# Patient Record
Sex: FEMALE | Race: WHITE | NOT HISPANIC OR LATINO | Employment: OTHER | URBAN - METROPOLITAN AREA
[De-identification: names, ages, dates, MRNs, and addresses within clinical notes are randomized per-mention and may not be internally consistent; named-entity substitution may affect disease eponyms.]

---

## 2019-07-02 ENCOUNTER — OFFICE VISIT (OUTPATIENT)
Dept: CARDIOLOGY | Facility: CLINIC | Age: 64
End: 2019-07-02

## 2019-07-02 VITALS
BODY MASS INDEX: 24.86 KG/M2 | OXYGEN SATURATION: 96 % | HEART RATE: 75 BPM | WEIGHT: 164 LBS | HEIGHT: 68 IN | SYSTOLIC BLOOD PRESSURE: 143 MMHG | DIASTOLIC BLOOD PRESSURE: 84 MMHG

## 2019-07-02 DIAGNOSIS — E78.5 DYSLIPIDEMIA: ICD-10-CM

## 2019-07-02 DIAGNOSIS — R07.89 CHEST PAIN, ATYPICAL: Primary | ICD-10-CM

## 2019-07-02 DIAGNOSIS — I10 ESSENTIAL HYPERTENSION: ICD-10-CM

## 2019-07-02 DIAGNOSIS — I20.8 OTHER FORMS OF ANGINA PECTORIS (HCC): ICD-10-CM

## 2019-07-02 DIAGNOSIS — J41.0 SIMPLE CHRONIC BRONCHITIS (HCC): ICD-10-CM

## 2019-07-02 PROCEDURE — 93000 ELECTROCARDIOGRAM COMPLETE: CPT | Performed by: INTERNAL MEDICINE

## 2019-07-02 PROCEDURE — 99204 OFFICE O/P NEW MOD 45 MIN: CPT | Performed by: INTERNAL MEDICINE

## 2019-07-02 RX ORDER — CLONAZEPAM 1 MG/1
TABLET ORAL
Refills: 0 | Status: ON HOLD | COMMUNITY
Start: 2019-06-03 | End: 2020-10-30

## 2019-07-02 RX ORDER — ATENOLOL 25 MG/1
25 TABLET ORAL DAILY
Refills: 0 | COMMUNITY
Start: 2019-04-29 | End: 2022-10-18 | Stop reason: HOSPADM

## 2019-07-02 RX ORDER — MELOXICAM 15 MG/1
TABLET ORAL
Refills: 0 | COMMUNITY
Start: 2019-06-19 | End: 2019-07-02

## 2019-07-02 RX ORDER — SIMVASTATIN 80 MG
TABLET ORAL
Refills: 0 | Status: ON HOLD | COMMUNITY
Start: 2019-05-14 | End: 2020-10-30

## 2019-07-02 RX ORDER — LISINOPRIL 20 MG/1
20 TABLET ORAL DAILY
Refills: 0 | COMMUNITY
Start: 2019-04-29

## 2019-07-02 RX ORDER — LURASIDONE HYDROCHLORIDE 60 MG/1
TABLET, FILM COATED ORAL
Refills: 0 | Status: ON HOLD | COMMUNITY
Start: 2019-05-15 | End: 2020-10-30

## 2019-07-02 RX ORDER — DILTIAZEM HYDROCHLORIDE 60 MG/1
2 TABLET, FILM COATED ORAL
Refills: 0 | COMMUNITY
Start: 2019-06-26 | End: 2022-10-14

## 2019-07-02 RX ORDER — ZOLPIDEM TARTRATE 5 MG/1
TABLET ORAL
Refills: 0 | COMMUNITY
Start: 2019-05-17 | End: 2020-10-28 | Stop reason: ALTCHOICE

## 2019-07-02 RX ORDER — GABAPENTIN 300 MG/1
300 CAPSULE ORAL 2 TIMES DAILY
Refills: 0 | COMMUNITY
Start: 2019-06-19 | End: 2020-10-28 | Stop reason: SDUPTHER

## 2019-07-02 RX ORDER — SERTRALINE HYDROCHLORIDE 100 MG/1
100 TABLET, FILM COATED ORAL DAILY
Refills: 0 | COMMUNITY
Start: 2019-06-07

## 2019-07-02 RX ORDER — TIZANIDINE 4 MG/1
4 TABLET ORAL EVERY MORNING
COMMUNITY

## 2019-07-02 RX ORDER — AMITRIPTYLINE HYDROCHLORIDE 75 MG/1
75 TABLET, FILM COATED ORAL NIGHTLY
Refills: 0 | COMMUNITY
Start: 2019-06-24

## 2019-07-02 RX ORDER — BUTALBITAL/ACETAMINOPHEN 50MG-325MG
TABLET ORAL
Refills: 0 | COMMUNITY
Start: 2019-05-17 | End: 2019-07-02

## 2019-07-02 RX ORDER — GLIPIZIDE 10 MG/1
10 TABLET ORAL 2 TIMES DAILY
Refills: 0 | COMMUNITY
Start: 2019-04-15 | End: 2022-10-18 | Stop reason: HOSPADM

## 2019-07-02 NOTE — PROGRESS NOTES
Encounter Date:07/02/2019  New patient      Patient ID: Arianna Blanco is a 63 y.o. female.    Chief Complaint:  Chest pain  Shortness of breath  Hypertension  Diabetes  Dyslipidemia      History of Present Illness  The patient is a pleasant 63-year-old white female is here for evaluation of recent onset of substernal heaviness and tightness sensation with radiation to the left arm.  Occurs both at rest and with exertion.  Not associated with any sweating or nausea.  Sometimes she has right-sided numbness.  No other associated aggravating or elevating factors.  The symptoms are of new onset and increasing in frequency intermittent and significant.    Patient is not having any shortness of breath, palpitations, dizziness or syncope.  Denies having any headache ,abdominal pain ,nausea, vomiting , diarrhea constipation, loss of weight or loss of appetite.  Denies having any excessive bruising ,hematuria or blood in the stool.    Review of all systems negative except as indicated  Assessment and Plan       ]]]]]]]]]]]]]]]]]]]  Impression  =========  -Chest discomfort suggestive of angina pectoris.    - Diabetes hypertension COPD dyslipidemia    -Status post hysterectomy cholecystectomy and carpal tunnel surgery.    -Family history is positive for coronary artery disease.    -Smoker- abstinence from smoking was advised.    - No known allergies  ============  Plan  ========    EKG showed sinus rhythm without any ischemic changes  Patient has symptoms concerning for angina pectoris.  Patient has multiple risk factors for coronary artery disease.  Ischemic cardiac work-up.  Stress Cardiolite test  Echocardiogram.  Medications were reviewed and updated.  Abstinence from smoking.  Follow-up in the office on the same day.  Further plan will depend on patient's progress.  ]]]]]]]]]]]]]]         Diagnosis Plan   1. Chest pain, atypical  Adult Transthoracic Echo Complete W/ Cont if Necessary Per Protocol    Stress Test With  Myocardial Perfusion One Day    ECG 12 Lead   2. Other forms of angina pectoris (CMS/HCC)  ECG 12 Lead   3. Dyslipidemia  ECG 12 Lead   4. Essential hypertension  ECG 12 Lead   5. Simple chronic bronchitis (CMS/HCC)  ECG 12 Lead   LAB RESULTS (LAST 7 DAYS)    CBC        BMP        CMP         BNP        TROPONIN        CoAg        Creatinine Clearance  CrCl cannot be calculated (No order found.).    ABG        Radiology  No radiology results for the last day                The following portions of the patient's history were reviewed and updated as appropriate: allergies, current medications, past family history, past medical history, past social history, past surgical history and problem list.    Review of Systems   Constitution: Positive for weakness. Negative for weight gain and weight loss.   Cardiovascular: Positive for chest pain (sharp, tightness ). Negative for leg swelling, palpitations and syncope.   Respiratory: Negative for shortness of breath.    Skin: Negative for rash.   Gastrointestinal: Negative for nausea and vomiting.   Neurological: Positive for light-headedness and numbness (Rt arm ). Negative for dizziness.         Current Outpatient Medications:   •  tiZANidine (ZANAFLEX) 4 MG tablet, Take 4 mg by mouth At Night As Needed for Muscle Spasms., Disp: , Rfl:   •  amitriptyline (ELAVIL) 75 MG tablet, , Disp: , Rfl: 0  •  atenolol (TENORMIN) 25 MG tablet, Take 12.5 mg by mouth Daily., Disp: , Rfl: 0  •  clonazePAM (KlonoPIN) 1 MG tablet, , Disp: , Rfl: 0  •  gabapentin (NEURONTIN) 100 MG capsule, take 1 capsule by mouth every evening for 3 days then take 1 caps...  (REFER TO PRESCRIPTION NOTES)., Disp: , Rfl: 0  •  glipiZIDE (GLUCOTROL) 10 MG tablet, Take 10 mg by mouth 2 (Two) Times a Day., Disp: , Rfl: 0  •  LATUDA 60 MG tablet tablet, , Disp: , Rfl: 0  •  lisinopril (PRINIVIL,ZESTRIL) 20 MG tablet, , Disp: , Rfl: 0  •  metFORMIN (GLUCOPHAGE) 1000 MG tablet, , Disp: , Rfl: 0  •  nicotine  "polacrilex (NICORETTE) 4 MG gum, , Disp: , Rfl: 0  •  sertraline (ZOLOFT) 100 MG tablet, Take 150 mg by mouth Daily., Disp: , Rfl: 0  •  simvastatin (ZOCOR) 80 MG tablet, , Disp: , Rfl: 0  •  SYMBICORT 80-4.5 MCG/ACT inhaler, , Disp: , Rfl: 0  •  zolpidem (AMBIEN) 5 MG tablet, , Disp: , Rfl: 0    No Known Allergies    Family History   Problem Relation Age of Onset   • Heart disease Mother    • Hypertension Mother    • Diabetes Mother    • Stroke Mother        Past Surgical History:   Procedure Laterality Date   • CARPAL TUNNEL RELEASE Bilateral 2017   • CHOLECYSTECTOMY  1980   • HYSTERECTOMY  1980       Past Medical History:   Diagnosis Date   • COPD (chronic obstructive pulmonary disease) (CMS/HCC)    • Depression    • Diabetes mellitus (CMS/HCC)    • Hypertension        Family History   Problem Relation Age of Onset   • Heart disease Mother    • Hypertension Mother    • Diabetes Mother    • Stroke Mother        Social History     Socioeconomic History   • Marital status:      Spouse name: Not on file   • Number of children: Not on file   • Years of education: Not on file   • Highest education level: Not on file   Tobacco Use   • Smoking status: Current Every Day Smoker     Packs/day: 1.00     Years: 20.00     Pack years: 20.00     Types: Cigarettes   Substance and Sexual Activity   • Alcohol use: No     Frequency: Never           ECG 12 Lead  Date/Time: 7/2/2019 2:15 PM  Performed by: Donaldo Archer MD  Authorized by: Donaldo Archer MD   Previous ECG: no previous ECG available  Comments: Normal sinus rhythm nonspecific ST-T wave changes 80/min normal axis normal intervals no ectopy              Objective:       Physical Exam    /84 (BP Location: Right arm, Patient Position: Sitting, Cuff Size: Adult)   Pulse 75   Ht 172.7 cm (68\")   Wt 74.4 kg (164 lb)   SpO2 96%   BMI 24.94 kg/m²   The patient is alert, oriented and in no distress.    Vital signs as noted above.    Head and neck revealed " no carotid bruits or jugular venous distension.  No thyromegaly or lymphadenopathy is present.    Lungs clear.  No wheezing.  Breath sounds are normal bilaterally.    Heart normal first and second heart sounds.  No murmur..  No pericardial rub is present.  No gallop is present.    Abdomen soft and nontender.  No organomegaly is present.    Extremities revealed good peripheral pulses without any pedal edema.    Skin warm and dry.    Musculoskeletal system is grossly normal.    CNS grossly normal.

## 2019-07-10 DIAGNOSIS — R07.89 CHEST PAIN, ATYPICAL: Primary | ICD-10-CM

## 2019-07-16 ENCOUNTER — APPOINTMENT (OUTPATIENT)
Dept: CARDIOLOGY | Facility: HOSPITAL | Age: 64
End: 2019-07-16

## 2020-10-28 ENCOUNTER — OFFICE VISIT (OUTPATIENT)
Dept: CARDIOLOGY | Facility: CLINIC | Age: 65
End: 2020-10-28

## 2020-10-28 VITALS
SYSTOLIC BLOOD PRESSURE: 130 MMHG | HEIGHT: 68 IN | WEIGHT: 184 LBS | HEART RATE: 76 BPM | BODY MASS INDEX: 27.89 KG/M2 | DIASTOLIC BLOOD PRESSURE: 80 MMHG | OXYGEN SATURATION: 97 %

## 2020-10-28 DIAGNOSIS — I20.0 UNSTABLE ANGINA (HCC): Primary | ICD-10-CM

## 2020-10-28 DIAGNOSIS — R07.89 CHEST PAIN, ATYPICAL: ICD-10-CM

## 2020-10-28 DIAGNOSIS — J41.0 SIMPLE CHRONIC BRONCHITIS (HCC): ICD-10-CM

## 2020-10-28 DIAGNOSIS — I10 ESSENTIAL HYPERTENSION: ICD-10-CM

## 2020-10-28 DIAGNOSIS — E78.5 DYSLIPIDEMIA: ICD-10-CM

## 2020-10-28 PROCEDURE — 99215 OFFICE O/P EST HI 40 MIN: CPT | Performed by: INTERNAL MEDICINE

## 2020-10-28 PROCEDURE — 93000 ELECTROCARDIOGRAM COMPLETE: CPT | Performed by: INTERNAL MEDICINE

## 2020-10-28 RX ORDER — INSULIN LISPRO 100 U/ML
11 INJECTION, SOLUTION SUBCUTANEOUS
COMMUNITY
Start: 2020-10-03 | End: 2022-10-14

## 2020-10-28 RX ORDER — BREXPIPRAZOLE 2 MG/1
1 TABLET ORAL
COMMUNITY
Start: 2020-10-03 | End: 2020-10-28 | Stop reason: ALTCHOICE

## 2020-10-28 RX ORDER — SUVOREXANT 20 MG/1
1 TABLET, FILM COATED ORAL
COMMUNITY
Start: 2020-10-16 | End: 2020-10-28 | Stop reason: ALTCHOICE

## 2020-10-28 RX ORDER — TOPIRAMATE 25 MG/1
25 TABLET ORAL DAILY
COMMUNITY
Start: 2020-10-19 | End: 2022-10-14

## 2020-10-28 RX ORDER — IBUPROFEN 800 MG/1
800 TABLET ORAL 3 TIMES DAILY
COMMUNITY
Start: 2020-10-01 | End: 2022-10-18 | Stop reason: HOSPADM

## 2020-10-28 RX ORDER — BUTALBITAL/ACETAMINOPHEN 50MG-325MG
1 TABLET ORAL DAILY PRN
COMMUNITY
Start: 2020-09-10

## 2020-10-28 RX ORDER — LAMOTRIGINE 50 MG/1
100 TABLET, ORALLY DISINTEGRATING ORAL DAILY
COMMUNITY
Start: 2020-10-01 | End: 2022-10-14

## 2020-10-28 RX ORDER — INSULIN GLARGINE 100 [IU]/ML
50 INJECTION, SOLUTION SUBCUTANEOUS NIGHTLY
COMMUNITY
Start: 2020-09-16 | End: 2021-06-29

## 2020-10-28 NOTE — PROGRESS NOTES
Encounter Date:10/28/2020  Last seen 7/2/2019      Patient ID: Arianna Blanco is a 65 y.o. female.    Chief Complaint:  Chest pain  Shortness of breath  Hypertension  Diabetes  Dyslipidemia        History of Present Illness  Patient is having continued chest discomfort.  Patient was last seen in the office in July 2019.  Recently she was admitted to [facility] with increasing chest pain and apparently had negative cardiac work-up.  However patient continues to have chest discomfort substernal and left precordial heaviness and tightness in the chest with radiation to the left arm occurring at rest and with activity.  No other associated aggravating or alleviating factors.  Chest discomfort is getting to be more frequent and more persistent.  Moderate to severe in nature.  Patient is not having any shortness of breath, palpitations, dizziness or syncope.  Denies having any headache ,abdominal pain ,nausea, vomiting , diarrhea constipation, loss of weight or loss of appetite.  Denies having any excessive bruising ,hematuria or blood in the stool.    Review of all systems negative except as indicated.    Reviewed ROS.    Assessment and Plan         ]]]]]]]]]]]]]]]]]]]  Impression  =========  -Chest discomfort suggestive of unstable angina pectoris.  Patient apparently had work-up at [facility] 2 months back which was negative.     - Diabetes hypertension COPD dyslipidemia     -Status post hysterectomy cholecystectomy and carpal tunnel surgery.     -Family history is positive for coronary artery disease.     -Smoker- abstinence from smoking was advised.     - No known allergies  ============  Plan  ========  Patient has symptoms that are concerning for unstable angina pectoris.  Delete that  EKG showed sinus rhythm without any ischemic changes  Patient has multiple risk factors for coronary artery disease.  Medications were reviewed and updated.  Abstinence from smoking.  Patient was advised  cardiac catheterization and coronary artery graffiti for definite evaluation of coronary artery status since patient has continued symptoms and having multiple underlying risk factors.  Risks and benefits pros and cons of the procedure were discussed with patient.  Hold Metformin prior to cardiac catheterization  Further plan will depend on patient's progress.  ]]]]]]]]]]]]]]                      Diagnosis Plan   1. Unstable angina (CMS/HCC)  Case Request Cath Lab: Left Heart Cath with Coronary Angiography    CBC (No Diff)    Basic Metabolic Panel    Protime-INR    Lipid Panel    ECG 12 Lead    XR Chest 2 View    COVID PRE-OP / PRE-PROCEDURE SCREENING ORDER (NO ISOLATION) - Swab, Nasopharynx   2. Chest pain, atypical  COVID PRE-OP / PRE-PROCEDURE SCREENING ORDER (NO ISOLATION) - Swab, Nasopharynx   3. Essential hypertension  Case Request Cath Lab: Left Heart Cath with Coronary Angiography    CBC (No Diff)    Basic Metabolic Panel    Protime-INR    Lipid Panel    ECG 12 Lead    XR Chest 2 View    COVID PRE-OP / PRE-PROCEDURE SCREENING ORDER (NO ISOLATION) - Swab, Nasopharynx   4. Dyslipidemia  Case Request Cath Lab: Left Heart Cath with Coronary Angiography    CBC (No Diff)    Basic Metabolic Panel    Protime-INR    Lipid Panel    ECG 12 Lead    XR Chest 2 View    COVID PRE-OP / PRE-PROCEDURE SCREENING ORDER (NO ISOLATION) - Swab, Nasopharynx   5. Simple chronic bronchitis (CMS/HCC)  Case Request Cath Lab: Left Heart Cath with Coronary Angiography    CBC (No Diff)    Basic Metabolic Panel    Protime-INR    Lipid Panel    ECG 12 Lead    XR Chest 2 View    COVID PRE-OP / PRE-PROCEDURE SCREENING ORDER (NO ISOLATION) - Swab, Nasopharynx   LAB RESULTS (LAST 7 DAYS)    CBC        BMP        CMP         BNP        TROPONIN        CoAg        Creatinine Clearance  CrCl cannot be calculated (No successful lab value found.).    ABG        Radiology  No radiology results for the last day                The following portions of  the patient's history were reviewed and updated as appropriate: allergies, current medications, past family history, past medical history, past social history, past surgical history and problem list.    Review of Systems   Constitution: Positive for malaise/fatigue. Negative for fever.   HENT: Negative for congestion and hearing loss.    Eyes: Negative for double vision and visual disturbance.   Cardiovascular: Positive for chest pain and dyspnea on exertion. Negative for claudication, leg swelling and syncope.   Respiratory: Positive for shortness of breath. Negative for cough.    Endocrine: Negative for cold intolerance.   Skin: Negative for color change and rash.   Musculoskeletal: Negative for arthritis and joint pain.   Gastrointestinal: Negative for abdominal pain and heartburn.   Genitourinary: Negative for hematuria.   Neurological: Negative for excessive daytime sleepiness and dizziness.   Psychiatric/Behavioral: Negative for depression. The patient is not nervous/anxious.    All other systems reviewed and are negative.        Current Outpatient Medications:   •  Admelog SoloStar 100 UNIT/ML solution pen-injector, INJECT 10 UNITS SUBCUTANEOUSLY BEFORE EACH MEAL  STOP NOVOLOG, Disp: , Rfl:   •  albuterol sulfate  (90 Base) MCG/ACT inhaler, , Disp: , Rfl:   •  amitriptyline (ELAVIL) 75 MG tablet, , Disp: , Rfl: 0  •  atenolol (TENORMIN) 25 MG tablet, Take 12.5 mg by mouth Daily., Disp: , Rfl: 0  •  butalbital-acetaminophen  MG tablet tablet, Take 1 tablet by mouth Daily As Needed., Disp: , Rfl:   •  gabapentin (NEURONTIN) 300 MG capsule, Take 600 mg by mouth every night at bedtime., Disp: , Rfl:   •  glipiZIDE (GLUCOTROL) 10 MG tablet, Take 10 mg by mouth 2 (Two) Times a Day., Disp: , Rfl: 0  •  ibuprofen (ADVIL,MOTRIN) 800 MG tablet, Take 800 mg by mouth 3 (Three) Times a Day., Disp: , Rfl:   •  Insulin Glargine (BASAGLAR KWIKPEN) 100 UNIT/ML injection pen, INJECT 25 UNIT UNDER THE SKIN WITH  SUPPER, Disp: , Rfl:   •  Insulin Glargine (BASAGLAR KWIKPEN) 100 UNIT/ML injection pen, INJECT 50 UNITS SUBCUTANEOUSLY EVERY DAY WITH SUPPER, Disp: , Rfl:   •  lamoTRIgine (LaMICtal) 50 MG tablet dispersible disintegrating tablet, 100 mg Daily., Disp: , Rfl:   •  lisinopril (PRINIVIL,ZESTRIL) 20 MG tablet, , Disp: , Rfl: 0  •  metFORMIN (GLUCOPHAGE) 1000 MG tablet, , Disp: , Rfl: 0  •  sertraline (ZOLOFT) 100 MG tablet, Take 200 mg by mouth Daily., Disp: , Rfl: 0  •  simvastatin (ZOCOR) 80 MG tablet, , Disp: , Rfl: 0  •  SYMBICORT 80-4.5 MCG/ACT inhaler, , Disp: , Rfl: 0  •  tiZANidine (ZANAFLEX) 4 MG tablet, Take 4 mg by mouth At Night As Needed for Muscle Spasms., Disp: , Rfl:   •  topiramate (TOPAMAX) 25 MG tablet, Take 25 mg by mouth Daily., Disp: , Rfl:   •  clonazePAM (KlonoPIN) 1 MG tablet, , Disp: , Rfl: 0  •  LATUDA 60 MG tablet tablet, , Disp: , Rfl: 0    Allergies   Allergen Reactions   • Codeine GI Intolerance       Family History   Problem Relation Age of Onset   • Heart disease Mother    • Hypertension Mother    • Diabetes Mother    • Stroke Mother        Past Surgical History:   Procedure Laterality Date   • CARPAL TUNNEL RELEASE Bilateral 2017   • CHOLECYSTECTOMY  1980   • HYSTERECTOMY  1980       Past Medical History:   Diagnosis Date   • COPD (chronic obstructive pulmonary disease) (CMS/[facility])    • Depression    • Diabetes mellitus (CMS/[facility])    • Hyperlipidemia    • Hypertension        Family History   Problem Relation Age of Onset   • Heart disease Mother    • Hypertension Mother    • Diabetes Mother    • Stroke Mother        Social History     Socioeconomic History   • Marital status:      Spouse name: Not on file   • Number of children: Not on file   • Years of education: Not on file   • Highest education level: Not on file   Tobacco Use   • Smoking status: Current Every Day Smoker     Packs/day: 1.00     Years: 20.00     Pack years: 20.00     Types: Cigarettes   • Smokeless tobacco:  "Never Used   Substance and Sexual Activity   • Alcohol use: No     Frequency: Never   • Sexual activity: Defer           ECG 12 Lead    Date/Time: 10/28/2020 4:34 PM  Performed by: Donaldo Archer MD  Authorized by: Donaldo Archer MD   Comparison: compared with previous ECG   Similar to previous ECG  Comparison to previous ECG: Normal sinus rhythm nonspecific ST-T wave changes 73/min normal axis normal intervals no ectopy is from 7/2/2019                Objective:       Physical Exam    /80   Pulse 76   Ht 172.7 cm (68\")   Wt 83.5 kg (184 lb)   SpO2 97%   BMI 27.98 kg/m²   The patient is alert, oriented and in no distress.    Vital signs as noted above.    Head and neck revealed no carotid bruits or jugular venous distension.  No thyromegaly or lymphadenopathy is present.    Lungs clear.  No wheezing.  Breath sounds are normal bilaterally.    Heart normal first and second heart sounds.  No murmur..  No pericardial rub is present.  No gallop is present.    Abdomen soft and nontender.  No organomegaly is present.    Extremities revealed good peripheral pulses without any pedal edema.    Skin warm and dry.    Musculoskeletal system is grossly normal.    CNS grossly normal.    Reviewed and unchanged from last visit.        "

## 2020-10-30 ENCOUNTER — HOSPITAL ENCOUNTER (OUTPATIENT)
Dept: GENERAL RADIOLOGY | Facility: HOSPITAL | Age: 65
Setting detail: HOSPITAL OUTPATIENT SURGERY
Discharge: HOME | End: 2020-10-30

## 2020-10-30 ENCOUNTER — HOSPITAL ENCOUNTER (OUTPATIENT)
Facility: HOSPITAL | Age: 65
Setting detail: HOSPITAL OUTPATIENT SURGERY
Discharge: LEFT BEFORE DISCHARGE | End: 2020-10-30
Attending: INTERNAL MEDICINE | Admitting: INTERNAL MEDICINE

## 2020-10-30 VITALS
TEMPERATURE: 97.7 F | OXYGEN SATURATION: 95 % | SYSTOLIC BLOOD PRESSURE: 131 MMHG | WEIGHT: 181.88 LBS | HEART RATE: 92 BPM | RESPIRATION RATE: 17 BRPM | HEIGHT: 68 IN | DIASTOLIC BLOOD PRESSURE: 111 MMHG | BODY MASS INDEX: 27.57 KG/M2

## 2020-10-30 DIAGNOSIS — R07.89 CHEST PAIN, ATYPICAL: ICD-10-CM

## 2020-10-30 DIAGNOSIS — J41.0 SIMPLE CHRONIC BRONCHITIS (HCC): ICD-10-CM

## 2020-10-30 DIAGNOSIS — I20.0 UNSTABLE ANGINA (HCC): ICD-10-CM

## 2020-10-30 DIAGNOSIS — E78.5 DYSLIPIDEMIA: ICD-10-CM

## 2020-10-30 DIAGNOSIS — I10 ESSENTIAL HYPERTENSION: ICD-10-CM

## 2020-10-30 LAB
ANION GAP SERPL CALCULATED.3IONS-SCNC: 10 MMOL/L (ref 5–15)
BUN SERPL-MCNC: 16 MG/DL (ref 8–23)
BUN/CREAT SERPL: 24.2 (ref 7–25)
CALCIUM SPEC-SCNC: 9.4 MG/DL (ref 8.6–10.5)
CHLORIDE SERPL-SCNC: 101 MMOL/L (ref 98–107)
CHOLEST SERPL-MCNC: 295 MG/DL (ref 0–200)
CO2 SERPL-SCNC: 23 MMOL/L (ref 22–29)
CREAT SERPL-MCNC: 0.66 MG/DL (ref 0.57–1)
DEPRECATED RDW RBC AUTO: 40.3 FL (ref 37–54)
ERYTHROCYTE [DISTWIDTH] IN BLOOD BY AUTOMATED COUNT: 13.1 % (ref 12.3–15.4)
GFR SERPL CREATININE-BSD FRML MDRD: 90 ML/MIN/1.73
GLUCOSE BLDC GLUCOMTR-MCNC: 237 MG/DL (ref 70–105)
GLUCOSE BLDC GLUCOMTR-MCNC: 289 MG/DL (ref 70–105)
GLUCOSE SERPL-MCNC: 292 MG/DL (ref 65–99)
HCT VFR BLD AUTO: 39.7 % (ref 34–46.6)
HDLC SERPL-MCNC: 44 MG/DL (ref 40–60)
HGB BLD-MCNC: 13.9 G/DL (ref 12–15.9)
INR PPP: 0.94 (ref 0.93–1.1)
LDLC SERPL CALC-MCNC: 160 MG/DL (ref 0–100)
LDLC/HDLC SERPL: 3.57 {RATIO}
MCH RBC QN AUTO: 30.7 PG (ref 26.6–33)
MCHC RBC AUTO-ENTMCNC: 34.9 G/DL (ref 31.5–35.7)
MCV RBC AUTO: 87.8 FL (ref 79–97)
PLATELET # BLD AUTO: 151 10*3/MM3 (ref 140–450)
PMV BLD AUTO: 9.3 FL (ref 6–12)
POTASSIUM SERPL-SCNC: 4.2 MMOL/L (ref 3.5–5.2)
PROTHROMBIN TIME: 10.4 SECONDS (ref 9.6–11.7)
RBC # BLD AUTO: 4.52 10*6/MM3 (ref 3.77–5.28)
SARS-COV-2 RNA PNL SPEC NAA+PROBE: NOT DETECTED
SODIUM SERPL-SCNC: 134 MMOL/L (ref 136–145)
TRIGL SERPL-MCNC: 470 MG/DL (ref 0–150)
VLDLC SERPL-MCNC: 91 MG/DL (ref 5–40)
WBC # BLD AUTO: 6.3 10*3/MM3 (ref 3.4–10.8)

## 2020-10-30 PROCEDURE — 0 IOPAMIDOL PER 1 ML: Performed by: INTERNAL MEDICINE

## 2020-10-30 PROCEDURE — 93458 L HRT ARTERY/VENTRICLE ANGIO: CPT | Performed by: INTERNAL MEDICINE

## 2020-10-30 PROCEDURE — 85610 PROTHROMBIN TIME: CPT | Performed by: INTERNAL MEDICINE

## 2020-10-30 PROCEDURE — 25010000002 FENTANYL CITRATE (PF) 100 MCG/2ML SOLUTION: Performed by: INTERNAL MEDICINE

## 2020-10-30 PROCEDURE — C1894 INTRO/SHEATH, NON-LASER: HCPCS | Performed by: INTERNAL MEDICINE

## 2020-10-30 PROCEDURE — C9803 HOPD COVID-19 SPEC COLLECT: HCPCS

## 2020-10-30 PROCEDURE — 93005 ELECTROCARDIOGRAM TRACING: CPT | Performed by: INTERNAL MEDICINE

## 2020-10-30 PROCEDURE — 82962 GLUCOSE BLOOD TEST: CPT

## 2020-10-30 PROCEDURE — 99153 MOD SED SAME PHYS/QHP EA: CPT | Performed by: INTERNAL MEDICINE

## 2020-10-30 PROCEDURE — 99152 MOD SED SAME PHYS/QHP 5/>YRS: CPT | Performed by: INTERNAL MEDICINE

## 2020-10-30 PROCEDURE — 85027 COMPLETE CBC AUTOMATED: CPT | Performed by: INTERNAL MEDICINE

## 2020-10-30 PROCEDURE — 87635 SARS-COV-2 COVID-19 AMP PRB: CPT | Performed by: INTERNAL MEDICINE

## 2020-10-30 PROCEDURE — 80061 LIPID PANEL: CPT | Performed by: INTERNAL MEDICINE

## 2020-10-30 PROCEDURE — 71046 X-RAY EXAM CHEST 2 VIEWS: CPT

## 2020-10-30 PROCEDURE — C1769 GUIDE WIRE: HCPCS | Performed by: INTERNAL MEDICINE

## 2020-10-30 PROCEDURE — 25010000002 ONDANSETRON PER 1 MG: Performed by: INTERNAL MEDICINE

## 2020-10-30 PROCEDURE — 80048 BASIC METABOLIC PNL TOTAL CA: CPT | Performed by: INTERNAL MEDICINE

## 2020-10-30 PROCEDURE — 93010 ELECTROCARDIOGRAM REPORT: CPT | Performed by: INTERNAL MEDICINE

## 2020-10-30 PROCEDURE — 25010000002 MIDAZOLAM PER 1 MG: Performed by: INTERNAL MEDICINE

## 2020-10-30 RX ORDER — SODIUM CHLORIDE 9 MG/ML
250 INJECTION, SOLUTION INTRAVENOUS ONCE AS NEEDED
Status: DISCONTINUED | OUTPATIENT
Start: 2020-10-30 | End: 2020-10-31 | Stop reason: HOSPADM

## 2020-10-30 RX ORDER — SODIUM CHLORIDE 0.9 % (FLUSH) 0.9 %
10 SYRINGE (ML) INJECTION AS NEEDED
Status: DISCONTINUED | OUTPATIENT
Start: 2020-10-30 | End: 2020-10-31 | Stop reason: HOSPADM

## 2020-10-30 RX ORDER — ONDANSETRON 2 MG/ML
4 INJECTION INTRAMUSCULAR; INTRAVENOUS EVERY 6 HOURS PRN
Status: DISCONTINUED | OUTPATIENT
Start: 2020-10-30 | End: 2020-10-31 | Stop reason: HOSPADM

## 2020-10-30 RX ORDER — DIPHENHYDRAMINE HCL 25 MG
25 CAPSULE ORAL EVERY 6 HOURS PRN
Status: DISCONTINUED | OUTPATIENT
Start: 2020-10-30 | End: 2020-10-31 | Stop reason: HOSPADM

## 2020-10-30 RX ORDER — ONDANSETRON 2 MG/ML
4 INJECTION INTRAMUSCULAR; INTRAVENOUS EVERY 6 HOURS PRN
Status: CANCELLED | OUTPATIENT
Start: 2020-10-30

## 2020-10-30 RX ORDER — ONDANSETRON 4 MG/1
4 TABLET, FILM COATED ORAL EVERY 6 HOURS PRN
Status: DISCONTINUED | OUTPATIENT
Start: 2020-10-30 | End: 2020-10-31 | Stop reason: HOSPADM

## 2020-10-30 RX ORDER — MIDAZOLAM HYDROCHLORIDE 1 MG/ML
INJECTION INTRAMUSCULAR; INTRAVENOUS AS NEEDED
Status: DISCONTINUED | OUTPATIENT
Start: 2020-10-30 | End: 2020-10-30 | Stop reason: HOSPADM

## 2020-10-30 RX ORDER — ASPIRIN 81 MG/1
81 TABLET ORAL DAILY
Status: DISCONTINUED | OUTPATIENT
Start: 2020-10-30 | End: 2020-10-31 | Stop reason: HOSPADM

## 2020-10-30 RX ORDER — ISOSORBIDE MONONITRATE 60 MG/1
60 TABLET, EXTENDED RELEASE ORAL
Status: DISCONTINUED | OUTPATIENT
Start: 2020-10-30 | End: 2020-10-31 | Stop reason: HOSPADM

## 2020-10-30 RX ORDER — SODIUM CHLORIDE 9 MG/ML
30 INJECTION, SOLUTION INTRAVENOUS CONTINUOUS
Status: DISCONTINUED | OUTPATIENT
Start: 2020-10-30 | End: 2020-10-31 | Stop reason: HOSPADM

## 2020-10-30 RX ORDER — LIDOCAINE HYDROCHLORIDE 20 MG/ML
INJECTION, SOLUTION INFILTRATION; PERINEURAL AS NEEDED
Status: DISCONTINUED | OUTPATIENT
Start: 2020-10-30 | End: 2020-10-30 | Stop reason: HOSPADM

## 2020-10-30 RX ORDER — ACETAMINOPHEN 325 MG/1
650 TABLET ORAL EVERY 4 HOURS PRN
Status: DISCONTINUED | OUTPATIENT
Start: 2020-10-30 | End: 2020-10-31 | Stop reason: HOSPADM

## 2020-10-30 RX ORDER — FENTANYL CITRATE 50 UG/ML
INJECTION, SOLUTION INTRAMUSCULAR; INTRAVENOUS AS NEEDED
Status: DISCONTINUED | OUTPATIENT
Start: 2020-10-30 | End: 2020-10-30 | Stop reason: HOSPADM

## 2020-10-30 RX ORDER — SODIUM CHLORIDE 0.9 % (FLUSH) 0.9 %
10 SYRINGE (ML) INJECTION EVERY 12 HOURS SCHEDULED
Status: DISCONTINUED | OUTPATIENT
Start: 2020-10-30 | End: 2020-10-31 | Stop reason: HOSPADM

## 2020-10-30 RX ADMIN — SODIUM CHLORIDE 30 ML/HR: 9 INJECTION, SOLUTION INTRAVENOUS at 12:50

## 2020-10-30 RX ADMIN — ONDANSETRON 4 MG: 2 INJECTION INTRAMUSCULAR; INTRAVENOUS at 17:56

## 2020-11-14 LAB — QT INTERVAL: 361 MS

## 2021-04-14 ENCOUNTER — TELEPHONE (OUTPATIENT)
Dept: CARDIOLOGY | Facility: CLINIC | Age: 66
End: 2021-04-14

## 2021-04-14 NOTE — TELEPHONE ENCOUNTER
[facility] called to verify which MG of Imdur the patient is supposed to be on.   30 or 60.   When looking in chart, that medication is not on her list or mention in OV notes.   Patient has been taking Imdur 60mg QD since February.   Please advise.

## 2021-06-29 ENCOUNTER — APPOINTMENT (OUTPATIENT)
Dept: GENERAL RADIOLOGY | Facility: HOSPITAL | Age: 66
End: 2021-06-29

## 2021-06-29 ENCOUNTER — HOSPITAL ENCOUNTER (EMERGENCY)
Facility: HOSPITAL | Age: 66
Discharge: HOME | End: 2021-06-29
Attending: EMERGENCY MEDICINE | Admitting: EMERGENCY MEDICINE

## 2021-06-29 VITALS
OXYGEN SATURATION: 95 % | BODY MASS INDEX: 26.67 KG/M2 | DIASTOLIC BLOOD PRESSURE: 60 MMHG | WEIGHT: 176 LBS | TEMPERATURE: 97.7 F | SYSTOLIC BLOOD PRESSURE: 117 MMHG | HEART RATE: 77 BPM | HEIGHT: 68 IN | RESPIRATION RATE: 18 BRPM

## 2021-06-29 DIAGNOSIS — R07.9 CHEST PAIN, UNSPECIFIED TYPE: Primary | ICD-10-CM

## 2021-06-29 LAB
ANION GAP SERPL CALCULATED.3IONS-SCNC: 11 MMOL/L (ref 5–15)
BASOPHILS # BLD AUTO: 0 10*3/MM3 (ref 0–0.2)
BASOPHILS NFR BLD AUTO: 0.2 % (ref 0–1.5)
BUN SERPL-MCNC: 13 MG/DL (ref 8–23)
BUN/CREAT SERPL: 16 (ref 7–25)
CALCIUM SPEC-SCNC: 9.1 MG/DL (ref 8.6–10.5)
CHLORIDE SERPL-SCNC: 100 MMOL/L (ref 98–107)
CO2 SERPL-SCNC: 24 MMOL/L (ref 22–29)
CREAT SERPL-MCNC: 0.81 MG/DL (ref 0.57–1)
DEPRECATED RDW RBC AUTO: 41.1 FL (ref 37–54)
EOSINOPHIL # BLD AUTO: 0.2 10*3/MM3 (ref 0–0.4)
EOSINOPHIL NFR BLD AUTO: 2.5 % (ref 0.3–6.2)
ERYTHROCYTE [DISTWIDTH] IN BLOOD BY AUTOMATED COUNT: 13.3 % (ref 12.3–15.4)
GFR SERPL CREATININE-BSD FRML MDRD: 71 ML/MIN/1.73
GLUCOSE SERPL-MCNC: 223 MG/DL (ref 65–99)
HCT VFR BLD AUTO: 38.9 % (ref 34–46.6)
HGB BLD-MCNC: 13.6 G/DL (ref 12–15.9)
LYMPHOCYTES # BLD AUTO: 3.3 10*3/MM3 (ref 0.7–3.1)
LYMPHOCYTES NFR BLD AUTO: 38 % (ref 19.6–45.3)
MCH RBC QN AUTO: 31.2 PG (ref 26.6–33)
MCHC RBC AUTO-ENTMCNC: 34.9 G/DL (ref 31.5–35.7)
MCV RBC AUTO: 89.4 FL (ref 79–97)
MONOCYTES # BLD AUTO: 0.6 10*3/MM3 (ref 0.1–0.9)
MONOCYTES NFR BLD AUTO: 6.8 % (ref 5–12)
NEUTROPHILS NFR BLD AUTO: 4.6 10*3/MM3 (ref 1.7–7)
NEUTROPHILS NFR BLD AUTO: 52.5 % (ref 42.7–76)
NRBC BLD AUTO-RTO: 0.1 /100 WBC (ref 0–0.2)
PLATELET # BLD AUTO: 162 10*3/MM3 (ref 140–450)
PMV BLD AUTO: 9.8 FL (ref 6–12)
POTASSIUM SERPL-SCNC: 4.1 MMOL/L (ref 3.5–5.2)
RBC # BLD AUTO: 4.35 10*6/MM3 (ref 3.77–5.28)
SODIUM SERPL-SCNC: 135 MMOL/L (ref 136–145)
TROPONIN T SERPL-MCNC: <0.01 NG/ML (ref 0–0.03)
WBC # BLD AUTO: 8.8 10*3/MM3 (ref 3.4–10.8)

## 2021-06-29 PROCEDURE — 99284 EMERGENCY DEPT VISIT MOD MDM: CPT

## 2021-06-29 PROCEDURE — 85025 COMPLETE CBC W/AUTO DIFF WBC: CPT | Performed by: EMERGENCY MEDICINE

## 2021-06-29 PROCEDURE — 93005 ELECTROCARDIOGRAM TRACING: CPT | Performed by: EMERGENCY MEDICINE

## 2021-06-29 PROCEDURE — 84484 ASSAY OF TROPONIN QUANT: CPT | Performed by: EMERGENCY MEDICINE

## 2021-06-29 PROCEDURE — 80048 BASIC METABOLIC PNL TOTAL CA: CPT | Performed by: EMERGENCY MEDICINE

## 2021-06-29 PROCEDURE — 71045 X-RAY EXAM CHEST 1 VIEW: CPT

## 2021-06-29 RX ORDER — NAPROXEN 375 MG/1
375 TABLET ORAL 2 TIMES DAILY PRN
Qty: 14 TABLET | Refills: 0 | Status: SHIPPED | OUTPATIENT
Start: 2021-06-29 | End: 2022-10-14

## 2021-06-29 RX ORDER — SUVOREXANT 20 MG/1
20 TABLET, FILM COATED ORAL NIGHTLY
COMMUNITY
End: 2022-10-14

## 2021-06-29 RX ORDER — ISOSORBIDE MONONITRATE 60 MG/1
60 TABLET, EXTENDED RELEASE ORAL DAILY
COMMUNITY
End: 2021-10-28

## 2021-06-29 RX ORDER — SODIUM CHLORIDE 0.9 % (FLUSH) 0.9 %
10 SYRINGE (ML) INJECTION AS NEEDED
Status: DISCONTINUED | OUTPATIENT
Start: 2021-06-29 | End: 2021-06-29 | Stop reason: HOSPADM

## 2021-06-29 RX ORDER — BREXPIPRAZOLE 2 MG/1
2 TABLET ORAL NIGHTLY
COMMUNITY

## 2021-06-29 RX ORDER — TIZANIDINE 4 MG/1
8 TABLET ORAL EVERY EVENING
COMMUNITY

## 2021-07-01 LAB — QT INTERVAL: 372 MS

## 2021-10-28 RX ORDER — ISOSORBIDE MONONITRATE 60 MG/1
TABLET, EXTENDED RELEASE ORAL
Qty: 30 TABLET | Refills: 0 | Status: SHIPPED | OUTPATIENT
Start: 2021-10-28 | End: 2022-10-18 | Stop reason: HOSPADM

## 2021-10-28 NOTE — TELEPHONE ENCOUNTER
Rx Refill Note  Requested Prescriptions     Pending Prescriptions Disp Refills   • isosorbide mononitrate (IMDUR) 60 MG 24 hr tablet [Pharmacy Med Name: ISOSORBIDE MONONIT ER 60 MG TB] 30 tablet 32     Sig: TAKE 1 TABLET BY MOUTH EVERY DAY IN THE MORNING      Last office visit with prescribing clinician: 10/28/2020      Next office visit with prescribing clinician: Visit date not found            Holly Ortega MA  10/28/21, 08:16 EDT

## 2021-11-29 RX ORDER — ISOSORBIDE MONONITRATE 60 MG/1
TABLET, EXTENDED RELEASE ORAL
Qty: 30 TABLET | Refills: 0 | OUTPATIENT
Start: 2021-11-29

## 2021-11-29 NOTE — TELEPHONE ENCOUNTER
Rx Refill Note  Requested Prescriptions     Pending Prescriptions Disp Refills   • isosorbide mononitrate (IMDUR) 60 MG 24 hr tablet [Pharmacy Med Name: ISOSORBIDE MONONIT ER 60 MG TB] 30 tablet 0     Sig: TAKE 1 TABLET BY MOUTH EVERY DAY IN THE MORNING      Last office visit with prescribing clinician: 10/28/2020      Next office visit with prescribing clinician: Visit date not found            Holly Ortega MA  11/29/21, 08:50 EST

## 2022-10-14 ENCOUNTER — APPOINTMENT (OUTPATIENT)
Dept: GENERAL RADIOLOGY | Facility: HOSPITAL | Age: 67
End: 2022-10-14

## 2022-10-14 ENCOUNTER — HOSPITAL ENCOUNTER (INPATIENT)
Facility: HOSPITAL | Age: 67
LOS: 4 days | Discharge: HOME | End: 2022-10-18
Attending: EMERGENCY MEDICINE | Admitting: INTERNAL MEDICINE

## 2022-10-14 DIAGNOSIS — Z91.14 HX OF MEDICATION NONCOMPLIANCE: ICD-10-CM

## 2022-10-14 DIAGNOSIS — I48.92 ATRIAL FLUTTER, UNSPECIFIED TYPE: ICD-10-CM

## 2022-10-14 DIAGNOSIS — R06.00 DYSPNEA, UNSPECIFIED TYPE: ICD-10-CM

## 2022-10-14 DIAGNOSIS — R07.9 CHEST PAIN, UNSPECIFIED TYPE: Primary | ICD-10-CM

## 2022-10-14 DIAGNOSIS — R73.9 HYPERGLYCEMIA: ICD-10-CM

## 2022-10-14 PROBLEM — F41.8 ANXIETY ASSOCIATED WITH DEPRESSION: Status: ACTIVE | Noted: 2022-10-14

## 2022-10-14 PROBLEM — R07.89 OTHER CHEST PAIN: Status: ACTIVE | Noted: 2022-10-14

## 2022-10-14 PROBLEM — I48.91 ATRIAL FIBRILLATION (HCC): Status: ACTIVE | Noted: 2022-10-14

## 2022-10-14 PROBLEM — E11.65 TYPE 2 DIABETES MELLITUS WITH HYPERGLYCEMIA: Status: ACTIVE | Noted: 2022-10-14

## 2022-10-14 LAB
ACETONE BLD QL: NEGATIVE
ALBUMIN SERPL-MCNC: 4 G/DL (ref 3.5–5.2)
ALBUMIN/GLOB SERPL: 1.6 G/DL
ALP SERPL-CCNC: 116 U/L (ref 39–117)
ALT SERPL W P-5'-P-CCNC: 31 U/L (ref 1–33)
ANION GAP SERPL CALCULATED.3IONS-SCNC: 12 MMOL/L (ref 5–15)
APTT PPP: 24.8 SECONDS (ref 61–76.5)
ARTERIAL PATENCY WRIST A: POSITIVE
AST SERPL-CCNC: 21 U/L (ref 1–32)
ATMOSPHERIC PRESS: ABNORMAL MM[HG]
BASE EXCESS BLDA CALC-SCNC: -2.5 MMOL/L (ref 0–3)
BASOPHILS # BLD AUTO: 0.1 10*3/MM3 (ref 0–0.2)
BASOPHILS NFR BLD AUTO: 0.7 % (ref 0–1.5)
BDY SITE: ABNORMAL
BILIRUB SERPL-MCNC: 0.2 MG/DL (ref 0–1.2)
BILIRUB UR QL STRIP: NEGATIVE
BUN SERPL-MCNC: 20 MG/DL (ref 8–23)
BUN/CREAT SERPL: 31.3 (ref 7–25)
CALCIUM SPEC-SCNC: 9 MG/DL (ref 8.6–10.5)
CHLORIDE SERPL-SCNC: 97 MMOL/L (ref 98–107)
CK SERPL-CCNC: 107 U/L (ref 20–180)
CLARITY UR: CLEAR
CO2 BLDA-SCNC: 22.2 MMOL/L (ref 22–29)
CO2 SERPL-SCNC: 24 MMOL/L (ref 22–29)
COLOR UR: YELLOW
CREAT SERPL-MCNC: 0.64 MG/DL (ref 0.57–1)
D DIMER PPP FEU-MCNC: <0.19 MG/L (FEU) (ref 0–0.59)
D-LACTATE SERPL-SCNC: 1.6 MMOL/L (ref 0.5–2)
DEPRECATED RDW RBC AUTO: 41.1 FL (ref 37–54)
EGFRCR SERPLBLD CKD-EPI 2021: 97.6 ML/MIN/1.73
EOSINOPHIL # BLD AUTO: 0.2 10*3/MM3 (ref 0–0.4)
EOSINOPHIL NFR BLD AUTO: 2.7 % (ref 0.3–6.2)
ERYTHROCYTE [DISTWIDTH] IN BLOOD BY AUTOMATED COUNT: 13 % (ref 12.3–15.4)
GLOBULIN UR ELPH-MCNC: 2.5 GM/DL
GLUCOSE BLDC GLUCOMTR-MCNC: 376 MG/DL (ref 70–105)
GLUCOSE BLDC GLUCOMTR-MCNC: 389 MG/DL (ref 70–105)
GLUCOSE SERPL-MCNC: 400 MG/DL (ref 65–99)
GLUCOSE UR STRIP-MCNC: ABNORMAL MG/DL
HBA1C MFR BLD: 12.3 % (ref 3.5–5.6)
HCO3 BLDA-SCNC: 21.2 MMOL/L (ref 21–28)
HCT VFR BLD AUTO: 37.9 % (ref 34–46.6)
HEMODILUTION: NO
HGB BLD-MCNC: 12.7 G/DL (ref 12–15.9)
HGB UR QL STRIP.AUTO: NEGATIVE
HOLD SPECIMEN: NORMAL
HOLD SPECIMEN: NORMAL
INHALED O2 CONCENTRATION: 21 %
INR PPP: 1 (ref 0.93–1.1)
KETONES UR QL STRIP: NEGATIVE
LEUKOCYTE ESTERASE UR QL STRIP.AUTO: NEGATIVE
LIPASE SERPL-CCNC: 33 U/L (ref 13–60)
LYMPHOCYTES # BLD AUTO: 2.3 10*3/MM3 (ref 0.7–3.1)
LYMPHOCYTES NFR BLD AUTO: 29.9 % (ref 19.6–45.3)
MAGNESIUM SERPL-MCNC: 1.6 MG/DL (ref 1.6–2.4)
MCH RBC QN AUTO: 30.5 PG (ref 26.6–33)
MCHC RBC AUTO-ENTMCNC: 33.5 G/DL (ref 31.5–35.7)
MCV RBC AUTO: 91.2 FL (ref 79–97)
MODALITY: ABNORMAL
MONOCYTES # BLD AUTO: 0.5 10*3/MM3 (ref 0.1–0.9)
MONOCYTES NFR BLD AUTO: 6.9 % (ref 5–12)
NEUTROPHILS NFR BLD AUTO: 4.6 10*3/MM3 (ref 1.7–7)
NEUTROPHILS NFR BLD AUTO: 59.8 % (ref 42.7–76)
NITRITE UR QL STRIP: NEGATIVE
NRBC BLD AUTO-RTO: 0 /100 WBC (ref 0–0.2)
NT-PROBNP SERPL-MCNC: 391.9 PG/ML (ref 0–900)
PCO2 BLDA: 32.5 MM HG (ref 35–48)
PH BLDA: 7.42 PH UNITS (ref 7.35–7.45)
PH UR STRIP.AUTO: 6 [PH] (ref 5–8)
PLATELET # BLD AUTO: 167 10*3/MM3 (ref 140–450)
PMV BLD AUTO: 10.3 FL (ref 6–12)
PO2 BLDA: 62.6 MM HG (ref 83–108)
POTASSIUM SERPL-SCNC: 4 MMOL/L (ref 3.5–5.2)
PROT SERPL-MCNC: 6.5 G/DL (ref 6–8.5)
PROT UR QL STRIP: NEGATIVE
PROTHROMBIN TIME: 10.3 SECONDS (ref 9.6–11.7)
QT INTERVAL: 340 MS
RBC # BLD AUTO: 4.15 10*6/MM3 (ref 3.77–5.28)
SAO2 % BLDCOA: 92.4 % (ref 94–98)
SODIUM SERPL-SCNC: 133 MMOL/L (ref 136–145)
SP GR UR STRIP: 1.04 (ref 1–1.03)
TROPONIN T SERPL-MCNC: <0.01 NG/ML (ref 0–0.03)
TSH SERPL DL<=0.05 MIU/L-ACNC: 1.37 UIU/ML (ref 0.27–4.2)
UROBILINOGEN UR QL STRIP: ABNORMAL
WBC NRBC COR # BLD: 7.8 10*3/MM3 (ref 3.4–10.8)
WHOLE BLOOD HOLD SPECIMEN: NORMAL

## 2022-10-14 PROCEDURE — 83690 ASSAY OF LIPASE: CPT | Performed by: NURSE PRACTITIONER

## 2022-10-14 PROCEDURE — 85379 FIBRIN DEGRADATION QUANT: CPT | Performed by: NURSE PRACTITIONER

## 2022-10-14 PROCEDURE — 87040 BLOOD CULTURE FOR BACTERIA: CPT | Performed by: NURSE PRACTITIONER

## 2022-10-14 PROCEDURE — 99222 1ST HOSP IP/OBS MODERATE 55: CPT | Performed by: INTERNAL MEDICINE

## 2022-10-14 PROCEDURE — 93005 ELECTROCARDIOGRAM TRACING: CPT | Performed by: NURSE PRACTITIONER

## 2022-10-14 PROCEDURE — 83605 ASSAY OF LACTIC ACID: CPT

## 2022-10-14 PROCEDURE — 36600 WITHDRAWAL OF ARTERIAL BLOOD: CPT

## 2022-10-14 PROCEDURE — 93005 ELECTROCARDIOGRAM TRACING: CPT | Performed by: EMERGENCY MEDICINE

## 2022-10-14 PROCEDURE — 83036 HEMOGLOBIN GLYCOSYLATED A1C: CPT | Performed by: INTERNAL MEDICINE

## 2022-10-14 PROCEDURE — 85730 THROMBOPLASTIN TIME PARTIAL: CPT | Performed by: NURSE PRACTITIONER

## 2022-10-14 PROCEDURE — 71045 X-RAY EXAM CHEST 1 VIEW: CPT

## 2022-10-14 PROCEDURE — 25010000002 DIGOXIN PER 500 MCG: Performed by: INTERNAL MEDICINE

## 2022-10-14 PROCEDURE — 93005 ELECTROCARDIOGRAM TRACING: CPT

## 2022-10-14 PROCEDURE — 82550 ASSAY OF CK (CPK): CPT | Performed by: NURSE PRACTITIONER

## 2022-10-14 PROCEDURE — 83880 ASSAY OF NATRIURETIC PEPTIDE: CPT | Performed by: NURSE PRACTITIONER

## 2022-10-14 PROCEDURE — 84484 ASSAY OF TROPONIN QUANT: CPT | Performed by: NURSE PRACTITIONER

## 2022-10-14 PROCEDURE — 82009 KETONE BODYS QUAL: CPT | Performed by: NURSE PRACTITIONER

## 2022-10-14 PROCEDURE — 99285 EMERGENCY DEPT VISIT HI MDM: CPT

## 2022-10-14 PROCEDURE — 82962 GLUCOSE BLOOD TEST: CPT

## 2022-10-14 PROCEDURE — 80050 GENERAL HEALTH PANEL: CPT | Performed by: NURSE PRACTITIONER

## 2022-10-14 PROCEDURE — 63710000001 INSULIN LISPRO (HUMAN) PER 5 UNITS: Performed by: NURSE PRACTITIONER

## 2022-10-14 PROCEDURE — 82803 BLOOD GASES ANY COMBINATION: CPT

## 2022-10-14 PROCEDURE — 83735 ASSAY OF MAGNESIUM: CPT | Performed by: NURSE PRACTITIONER

## 2022-10-14 PROCEDURE — 25010000002 ENOXAPARIN PER 10 MG: Performed by: NURSE PRACTITIONER

## 2022-10-14 PROCEDURE — 85610 PROTHROMBIN TIME: CPT | Performed by: NURSE PRACTITIONER

## 2022-10-14 PROCEDURE — 81003 URINALYSIS AUTO W/O SCOPE: CPT | Performed by: NURSE PRACTITIONER

## 2022-10-14 PROCEDURE — 63710000001 INSULIN LISPRO (HUMAN) PER 5 UNITS: Performed by: INTERNAL MEDICINE

## 2022-10-14 PROCEDURE — 63710000001 INSULIN GLARGINE PER 5 UNITS: Performed by: INTERNAL MEDICINE

## 2022-10-14 RX ORDER — DILTIAZEM HCL/D5W 125 MG/125
5-15 PLASTIC BAG, INJECTION (ML) INTRAVENOUS CONTINUOUS
Status: DISCONTINUED | OUTPATIENT
Start: 2022-10-14 | End: 2022-10-17

## 2022-10-14 RX ORDER — LATANOPROST 50 UG/ML
1 SOLUTION/ DROPS OPHTHALMIC NIGHTLY
Status: DISCONTINUED | OUTPATIENT
Start: 2022-10-14 | End: 2022-10-18 | Stop reason: HOSPADM

## 2022-10-14 RX ORDER — ONDANSETRON 4 MG/1
4 TABLET, FILM COATED ORAL EVERY 6 HOURS PRN
Status: DISCONTINUED | OUTPATIENT
Start: 2022-10-14 | End: 2022-10-18 | Stop reason: HOSPADM

## 2022-10-14 RX ORDER — ENOXAPARIN SODIUM 100 MG/ML
1 INJECTION SUBCUTANEOUS ONCE
Status: COMPLETED | OUTPATIENT
Start: 2022-10-14 | End: 2022-10-14

## 2022-10-14 RX ORDER — ENOXAPARIN SODIUM 100 MG/ML
80 INJECTION SUBCUTANEOUS EVERY 12 HOURS
Status: DISCONTINUED | OUTPATIENT
Start: 2022-10-15 | End: 2022-10-18

## 2022-10-14 RX ORDER — INSULIN GLULISINE 100 [IU]/ML
10 INJECTION, SOLUTION SUBCUTANEOUS
Status: ON HOLD | COMMUNITY
End: 2022-10-18 | Stop reason: SDUPTHER

## 2022-10-14 RX ORDER — OLANZAPINE 10 MG/2ML
1 INJECTION, POWDER, LYOPHILIZED, FOR SOLUTION INTRAMUSCULAR
Status: DISCONTINUED | OUTPATIENT
Start: 2022-10-14 | End: 2022-10-18 | Stop reason: HOSPADM

## 2022-10-14 RX ORDER — OLANZAPINE 10 MG/2ML
1 INJECTION, POWDER, LYOPHILIZED, FOR SOLUTION INTRAMUSCULAR
Status: DISCONTINUED | OUTPATIENT
Start: 2022-10-14 | End: 2022-10-14

## 2022-10-14 RX ORDER — ASPIRIN 81 MG/1
324 TABLET, CHEWABLE ORAL ONCE
Status: COMPLETED | OUTPATIENT
Start: 2022-10-14 | End: 2022-10-14

## 2022-10-14 RX ORDER — INSULIN LISPRO 100 [IU]/ML
8 INJECTION, SOLUTION INTRAVENOUS; SUBCUTANEOUS ONCE
Status: COMPLETED | OUTPATIENT
Start: 2022-10-14 | End: 2022-10-14

## 2022-10-14 RX ORDER — ACETAMINOPHEN 650 MG/1
650 SUPPOSITORY RECTAL EVERY 4 HOURS PRN
Status: DISCONTINUED | OUTPATIENT
Start: 2022-10-14 | End: 2022-10-18 | Stop reason: HOSPADM

## 2022-10-14 RX ORDER — ONDANSETRON 2 MG/ML
4 INJECTION INTRAMUSCULAR; INTRAVENOUS EVERY 6 HOURS PRN
Status: DISCONTINUED | OUTPATIENT
Start: 2022-10-14 | End: 2022-10-18 | Stop reason: HOSPADM

## 2022-10-14 RX ORDER — DIGOXIN 0.25 MG/ML
0.5 INJECTION INTRAMUSCULAR; INTRAVENOUS ONCE
Status: COMPLETED | OUTPATIENT
Start: 2022-10-14 | End: 2022-10-14

## 2022-10-14 RX ORDER — INSULIN LISPRO 100 [IU]/ML
0-9 INJECTION, SOLUTION INTRAVENOUS; SUBCUTANEOUS
Status: DISCONTINUED | OUTPATIENT
Start: 2022-10-14 | End: 2022-10-14

## 2022-10-14 RX ORDER — MAGNESIUM SULFATE HEPTAHYDRATE 40 MG/ML
4 INJECTION, SOLUTION INTRAVENOUS AS NEEDED
Status: DISCONTINUED | OUTPATIENT
Start: 2022-10-14 | End: 2022-10-18 | Stop reason: HOSPADM

## 2022-10-14 RX ORDER — SERTRALINE HYDROCHLORIDE 100 MG/1
100 TABLET, FILM COATED ORAL DAILY
Status: DISCONTINUED | OUTPATIENT
Start: 2022-10-15 | End: 2022-10-18 | Stop reason: HOSPADM

## 2022-10-14 RX ORDER — CILOSTAZOL 100 MG/1
100 TABLET ORAL 2 TIMES DAILY
COMMUNITY

## 2022-10-14 RX ORDER — MAGNESIUM SULFATE HEPTAHYDRATE 40 MG/ML
2 INJECTION, SOLUTION INTRAVENOUS AS NEEDED
Status: DISCONTINUED | OUTPATIENT
Start: 2022-10-14 | End: 2022-10-18 | Stop reason: HOSPADM

## 2022-10-14 RX ORDER — CALCIUM GLUCONATE 20 MG/ML
2 INJECTION, SOLUTION INTRAVENOUS AS NEEDED
Status: DISCONTINUED | OUTPATIENT
Start: 2022-10-14 | End: 2022-10-18 | Stop reason: HOSPADM

## 2022-10-14 RX ORDER — DEXTROSE MONOHYDRATE 25 G/50ML
25 INJECTION, SOLUTION INTRAVENOUS
Status: DISCONTINUED | OUTPATIENT
Start: 2022-10-14 | End: 2022-10-14

## 2022-10-14 RX ORDER — SODIUM CHLORIDE 0.9 % (FLUSH) 0.9 %
10 SYRINGE (ML) INJECTION AS NEEDED
Status: DISCONTINUED | OUTPATIENT
Start: 2022-10-14 | End: 2022-10-18 | Stop reason: HOSPADM

## 2022-10-14 RX ORDER — CHOLECALCIFEROL (VITAMIN D3) 125 MCG
5 CAPSULE ORAL NIGHTLY PRN
Status: DISCONTINUED | OUTPATIENT
Start: 2022-10-14 | End: 2022-10-18 | Stop reason: HOSPADM

## 2022-10-14 RX ORDER — LATANOPROST 50 UG/ML
1 SOLUTION/ DROPS OPHTHALMIC NIGHTLY
COMMUNITY

## 2022-10-14 RX ORDER — DEXTROSE MONOHYDRATE 25 G/50ML
25 INJECTION, SOLUTION INTRAVENOUS
Status: DISCONTINUED | OUTPATIENT
Start: 2022-10-14 | End: 2022-10-18 | Stop reason: HOSPADM

## 2022-10-14 RX ORDER — DIGOXIN 0.25 MG/ML
0.25 INJECTION INTRAMUSCULAR; INTRAVENOUS ONCE AS NEEDED
Status: DISCONTINUED | OUTPATIENT
Start: 2022-10-14 | End: 2022-10-18 | Stop reason: HOSPADM

## 2022-10-14 RX ORDER — ARIPIPRAZOLE 2 MG/1
2 TABLET ORAL NIGHTLY
Status: DISCONTINUED | OUTPATIENT
Start: 2022-10-14 | End: 2022-10-18 | Stop reason: HOSPADM

## 2022-10-14 RX ORDER — POTASSIUM CHLORIDE 7.45 MG/ML
10 INJECTION INTRAVENOUS
Status: DISCONTINUED | OUTPATIENT
Start: 2022-10-14 | End: 2022-10-18 | Stop reason: HOSPADM

## 2022-10-14 RX ORDER — CALCIUM GLUCONATE 20 MG/ML
1 INJECTION, SOLUTION INTRAVENOUS AS NEEDED
Status: DISCONTINUED | OUTPATIENT
Start: 2022-10-14 | End: 2022-10-18 | Stop reason: HOSPADM

## 2022-10-14 RX ORDER — POLYETHYLENE GLYCOL 3350 17 G/17G
17 POWDER, FOR SOLUTION ORAL DAILY PRN
Status: DISCONTINUED | OUTPATIENT
Start: 2022-10-14 | End: 2022-10-18 | Stop reason: HOSPADM

## 2022-10-14 RX ORDER — ACETAMINOPHEN 325 MG/1
650 TABLET ORAL EVERY 4 HOURS PRN
Status: DISCONTINUED | OUTPATIENT
Start: 2022-10-14 | End: 2022-10-18 | Stop reason: HOSPADM

## 2022-10-14 RX ORDER — AMOXICILLIN 250 MG
2 CAPSULE ORAL 2 TIMES DAILY PRN
Status: DISCONTINUED | OUTPATIENT
Start: 2022-10-14 | End: 2022-10-18 | Stop reason: HOSPADM

## 2022-10-14 RX ORDER — GABAPENTIN 300 MG/1
300 CAPSULE ORAL 3 TIMES DAILY
Status: DISCONTINUED | OUTPATIENT
Start: 2022-10-14 | End: 2022-10-18 | Stop reason: HOSPADM

## 2022-10-14 RX ORDER — POTASSIUM CHLORIDE 20 MEQ/1
40 TABLET, EXTENDED RELEASE ORAL AS NEEDED
Status: DISCONTINUED | OUTPATIENT
Start: 2022-10-14 | End: 2022-10-18 | Stop reason: HOSPADM

## 2022-10-14 RX ORDER — NICOTINE POLACRILEX 4 MG
15 LOZENGE BUCCAL
Status: DISCONTINUED | OUTPATIENT
Start: 2022-10-14 | End: 2022-10-14

## 2022-10-14 RX ORDER — SODIUM CHLORIDE 0.9 % (FLUSH) 0.9 %
3-10 SYRINGE (ML) INJECTION AS NEEDED
Status: DISCONTINUED | OUTPATIENT
Start: 2022-10-14 | End: 2022-10-18 | Stop reason: HOSPADM

## 2022-10-14 RX ORDER — BISACODYL 10 MG
10 SUPPOSITORY, RECTAL RECTAL DAILY PRN
Status: DISCONTINUED | OUTPATIENT
Start: 2022-10-14 | End: 2022-10-18 | Stop reason: HOSPADM

## 2022-10-14 RX ORDER — ZOLPIDEM TARTRATE 5 MG/1
5 TABLET ORAL NIGHTLY PRN
COMMUNITY

## 2022-10-14 RX ORDER — NITROGLYCERIN 0.4 MG/1
0.4 TABLET SUBLINGUAL
Status: DISCONTINUED | OUTPATIENT
Start: 2022-10-14 | End: 2022-10-18 | Stop reason: HOSPADM

## 2022-10-14 RX ORDER — POTASSIUM CHLORIDE 1.5 G/1.77G
40 POWDER, FOR SOLUTION ORAL AS NEEDED
Status: DISCONTINUED | OUTPATIENT
Start: 2022-10-14 | End: 2022-10-18 | Stop reason: HOSPADM

## 2022-10-14 RX ORDER — BISACODYL 5 MG/1
5 TABLET, DELAYED RELEASE ORAL DAILY PRN
Status: DISCONTINUED | OUTPATIENT
Start: 2022-10-14 | End: 2022-10-18 | Stop reason: HOSPADM

## 2022-10-14 RX ORDER — ACETAMINOPHEN 160 MG/5ML
650 SOLUTION ORAL EVERY 4 HOURS PRN
Status: DISCONTINUED | OUTPATIENT
Start: 2022-10-14 | End: 2022-10-18 | Stop reason: HOSPADM

## 2022-10-14 RX ORDER — INSULIN LISPRO 100 [IU]/ML
0-14 INJECTION, SOLUTION INTRAVENOUS; SUBCUTANEOUS
Status: DISCONTINUED | OUTPATIENT
Start: 2022-10-15 | End: 2022-10-18 | Stop reason: HOSPADM

## 2022-10-14 RX ORDER — NICOTINE POLACRILEX 4 MG
15 LOZENGE BUCCAL
Status: DISCONTINUED | OUTPATIENT
Start: 2022-10-14 | End: 2022-10-18 | Stop reason: HOSPADM

## 2022-10-14 RX ORDER — SODIUM CHLORIDE 0.9 % (FLUSH) 0.9 %
3 SYRINGE (ML) INJECTION EVERY 12 HOURS SCHEDULED
Status: DISCONTINUED | OUTPATIENT
Start: 2022-10-14 | End: 2022-10-18 | Stop reason: HOSPADM

## 2022-10-14 RX ADMIN — ASPIRIN 81 MG CHEWABLE TABLET 324 MG: 81 TABLET CHEWABLE at 16:04

## 2022-10-14 RX ADMIN — SODIUM CHLORIDE 1000 ML: 9 INJECTION, SOLUTION INTRAVENOUS at 16:12

## 2022-10-14 RX ADMIN — INSULIN GLARGINE 30 UNITS: 100 INJECTION, SOLUTION SUBCUTANEOUS at 22:57

## 2022-10-14 RX ADMIN — Medication 5 MG/HR: at 16:17

## 2022-10-14 RX ADMIN — Medication 3 ML: at 22:57

## 2022-10-14 RX ADMIN — DIGOXIN 0.5 MG: 0.25 INJECTION INTRAMUSCULAR; INTRAVENOUS at 20:18

## 2022-10-14 RX ADMIN — INSULIN LISPRO 8 UNITS: 100 INJECTION, SOLUTION INTRAVENOUS; SUBCUTANEOUS at 21:01

## 2022-10-14 RX ADMIN — GABAPENTIN 300 MG: 300 CAPSULE ORAL at 22:57

## 2022-10-14 RX ADMIN — Medication 5 MG: at 22:57

## 2022-10-14 RX ADMIN — INSULIN LISPRO 8 UNITS: 100 INJECTION, SOLUTION INTRAVENOUS; SUBCUTANEOUS at 17:37

## 2022-10-14 RX ADMIN — ENOXAPARIN SODIUM 80 MG: 100 INJECTION SUBCUTANEOUS at 17:19

## 2022-10-14 NOTE — ED PROVIDER NOTES
"Subjective   History of Present Illness  Chief complaint: chest pain      Context: Patient is a 67-year-old female with a past medical history significant for hypertension dyslipidemia COPD obstructive sleep apnea CAD diabetes who comes in with complaints of chest pain that she describes as a heavy pressure and intermittently sharp that is not reproducible.  She denies any associated nausea or diaphoresis.  She denies any vomiting diarrhea or urinary complaints..  She denies any fever cough congestion or upper respiratory complaints.  She denies any unilateral leg swelling recent trauma surgery immobilization prior history of DVT PE or exogenous hormone use.  She saw her family doctor on October 6 for ankle pain and was sent to [facility] as they noted that she was tachycardic; she ended up leaving AGAINST MEDICAL ADVICE from the emergency department.  She has had some intermittent shortness of breath.  She reports her last cardiac evaluation was in October 2020 she had a heart cath.  She states she is not on any blood thinners and has not been taking her insulin in over 2 months because she \"did not want to.\"  She is unsure of what medications she currently takes.  She denies any known history of irregular heartbeat.  No reported unilateral focal deficits confusion ataxia slurred speech facial asymmetry or lethargy    Location: Chest without radiation  Duration: Over a week, worse today  Timing: Waxes and wanes  Quality/Severity: Heavy pressure sharp  Modifying factors: Worse with exertion  Associated symptoms: Shortness of breath        Additional hx provided by: self    PCP: Julian Archer                Review of Systems   Constitutional: Negative for fever.   HENT: Negative.    Eyes: Negative for visual disturbance.   Respiratory: Positive for shortness of breath. Negative for cough.    Cardiovascular: Positive for chest pain and palpitations. Negative for leg swelling.   Gastrointestinal: " Negative.    Endocrine: Negative for polydipsia and polyuria.   Genitourinary: Negative.    Musculoskeletal: Negative.    Skin: Negative for rash.   Allergic/Immunologic: Negative for immunocompromised state.   Neurological: Positive for weakness.   Hematological: Does not bruise/bleed easily.   Psychiatric/Behavioral: Negative for confusion.       Past Medical History:   Diagnosis Date   • COPD (chronic obstructive pulmonary disease) (CMS/[facility])    • Depression    • Diabetes mellitus (CMS/[facility])    • Hyperlipidemia    • Hypertension    Sleep apnea    Allergies   Allergen Reactions   • Codeine GI Intolerance       Past Surgical History:   Procedure Laterality Date   • CARDIAC CATHETERIZATION Left 10/30/2020    Procedure: Left Heart Cath with Coronary Angiography;  Surgeon: Donaldo Archer MD;  [facility]: [facility] CATH INVAS[facility];  Service: Cardiovascular;  Laterality: Left;   • CARPAL TUNNEL RELEASE Bilateral 2017   • CHOLECYSTECTOMY  1980   • HYSTERECTOMY  1980       Family History   Problem Relation Age of Onset   • Heart disease Mother    • Hypertension Mother    • Diabetes Mother    • Stroke Mother        Social History     Socioeconomic History   • Marital status:    Tobacco Use   • Smoking status: Every Day     Packs/day: 1.00     Years: 20.00     Pack years: 20.00     Types: Cigarettes   • Smokeless tobacco: Never   Substance and Sexual Activity   • Alcohol use: No   • Sexual activity: Defer           Objective   Physical Exam     Vital signs and triage nurse note reviewed.   Constitutional: Awake, alert; uncomfortable but nontoxic  HEENT: Normocephalic, atraumatic; pupils are PERRL with intact EOM; oropharynx is pink and moist without exudate or erythema.   Neck: Supple, full range of motion without pain; no JVD   Cardiovascular: Tachycardic rate   Pulmonary: Respiratory effort regular nonlabored, breath sounds clear to auscultation all fields.   Abdomen: Soft, nontender nondistended with  normoactive bowel sounds; no rebound or guarding.   Musculoskeletal: Independent range of motion of all extremities with no palpable tenderness or edema.  Negative Eyad   Neuro: Alert oriented x3, speech is clear and appropriate, GCS 15   Skin:  Fleshtone warm, dry, intact; no erythematous or petechial rash or lesion       ECG 12 Lead      Date/Time: 10/14/2022 4:20 PM  Performed by: Chanel Cordova APRN  Authorized by: Chanel Cordova APRN   Interpreted by physician  Comparison: compared with previous ECG from 10/6/2022  Comparison to previous ECG: From [facility]: ; after adenosine 2:1 flutter 155  BPM: 158  Conduction: right bundle branch block      ECG 12 Lead      Date/Time: 10/14/2022 5:01 PM  Performed by: Chanel Cordova APRN  Authorized by: Chanel Cordova APRN   Interpreted by physician ([place])  Rhythm: atrial flutter                                 ED Course  ED Course as of 10/14/22 1728   Fri Oct 14, 2022   1542 Seen after being placed in a room from triage [JW]   1701 Repeat EKG was obtained due to right improvement and noted to be in a flutter [JW]   1704 Spoke with dr meza []      ED Course User Index  [JW] Chanel Cordova APRN            Labs Reviewed   COMPREHENSIVE METABOLIC PANEL - Abnormal; Notable for the following components:       Result Value    Glucose 400 (*)     Sodium 133 (*)     Chloride 97 (*)     BUN/Creatinine Ratio 31.3 (*)     All other components within normal limits    Narrative:     GFR Normal >60  Chronic Kidney Disease <60  Kidney Failure <15     APTT - Abnormal; Notable for the following components:    PTT 24.8 (*)     All other components within normal limits   URINALYSIS W/ CULTURE IF INDICATED - Abnormal; Notable for the following components:    Specific Gravity, UA 1.039 (*)     Glucose, UA >=1000 mg/dL (3+) (*)     All other components within normal limits    Narrative:     In absence of clinical symptoms, the presence of pyuria, bacteria, and/or  nitrites on the urinalysis result does not correlate with infection.  Urine microscopic not indicated.   BLOOD GAS, ARTERIAL - Abnormal; Notable for the following components:    pCO2, Arterial 32.5 (*)     pO2, Arterial 62.6 (*)     Base Excess, Arterial -2.5 (*)     O2 Saturation, Arterial 92.4 (*)     All other components within normal limits   POCT GLUCOSE FINGERSTICK - Abnormal; Notable for the following components:    Glucose 376 (*)     All other components within normal limits   PROTIME-INR - Normal   LIPASE - Normal   TROPONIN (IN-HOUSE) - Normal    Narrative:     Troponin T Reference Range:  <= 0.03 ng/mL-   Negative for AMI  >0.03 ng/mL-     Abnormal for myocardial necrosis.  Clinicians would have to utilize clinical acumen, EKG, Troponin and serial changes to determine if it is an Acute Myocardial Infarction or myocardial injury due to an underlying chronic condition.       Results may be falsely decreased if patient taking Biotin.     BNP (IN-HOUSE) - Normal    Narrative:     Among patients with dyspnea, NT-proBNP is highly sensitive for the detection of acute congestive heart failure. In addition NT-proBNP of <300 pg/ml effectively rules out acute congestive heart failure with 99% negative predictive value.    Results may be falsely decreased if patient taking Biotin.     CK - Normal   MAGNESIUM - Normal   TSH - Normal   CBC WITH AUTO DIFFERENTIAL - Normal   ACETONE - Normal   D-DIMER, QUANTITATIVE - Normal    Narrative:     Reference Range  --------------------------------------------------------------------     < 0.50   Negative Predictive Value  0.50-0.59   Indeterminate    >= 0.60   Probable VTE             A very low percentage of patients with DVT may yield D-Dimer results   below the cut-off of 0.50 mg/L FEU.  This is known to be more   prevalent in patients with distal DVT.             Results of this test should always be interpreted in conjunction with   the patient's medical history, clinical  presentation and other   findings.  Clinical diagnosis should not be based on the result of   [place]OV[place] D-Dimer alone.   POC LACTATE - Normal   BLOOD CULTURE   BLOOD CULTURE   BLOOD GAS, ARTERIAL   RAINBOW DRAW    Narrative:     The following orders were created for panel order [place] Draw.  Procedure                               Abnormality         Status                     ---------                               -----------         ------                     Green Top (Gel)[[id number]]                                  Final result               Lavender Top[[id number]]                                     Final result               Gold Top - SST[[id number]]                                   Final result               Light Blue Top[[id number]]                                                                Please view results for these tests on the individual orders.   POCT GLUCOSE FINGERSTICK   POC LACTATE   CBC AND DIFFERENTIAL    Narrative:     The following orders were created for panel order CBC & Differential.  Procedure                               Abnormality         Status                     ---------                               -----------         ------                     CBC Auto Differential[[id number]]        Normal              Final result                 Please view results for these tests on the individual orders.   KETONE BODIES SERUM    Narrative:     The following orders were created for panel order Ketone Bodies, Serum (Not performed at [place]).  Procedure                               Abnormality         Status                     ---------                               -----------         ------                     Acetone[[id number]]                      Normal              Final result                 Please view results for these tests on the individual orders.   GREEN TOP   LAVENDER TOP   GOLD TOP - SST     Medications   sodium chloride 0.9 % flush 10 mL (has no administration in  time range)   sodium chloride 0.9 % flush 10 mL (has no administration in time range)   dilTIAZem (CARDIZEM) 125 mg in 125 mL D5W infusion (10 mg/hr Intravenous Rate/Dose Change 10/14/22 1705)   nitroglycerin (NITROSTAT) SL tablet 0.4 mg (has no administration in time range)   insulin lispro (ADMELOG) injection 8 Units (has no administration in time range)   sodium chloride 0.9 % bolus 1,000 mL (1,000 mL Intravenous New Bag 10/14/22 1612)   aspirin chewable tablet 324 mg (324 mg Oral Given 10/14/22 1604)   dilTIAZem (CARDIZEM) bolus from bag 1 mg/mL 10 mg (10 mg Intravenous Bolus from Bag 10/14/22 1617)   Enoxaparin Sodium (LOVENOX) syringe 80 mg (80 mg Subcutaneous Given 10/14/22 1719)     XR Chest 1 View    Result Date: 10/14/2022  IMPRESSION : Limited negative low lung volume portable chest[  Electronically Signed By-Mayur Allen On:10/14/2022 4:33 PM This report was finalized on [id number] by  Mayur Allen, .                               MDM  Number of Diagnoses or Management Options  Atrial flutter, unspecified type (HCC)  Chest pain, unspecified type  Dyspnea, unspecified type  Hx of medication noncompliance  Hyperglycemia  Diagnosis management comments: Chart review:  10/2020 heart cath: 1. HEMODYNAMICS:  Left ventricle end-diastolic pressure is normal.  No gradient was noted across aortic valve.  2. LEFT VENTRICULOGRAPHY:  Left ventricle size and contractility is normal with ejection fraction of 60%.  3. CORONARY ANGIOGRAPHY:  Left main coronary artery is normal.  Left anterior descending artery is normal.  Circumflex coronary artery is normal.  Right coronary artery has mid segment 50% disease     SUMMARY:  Left ventricle size and contractility is normal with ejection fraction of 60%.     Left main coronary artery is normal.  Left anterior descending artery is normal.  Circumflex coronary artery is normal.  Right coronary artery has mid segment 50% disease     RECOMMENDATIONS:  Medical  "therapy.  Patient was started on baby aspirin and Imdur 60 mg a day.  Follow-up in the office in 2 weeks.      Records were obtained from [facility] dated 10/6/2022.  Patient was noted to be in SVT with a heart rate of 160.  Patient was given 6 mg of adenosine and repeat EKG showed 2-1 a flutter with a right bundle branch block.  Troponin 0.14, , glucose 484.  Patient was given Cardizem and IV metoprolol but remained tachycardic and ended up leaving AGAINST MEDICAL ADVICE      /62   Pulse 107   Temp 98.3 °F (36.8 °C) (Oral)   Resp 16   Ht 172.7 cm (68\")   Wt 79.8 kg (176 lb)   SpO2 95%   BMI 26.76 kg/m²      Chart review:      Comorbidities:  has a past medical history of COPD (chronic obstructive pulmonary disease) (CMS/HCC), Depression, Diabetes mellitus (CMS/[facility]), Hyperlipidemia, and Hypertension.  Differentials:   not all inclusive of differentials considered  Discussion with provider:  Radiology interpretation:  X-rays reviewed by me and interpreted by radiologist,   XR Chest 1 View    Result Date: 10/14/2022  IMPRESSION : Limited negative low lung volume portable chest(  Electronically Signed By-Mayur Allen On:10/14/2022 4:33 PM This report was finalized on [id number] by  Mayur Allen, .    Lab interpretation:  Labs viewed by me significant for,    Appropriate PPE worn during exam.  Patient had an IV established labs obtained.  She was given aspirin and Cardizem bolus and drip, IV fluids.  Given Lovenox.  Refused sublingual nitro as her heart rate has improved her pain has subsided and she is resting comfortably.  I discussed with cardiology.  After her rate slowed it was noted to be in a flutter.    she was given Humalog as there are no other labs concerning for acidosis.   Pharmacy notified and working on current med list as she is a poor historian and does not know what current meds she is taking.    i discussed findings with patient who voices understanding of " admission    This document is intended for medical expert use only. Reading of this document by patients and/or patient's family without participating medical staff guidance may result in misinterpretation and unintended morbidity.  Any interpretation of such data is the responsibility of the patient and/or family member responsible for the patient in concert with their primary or specialist providers, not to be left for sources of online searches such as [employer], [employer] or similar queries. Relying on these approaches to knowledge may result in misinterpretation, misguided goals of care and even death should patients or family members try recommendations outside of the realm of professional medical care in a supervised inpatient environment.     Discussed with Dr. Wynn            Amount and/or Complexity of Data Reviewed  Independent visualization of images, tracings, or specimens: yes    Critical Care  Total time providing critical care: 30-74 minutes (35)      Final diagnoses:   Chest pain, unspecified type   Atrial flutter, unspecified type (HCC)   Dyspnea, unspecified type   Hyperglycemia   Hx of medication noncompliance       ED Disposition  ED Disposition     ED Disposition   Decision to Admit    Condition   --    Comment   --             No follow-up provider specified.       Medication List      No changes were made to your prescriptions during this visit.          Chanel Cordova, APRN  10/14/22 1727       Chanel Cordova, APRN  10/14/22 1724

## 2022-10-14 NOTE — CONSULTS
Referring Provider: Scott Wynn MD  Reason for Consultation:  Atrial flutter    Patient Care Team:  Christopher Jordan MD as PCP - General (Sports Medicine)  Donaldo Arcehr MD as Consulting Physician (Cardiology)    Chief complaint  Palpitations    Subjective .     History of present illness:  Arianna Blanco is a 67 y.o. female who presents with with history of multiple medical problems as outlined in the assessment was admitted to the hospital with history of chest pressure intermittently.  Denies having any nausea sweating shortness of breath.  Denies having any fever cough chills.  Patient recently was at [facility] and was noted to be tachycardic.  Patient was supposed to transfer to [facility] however patient signed out AMA since there were no beds available at [facility].  Having intermittent shortness of breath.  He had cardiac cath in October 2020 that revealed 50% RCA disease.  Patient is not on anticoagulants.  No other associated aggravating or elevating factors.  Patient is having palpitations.  Patient was found to be in atrial flutter.  Cardiology consultation was requested.  Patient was started on intravenous Cardizem..       ROS      Patient is not having any dizziness or syncope.  Denies having any headache ,abdominal pain ,nausea, vomiting , diarrhea constipation, loss of weight or loss of appetite.  Denies having any excessive bruising ,hematuria or blood in the stool.    Review of all systems negative except as indicated      History  Past Medical History:   Diagnosis Date   • COPD (chronic obstructive pulmonary disease) (CMS/HCC)    • Depression    • Diabetes mellitus (CMS/[facility])    • Hyperlipidemia    • Hypertension        Past Surgical History:   Procedure Laterality Date   • CARDIAC CATHETERIZATION Left 10/30/2020    Procedure: Left Heart Cath with Coronary Angiography;  Surgeon: Donaldo Archer MD;  Location: [facility] INVASIVE LOCATION;   "Service: Cardiovascular;  Laterality: Left;   • CARPAL TUNNEL RELEASE Bilateral 2017   • CHOLECYSTECTOMY  1980   • HYSTERECTOMY  1980       Family History   Problem Relation Age of Onset   • Heart disease Mother    • Hypertension Mother    • Diabetes Mother    • Stroke Mother        Social History     Tobacco Use   • Smoking status: Every Day     Packs/day: 1.00     Years: 20.00     Pack years: 20.00     Types: Cigarettes   • Smokeless tobacco: Never   Substance Use Topics   • Alcohol use: No        (Not in a hospital admission)        Codeine    Scheduled Meds:   Continuous Infusions:dilTIAZem, 5-15 mg/hr, Last Rate: 10 mg/hr (10/14/22 1705)      PRN Meds:.•  nitroglycerin  •  [COMPLETED] Insert peripheral IV **AND** sodium chloride  •  [COMPLETED] Insert peripheral IV **AND** sodium chloride    Objective     VITAL SIGNS  Vitals:    10/14/22 1654 10/14/22 1705 10/14/22 1715 10/14/22 1725   BP:   119/62    BP Location:       Patient Position:       Pulse: (!) 129 (!) 121 110 107   Resp:       Temp:       TempSrc:       SpO2: 94%  95% 95%   Weight:       Height:           Flowsheet Rows    Flowsheet Row First Filed Value   Admission Height 172.7 cm (68\") Documented at 10/14/2022 1522   Admission Weight 79.8 kg (176 lb) Documented at 10/14/2022 1522            Intake/Output Summary (Last 24 hours) at 10/14/2022 1752  Last data filed at 10/14/2022 1520  Gross per 24 hour   Intake 800 ml   Output --   Net 800 ml        TELEMETRY: Atrial flutter    Physical Exam:  The patient is alert, oriented and in no distress.  Vital signs as noted above.  Exogenous obesity.  Head and neck revealed no carotid bruits or jugular venous distention.  No thyromegaly or lymph adenopathy is present  Lungs clear.  No wheezing.  Breath sounds are normal bilaterally.  Heart normal first and second heart sounds.No murmur.  No precordial rub is present.  No gallop is present.  Abdomen soft and nontender.  No organomegaly is present.  Extremities " with good peripheral pulses without any pedal edema.  Skin warm and dry.  Musculoskeletal system is grossly normal  CNS grossly normal      Results Review:   I reviewed the patient's new clinical results.  Lab Results (last 24 hours)     Procedure Component Value Units Date/Time    Urinalysis With Culture If Indicated - Urine, Clean Catch [[id number]]  (Abnormal) Collected: 10/14/22 1715    Specimen: Urine, Clean Catch Updated: 10/14/22 1721     Color, UA Yellow     Appearance, UA Clear     pH, UA 6.0     Specific Gravity, UA 1.039     Glucose, UA >=1000 mg/dL (3+)     Ketones, UA Negative     Bilirubin, UA Negative     Blood, UA Negative     Protein, UA Negative     Leuk Esterase, UA Negative     Nitrite, UA Negative     Urobilinogen, UA 0.2 E.U./dL    Narrative:      In absence of clinical symptoms, the presence of pyuria, bacteria, and/or nitrites on the urinalysis result does not correlate with infection.  Urine microscopic not indicated.    Blood Gas, Arterial - [[id number]]  (Abnormal) Collected: 10/14/22 1618    Specimen: Arterial Blood Updated: 10/14/22 1721     Site Right Radial     Raphael's Test Positive     pH, Arterial 7.422 pH units      pCO2, Arterial 32.5 mm Hg      pO2, Arterial 62.6 mm Hg      HCO3, Arterial 21.2 mmol/L      Base Excess, Arterial -2.5 mmol/L      Comment: Serial Number: [id number]:  [id number]        O2 Saturation, Arterial 92.4 %      CO2 Content 22.2 mmol/L      Barometric Pressure for Blood Gas --     Comment: N/A        Modality Room Air     FIO2 21 %      Hemodilution No    [place] Draw [[id number]] Collected: 10/14/22 1558    Specimen: Blood Updated: 10/14/22 1707    Narrative:      The following orders were created for panel order [place] Draw.  Procedure                               Abnormality         Status                     ---------                               -----------         ------                     Green Top (Gel)[[id number]]                                  Final  result               Lavender Top[[id number]]                                     Final result               Gold Top - SST[[id number]]                                   Final result               Light Blue Top[[id number]]                                                                Please view results for these tests on the individual orders.    Green Top (Gel) [[id number]] Collected: 10/14/22 1558    Specimen: Blood Updated: 10/14/22 1707     Extra Tube HOLD    Ketone Bodies, Serum (Not performed at [place]) [[id number]]  (Normal) Collected: 10/14/22 1558    Specimen: Blood Updated: 10/14/22 1701    Narrative:      The following orders were created for panel order Ketone Bodies, Serum (Not performed at [place]).  Procedure                               Abnormality         Status                     ---------                               -----------         ------                     Acetone[[id number]]                      Normal              Final result                 Please view results for these tests on the individual orders.    Acetone [[id number]]  (Normal) Collected: 10/14/22 1558    Specimen: Blood Updated: 10/14/22 1701     Acetone Negative    Lavender Top [[id number]] Collected: 10/14/22 1558    Specimen: Blood Updated: 10/14/22 1700     Extra Tube hold for add-on     Comment: Auto resulted       Gold Top - SST [[id number]] Collected: 10/14/22 1558    Specimen: Blood Updated: 10/14/22 1700     Extra Tube Hold for add-ons.     Comment: Auto resulted.       Protime-INR [[id number]]  (Normal) Collected: 10/14/22 1626    Specimen: Blood Updated: 10/14/22 1643     Protime 10.3 Seconds      INR 1.00    aPTT [[id number]]  (Abnormal) Collected: 10/14/22 1626    Specimen: Blood Updated: 10/14/22 1643     PTT 24.8 seconds     D-dimer, Quantitative [[id number]]  (Normal) Collected: 10/14/22 1626    Specimen: Blood Updated: 10/14/22 1643     D-Dimer, Quantitative <0.19 mg/L (FEU)     Narrative:      Reference  Range  --------------------------------------------------------------------     < 0.50   Negative Predictive Value  0.50-0.59   Indeterminate    >= 0.60   Probable VTE             A very low percentage of patients with DVT may yield D-Dimer results   below the cut-off of 0.50 mg/L FEU.  This is known to be more   prevalent in patients with distal DVT.             Results of this test should always be interpreted in conjunction with   the patient's medical history, clinical presentation and other   findings.  Clinical diagnosis should not be based on the result of   INNOVANCE D-Dimer alone.    BNP [[id number]]  (Normal) Collected: 10/14/22 1558    Specimen: Blood Updated: 10/14/22 1629     proBNP 391.9 pg/mL     Narrative:      Among patients with dyspnea, NT-proBNP is highly sensitive for the detection of acute congestive heart failure. In addition NT-proBNP of <300 pg/ml effectively rules out acute congestive heart failure with 99% negative predictive value.    Results may be falsely decreased if patient taking Biotin.      TSH [[id number]]  (Normal) Collected: 10/14/22 1558    Specimen: Blood Updated: 10/14/22 1629     TSH 1.370 uIU/mL     Troponin [[id number]]  (Normal) Collected: 10/14/22 1558    Specimen: Blood Updated: 10/14/22 1629     Troponin T <0.010 ng/mL     Narrative:      Troponin T Reference Range:  <= 0.03 ng/mL-   Negative for AMI  >0.03 ng/mL-     Abnormal for myocardial necrosis.  Clinicians would have to utilize clinical acumen, EKG, Troponin and serial changes to determine if it is an Acute Myocardial Infarction or myocardial injury due to an underlying chronic condition.       Results may be falsely decreased if patient taking Biotin.      Comprehensive Metabolic Panel [[id number]]  (Abnormal) Collected: 10/14/22 1558    Specimen: Blood Updated: 10/14/22 1625     Glucose 400 mg/dL      BUN 20 mg/dL      Creatinine 0.64 mg/dL      Sodium 133 mmol/L      Potassium 4.0 mmol/L      Chloride 97 mmol/L       CO2 24.0 mmol/L      Calcium 9.0 mg/dL      Total Protein 6.5 g/dL      Albumin 4.00 g/dL      ALT (SGPT) 31 U/L      AST (SGOT) 21 U/L      Alkaline Phosphatase 116 U/L      Total Bilirubin 0.2 mg/dL      Globulin 2.5 gm/dL      A/G Ratio 1.6 g/dL      BUN/Creatinine Ratio 31.3     Anion Gap 12.0 mmol/L      eGFR 97.6 mL/min/1.73      Comment: National Kidney Foundation and American Society of Nephrology (ASN) Task Force recommended calculation based on the Chronic Kidney Disease Epidemiology Collaboration (CKD-EPI) equation refit without adjustment for race.       Narrative:      GFR Normal >60  Chronic Kidney Disease <60  Kidney Failure <15      Lipase [[id number]]  (Normal) Collected: 10/14/22 1558    Specimen: Blood Updated: 10/14/22 1625     Lipase 33 U/L     CK [[id number]]  (Normal) Collected: 10/14/22 1558    Specimen: Blood Updated: 10/14/22 1625     Creatine Kinase 107 U/L     Magnesium [[id number]]  (Normal) Collected: 10/14/22 1558    Specimen: Blood Updated: 10/14/22 1625     Magnesium 1.6 mg/dL     Blood Culture - Blood, Arm, Right [[id number]] Collected: 10/14/22 1612    Specimen: Blood from Arm, Right Updated: 10/14/22 1624    Blood Culture - Blood, Arm, Left [[id number]] Collected: 10/14/22 1612    Specimen: Blood from Arm, Left Updated: 10/14/22 1624    CBC & Differential [[id number]]  (Normal) Collected: 10/14/22 1558    Specimen: Blood Updated: 10/14/22 1604    Narrative:      The following orders were created for panel order CBC & Differential.  Procedure                               Abnormality         Status                     ---------                               -----------         ------                     CBC Auto Differential[[id number]]        Normal              Final result                 Please view results for these tests on the individual orders.    CBC Auto Differential [[id number]]  (Normal) Collected: 10/14/22 1558    Specimen: Blood Updated: 10/14/22 1604     WBC 7.80  10*3/mm3      RBC 4.15 10*6/mm3      Hemoglobin 12.7 g/dL      Hematocrit 37.9 %      MCV 91.2 fL      MCH 30.5 pg      MCHC 33.5 g/dL      RDW 13.0 %      RDW-SD 41.1 fl      MPV 10.3 fL      Platelets 167 10*3/mm3      Neutrophil % 59.8 %      Lymphocyte % 29.9 %      Monocyte % 6.9 %      Eosinophil % 2.7 %      Basophil % 0.7 %      Neutrophils, Absolute 4.60 10*3/mm3      Lymphocytes, Absolute 2.30 10*3/mm3      Monocytes, Absolute 0.50 10*3/mm3      Eosinophils, Absolute 0.20 10*3/mm3      Basophils, Absolute 0.10 10*3/mm3      nRBC 0.0 /100 WBC     POC Lactate [[id number]]  (Normal) Collected: 10/14/22 1556    Specimen: Blood Updated: 10/14/22 1557     Lactate 1.6 mmol/L      Comment: Serial Number: [id number]:  [id number]       POC Glucose Once [[id number]]  (Abnormal) Collected: 10/14/22 1556    Specimen: Blood Updated: 10/14/22 1557     Glucose 376 mg/dL      Comment: Serial Number: [id number]:  [id number]             Imaging Results (Last 24 Hours)     Procedure Component Value Units Date/Time    XR Chest 1 View [[id number]] Collected: 10/14/22 1632     Updated: 10/14/22 1635    Narrative:         DATE OF EXAM:   10/14/2022 4:29 PM     PROCEDURE:   XR CHEST 1 VW-     INDICATIONS:   chest pain, tachycardia     COMPARISON:  06/29/2021 and prior     TECHNIQUE:   Portable Chest     FINDINGS:      Study is limited by overlying support and monitoring apparatus. The  heart and mediastinal contours are within normal limits. Lung volumes  are low. Lungs are grossly clear. Osseous structures demonstrate no  acute abnormality        Impression:      IMPRESSION :   Limited negative low lung volume portable chest[     Electronically Signed By-Mayur Allen On:10/14/2022 4:33 PM  This report was finalized on [id number] by  Mayur Allen, .      LAB RESULTS (LAST 7 DAYS)    CBC  Results from last 7 days   Lab Units 10/14/22  1558   WBC 10*3/mm3 7.80   RBC 10*6/mm3 4.15   HEMOGLOBIN g/dL 12.7    HEMATOCRIT % 37.9   MCV fL 91.2   PLATELETS 10*3/mm3 167       BMP  Results from last 7 days   Lab Units 10/14/22  1558   SODIUM mmol/L 133*   POTASSIUM mmol/L 4.0   CHLORIDE mmol/L 97*   CO2 mmol/L 24.0   BUN mg/dL 20   CREATININE mg/dL 0.64   GLUCOSE mg/dL 400*   MAGNESIUM mg/dL 1.6       CMP   Results from last 7 days   Lab Units 10/14/22  1558   SODIUM mmol/L 133*   POTASSIUM mmol/L 4.0   CHLORIDE mmol/L 97*   CO2 mmol/L 24.0   BUN mg/dL 20   CREATININE mg/dL 0.64   GLUCOSE mg/dL 400*   ALBUMIN g/dL 4.00   BILIRUBIN mg/dL 0.2   ALK PHOS U/L 116   AST (SGOT) U/L 21   ALT (SGPT) U/L 31   LIPASE U/L 33         BNP        TROPONIN  Results from last 7 days   Lab Units 10/14/22  1558   CK TOTAL U/L 107   TROPONIN T ng/mL <0.010       CoAg  Results from last 7 days   Lab Units 10/14/22  1626   INR  1.00   APTT seconds 24.8*       Creatinine Clearance  Estimated Creatinine Clearance: 96 mL/min (by C-G formula based on SCr of 0.64 mg/dL).    ABG  Results from last 7 days   Lab Units 10/14/22  1618   PH, ARTERIAL pH units 7.422   PCO2, ARTERIAL mm Hg 32.5*   PO2 ART mm Hg 62.6*   O2 SATURATION ART % 92.4*   BASE EXCESS ART mmol/L -2.5*       Radiology  XR Chest 1 View    Result Date: 10/14/2022  IMPRESSION : Limited negative low lung volume portable chest[  Electronically Signed By-Mayur Allen On:10/14/2022 4:33 PM This report was finalized on [id number] by  Mayur Allen, .        EKG                        I personally viewed and interpreted the patient's EKG/Telemetry data: Atrial flutter with rapid ventricular response.  ECHOCARDIOGRAM:                Cardiolite (Tc-99m Sestamibi) stress test      OTHER:     Assessment & Plan     Active Problems:    * No active hospital problems. *    ]]]]]]]]]]]]]]]]]]]  Impression  =========  - Chest discomfort and palpitations.    - Atrial flutter with rapid ventricular response.    Cardiac cath 10/30/2020 revealed  Left ventricle size and contractility is normal  with ejection fraction of 60%.     Left main coronary artery is normal.  Left anterior descending artery is normal.  Circumflex coronary artery is normal.  Right coronary artery has mid segment 50% disease      - Diabetes hypertension COPD dyslipidemia     -Status post hysterectomy cholecystectomy and carpal tunnel surgery.     -Family history is positive for coronary artery disease.     -Smoker- abstinence from smoking was advised.     - No known allergies  ============  Plan  ========   Chest discomfort and palpitations.  Rule out progression of coronary artery disease     Atrial flutter with rapid ventricular response.  Intravenous Cardizem.    Close cardiac monitoring.  Stress Cardiolite test  Echocardiogram    Medications were reviewed and updated.  Abstinence from smoking.  Further plan will depend on patient's progress.  ]]]]]]]]]]]]]]          Donaldo Archer MD  10/14/22  17:52 EDT

## 2022-10-14 NOTE — H&P
[facility] Medicine Services      Patient Name: Arianna Blanco  : 1955  MRN: [id number]  Primary Care Physician:  Christopher Jordan MD  Date of admission: 10/14/2022      Subjective      Chief Complaint: Chest pain    History of Present Illness:     Patient is a 67-year-old female with multiple comorbidities including diabetes mellitus type 2, hypertension, COPD, depression, obstructive sleep apnea and hyperlipidemia.    Patient presented to [facility] ED on 10/14/2022 with complaints of chest pain.  Stated chest pain is pressure-like, left-sided, nonradiating with no aggravating or relieving factor.  Has associated generalized body weakness, shortness of breath but no nausea or vomiting.    Has had similar presentation about a week ago and had gone to [facility] ED where plans was being made to transfer her to [facility] for further evaluation and management however patient left AMA.      In the ED patient was noted to be in atrial flutter, she was given Cardizem bolus and started on Cardizem drip.  Also given therapeutic dose Lovenox.  Cardiologist was consulted from the ED.  Patient was admitted for further care    Review of Systems   Constitutional: Negative for chills and fever.   HENT: Negative for congestion.    Eyes: Negative for blurred vision.   Cardiovascular: Positive for chest pain and palpitations.   Respiratory: Positive for shortness of breath.    Endocrine: Negative for cold intolerance and heat intolerance.   Gastrointestinal: Negative for abdominal pain, nausea and vomiting.   Genitourinary: Negative for dysuria.   Neurological: Positive for weakness.   Psychiatric/Behavioral: Negative for altered mental status.        Personal History     Past Medical History:   Diagnosis Date   • COPD (chronic obstructive pulmonary disease) (CMS/HCC)    • Depression    • Diabetes mellitus (CMS/HCC)    • Hyperlipidemia    • Hypertension        Past Surgical  History:   Procedure Laterality Date   • CARDIAC CATHETERIZATION Left 10/30/2020    Procedure: Left Heart Cath with Coronary Angiography;  Surgeon: Donaldo Archer MD;  Location: [facility] CATH INVASIVE LOCATION;  Service: Cardiovascular;  Laterality: Left;   • CARPAL TUNNEL RELEASE Bilateral 2017   • CHOLECYSTECTOMY  1980   • HYSTERECTOMY  1980       Family History: family history includes Diabetes in her mother; Heart disease in her mother; Hypertension in her mother; Stroke in her mother. Otherwise pertinent FHx was reviewed and not pertinent to current issue.    Social History:  reports that she has been smoking cigarettes. She has a 20.00 pack-year smoking history. She has never used smokeless tobacco. She reports that she does not drink alcohol.    Home Medications:  Prior to Admission Medications     Prescriptions Last Dose Informant Patient Reported? Taking?    albuterol sulfate  (90 Base) MCG/ACT inhaler  Self Yes Yes    Inhale 2 puffs Every 4 (Four) Hours As Needed for Wheezing or Shortness of Air.    amitriptyline (ELAVIL) 75 MG tablet  Self Yes Yes    Take 75 mg by mouth Every Night.    atenolol (TENORMIN) 25 MG tablet  Self Yes Yes    Take 1 tablet by mouth Daily.    Brexpiprazole (Rexulti) 2 MG tablet   Yes Yes    Take 2 mg by mouth Every Night.    butalbital-acetaminophen  MG tablet tablet   Yes Yes    Take 1 tablet by mouth Daily As Needed.    cilostazol (PLETAL) 100 MG tablet   Yes Yes    Take 1 tablet by mouth 2 (Two) Times a Day.    gabapentin (NEURONTIN) 300 MG capsule  Self Yes Yes    Take 1 capsule by mouth 3 (Three) Times a Day.    Galcanezumab-gnlm (Emgality) 120 MG/ML solution prefilled syringe   Yes Yes    Inject 1 mL under the skin into the appropriate area as directed Every 30 (Thirty) Days.    glipiZIDE (GLUCOTROL) 10 MG tablet   Yes Yes    Take 10 mg by mouth 2 (Two) Times a Day.    ibuprofen (ADVIL,MOTRIN) 800 MG tablet   Yes Yes    Take 800 mg by mouth 3 (Three) Times a  Day.    insulin detemir (LEVEMIR) 100 UNIT/ML injection   Yes Yes    Inject 60 Units under the skin into the appropriate area as directed Every Night.    insulin glulisine (Apidra) 100 UNIT/ML injection   Yes Yes    Inject 10 Units under the skin into the appropriate area as directed 3 (Three) Times a Day Before Meals.    isosorbide mononitrate (IMDUR) 60 MG 24 hr tablet   No Yes    TAKE 1 TABLET BY MOUTH EVERY DAY IN THE MORNING    latanoprost (XALATAN) 0.005 % ophthalmic solution   Yes Yes    Administer 1 drop to both eyes Every Night.    lisinopril (PRINIVIL,ZESTRIL) 20 MG tablet  Self Yes Yes    Take 20 mg by mouth Daily.    metFORMIN (GLUCOPHAGE) 1000 MG tablet  Self Yes Yes    Take 1,000 mg by mouth 2 (Two) Times a Day With Meals.    sertraline (ZOLOFT) 100 MG tablet  Self Yes Yes    Take 1 tablet by mouth Daily.    tiZANidine (ZANAFLEX) 4 MG tablet  Self Yes Yes    Take 1 tablet by mouth Every Morning.    tiZANidine (ZANAFLEX) 4 MG tablet   Yes Yes    Take 8 mg by mouth Every Evening.    zolpidem (AMBIEN) 5 MG tablet   Yes Yes    Take 1 tablet by mouth At Night As Needed for Sleep.            Allergies:  Allergies   Allergen Reactions   • Codeine GI Intolerance       Objective      Vitals:   Temp:  [98.3 °F (36.8 °C)] 98.3 °F (36.8 °C)  Heart Rate:  [104-160] 115  Resp:  [16] 16  BP: (107-120)/(62-78) 110/67    Physical Exam  Vitals reviewed.   Constitutional:       General: She is not in acute distress.  HENT:      Head: Normocephalic.      Nose: Nose normal.      Mouth/Throat:      Mouth: Mucous membranes are moist.   Eyes:      Extraocular Movements: Extraocular movements intact.      Conjunctiva/sclera: Conjunctivae normal.      Pupils: Pupils are equal, round, and reactive to light.   Cardiovascular:      Rate and Rhythm: Tachycardia present. Rhythm irregular.   Pulmonary:      Comments: Decreased air entry at the bases  Abdominal:      General: Bowel sounds are normal.      Palpations: Abdomen is soft.       Tenderness: There is no abdominal tenderness.   Musculoskeletal:         General: Normal range of motion.      Cervical back: Neck supple.   Skin:     General: Skin is warm and dry.   Neurological:      Mental Status: She is alert and oriented to person, place, and time.   Psychiatric:         Mood and Affect: Mood normal.         Result Review    Result Review:  I have personally reviewed the results from the time of this admission to 10/14/2022 18:48 EDT and agree with these findings:  [x]  Laboratory  [x]  Microbiology  [x]  Radiology  []  EKG/Telemetry   []  Cardiology/Vascular   []  Pathology  []  Old records  []  Other:  Most notable findings include:    Assessment & Plan        Active Hospital Problems:  Active Hospital Problems    Diagnosis    • **Other chest pain    • Atrial fibrillation (HCC)    • Type 2 diabetes mellitus with hyperglycemia (HCC)    • Anxiety associated with depression      Plan:     Atrial flutter/fibrillation with rapid ventricular response  Electrolytes including magnesium and potassium okay  TSH okay  Was given Cardizem bolus and started on Cardizem drip  Also started on therapeutic dose Lovenox  Cardiologist consulted    Chest pain  History of CAD  Cardiac catheterization 10/30/2020-right coronary artery with 50% mid segment stenosis.  Medical management advised at that time and patient started on aspirin and Imdur  Patient is noncompliant to medications  Check serial troponin  Cardiologist following    Diabetes mellitus with hyperglycemia  Blood glucose on presentation 400 however patient was not in DKA  Check A1c  Start on Lantus, Premeal insulin and sliding scale insulin  Monitor blood sugar and adjust insulin as needed    Depression-on Zoloft    Hypertension  Blood pressure marginal  On Cardizem drip  Hold home medication of lisinopril at this time    COPD-not in exacerbation  Active smoker  Respiratory care with bronchodilators  Oxygen therapy and titration    Tobacco  dependence-counseled on smoking cessation      Further recommendation following clinical course    DVT prophylaxis:  Medical DVT prophylaxis orders are present.    CODE STATUS:    Level Of Support Discussed With: Patient  Code Status (Patient has no pulse and is not breathing): No CPR (Do Not Attempt to Resuscitate)  Medical Interventions (Patient has pulse or is breathing): Full Support    Admission Status:  I believe this patient meets inpatient status.    I discussed the patient's findings and my recommendations with patient    This patient has been examined appropriate PPE. 10/14/22      Signature: Electronically signed by Ruben Cruz MD, 10/14/22, 6:34 PM EDT.

## 2022-10-15 ENCOUNTER — APPOINTMENT (OUTPATIENT)
Dept: CARDIOLOGY | Facility: HOSPITAL | Age: 67
End: 2022-10-15

## 2022-10-15 ENCOUNTER — APPOINTMENT (OUTPATIENT)
Dept: NUCLEAR MEDICINE | Facility: HOSPITAL | Age: 67
End: 2022-10-15

## 2022-10-15 LAB
ANION GAP SERPL CALCULATED.3IONS-SCNC: 12 MMOL/L (ref 5–15)
BASOPHILS # BLD AUTO: 0.1 10*3/MM3 (ref 0–0.2)
BASOPHILS NFR BLD AUTO: 0.9 % (ref 0–1.5)
BH CV ECHO MEAS - AO MAX PG: 10.7 MMHG
BH CV ECHO MEAS - AO MEAN PG: 5.6 MMHG
BH CV ECHO MEAS - AO ROOT DIAM: 2.8 CM
BH CV ECHO MEAS - AO V2 MAX: 163.6 CM/SEC
BH CV ECHO MEAS - AO V2 VTI: 28.5 CM
BH CV ECHO MEAS - AVA(I,D): 1.12 CM2
BH CV ECHO MEAS - EDV(CUBED): 82.3 ML
BH CV ECHO MEAS - EDV(MOD-SP4): 121.8 ML
BH CV ECHO MEAS - EF(MOD-BP): 53 %
BH CV ECHO MEAS - EF(MOD-SP4): 53 %
BH CV ECHO MEAS - ESV(CUBED): 27.2 ML
BH CV ECHO MEAS - ESV(MOD-SP4): 57.2 ML
BH CV ECHO MEAS - FS: 30.9 %
BH CV ECHO MEAS - IVS/LVPW: 1.13 CM
BH CV ECHO MEAS - IVSD: 1.31 CM
BH CV ECHO MEAS - LA DIMENSION: 3.4 CM
BH CV ECHO MEAS - LV DIASTOLIC VOL/BSA (35-75): 62.9 CM2
BH CV ECHO MEAS - LV MASS(C)D: 195.4 GRAMS
BH CV ECHO MEAS - LV MAX PG: 1.82 MMHG
BH CV ECHO MEAS - LV MEAN PG: 0.87 MMHG
BH CV ECHO MEAS - LV SYSTOLIC VOL/BSA (12-30): 29.6 CM2
BH CV ECHO MEAS - LV V1 MAX: 67.5 CM/SEC
BH CV ECHO MEAS - LV V1 VTI: 12 CM
BH CV ECHO MEAS - LVIDD: 4.3 CM
BH CV ECHO MEAS - LVIDS: 3 CM
BH CV ECHO MEAS - LVOT AREA: 2.6 CM2
BH CV ECHO MEAS - LVOT DIAM: 1.83 CM
BH CV ECHO MEAS - LVPWD: 1.16 CM
BH CV ECHO MEAS - MV A MAX VEL: 52 CM/SEC
BH CV ECHO MEAS - MV DEC SLOPE: 715.3 CM/SEC2
BH CV ECHO MEAS - MV DEC TIME: 0.15 MSEC
BH CV ECHO MEAS - MV E MAX VEL: 104.8 CM/SEC
BH CV ECHO MEAS - MV E/A: 2.02
BH CV ECHO MEAS - MV MAX PG: 6.6 MMHG
BH CV ECHO MEAS - MV MEAN PG: 2.36 MMHG
BH CV ECHO MEAS - MV V2 VTI: 26 CM
BH CV ECHO MEAS - MVA(VTI): 1.22 CM2
BH CV ECHO MEAS - PA ACC TIME: 0.18 SEC
BH CV ECHO MEAS - PA PR(ACCEL): -1.49 MMHG
BH CV ECHO MEAS - PA V2 MAX: 114.3 CM/SEC
BH CV ECHO MEAS - RV MAX PG: 2.15 MMHG
BH CV ECHO MEAS - RV V1 MAX: 73.2 CM/SEC
BH CV ECHO MEAS - RV V1 VTI: 14.9 CM
BH CV ECHO MEAS - SI(MOD-SP4): 33.3 ML/M2
BH CV ECHO MEAS - SV(LVOT): 31.8 ML
BH CV ECHO MEAS - SV(MOD-SP4): 64.5 ML
BUN SERPL-MCNC: 17 MG/DL (ref 8–23)
BUN/CREAT SERPL: 29.3 (ref 7–25)
CALCIUM SPEC-SCNC: 8.7 MG/DL (ref 8.6–10.5)
CHLORIDE SERPL-SCNC: 99 MMOL/L (ref 98–107)
CO2 SERPL-SCNC: 22 MMOL/L (ref 22–29)
CREAT SERPL-MCNC: 0.58 MG/DL (ref 0.57–1)
DEPRECATED RDW RBC AUTO: 39.8 FL (ref 37–54)
DIGOXIN SERPL-MCNC: 0.6 NG/ML (ref 0.6–1.2)
EGFRCR SERPLBLD CKD-EPI 2021: 99.9 ML/MIN/1.73
EOSINOPHIL # BLD AUTO: 0.2 10*3/MM3 (ref 0–0.4)
EOSINOPHIL NFR BLD AUTO: 2.9 % (ref 0.3–6.2)
ERYTHROCYTE [DISTWIDTH] IN BLOOD BY AUTOMATED COUNT: 12.7 % (ref 12.3–15.4)
GLUCOSE BLDC GLUCOMTR-MCNC: 238 MG/DL (ref 70–105)
GLUCOSE BLDC GLUCOMTR-MCNC: 272 MG/DL (ref 70–105)
GLUCOSE BLDC GLUCOMTR-MCNC: 296 MG/DL (ref 70–105)
GLUCOSE BLDC GLUCOMTR-MCNC: 298 MG/DL (ref 70–105)
GLUCOSE SERPL-MCNC: 294 MG/DL (ref 65–99)
HCT VFR BLD AUTO: 37.3 % (ref 34–46.6)
HGB BLD-MCNC: 13 G/DL (ref 12–15.9)
LV EF 2D ECHO EST: 60 %
LYMPHOCYTES # BLD AUTO: 2.3 10*3/MM3 (ref 0.7–3.1)
LYMPHOCYTES NFR BLD AUTO: 28.3 % (ref 19.6–45.3)
MAGNESIUM SERPL-MCNC: 1.6 MG/DL (ref 1.6–2.4)
MAXIMAL PREDICTED HEART RATE: 154 BPM
MCH RBC QN AUTO: 31.1 PG (ref 26.6–33)
MCHC RBC AUTO-ENTMCNC: 34.8 G/DL (ref 31.5–35.7)
MCV RBC AUTO: 89.3 FL (ref 79–97)
MONOCYTES # BLD AUTO: 0.6 10*3/MM3 (ref 0.1–0.9)
MONOCYTES NFR BLD AUTO: 7.7 % (ref 5–12)
NEUTROPHILS NFR BLD AUTO: 4.9 10*3/MM3 (ref 1.7–7)
NEUTROPHILS NFR BLD AUTO: 60.2 % (ref 42.7–76)
NRBC BLD AUTO-RTO: 0.2 /100 WBC (ref 0–0.2)
PLATELET # BLD AUTO: 146 10*3/MM3 (ref 140–450)
PMV BLD AUTO: 9.5 FL (ref 6–12)
POTASSIUM SERPL-SCNC: 4.2 MMOL/L (ref 3.5–5.2)
QT INTERVAL: 415 MS
RBC # BLD AUTO: 4.17 10*6/MM3 (ref 3.77–5.28)
SODIUM SERPL-SCNC: 133 MMOL/L (ref 136–145)
STRESS TARGET HR: 131 BPM
TROPONIN T SERPL-MCNC: <0.01 NG/ML (ref 0–0.03)
TSH SERPL DL<=0.05 MIU/L-ACNC: 3.02 UIU/ML (ref 0.27–4.2)
WBC NRBC COR # BLD: 8.2 10*3/MM3 (ref 3.4–10.8)

## 2022-10-15 PROCEDURE — 80048 BASIC METABOLIC PNL TOTAL CA: CPT | Performed by: INTERNAL MEDICINE

## 2022-10-15 PROCEDURE — 25010000002 ENOXAPARIN PER 10 MG: Performed by: INTERNAL MEDICINE

## 2022-10-15 PROCEDURE — 99233 SBSQ HOSP IP/OBS HIGH 50: CPT | Performed by: INTERNAL MEDICINE

## 2022-10-15 PROCEDURE — A9502 TC99M TETROFOSMIN: HCPCS | Performed by: INTERNAL MEDICINE

## 2022-10-15 PROCEDURE — 78452 HT MUSCLE IMAGE SPECT MULT: CPT

## 2022-10-15 PROCEDURE — 63710000001 INSULIN LISPRO (HUMAN) PER 5 UNITS: Performed by: PHYSICIAN ASSISTANT

## 2022-10-15 PROCEDURE — 63710000001 INSULIN GLARGINE PER 5 UNITS: Performed by: INTERNAL MEDICINE

## 2022-10-15 PROCEDURE — 93017 CV STRESS TEST TRACING ONLY: CPT

## 2022-10-15 PROCEDURE — 84443 ASSAY THYROID STIM HORMONE: CPT | Performed by: INTERNAL MEDICINE

## 2022-10-15 PROCEDURE — 93306 TTE W/DOPPLER COMPLETE: CPT

## 2022-10-15 PROCEDURE — 82962 GLUCOSE BLOOD TEST: CPT

## 2022-10-15 PROCEDURE — 78452 HT MUSCLE IMAGE SPECT MULT: CPT | Performed by: INTERNAL MEDICINE

## 2022-10-15 PROCEDURE — 80162 ASSAY OF DIGOXIN TOTAL: CPT | Performed by: PHYSICIAN ASSISTANT

## 2022-10-15 PROCEDURE — 0 TECHNETIUM TETROFOSMIN KIT: Performed by: INTERNAL MEDICINE

## 2022-10-15 PROCEDURE — 85025 COMPLETE CBC W/AUTO DIFF WBC: CPT | Performed by: INTERNAL MEDICINE

## 2022-10-15 PROCEDURE — 0 MAGNESIUM SULFATE 4 GM/100ML SOLUTION: Performed by: INTERNAL MEDICINE

## 2022-10-15 PROCEDURE — 25010000002 REGADENOSON 0.4 MG/5ML SOLUTION: Performed by: INTERNAL MEDICINE

## 2022-10-15 PROCEDURE — 93016 CV STRESS TEST SUPVJ ONLY: CPT | Performed by: NURSE PRACTITIONER

## 2022-10-15 PROCEDURE — 25010000002 SULFUR HEXAFLUORIDE MICROSPH 60.7-25 MG RECONSTITUTED SUSPENSION: Performed by: INTERNAL MEDICINE

## 2022-10-15 PROCEDURE — 83735 ASSAY OF MAGNESIUM: CPT | Performed by: INTERNAL MEDICINE

## 2022-10-15 PROCEDURE — 93306 TTE W/DOPPLER COMPLETE: CPT | Performed by: INTERNAL MEDICINE

## 2022-10-15 PROCEDURE — 93005 ELECTROCARDIOGRAM TRACING: CPT | Performed by: INTERNAL MEDICINE

## 2022-10-15 PROCEDURE — 84484 ASSAY OF TROPONIN QUANT: CPT | Performed by: INTERNAL MEDICINE

## 2022-10-15 PROCEDURE — 93018 CV STRESS TEST I&R ONLY: CPT | Performed by: INTERNAL MEDICINE

## 2022-10-15 RX ORDER — ZOLPIDEM TARTRATE 5 MG/1
5 TABLET ORAL ONCE
Status: COMPLETED | OUTPATIENT
Start: 2022-10-15 | End: 2022-10-15

## 2022-10-15 RX ORDER — METOPROLOL SUCCINATE 25 MG/1
25 TABLET, EXTENDED RELEASE ORAL
Status: DISCONTINUED | OUTPATIENT
Start: 2022-10-15 | End: 2022-10-18 | Stop reason: HOSPADM

## 2022-10-15 RX ADMIN — Medication 3 ML: at 20:31

## 2022-10-15 RX ADMIN — Medication 7.5 MG/HR: at 15:46

## 2022-10-15 RX ADMIN — Medication 3 ML: at 08:42

## 2022-10-15 RX ADMIN — INSULIN GLARGINE 37 UNITS: 100 INJECTION, SOLUTION SUBCUTANEOUS at 20:30

## 2022-10-15 RX ADMIN — Medication 15 MG/HR: at 00:38

## 2022-10-15 RX ADMIN — REGADENOSON 0.4 MG: 0.08 INJECTION, SOLUTION INTRAVENOUS at 10:20

## 2022-10-15 RX ADMIN — METOPROLOL SUCCINATE 25 MG: 25 TABLET, EXTENDED RELEASE ORAL at 17:16

## 2022-10-15 RX ADMIN — INSULIN LISPRO 8 UNITS: 100 INJECTION, SOLUTION INTRAVENOUS; SUBCUTANEOUS at 16:53

## 2022-10-15 RX ADMIN — INSULIN LISPRO 8 UNITS: 100 INJECTION, SOLUTION INTRAVENOUS; SUBCUTANEOUS at 08:42

## 2022-10-15 RX ADMIN — ACETAMINOPHEN 650 MG: 325 TABLET, FILM COATED ORAL at 16:52

## 2022-10-15 RX ADMIN — TETROFOSMIN 1 DOSE: 1.38 INJECTION, POWDER, LYOPHILIZED, FOR SOLUTION INTRAVENOUS at 08:00

## 2022-10-15 RX ADMIN — SULFUR HEXAFLUORIDE 3 ML: KIT at 14:44

## 2022-10-15 RX ADMIN — MAGNESIUM SULFATE HEPTAHYDRATE 4 G: 40 INJECTION, SOLUTION INTRAVENOUS at 05:15

## 2022-10-15 RX ADMIN — ZOLPIDEM TARTRATE 5 MG: 5 TABLET ORAL at 20:58

## 2022-10-15 RX ADMIN — ENOXAPARIN SODIUM 80 MG: 100 INJECTION SUBCUTANEOUS at 20:30

## 2022-10-15 RX ADMIN — SERTRALINE 100 MG: 100 TABLET, FILM COATED ORAL at 11:08

## 2022-10-15 RX ADMIN — GABAPENTIN 300 MG: 300 CAPSULE ORAL at 16:53

## 2022-10-15 RX ADMIN — INSULIN LISPRO 5 UNITS: 100 INJECTION, SOLUTION INTRAVENOUS; SUBCUTANEOUS at 11:08

## 2022-10-15 RX ADMIN — TETROFOSMIN 1 DOSE: 1.38 INJECTION, POWDER, LYOPHILIZED, FOR SOLUTION INTRAVENOUS at 10:20

## 2022-10-15 RX ADMIN — LATANOPROST 1 DROP: 50 SOLUTION/ DROPS OPHTHALMIC at 20:31

## 2022-10-15 RX ADMIN — ENOXAPARIN SODIUM 80 MG: 100 INJECTION SUBCUTANEOUS at 08:41

## 2022-10-15 RX ADMIN — GABAPENTIN 300 MG: 300 CAPSULE ORAL at 20:31

## 2022-10-15 RX ADMIN — GABAPENTIN 300 MG: 300 CAPSULE ORAL at 08:42

## 2022-10-15 NOTE — PROGRESS NOTES
Referring Provider: Scott Wynn MD    Reason for follow-up:  Atrial flutter  Chest discomfort     Patient Care Team:  Christopher Jordan MD as PCP - General (Sports Medicine)  Donaldo Archer MD as Consulting Physician (Cardiology)    Subjective .      ROS    Since I have last seen her yesterday, the patient has been without any chest discomfort ,shortness of breath, palpitations, dizziness or syncope.  Denies having any headache ,abdominal pain ,nausea, vomiting , diarrhea constipation, loss of weight or loss of appetite.  Denies having any excessive bruising ,hematuria or blood in the stool.    Review of all systems negative except as indicated    History  Past Medical History:   Diagnosis Date   • COPD (chronic obstructive pulmonary disease) (CMS/[facility])    • Depression    • Diabetes mellitus (CMS/[facility])    • Hyperlipidemia    • Hypertension        Past Surgical History:   Procedure Laterality Date   • CARDIAC CATHETERIZATION Left 10/30/2020    Procedure: Left Heart Cath with Coronary Angiography;  Surgeon: Donaldo Archer MD;  Location: [facility] CATH INVASIVE LOCATION;  Service: Cardiovascular;  Laterality: Left;   • CARPAL TUNNEL RELEASE Bilateral 2017   • CHOLECYSTECTOMY  1980   • HYSTERECTOMY  1980       Family History   Problem Relation Age of Onset   • Heart disease Mother    • Hypertension Mother    • Diabetes Mother    • Stroke Mother        Social History     Tobacco Use   • Smoking status: Every Day     Packs/day: 1.00     Years: 20.00     Pack years: 20.00     Types: Cigarettes   • Smokeless tobacco: Never   Substance Use Topics   • Alcohol use: No        (Not in a hospital admission)      Allergies  Codeine    Scheduled Meds:ARIPiprazole, 2 mg, Oral, Nightly  enoxaparin, 80 mg, Subcutaneous, Q12H  gabapentin, 300 mg, Oral, TID  insulin glargine, 30 Units, Subcutaneous, Nightly  insulin lispro, 0-14 Units, Subcutaneous, TID AC  latanoprost, 1 drop, Both Eyes, Nightly  sertraline, 100 mg, Oral,  "Daily  sodium chloride, 3 mL, Intravenous, Q12H      Continuous Infusions:dilTIAZem, 5-15 mg/hr, Last Rate: 10 mg/hr (10/15/22 0210)  Pharmacy to Dose enoxaparin (LOVENOX),       PRN Meds:.•  acetaminophen **OR** acetaminophen **OR** acetaminophen  •  senna-docusate sodium **AND** polyethylene glycol **AND** bisacodyl **AND** bisacodyl  •  Calcium Gluconate-NaCl **AND** calcium gluconate **AND** Calcium, Ionized  •  dextrose  •  dextrose  •  digoxin  •  glucagon (human recombinant)  •  magnesium sulfate **OR** magnesium sulfate **OR** magnesium sulfate  •  melatonin  •  nitroglycerin  •  ondansetron **OR** ondansetron  •  Pharmacy to Dose enoxaparin (LOVENOX)  •  potassium & sodium phosphates **OR** potassium & sodium phosphates  •  potassium chloride **OR** potassium chloride **OR** potassium chloride  •  [COMPLETED] Insert peripheral IV **AND** sodium chloride  •  [COMPLETED] Insert peripheral IV **AND** sodium chloride  •  sodium chloride    Objective     VITAL SIGNS  Vitals:    10/15/22 0300 10/15/22 0400 10/15/22 0500 10/15/22 0600   BP: 151/74 135/70 159/64 157/92   BP Location: Left arm   Left arm   Patient Position: Lying   Lying   Pulse: 86 90 105 92   Resp: 17 16 18 20   Temp:       TempSrc:       SpO2: 99% 90% 91% 97%   Weight:       Height:           Flowsheet Rows    Flowsheet Row First Filed Value   Admission Height 172.7 cm (68\") Documented at 10/14/2022 1522   Admission Weight 79.8 kg (176 lb) Documented at 10/14/2022 1522            Intake/Output Summary (Last 24 hours) at 10/15/2022 0725  Last data filed at 10/14/2022 2300  Gross per 24 hour   Intake 1800 ml   Output 450 ml   Net 1350 ml        TELEMETRY: Atrial flutter    Physical Exam:  The patient is alert, oriented and in no distress.  Vital signs as noted above.  Head and neck revealed no carotid bruits or jugular venous distention.  No thyromegaly or lymphadenopathy is present  Lungs clear.  No wheezing.  Breath sounds are normal " bilaterally.  Heart normal first and second heart sounds.  No murmur. No precordial rub is present.  No gallop is present.  Abdomen soft and nontender.  No organomegaly is present.  Extremities with good peripheral pulses without any pedal edema.  Skin warm and dry.  Musculoskeletal system is grossly normal  CNS grossly normal      Results Review:   I reviewed the patient's new clinical results.  Lab Results (last 24 hours)     Procedure Component Value Units Date/Time    POC Glucose Once [[id number]]  (Abnormal) Collected: 10/15/22 0614    Specimen: Blood Updated: 10/15/22 0615     Glucose 296 mg/dL      Comment: Serial Number: [id number]:  [id number]       TSH [[id number]]  (Normal) Collected: 10/15/22 0214    Specimen: Blood Updated: 10/15/22 0313     TSH 3.020 uIU/mL     Troponin [[id number]]  (Normal) Collected: 10/15/22 0214    Specimen: Blood Updated: 10/15/22 0313     Troponin T <0.010 ng/mL     Narrative:      Troponin T Reference Range:  <= 0.03 ng/mL-   Negative for AMI  >0.03 ng/mL-     Abnormal for myocardial necrosis.  Clinicians would have to utilize clinical acumen, EKG, Troponin and serial changes to determine if it is an Acute Myocardial Infarction or myocardial injury due to an underlying chronic condition.       Results may be falsely decreased if patient taking Biotin.      Digoxin Level [[id number]]  (Normal) Collected: 10/15/22 0214    Specimen: Blood Updated: 10/15/22 0247     Digoxin 0.60 ng/mL     Basic Metabolic Panel [[id number]]  (Abnormal) Collected: 10/15/22 0214    Specimen: Blood Updated: 10/15/22 0245     Glucose 294 mg/dL      BUN 17 mg/dL      Creatinine 0.58 mg/dL      Sodium 133 mmol/L      Potassium 4.2 mmol/L      Chloride 99 mmol/L      CO2 22.0 mmol/L      Calcium 8.7 mg/dL      BUN/Creatinine Ratio 29.3     Anion Gap 12.0 mmol/L      eGFR 99.9 mL/min/1.73      Comment: National Kidney Foundation and American Society of Nephrology (ASN) Task Force recommended calculation  based on the Chronic Kidney Disease Epidemiology Collaboration (CKD-EPI) equation refit without adjustment for race.       Narrative:      GFR Normal >60  Chronic Kidney Disease <60  Kidney Failure <15      Magnesium [[id number]]  (Normal) Collected: 10/15/22 0214    Specimen: Blood Updated: 10/15/22 0245     Magnesium 1.6 mg/dL     CBC Auto Differential [[id number]]  (Normal) Collected: 10/15/22 0214    Specimen: Blood Updated: 10/15/22 0221     WBC 8.20 10*3/mm3      RBC 4.17 10*6/mm3      Hemoglobin 13.0 g/dL      Hematocrit 37.3 %      MCV 89.3 fL      MCH 31.1 pg      MCHC 34.8 g/dL      RDW 12.7 %      RDW-SD 39.8 fl      MPV 9.5 fL      Platelets 146 10*3/mm3      Neutrophil % 60.2 %      Lymphocyte % 28.3 %      Monocyte % 7.7 %      Eosinophil % 2.9 %      Basophil % 0.9 %      Neutrophils, Absolute 4.90 10*3/mm3      Lymphocytes, Absolute 2.30 10*3/mm3      Monocytes, Absolute 0.60 10*3/mm3      Eosinophils, Absolute 0.20 10*3/mm3      Basophils, Absolute 0.10 10*3/mm3      nRBC 0.2 /100 WBC     POC Glucose Once [[id number]]  (Abnormal) Collected: 10/14/22 2057    Specimen: Blood Updated: 10/14/22 2058     Glucose 389 mg/dL      Comment: Serial Number: [id number]:  [id number]       Hemoglobin A1c [[id number]]  (Abnormal) Collected: 10/14/22 1558    Specimen: Blood Updated: 10/14/22 1912     Hemoglobin A1C 12.3 %     Narrative:      Hemoglobin A1C Reference Range:    <5.7 %        Normal  5.7-6.4 %     Increased risk for diabetes  > 6.4 %        Diabetes       These guidelines have been recommended by the American Diabetic Association for Hgb A1c.      The following 2010 guidelines have been recommended by the American Diabetes Association for Hemoglobin A1c.    HBA1c 5.7-6.4% Increased risk for future diabetes (pre-diabetes)  HBA1c     >6.4% Diabetes      Urinalysis With Culture If Indicated - Urine, Clean Catch [[id number]]  (Abnormal) Collected: 10/14/22 1715    Specimen: Urine, Clean Catch Updated:  10/14/22 1721     Color, UA Yellow     Appearance, UA Clear     pH, UA 6.0     Specific Gravity, UA 1.039     Glucose, UA >=1000 mg/dL (3+)     Ketones, UA Negative     Bilirubin, UA Negative     Blood, UA Negative     Protein, UA Negative     Leuk Esterase, UA Negative     Nitrite, UA Negative     Urobilinogen, UA 0.2 E.U./dL    Narrative:      In absence of clinical symptoms, the presence of pyuria, bacteria, and/or nitrites on the urinalysis result does not correlate with infection.  Urine microscopic not indicated.    Blood Gas, Arterial - [[id number]]  (Abnormal) Collected: 10/14/22 1618    Specimen: Arterial Blood Updated: 10/14/22 1721     Site Right Radial     Raphael's Test Positive     pH, Arterial 7.422 pH units      pCO2, Arterial 32.5 mm Hg      pO2, Arterial 62.6 mm Hg      HCO3, Arterial 21.2 mmol/L      Base Excess, Arterial -2.5 mmol/L      Comment: Serial Number: [id number]:  [id number]        O2 Saturation, Arterial 92.4 %      CO2 Content 22.2 mmol/L      Barometric Pressure for Blood Gas --     Comment: N/A        Modality Room Air     FIO2 21 %      Hemodilution No    [place] Draw [[id number]] Collected: 10/14/22 1558    Specimen: Blood Updated: 10/14/22 1707    Narrative:      The following orders were created for panel order [place] Draw.  Procedure                               Abnormality         Status                     ---------                               -----------         ------                     Green Top (Gel)[[id number]]                                  Final result               Lavender Top[[id number]]                                     Final result               Gold Top - SST[[id number]]                                   Final result               Light Blue Top[[id number]]                                                                Please view results for these tests on the individual orders.    Green Top (Gel) [[id number]] Collected: 10/14/22 1558    Specimen: Blood Updated:  10/14/22 1707     Extra Tube HOLD    Ketone Bodies, Serum (Not performed at [place]) [[id number]]  (Normal) Collected: 10/14/22 1558    Specimen: Blood Updated: 10/14/22 1701    Narrative:      The following orders were created for panel order Ketone Bodies, Serum (Not performed at [place]).  Procedure                               Abnormality         Status                     ---------                               -----------         ------                     Acetone[[id number]]                      Normal              Final result                 Please view results for these tests on the individual orders.    Acetone [[id number]]  (Normal) Collected: 10/14/22 1558    Specimen: Blood Updated: 10/14/22 1701     Acetone Negative    Lavender Top [[id number]] Collected: 10/14/22 1558    Specimen: Blood Updated: 10/14/22 1700     Extra Tube hold for add-on     Comment: Auto resulted       Gold Top - SST [[id number]] Collected: 10/14/22 1558    Specimen: Blood Updated: 10/14/22 1700     Extra Tube Hold for add-ons.     Comment: Auto resulted.       Protime-INR [[id number]]  (Normal) Collected: 10/14/22 1626    Specimen: Blood Updated: 10/14/22 1643     Protime 10.3 Seconds      INR 1.00    aPTT [[id number]]  (Abnormal) Collected: 10/14/22 1626    Specimen: Blood Updated: 10/14/22 1643     PTT 24.8 seconds     D-dimer, Quantitative [[id number]]  (Normal) Collected: 10/14/22 1626    Specimen: Blood Updated: 10/14/22 1643     D-Dimer, Quantitative <0.19 mg/L (FEU)     Narrative:      Reference Range  --------------------------------------------------------------------     < 0.50   Negative Predictive Value  0.50-0.59   Indeterminate    >= 0.60   Probable VTE             A very low percentage of patients with DVT may yield D-Dimer results   below the cut-off of 0.50 mg/L FEU.  This is known to be more   prevalent in patients with distal DVT.             Results of this test should always be interpreted in conjunction with    the patient's medical history, clinical presentation and other   findings.  Clinical diagnosis should not be based on the result of   [place] D-Dimer alone.    BNP [[id number]]  (Normal) Collected: 10/14/22 1558    Specimen: Blood Updated: 10/14/22 1629     proBNP 391.9 pg/mL     Narrative:      Among patients with dyspnea, NT-proBNP is highly sensitive for the detection of acute congestive heart failure. In addition NT-proBNP of <300 pg/ml effectively rules out acute congestive heart failure with 99% negative predictive value.    Results may be falsely decreased if patient taking Biotin.      TSH [[id number]]  (Normal) Collected: 10/14/22 1558    Specimen: Blood Updated: 10/14/22 1629     TSH 1.370 uIU/mL     Troponin [[id number]]  (Normal) Collected: 10/14/22 1558    Specimen: Blood Updated: 10/14/22 1629     Troponin T <0.010 ng/mL     Narrative:      Troponin T Reference Range:  <= 0.03 ng/mL-   Negative for AMI  >0.03 ng/mL-     Abnormal for myocardial necrosis.  Clinicians would have to utilize clinical acumen, EKG, Troponin and serial changes to determine if it is an Acute Myocardial Infarction or myocardial injury due to an underlying chronic condition.       Results may be falsely decreased if patient taking Biotin.      Comprehensive Metabolic Panel [[id number]]  (Abnormal) Collected: 10/14/22 1558    Specimen: Blood Updated: 10/14/22 1625     Glucose 400 mg/dL      BUN 20 mg/dL      Creatinine 0.64 mg/dL      Sodium 133 mmol/L      Potassium 4.0 mmol/L      Chloride 97 mmol/L      CO2 24.0 mmol/L      Calcium 9.0 mg/dL      Total Protein 6.5 g/dL      Albumin 4.00 g/dL      ALT (SGPT) 31 U/L      AST (SGOT) 21 U/L      Alkaline Phosphatase 116 U/L      Total Bilirubin 0.2 mg/dL      Globulin 2.5 gm/dL      A/G Ratio 1.6 g/dL      BUN/Creatinine Ratio 31.3     Anion Gap 12.0 mmol/L      eGFR 97.6 mL/min/1.73      Comment: National Kidney Foundation and American Society of Nephrology (ASN) Task Force  recommended calculation based on the Chronic Kidney Disease Epidemiology Collaboration (CKD-EPI) equation refit without adjustment for race.       Narrative:      GFR Normal >60  Chronic Kidney Disease <60  Kidney Failure <15      Lipase [[id number]]  (Normal) Collected: 10/14/22 1558    Specimen: Blood Updated: 10/14/22 1625     Lipase 33 U/L     CK [[id number]]  (Normal) Collected: 10/14/22 1558    Specimen: Blood Updated: 10/14/22 1625     Creatine Kinase 107 U/L     Magnesium [[id number]]  (Normal) Collected: 10/14/22 1558    Specimen: Blood Updated: 10/14/22 1625     Magnesium 1.6 mg/dL     Blood Culture - Blood, Arm, Right [[id number]] Collected: 10/14/22 1612    Specimen: Blood from Arm, Right Updated: 10/14/22 1624    Blood Culture - Blood, Arm, Left [[id number]] Collected: 10/14/22 1612    Specimen: Blood from Arm, Left Updated: 10/14/22 1624    CBC & Differential [[id number]]  (Normal) Collected: 10/14/22 1558    Specimen: Blood Updated: 10/14/22 1604    Narrative:      The following orders were created for panel order CBC & Differential.  Procedure                               Abnormality         Status                     ---------                               -----------         ------                     CBC Auto Differential[[id number]]        Normal              Final result                 Please view results for these tests on the individual orders.    CBC Auto Differential [[id number]]  (Normal) Collected: 10/14/22 1558    Specimen: Blood Updated: 10/14/22 1604     WBC 7.80 10*3/mm3      RBC 4.15 10*6/mm3      Hemoglobin 12.7 g/dL      Hematocrit 37.9 %      MCV 91.2 fL      MCH 30.5 pg      MCHC 33.5 g/dL      RDW 13.0 %      RDW-SD 41.1 fl      MPV 10.3 fL      Platelets 167 10*3/mm3      Neutrophil % 59.8 %      Lymphocyte % 29.9 %      Monocyte % 6.9 %      Eosinophil % 2.7 %      Basophil % 0.7 %      Neutrophils, Absolute 4.60 10*3/mm3      Lymphocytes, Absolute 2.30 10*3/mm3      Monocytes,  Absolute 0.50 10*3/mm3      Eosinophils, Absolute 0.20 10*3/mm3      Basophils, Absolute 0.10 10*3/mm3      nRBC 0.0 /100 WBC     POC Lactate [[id number]]  (Normal) Collected: 10/14/22 1556    Specimen: Blood Updated: 10/14/22 1557     Lactate 1.6 mmol/L      Comment: Serial Number: [id number]:  [id number]       POC Glucose Once [[id number]]  (Abnormal) Collected: 10/14/22 1556    Specimen: Blood Updated: 10/14/22 1557     Glucose 376 mg/dL      Comment: Serial Number: [id number]:  [id number]             Imaging Results (Last 24 Hours)     Procedure Component Value Units Date/Time    XR Chest 1 View [[id number]] Collected: 10/14/22 1632     Updated: 10/14/22 1635    Narrative:         DATE OF EXAM:   10/14/2022 4:29 PM     PROCEDURE:   XR CHEST 1 VW-     INDICATIONS:   chest pain, tachycardia     COMPARISON:  06/29/2021 and prior     TECHNIQUE:   Portable Chest     FINDINGS:      Study is limited by overlying support and monitoring apparatus. The  heart and mediastinal contours are within normal limits. Lung volumes  are low. Lungs are grossly clear. Osseous structures demonstrate no  acute abnormality        Impression:      IMPRESSION :   Limited negative low lung volume portable chest[     Electronically Signed By-Mayur Allen On:10/14/2022 4:33 PM  This report was finalized on [id number] by  Mayur Allen, .      LAB RESULTS (LAST 7 DAYS)    CBC  Results from last 7 days   Lab Units 10/15/22  0214 10/14/22  1558   WBC 10*3/mm3 8.20 7.80   RBC 10*6/mm3 4.17 4.15   HEMOGLOBIN g/dL 13.0 12.7   HEMATOCRIT % 37.3 37.9   MCV fL 89.3 91.2   PLATELETS 10*3/mm3 146 167       BMP  Results from last 7 days   Lab Units 10/15/22  0214 10/14/22  1558   SODIUM mmol/L 133* 133*   POTASSIUM mmol/L 4.2 4.0   CHLORIDE mmol/L 99 97*   CO2 mmol/L 22.0 24.0   BUN mg/dL 17 20   CREATININE mg/dL 0.58 0.64   GLUCOSE mg/dL 294* 400*   MAGNESIUM mg/dL 1.6 1.6       CMP   Results from last 7 days   Lab Units  10/15/22  0214 10/14/22  1558   SODIUM mmol/L 133* 133*   POTASSIUM mmol/L 4.2 4.0   CHLORIDE mmol/L 99 97*   CO2 mmol/L 22.0 24.0   BUN mg/dL 17 20   CREATININE mg/dL 0.58 0.64   GLUCOSE mg/dL 294* 400*   ALBUMIN g/dL  --  4.00   BILIRUBIN mg/dL  --  0.2   ALK PHOS U/L  --  116   AST (SGOT) U/L  --  21   ALT (SGPT) U/L  --  31   LIPASE U/L  --  33         BNP        TROPONIN  Results from last 7 days   Lab Units 10/15/22  0214 10/14/22  1558   CK TOTAL U/L  --  107   TROPONIN T ng/mL <0.010 <0.010       CoAg  Results from last 7 days   Lab Units 10/14/22  1626   INR  1.00   APTT seconds 24.8*       Creatinine Clearance  Estimated Creatinine Clearance: 105.9 mL/min (by C-G formula based on SCr of 0.58 mg/dL).    ABG  Results from last 7 days   Lab Units 10/14/22  1618   PH, ARTERIAL pH units 7.422   PCO2, ARTERIAL mm Hg 32.5*   PO2 ART mm Hg 62.6*   O2 SATURATION ART % 92.4*   BASE EXCESS ART mmol/L -2.5*       Radiology  XR Chest 1 View    Result Date: 10/14/2022  IMPRESSION : Limited negative low lung volume portable chest[  Electronically Signed By-Mayur Allen On:10/14/2022 4:33 PM This report was finalized on [id number] by  Mayur Allen, .          EKG                              I personally viewed and interpreted the patient's EKG/Telemetry data: Atrial flutter    ECHOCARDIOGRAM:            STRESS MYOVIEW:    Cardiolite (Tc-99m Sestamibi) stress test    CARDIAC CATHETERIZATION:            OTHER:         Assessment & Plan     Principal Problem:    Other chest pain  Active Problems:    Atrial fibrillation (HCC)    Type 2 diabetes mellitus with hyperglycemia (HCC)    Anxiety associated with depression      ]]]]]]]]]]]]]]]]]]]  Impression  =========  - Chest discomfort and palpitations.     - Atrial flutter with rapid ventricular response.     Cardiac cath 10/30/2020 revealed  Left ventricle size and contractility is normal with ejection fraction of 60%.     Left main coronary artery is normal.  Left  anterior descending artery is normal.  Circumflex coronary artery is normal.  Right coronary artery has mid segment 50% disease      - Diabetes hypertension COPD dyslipidemia     -Status post hysterectomy cholecystectomy and carpal tunnel surgery.     -Family history is positive for coronary artery disease.     -Smoker- abstinence from smoking was advised.     - No known allergies  ============  Plan  ========   Chest discomfort and palpitations.  Rule out progression of coronary artery disease      Atrial flutter with rapid ventricular response.  Intravenous Cardizem.  Rate is better controlled.  Patient still in atrial flutter.  Received IV digoxin last night.  Start metoprolol.  Hopefully patient will convert back to sinus rhythm.     Close cardiac monitoring.    Stress Cardiolite test  Echocardiogram     Medications were reviewed and updated.    Abstinence from smoking.  Further plan will depend on patient's progress.  ]]]]]]]]]]]]]]              Donaldo Archer MD  10/15/22  07:25 EDT

## 2022-10-15 NOTE — PROGRESS NOTES
[facility] Medicine Services Daily Progress Note    Patient Name: Arianna Blanco  : 1955  MRN: [id number]  Primary Care Physician:  Christopher Jordan MD  Date of admission: 10/14/2022      Subjective      Chief Complaint: Chest pain/palpitation    Patient Reports  10/15/2022  Patient seen post stress test  Related having generalized body weakness  Denies any palpitation nor chest pain      Review of Systems   Constitutional: Negative for chills and fever.   HENT: Negative for congestion.    Eyes: Negative for blurred vision.   Cardiovascular: Negative for chest pain and palpitations.   Respiratory: Negative for shortness of breath.    Endocrine: Negative for cold intolerance and heat intolerance.   Gastrointestinal: Negative for abdominal pain, nausea and vomiting.   Genitourinary: Negative for flank pain.   Neurological: Positive for weakness.   Psychiatric/Behavioral: Negative for altered mental status.        Objective      Vitals:   Temp:  [97.5 °F (36.4 °C)-98 °F (36.7 °C)] 97.5 °F (36.4 °C)  Heart Rate:  [] 91  Resp:  [16-20] 16  BP: (106-159)/(53-92) 156/72  Flow (L/min):  [2] 2    Physical Exam  Vitals reviewed.   Constitutional:       General: She is not in acute distress.  HENT:      Head: Normocephalic.      Nose: Nose normal.      Mouth/Throat:      Mouth: Mucous membranes are moist.   Eyes:      Extraocular Movements: Extraocular movements intact.      Conjunctiva/sclera: Conjunctivae normal.      Pupils: Pupils are equal, round, and reactive to light.   Cardiovascular:      Rate and Rhythm: Normal rate. Rhythm irregular.   Pulmonary:      Effort: No respiratory distress.      Breath sounds: Normal breath sounds.   Abdominal:      General: Bowel sounds are normal. There is no distension.      Palpations: Abdomen is soft.      Tenderness: There is no abdominal tenderness.   Musculoskeletal:         General: Normal range of motion.      Cervical back: Neck  supple.   Skin:     General: Skin is warm and dry.   Neurological:      Mental Status: She is alert and oriented to person, place, and time.   Psychiatric:         Mood and Affect: Mood normal.            Result Review    Result Review:  I have personally reviewed the results from the time of this admission to 10/15/2022 17:08 EDT and agree with these findings:  [x]  Laboratory  [x]  Microbiology  [x]  Radiology  []  EKG/Telemetry   []  Cardiology/Vascular   []  Pathology  []  Old records  []  Other:  Most notable findings include:           Assessment & Plan      Brief Patient Summary:  Patient is a 67-year-old female with multiple comorbidities including diabetes mellitus type 2, hypertension, COPD, depression, obstructive sleep apnea and hyperlipidemia.     Patient presented to [facility] ED on 10/14/2022 with complaints of chest pain.  Stated chest pain is pressure-like, left-sided, nonradiating with no aggravating or relieving factor.  Has associated generalized body weakness, shortness of breath but no nausea or vomiting.     Has had similar presentation about a week ago [facility] her to [facility] for further evaluation and management however patient left AMA.        In the ED patient was noted to be in atrial flutter, she was given Cardizem bolus and started on Cardizem drip.  Also given therapeutic dose Lovenox.  Cardiologist was consulted from the ED.  Patient was admitted for further care         ARIPiprazole, 2 mg, Oral, Nightly  enoxaparin, 80 mg, Subcutaneous, Q12H  gabapentin, 300 mg, Oral, TID  insulin glargine, 37 Units, Subcutaneous, Nightly  insulin lispro, 0-14 Units, Subcutaneous, TID AC  latanoprost, 1 drop, Both Eyes, Nightly  metoprolol succinate XL, 25 mg, Oral, Q24H  sertraline, 100 mg, Oral, Daily  sodium chloride, 3 mL, Intravenous, Q12H       dilTIAZem, 5-15 mg/hr, Last Rate: 7.5 mg/hr (10/15/22 1546)  Pharmacy  to Dose enoxaparin (LOVENOX),          Active Hospital Problems:  Active Hospital Problems    Diagnosis    • **Other chest pain    • Atrial fibrillation (HCC)    • Type 2 diabetes mellitus with hyperglycemia (HCC)    • Anxiety associated with depression      Plan:   Atrial flutter with rapid ventricular response  Electrolytes including magnesium and potassium okay  TSH okay  Was given Cardizem bolus and started on Cardizem drip  Also started on therapeutic dose Lovenox  Rate controlled but still in atrial flutter  Was given IV digoxin x1  Started on metoprolol 25 mg daily  Cardiology following     Chest pain  History of CAD  Cardiac catheterization 10/30/2020-right coronary artery with 50% mid segment stenosis.  Medical management advised at that time and patient started on aspirin and Imdur  Patient is noncompliant to medications  serial troponin insignificant  Cardiologist following  Lexiscan Cardiolite test 10/15/2022-is negative for myocardial ischemia  2D echo normal LV size and contractility with EF of 60%    Diabetes mellitus with hyperglycemia  Blood glucose on presentation 400 however patient was not in DKA  A1c 12.3  on Lantus, Premeal insulin and sliding scale insulin  Blood sugar not controlled-increase Lantus to 37 units at bedtime  Monitor blood sugar and adjust insulin as needed  Consult diabetic educator     Depression-on Zoloft     Hypertension  On Cardizem drip and metoprolol       COPD-not in exacerbation  Active smoker  Respiratory care with bronchodilators  Oxygen therapy and titration     Tobacco dependence-counseled on smoking cessation    DVT prophylaxis:  Medical DVT prophylaxis orders are present.    CODE STATUS:    Level Of Support Discussed With: Patient  Code Status (Patient has no pulse and is not breathing): No CPR (Do Not Attempt to Resuscitate)  Medical Interventions (Patient has pulse or is breathing): Full Support      Disposition: Pending clinical progress    This patient has been  examined with appropriate PPE . 10/15/22      Electronically signed by Ruben Cruz MD, 10/15/22, 17:08 EDT.  [facility] Hospitalist Team

## 2022-10-15 NOTE — PLAN OF CARE
Goal Outcome Evaluation:           Progress: no change  Outcome Evaluation: Patient still in a flutter, HR controlled in  range on 10 mg/hour cardizem drip. Patient blood sugars continue to be elevated. Lantus started overnight. Continue with plan of care.

## 2022-10-15 NOTE — PLAN OF CARE
Goal Outcome Evaluation:           Progress: no change  Outcome Evaluation: Pt has remained in Aflutter throughout my shift. Had myoview this morning, pending results. Pt is still on cardizem. Will continue to monitor.

## 2022-10-15 NOTE — NURSING NOTE
Javier Hogan, PA bedside.    Provider informed the med rec was updated after doing an admission assessment; there are several medications listed on her med rec that the patient in non-compliant in taking including her insulin. She also is requesting a few home medications prior to bed.  patient's blood glucose was 389 tonight at 2100 and treatment of 8units via medium sliding scale.    PA on way to review patient's chart and will make corrections to MAR after review. At this time will hold medications until further review and orders placed.

## 2022-10-15 NOTE — ED NOTES
Nursing report ED to floor  Arianna Blanco  67 y.o.  female    HPI:   Chief Complaint   Patient presents with    Chest Pain       Admitting doctor:   Ruben Cruz MD    Admitting diagnosis:   The primary encounter diagnosis was Chest pain, unspecified type. Diagnoses of Atrial flutter, unspecified type (HCC), Dyspnea, unspecified type, Hyperglycemia, and Hx of medication noncompliance were also pertinent to this visit.    Code status:   Current Code Status       Date Active Code Status Order ID Comments User Context       10/14/2022 1843 No CPR (Do Not Attempt to Resuscitate) [id number]  Ruben Cruz MD ED        Question Answer    Code Status (Patient has no pulse and is not breathing) No CPR (Do Not Attempt to Resuscitate)    Medical Interventions (Patient has pulse or is breathing) Full Support    Level Of Support Discussed With Patient                    Allergies:   Codeine    Isolation:  No active isolations     Fall Risk:  Fall Risk Assessment was completed, and patient is at low risk for falls.   Predictive Model Details         25 (Low) Factor Value    Calculated 10/15/2022 10:07 Age 67    Risk of Fall Model Musculoskeletal Assessment WDL     Number of Distinct Medication Classes administered 9     Active Peripheral IV Present     Imaging order in this encounter Present     Drug Use Not Asked     Number of administrations of Anti-Convulsants 2     Respiratory Rate 18     Skin Assessment WDL     Magnesium 1.6 mg/dL     Hemoglobin A1c 12.3 %     Tobacco Use Current     Calcium 8.7 mg/dL     Diastolic BP 75     Peripheral Vascular Assessment WDL     Chloride 99 mmol/L     Cardiac Assessment X     Rikki Scale 21     Days after Admission 0.78     Financial Class Medicaid     Gastrointestinal Assessment WDL     Number of administrations of Anti-Coagulants 2     Albumin 4 g/dL     Creatinine 0.58 mg/dL     ALT 31 U/L     Potassium 4.2 mmol/L     Total Bilirubin 0.2 mg/dL         Weight:       10/14/22  0298    Weight: 79.8 kg (176 lb)       Intake and Output    Intake/Output Summary (Last 24 hours) at 10/15/2022 1326  Last data filed at 10/15/2022 1245  Gross per 24 hour   Intake 2020 ml   Output 450 ml   Net 1570 ml       Diet:   Dietary Orders (From admission, onward)       Start     Ordered    10/15/22 1141  Diet Diabetic/Consistent Carbs; Diabetic - Consistent Carb  Diet Effective Now        Question Answer Comment   Diet / Texture / Consistency Diabetic/Consistent Carbs    Select Type: Diabetic - Consistent Carb        10/15/22 1141                     Most recent vitals:   Vitals:    10/15/22 0500 10/15/22 0600 10/15/22 0846 10/15/22 1115   BP: 159/64 157/92 148/75 106/78   BP Location:  Left arm Right arm Right arm   Patient Position:  Lying  Lying   Pulse: 105 92 104 96   Resp: 18 20 18 18   Temp:       TempSrc:       SpO2: 91% 97% 94% 97%   Weight:       Height:           Active LDAs/IV Access:   Lines, Drains & Airways       Active LDAs       Name Placement date Placement time Site Days    Peripheral IV 10/14/22 1520 Forearm 10/14/22  1520  Forearm  less than 1    Peripheral IV 10/14/22 1549 Right Antecubital 10/14/22  1549  Antecubital  less than 1                    Skin Condition:   Skin Assessments (last day)       Date/Time Skin WDL Sensory Perception Moisture Activity Mobility Nutrition Friction and Shear Rikki Score    10/14/22 2200 -- 4-->no impairment 4-->rarely moist 3-->walks occasionally 4-->no limitation 3-->adequate 3-->no apparent problem 21    10/14/22 2215 WDL 4-->no impairment 4-->rarely moist 3-->walks occasionally 4-->no limitation 3-->adequate 3-->no apparent problem 21    10/15/22 0000 WDL -- -- -- -- -- -- --    10/15/22 0400 WDL -- -- -- -- -- -- --    10/15/22 0800 WDL 4-->no impairment 4-->rarely moist 3-->walks occasionally 4-->no limitation 3-->adequate 3-->no apparent problem 21             Labs (abnormal labs have a star):   Labs Reviewed   COMPREHENSIVE METABOLIC PANEL -  Abnormal; Notable for the following components:       Result Value    Glucose 400 (*)     Sodium 133 (*)     Chloride 97 (*)     BUN/Creatinine Ratio 31.3 (*)     All other components within normal limits    Narrative:     GFR Normal >60  Chronic Kidney Disease <60  Kidney Failure <15     APTT - Abnormal; Notable for the following components:    PTT 24.8 (*)     All other components within normal limits   URINALYSIS W/ CULTURE IF INDICATED - Abnormal; Notable for the following components:    Specific Gravity, UA 1.039 (*)     Glucose, UA >=1000 mg/dL (3+) (*)     All other components within normal limits    Narrative:     In absence of clinical symptoms, the presence of pyuria, bacteria, and/or nitrites on the urinalysis result does not correlate with infection.  Urine microscopic not indicated.   BLOOD GAS, ARTERIAL - Abnormal; Notable for the following components:    pCO2, Arterial 32.5 (*)     pO2, Arterial 62.6 (*)     Base Excess, Arterial -2.5 (*)     O2 Saturation, Arterial 92.4 (*)     All other components within normal limits   HEMOGLOBIN A1C - Abnormal; Notable for the following components:    Hemoglobin A1C 12.3 (*)     All other components within normal limits    Narrative:     Hemoglobin A1C Reference Range:    <5.7 %        Normal  5.7-6.4 %     Increased risk for diabetes  > 6.4 %        Diabetes       These guidelines have been recommended by the American Diabetic Association for Hgb A1c.      The following 2010 guidelines have been recommended by the American Diabetes Association for Hemoglobin A1c.    HBA1c 5.7-6.4% Increased risk for future diabetes (pre-diabetes)  HBA1c     >6.4% Diabetes     BASIC METABOLIC PANEL - Abnormal; Notable for the following components:    Glucose 294 (*)     Sodium 133 (*)     BUN/Creatinine Ratio 29.3 (*)     All other components within normal limits    Narrative:     GFR Normal >60  Chronic Kidney Disease <60  Kidney Failure <15     POCT GLUCOSE FINGERSTICK -  Abnormal; Notable for the following components:    Glucose 376 (*)     All other components within normal limits   POCT GLUCOSE FINGERSTICK - Abnormal; Notable for the following components:    Glucose 389 (*)     All other components within normal limits   POCT GLUCOSE FINGERSTICK - Abnormal; Notable for the following components:    Glucose 296 (*)     All other components within normal limits   POCT GLUCOSE FINGERSTICK - Abnormal; Notable for the following components:    Glucose 238 (*)     All other components within normal limits   PROTIME-INR - Normal   LIPASE - Normal   TROPONIN (IN-HOUSE) - Normal    Narrative:     Troponin T Reference Range:  <= 0.03 ng/mL-   Negative for AMI  >0.03 ng/mL-     Abnormal for myocardial necrosis.  Clinicians would have to utilize clinical acumen, EKG, Troponin and serial changes to determine if it is an Acute Myocardial Infarction or myocardial injury due to an underlying chronic condition.       Results may be falsely decreased if patient taking Biotin.     BNP (IN-HOUSE) - Normal    Narrative:     Among patients with dyspnea, NT-proBNP is highly sensitive for the detection of acute congestive heart failure. In addition NT-proBNP of <300 pg/ml effectively rules out acute congestive heart failure with 99% negative predictive value.    Results may be falsely decreased if patient taking Biotin.     CK - Normal   MAGNESIUM - Normal   TSH - Normal   CBC WITH AUTO DIFFERENTIAL - Normal   ACETONE - Normal   D-DIMER, QUANTITATIVE - Normal    Narrative:     Reference Range  --------------------------------------------------------------------     < 0.50   Negative Predictive Value  0.50-0.59   Indeterminate    >= 0.60   Probable VTE             A very low percentage of patients with DVT may yield D-Dimer results   below the cut-off of 0.50 mg/L FEU.  This is known to be more   prevalent in patients with distal DVT.             Results of this test should always be interpreted in  conjunction with   the patient's medical history, clinical presentation and other   findings.  Clinical diagnosis should not be based on the result of   [place] D-Dimer alone.   MAGNESIUM - Normal   TSH - Normal   TROPONIN (IN-HOUSE) - Normal    Narrative:     Troponin T Reference Range:  <= 0.03 ng/mL-   Negative for AMI  >0.03 ng/mL-     Abnormal for myocardial necrosis.  Clinicians would have to utilize clinical acumen, EKG, Troponin and serial changes to determine if it is an Acute Myocardial Infarction or myocardial injury due to an underlying chronic condition.       Results may be falsely decreased if patient taking Biotin.     CBC WITH AUTO DIFFERENTIAL - Normal   DIGOXIN LEVEL - Normal   POC LACTATE - Normal   BLOOD CULTURE   BLOOD CULTURE   BLOOD GAS, ARTERIAL   RAINBOW DRAW    Narrative:     The following orders were created for panel order [place] Draw.  Procedure                               Abnormality         Status                     ---------                               -----------         ------                     Green Top (Gel)[[id number]]                                  Final result               Lavender Top[[id number]]                                     Final result               Gold Top - SST[[id number]]                                   Final result               Light Blue Top[[id number]]                                                                Please view results for these tests on the individual orders.   POCT GLUCOSE FINGERSTICK   POC LACTATE   POCT GLUCOSE FINGERSTICK   POCT GLUCOSE FINGERSTICK   POCT GLUCOSE FINGERSTICK   POCT GLUCOSE FINGERSTICK   POCT GLUCOSE FINGERSTICK   CBC AND DIFFERENTIAL    Narrative:     The following orders were created for panel order CBC & Differential.  Procedure                               Abnormality         Status                     ---------                               -----------         ------                     CBC Auto  Differential[[id number]]        Normal              Final result                 Please view results for these tests on the individual orders.   KETONE BODIES SERUM    Narrative:     The following orders were created for panel order Ketone Bodies, Serum (Not performed at [place]).  Procedure                               Abnormality         Status                     ---------                               -----------         ------                     Acetone[[id number]]                      Normal              Final result                 Please view results for these tests on the individual orders.   GREEN TOP   LAVENDER TOP   [facility]       LOC: Person, Place, Time, and Situation    Telemetry:  Telemetry    Cardiac Monitoring Ordered: yes    EKG:   ECG 12 Lead   Preliminary Result   HEART RATE= 86  bpm   RR Interval= 616  ms   MN Interval=   ms   P Horizontal Axis=   deg   P Front Axis=   deg   QRSD Interval= 124  ms   QT Interval= 415  ms   QRS Axis= 102  deg   T Wave Axis= 45  deg   - ABNORMAL ECG -   Atrial flutter   Ventricular premature complex   Right bundle branch block   Electronically Signed By:    Date and Time of Study: 2022-10-15 05:22:57      ECG 12 Lead   ED Interpretation   Chanel Cordova APRN     10/14/2022  5:28 PM   ECG 12 Lead         Date/Time: 10/14/2022 4:20 PM   Performed by: Chanel Cordova APRN   Authorized by: Chanel Cordova APRN    Interpreted by physician   Comparison: compared with previous ECG from 10/6/2022   Comparison to previous ECG: From [facility]: ; after adenosine 2:1 flutter    155   BPM: 158   Conduction: right bundle branch block         ECG 12 Lead   ED Interpretation   Chanel Cordova APRN     10/14/2022  5:28 PM   ECG 12 Lead         Date/Time: 10/14/2022 5:01 PM   Performed by: Chanel Cordova APRN   Authorized by: Chanel Cordova APRN    Interpreted by physician ([place])   Rhythm: atrial flutter         ECG 12 Lead   Preliminary Result    HEART RATE= 106  bpm   RR Interval= 566  ms   RI Interval=   ms   P Horizontal Axis=   deg   P Front Axis=   deg   QRSD Interval= 124  ms   QT Interval= 380  ms   QRS Axis= 96  deg   T Wave Axis= 24  deg   - ABNORMAL ECG -   Atrial flutter with varied AV block,   Right bundle branch block   Electronically Signed By:    Date and Time of Study: 2022-10-14 17:01:34      ECG 12 Lead   Preliminary Result   HEART RATE= 158  bpm   RR Interval= 380  ms   RI Interval= 56  ms   P Horizontal Axis=   deg   P Front Axis= -87  deg   QRSD Interval= 128  ms   QT Interval= 335  ms   QRS Axis= 150  deg   T Wave Axis= 2  deg   - ABNORMAL ECG -   Serial comparison ignored immediately previous ECG(s) - Too recent   Wide-QRS tachycardia   RBBB and LPFB   When compared with ECG of 29-Jun-2021 19:20:04,   Significant change in rhythm: previously sinus   New conduction abnormality   Electronically Signed By:    Date and Time of Study: 2022-10-14 15:50:50      ECG 12 Lead   Preliminary Result   HEART RATE= 159  bpm   RR Interval= 376  ms   RI Interval= 232  ms   P Horizontal Axis= 30  deg   P Front Axis= -9  deg   QRSD Interval= 126  ms   QT Interval= 340  ms   QRS Axis= 129  deg   T Wave Axis= 19  deg   - ABNORMAL ECG -   Incomplete analysis due to missing data in precordial lead(s)   Wide-QRS tachycardia   RBBB and LPFB   When compared with ECG of 29-Jun-2021 19:20:04,   Significant change in rhythm: previously sinus   New conduction abnormality   Electronically Signed By:    Date and Time of Study: 2022-10-14 15:35:11          Medications Given in the ED:   Medications   sodium chloride 0.9 % flush 10 mL (has no administration in time range)   sodium chloride 0.9 % flush 10 mL (has no administration in time range)   dilTIAZem (CARDIZEM) 125 mg in 125 mL D5W infusion (10 mg/hr Intravenous Currently Infusing 10/15/22 0846)   nitroglycerin (NITROSTAT) SL tablet 0.4 mg (has no administration in time range)   ARIPiprazole (ABILIFY) tablet 2  mg (2 mg Oral Not Given 10/14/22 2259)   gabapentin (NEURONTIN) capsule 300 mg (300 mg Oral Given 10/15/22 0842)   insulin glargine (LANTUS, SEMGLEE) injection 30 Units (30 Units Subcutaneous Given 10/14/22 2257)   latanoprost (XALATAN) 0.005 % ophthalmic solution 1 drop (1 drop Both Eyes Not Given 10/14/22 2259)   sertraline (ZOLOFT) tablet 100 mg (100 mg Oral Given 10/15/22 1108)   sodium chloride 0.9 % flush 3 mL (3 mL Intravenous Given 10/15/22 0842)   sodium chloride 0.9 % flush 3-10 mL (has no administration in time range)   acetaminophen (TYLENOL) tablet 650 mg (has no administration in time range)     Or   acetaminophen (TYLENOL) 160 MG/5ML solution 650 mg (has no administration in time range)     Or   acetaminophen (TYLENOL) suppository 650 mg (has no administration in time range)   melatonin tablet 5 mg (5 mg Oral Given 10/14/22 2257)   sennosides-docusate (PERICOLACE) 8.6-50 MG per tablet 2 tablet (has no administration in time range)     And   polyethylene glycol (MIRALAX) packet 17 g (has no administration in time range)     And   bisacodyl (DULCOLAX) EC tablet 5 mg (has no administration in time range)     And   bisacodyl (DULCOLAX) suppository 10 mg (has no administration in time range)   ondansetron (ZOFRAN) tablet 4 mg (has no administration in time range)     Or   ondansetron (ZOFRAN) injection 4 mg (has no administration in time range)   Pharmacy to Dose enoxaparin (LOVENOX) (has no administration in time range)   potassium chloride (K-DUR,KLOR-CON) CR tablet 40 mEq (has no administration in time range)     Or   potassium chloride (KLOR-CON) packet 40 mEq (has no administration in time range)     Or   potassium chloride 10 mEq in 100 mL IVPB (has no administration in time range)   Magnesium Sulfate 2 gram Bolus, followed by 8 gram infusion (total Mg dose 10 grams)- Mg less than or equal to 1mg/dL ( Intravenous Not Given:  See Alt 10/15/22 0515)     Or   Magnesium Sulfate 2 gram / 50mL Infusion  (GIVE X 3 BAGS TO EQUAL 6GM TOTAL DOSE) - Mg 1.1 - 1.5 mg/dl ( Intravenous Not Given:  See Alt 10/15/22 0515)     Or   Magnesium Sulfate 4 gram infusion- Mg 1.6-1.9 mg/dL (4 g Intravenous New Bag 10/15/22 0515)   potassium & sodium phosphates (PHOS-NAK) 280-160-250 MG packet - for Phosphorus less than 1.25 mg/dL (has no administration in time range)     Or   potassium & sodium phosphates (PHOS-NAK) 280-160-250 MG packet - for Phosphorus 1.25 - 2.5 mg/dL (has no administration in time range)   calcium gluconate 1g/50ml 0.675% NaCl IV SOLN (has no administration in time range)     And   calcium gluconate 2-0.675 GM/100ML NACL IVPB (has no administration in time range)   Enoxaparin Sodium (LOVENOX) syringe 80 mg (80 mg Subcutaneous Given 10/15/22 0841)   digoxin (LANOXIN) injection 0.25 mg (has no administration in time range)   dextrose (GLUTOSE) oral gel 15 g (has no administration in time range)   dextrose (D50W) (25 g/50 mL) IV injection 25 g (has no administration in time range)   glucagon (human recombinant) (GLUCAGEN DIAGNOSTIC) 1 mg in sterile water (preservative free) 1 mL injection (has no administration in time range)   insulin lispro (ADMELOG) injection 0-14 Units (5 Units Subcutaneous Given 10/15/22 1108)   sodium chloride 0.9 % bolus 1,000 mL (0 mL Intravenous Stopped 10/14/22 1820)   aspirin chewable tablet 324 mg (324 mg Oral Given 10/14/22 1604)   dilTIAZem (CARDIZEM) bolus from bag 1 mg/mL 10 mg (10 mg Intravenous Bolus from Bag 10/14/22 1617)   Enoxaparin Sodium (LOVENOX) syringe 80 mg (80 mg Subcutaneous Given 10/14/22 1719)   insulin lispro (ADMELOG) injection 8 Units (8 Units Subcutaneous Given 10/14/22 1737)   digoxin (LANOXIN) injection 0.5 mg (0.5 mg Intravenous Given 10/14/22 2018)   technetium tetrofosmin (Tc-MYOVIEW) injection 1 dose (1 dose Intravenous Given 10/15/22 0800)   regadenoson (LEXISCAN) injection 0.4 mg (0.4 mg Intravenous Given 10/15/22 1020)   technetium tetrofosmin  (Tc-MYOVIEW) injection 1 dose (1 dose Intravenous Given 10/15/22 1020)       Imaging results:  XR Chest 1 View    Result Date: 10/14/2022  IMPRESSION : Limited negative low lung volume portable chest[  Electronically Signed By-Mayur Allen On:10/14/2022 4:33 PM This report was finalized on [id number] by  Mayur Allen, .     Social issues:   Social History     Socioeconomic History    Marital status:    Tobacco Use    Smoking status: Every Day     Packs/day: 1.00     Years: 20.00     Pack years: 20.00     Types: Cigarettes    Smokeless tobacco: Never   Substance and Sexual Activity    Alcohol use: No    Sexual activity: Defer       NIH Stroke Scale:  Interval: (not recorded)  1a. Level of Consciousness: (not recorded)  1b. LOC Questions: (not recorded)  1c. LOC Commands: (not recorded)  2. Best Gaze: (not recorded)  3. Visual: (not recorded)  4. Facial Palsy: (not recorded)  5a. Motor Arm, Left: (not recorded)  5b. Motor Arm, Right: (not recorded)  6a. Motor Leg, Left: (not recorded)  6b. Motor Leg, Right: (not recorded)  7. Limb Ataxia: (not recorded)  8. Sensory: (not recorded)  9. Best Language: (not recorded)  10. Dysarthria: (not recorded)  11. Extinction and Inattention (formerly Neglect): (not recorded)    Total (NIH Stroke Scale): (not recorded)     Additional notable assessment information:    Nursing report ED to floor:  VIKTORIYA Bates RN   10/15/22 13:26 EDT

## 2022-10-16 LAB
ANION GAP SERPL CALCULATED.3IONS-SCNC: 9 MMOL/L (ref 5–15)
BASOPHILS # BLD AUTO: 0 10*3/MM3 (ref 0–0.2)
BASOPHILS NFR BLD AUTO: 0.6 % (ref 0–1.5)
BUN SERPL-MCNC: 9 MG/DL (ref 8–23)
BUN/CREAT SERPL: 17.6 (ref 7–25)
CALCIUM SPEC-SCNC: 9 MG/DL (ref 8.6–10.5)
CHLORIDE SERPL-SCNC: 102 MMOL/L (ref 98–107)
CO2 SERPL-SCNC: 24 MMOL/L (ref 22–29)
CREAT SERPL-MCNC: 0.51 MG/DL (ref 0.57–1)
DEPRECATED RDW RBC AUTO: 40.7 FL (ref 37–54)
EGFRCR SERPLBLD CKD-EPI 2021: 103.1 ML/MIN/1.73
EOSINOPHIL # BLD AUTO: 0.2 10*3/MM3 (ref 0–0.4)
EOSINOPHIL NFR BLD AUTO: 3.7 % (ref 0.3–6.2)
ERYTHROCYTE [DISTWIDTH] IN BLOOD BY AUTOMATED COUNT: 12.8 % (ref 12.3–15.4)
GLUCOSE BLDC GLUCOMTR-MCNC: 180 MG/DL (ref 70–105)
GLUCOSE BLDC GLUCOMTR-MCNC: 241 MG/DL (ref 70–105)
GLUCOSE BLDC GLUCOMTR-MCNC: 279 MG/DL (ref 70–105)
GLUCOSE BLDC GLUCOMTR-MCNC: 282 MG/DL (ref 70–105)
GLUCOSE SERPL-MCNC: 227 MG/DL (ref 65–99)
HCT VFR BLD AUTO: 40.8 % (ref 34–46.6)
HGB BLD-MCNC: 13.7 G/DL (ref 12–15.9)
LYMPHOCYTES # BLD AUTO: 1.8 10*3/MM3 (ref 0.7–3.1)
LYMPHOCYTES NFR BLD AUTO: 30.9 % (ref 19.6–45.3)
MCH RBC QN AUTO: 30.7 PG (ref 26.6–33)
MCHC RBC AUTO-ENTMCNC: 33.6 G/DL (ref 31.5–35.7)
MCV RBC AUTO: 91.3 FL (ref 79–97)
MONOCYTES # BLD AUTO: 0.5 10*3/MM3 (ref 0.1–0.9)
MONOCYTES NFR BLD AUTO: 8.5 % (ref 5–12)
NEUTROPHILS NFR BLD AUTO: 3.2 10*3/MM3 (ref 1.7–7)
NEUTROPHILS NFR BLD AUTO: 56.3 % (ref 42.7–76)
NRBC BLD AUTO-RTO: 0.1 /100 WBC (ref 0–0.2)
PLATELET # BLD AUTO: 140 10*3/MM3 (ref 140–450)
PMV BLD AUTO: 10.4 FL (ref 6–12)
POTASSIUM SERPL-SCNC: 3.6 MMOL/L (ref 3.5–5.2)
QT INTERVAL: 335 MS
QT INTERVAL: 380 MS
RBC # BLD AUTO: 4.47 10*6/MM3 (ref 3.77–5.28)
SODIUM SERPL-SCNC: 135 MMOL/L (ref 136–145)
WBC NRBC COR # BLD: 5.8 10*3/MM3 (ref 3.4–10.8)

## 2022-10-16 PROCEDURE — 80048 BASIC METABOLIC PNL TOTAL CA: CPT | Performed by: INTERNAL MEDICINE

## 2022-10-16 PROCEDURE — 25010000002 ENOXAPARIN PER 10 MG: Performed by: INTERNAL MEDICINE

## 2022-10-16 PROCEDURE — 85025 COMPLETE CBC W/AUTO DIFF WBC: CPT | Performed by: INTERNAL MEDICINE

## 2022-10-16 PROCEDURE — 63710000001 INSULIN LISPRO (HUMAN) PER 5 UNITS: Performed by: PHYSICIAN ASSISTANT

## 2022-10-16 PROCEDURE — 82962 GLUCOSE BLOOD TEST: CPT

## 2022-10-16 PROCEDURE — 99233 SBSQ HOSP IP/OBS HIGH 50: CPT | Performed by: INTERNAL MEDICINE

## 2022-10-16 PROCEDURE — 63710000001 INSULIN GLARGINE PER 5 UNITS: Performed by: INTERNAL MEDICINE

## 2022-10-16 RX ADMIN — METOPROLOL SUCCINATE 25 MG: 25 TABLET, EXTENDED RELEASE ORAL at 09:32

## 2022-10-16 RX ADMIN — ENOXAPARIN SODIUM 80 MG: 100 INJECTION SUBCUTANEOUS at 18:01

## 2022-10-16 RX ADMIN — Medication 3 ML: at 20:42

## 2022-10-16 RX ADMIN — INSULIN LISPRO 8 UNITS: 100 INJECTION, SOLUTION INTRAVENOUS; SUBCUTANEOUS at 09:32

## 2022-10-16 RX ADMIN — Medication 5 MG: at 20:47

## 2022-10-16 RX ADMIN — INSULIN LISPRO 5 UNITS: 100 INJECTION, SOLUTION INTRAVENOUS; SUBCUTANEOUS at 11:27

## 2022-10-16 RX ADMIN — INSULIN LISPRO 3 UNITS: 100 INJECTION, SOLUTION INTRAVENOUS; SUBCUTANEOUS at 17:52

## 2022-10-16 RX ADMIN — ENOXAPARIN SODIUM 80 MG: 100 INJECTION SUBCUTANEOUS at 06:34

## 2022-10-16 RX ADMIN — LATANOPROST 1 DROP: 50 SOLUTION/ DROPS OPHTHALMIC at 20:44

## 2022-10-16 RX ADMIN — Medication 3 ML: at 09:33

## 2022-10-16 RX ADMIN — GABAPENTIN 300 MG: 300 CAPSULE ORAL at 09:32

## 2022-10-16 RX ADMIN — ARIPIPRAZOLE 2 MG: 2 TABLET ORAL at 20:40

## 2022-10-16 RX ADMIN — GABAPENTIN 300 MG: 300 CAPSULE ORAL at 20:40

## 2022-10-16 RX ADMIN — GABAPENTIN 300 MG: 300 CAPSULE ORAL at 17:52

## 2022-10-16 RX ADMIN — INSULIN GLARGINE 40 UNITS: 100 INJECTION, SOLUTION SUBCUTANEOUS at 20:43

## 2022-10-16 RX ADMIN — SERTRALINE 100 MG: 100 TABLET, FILM COATED ORAL at 09:32

## 2022-10-16 NOTE — PLAN OF CARE
Goal Outcome Evaluation:   Pt remains on cardizem gtt. HR borderline, ended up titrating from 5-10mg this afternoon for Hr 110-120. Blood sugars are persistently in the 200's.

## 2022-10-16 NOTE — PROGRESS NOTES
Referring Provider: Ruben Cruz MD    Reason for follow-up:  Atrial flutter  Chest discomfort     Patient Care Team:  Christopher Jordan MD as PCP - General (Sports Medicine)  Donaldo Archer MD as Consulting Physician (Cardiology)    Subjective .      ROS    Since I have last seen her yesterday, the patient has been without any chest discomfort ,shortness of breath, palpitations, dizziness or syncope.  Denies having any headache ,abdominal pain ,nausea, vomiting , diarrhea constipation, loss of weight or loss of appetite.  Denies having any excessive bruising ,hematuria or blood in the stool.    Review of all systems negative except as indicated    History  Past Medical History:   Diagnosis Date   • COPD (chronic obstructive pulmonary disease) (CMS/[facility])    • Depression    • Diabetes mellitus (CMS/[facility])    • Hyperlipidemia    • Hypertension        Past Surgical History:   Procedure Laterality Date   • CARDIAC CATHETERIZATION Left 10/30/2020    Procedure: Left Heart Cath with Coronary Angiography;  Surgeon: Donaldo Arcehr MD;  Location: [facility] CATH INVASIVE LOCATION;  Service: Cardiovascular;  Laterality: Left;   • CARPAL TUNNEL RELEASE Bilateral 2017   • CHOLECYSTECTOMY  1980   • HYSTERECTOMY  1980       Family History   Problem Relation Age of Onset   • Heart disease Mother    • Hypertension Mother    • Diabetes Mother    • Stroke Mother        Social History     Tobacco Use   • Smoking status: Every Day     Packs/day: 1.00     Years: 20.00     Pack years: 20.00     Types: Cigarettes   • Smokeless tobacco: Never   Substance Use Topics   • Alcohol use: No        Medications Prior to Admission   Medication Sig Dispense Refill Last Dose   • albuterol sulfate  (90 Base) MCG/ACT inhaler Inhale 2 puffs Every 4 (Four) Hours As Needed for Wheezing or Shortness of Air.   Past Month   • amitriptyline (ELAVIL) 75 MG tablet Take 75 mg by mouth Every Night.  0 10/13/2022 at 2100   • atenolol (TENORMIN) 25 MG  tablet Take 1 tablet by mouth Daily.  0 Past Month   • Brexpiprazole (Rexulti) 2 MG tablet Take 2 mg by mouth Every Night.   10/13/2022 at 2100   • butalbital-acetaminophen  MG tablet tablet Take 1 tablet by mouth Daily As Needed.   Past Month   • cilostazol (PLETAL) 100 MG tablet Take 1 tablet by mouth 2 (Two) Times a Day.   10/14/2022 at 0900   • gabapentin (NEURONTIN) 300 MG capsule Take 1 capsule by mouth 3 (Three) Times a Day.   10/14/2022 at 0900   • Galcanezumab-gnlm 120 MG/ML solution prefilled syringe Inject 1 mL under the skin into the appropriate area as directed Every 30 (Thirty) Days.   Past Month   • glipiZIDE (GLUCOTROL) 10 MG tablet Take 10 mg by mouth 2 (Two) Times a Day.  0 10/14/2022 at 0900   • ibuprofen (ADVIL,MOTRIN) 800 MG tablet Take 800 mg by mouth 3 (Three) Times a Day.   10/14/2022 at 0900   • isosorbide mononitrate (IMDUR) 60 MG 24 hr tablet TAKE 1 TABLET BY MOUTH EVERY DAY IN THE MORNING 30 tablet 0 10/14/2022 at 0900   • latanoprost (XALATAN) 0.005 % ophthalmic solution Administer 1 drop to both eyes Every Night.   10/13/2022 at 2100   • metFORMIN (GLUCOPHAGE) 1000 MG tablet Take 1,000 mg by mouth 2 (Two) Times a Day With Meals.  0 10/14/2022 at 0900   • sertraline (ZOLOFT) 100 MG tablet Take 1 tablet by mouth Daily.  0 10/14/2022 at 0900   • tiZANidine (ZANAFLEX) 4 MG tablet Take 1 tablet by mouth Every Morning.   10/14/2022 at 0900   • tiZANidine (ZANAFLEX) 4 MG tablet Take 8 mg by mouth Every Evening.   10/13/2022 at 2100   • zolpidem (AMBIEN) 5 MG tablet Take 1 tablet by mouth At Night As Needed for Sleep.   10/13/2022 at 2100   • insulin detemir (LEVEMIR) 100 UNIT/ML injection Inject 60 Units under the skin into the appropriate area as directed Every Night. (Patient not taking: Reported on 10/14/2022)   Not Taking   • insulin glulisine (Apidra) 100 UNIT/ML injection Inject 10 Units under the skin into the appropriate area as directed 3 (Three) Times a Day Before Meals.  "(Patient not taking: Reported on 10/14/2022)   Not Taking   • lisinopril (PRINIVIL,ZESTRIL) 20 MG tablet Take 20 mg by mouth Daily. (Patient not taking: Reported on 10/14/2022)  0 Not Taking       Allergies  Codeine    Scheduled Meds:ARIPiprazole, 2 mg, Oral, Nightly  enoxaparin, 80 mg, Subcutaneous, Q12H  gabapentin, 300 mg, Oral, TID  insulin glargine, 37 Units, Subcutaneous, Nightly  insulin lispro, 0-14 Units, Subcutaneous, TID AC  latanoprost, 1 drop, Both Eyes, Nightly  metoprolol succinate XL, 25 mg, Oral, Q24H  sertraline, 100 mg, Oral, Daily  sodium chloride, 3 mL, Intravenous, Q12H      Continuous Infusions:dilTIAZem, 5-15 mg/hr, Last Rate: 7.5 mg/hr (10/15/22 1546)  Pharmacy to Dose enoxaparin (LOVENOX),       PRN Meds:.•  acetaminophen **OR** acetaminophen **OR** acetaminophen  •  senna-docusate sodium **AND** polyethylene glycol **AND** bisacodyl **AND** bisacodyl  •  Calcium Gluconate-NaCl **AND** calcium gluconate **AND** Calcium, Ionized  •  dextrose  •  dextrose  •  digoxin  •  glucagon (human recombinant)  •  magnesium sulfate **OR** magnesium sulfate **OR** magnesium sulfate  •  melatonin  •  nitroglycerin  •  ondansetron **OR** ondansetron  •  Pharmacy to Dose enoxaparin (LOVENOX)  •  potassium & sodium phosphates **OR** potassium & sodium phosphates  •  potassium chloride **OR** potassium chloride **OR** potassium chloride  •  [COMPLETED] Insert peripheral IV **AND** sodium chloride  •  [COMPLETED] Insert peripheral IV **AND** sodium chloride  •  sodium chloride    Objective     VITAL SIGNS  Vitals:    10/16/22 0119 10/16/22 0400 10/16/22 0625 10/16/22 0815   BP: 147/68 152/60 162/60    BP Location:       Patient Position:       Pulse: 83 80 106 120   Resp: 16      Temp: 98.4 °F (36.9 °C)      TempSrc: Oral      SpO2:       Weight:       Height:           Flowsheet Rows    Flowsheet Row First Filed Value   Admission Height 172.7 cm (68\") Documented at 10/14/2022 1522   Admission Weight 79.8 kg " (176 lb) Documented at 10/14/2022 1522            Intake/Output Summary (Last 24 hours) at 10/16/2022 0844  Last data filed at 10/15/2022 1759  Gross per 24 hour   Intake 520 ml   Output --   Net 520 ml        TELEMETRY: Atrial flutter    Physical Exam:  The patient is alert, oriented and in no distress.  Vital signs as noted above.  Head and neck revealed no carotid bruits or jugular venous distention.  No thyromegaly or lymphadenopathy is present  Lungs clear.  No wheezing.  Breath sounds are normal bilaterally.  Heart normal first and second heart sounds.  No murmur. No precordial rub is present.  No gallop is present.  Abdomen soft and nontender.  No organomegaly is present.  Extremities with good peripheral pulses without any pedal edema.  Skin warm and dry.  Musculoskeletal system is grossly normal  CNS grossly normal      Results Review:   I reviewed the patient's new clinical results.  Lab Results (last 24 hours)     Procedure Component Value Units Date/Time    POC Glucose Once [[id number]]  (Abnormal) Collected: 10/16/22 0706    Specimen: Blood Updated: 10/16/22 0707     Glucose 282 mg/dL      Comment: Serial Number: [id number]:  [id number]       Basic Metabolic Panel [[id number]]  (Abnormal) Collected: 10/16/22 0555    Specimen: Blood Updated: 10/16/22 0644     Glucose 227 mg/dL      BUN 9 mg/dL      Creatinine 0.51 mg/dL      Sodium 135 mmol/L      Potassium 3.6 mmol/L      Comment: Slight hemolysis detected by analyzer. Results may be affected.        Chloride 102 mmol/L      CO2 24.0 mmol/L      Calcium 9.0 mg/dL      BUN/Creatinine Ratio 17.6     Anion Gap 9.0 mmol/L      eGFR 103.1 mL/min/1.73      Comment: National Kidney Foundation and American Society of Nephrology (ASN) Task Force recommended calculation based on the Chronic Kidney Disease Epidemiology Collaboration (CKD-EPI) equation refit without adjustment for race.       Narrative:      GFR Normal >60  Chronic Kidney Disease <60  Kidney  Failure <15      CBC & Differential [[id number]]  (Normal) Collected: 10/16/22 0555    Specimen: Blood Updated: 10/16/22 0630    Narrative:      The following orders were created for panel order CBC & Differential.  Procedure                               Abnormality         Status                     ---------                               -----------         ------                     CBC Auto Differential[[id number]]        Normal              Final result                 Please view results for these tests on the individual orders.    CBC Auto Differential [[id number]]  (Normal) Collected: 10/16/22 0555    Specimen: Blood Updated: 10/16/22 0630     WBC 5.80 10*3/mm3      RBC 4.47 10*6/mm3      Hemoglobin 13.7 g/dL      Hematocrit 40.8 %      MCV 91.3 fL      MCH 30.7 pg      MCHC 33.6 g/dL      RDW 12.8 %      RDW-SD 40.7 fl      MPV 10.4 fL      Platelets 140 10*3/mm3      Neutrophil % 56.3 %      Lymphocyte % 30.9 %      Monocyte % 8.5 %      Eosinophil % 3.7 %      Basophil % 0.6 %      Neutrophils, Absolute 3.20 10*3/mm3      Lymphocytes, Absolute 1.80 10*3/mm3      Monocytes, Absolute 0.50 10*3/mm3      Eosinophils, Absolute 0.20 10*3/mm3      Basophils, Absolute 0.00 10*3/mm3      nRBC 0.1 /100 WBC     POC Glucose Once [[id number]]  (Abnormal) Collected: 10/15/22 2028    Specimen: Blood Updated: 10/15/22 2031     Glucose 298 mg/dL      Comment: Serial Number: [id number]:  [id number]       POC Glucose Once [[id number]]  (Abnormal) Collected: 10/15/22 1648    Specimen: Blood Updated: 10/15/22 1649     Glucose 272 mg/dL      Comment: Serial Number: [id number]:  [id number]       Blood Culture - Blood, Arm, Left [[id number]]  (Normal) Collected: 10/14/22 1612    Specimen: Blood from Arm, Left Updated: 10/15/22 1631     Blood Culture No growth at 24 hours    Blood Culture - Blood, Arm, Right [[id number]]  (Normal) Collected: 10/14/22 1612    Specimen: Blood from Arm, Right Updated: 10/15/22 1631     Blood  Culture No growth at 24 hours    POC Glucose Once [[id number]]  (Abnormal) Collected: 10/15/22 1104    Specimen: Blood Updated: 10/15/22 1105     Glucose 238 mg/dL      Comment: Serial Number: [id number]:  [id number]             Imaging Results (Last 24 Hours)     ** No results found for the last 24 hours. **      LAB RESULTS (LAST 7 DAYS)    CBC  Results from last 7 days   Lab Units 10/16/22  0555 10/15/22  0214 10/14/22  1558   WBC 10*3/mm3 5.80 8.20 7.80   RBC 10*6/mm3 4.47 4.17 4.15   HEMOGLOBIN g/dL 13.7 13.0 12.7   HEMATOCRIT % 40.8 37.3 37.9   MCV fL 91.3 89.3 91.2   PLATELETS 10*3/mm3 140 146 167       BMP  Results from last 7 days   Lab Units 10/16/22  0555 10/15/22  0214 10/14/22  1558   SODIUM mmol/L 135* 133* 133*   POTASSIUM mmol/L 3.6 4.2 4.0   CHLORIDE mmol/L 102 99 97*   CO2 mmol/L 24.0 22.0 24.0   BUN mg/dL 9 17 20   CREATININE mg/dL 0.51* 0.58 0.64   GLUCOSE mg/dL 227* 294* 400*   MAGNESIUM mg/dL  --  1.6 1.6       CMP   Results from last 7 days   Lab Units 10/16/22  0555 10/15/22  0214 10/14/22  1558   SODIUM mmol/L 135* 133* 133*   POTASSIUM mmol/L 3.6 4.2 4.0   CHLORIDE mmol/L 102 99 97*   CO2 mmol/L 24.0 22.0 24.0   BUN mg/dL 9 17 20   CREATININE mg/dL 0.51* 0.58 0.64   GLUCOSE mg/dL 227* 294* 400*   ALBUMIN g/dL  --   --  4.00   BILIRUBIN mg/dL  --   --  0.2   ALK PHOS U/L  --   --  116   AST (SGOT) U/L  --   --  21   ALT (SGPT) U/L  --   --  31   LIPASE U/L  --   --  33         BNP        TROPONIN  Results from last 7 days   Lab Units 10/15/22  0214 10/14/22  1558   CK TOTAL U/L  --  107   TROPONIN T ng/mL <0.010 <0.010       CoAg  Results from last 7 days   Lab Units 10/14/22  1626   INR  1.00   APTT seconds 24.8*       Creatinine Clearance  Estimated Creatinine Clearance: 120.4 mL/min (A) (by C-G formula based on SCr of 0.51 mg/dL (L)).    ABG  Results from last 7 days   Lab Units 10/14/22  1618   PH, ARTERIAL pH units 7.422   PCO2, ARTERIAL mm Hg 32.5*   PO2 ART mm Hg 62.6*   O2  SATURATION ART % 92.4*   BASE EXCESS ART mmol/L -2.5*       Radiology  XR Chest 1 View    Result Date: 10/14/2022  IMPRESSION : Limited negative low lung volume portable chest[  Electronically Signed By-Mayur Allen On:10/14/2022 4:33 PM This report was finalized on [id number] by  Mayur Allen, .          EKG                                    I personally viewed and interpreted the patient's EKG/Telemetry data: Atrial flutter    ECHOCARDIOGRAM:    Results for orders placed during the hospital encounter of 10/14/22    Adult Transthoracic Echo Complete W/ Cont if Necessary Per Protocol    Interpretation Summary  •  Estimated left ventricular EF = 60% Left ventricular systolic function is normal.    Indication  Chest pain  Arrhythmia    Technically satisfactory study.  Mitral valve is structurally normal.  Tricuspid valve is structurally normal.  Aortic valve is structurally normal.  Pulmonic valve could not be well visualized.  No evidence for mitral tricuspid or aortic regurgitation is seen by Doppler study.  Left atrium is normal in size.  Right atrium is normal in size.  Left ventricle is normal in size and contractility with ejection fraction of 60%.  Right ventricle is normal in size.  Atrial septum is intact.  Aorta is normal.  Small pericardial effusion.    Impression  Structurally and functionally normal cardiac valves.  Left ventricular size and contractility is normal with ejection fraction of 60%.  Small pericardial effusion.          STRESS MYOVIEW:    Cardiolite (Tc-99m Sestamibi) stress test    CARDIAC CATHETERIZATION:            OTHER:         Assessment & Plan     Principal Problem:    Other chest pain  Active Problems:    Atrial fibrillation (HCC)    Type 2 diabetes mellitus with hyperglycemia (HCC)    Anxiety associated with depression      ]]]]]]]]]]]]]]]]]]]  Impression  =========  - Chest discomfort and palpitations.     - Atrial flutter with rapid ventricular response.     Cardiac  cath 10/30/2020 revealed  Left ventricle size and contractility is normal with ejection fraction of 60%.     Left main coronary artery is normal.  Left anterior descending artery is normal.  Circumflex coronary artery is normal.  Right coronary artery has mid segment 50% disease      - Diabetes hypertension COPD dyslipidemia     -Status post hysterectomy cholecystectomy and carpal tunnel surgery.     -Family history is positive for coronary artery disease.     -Smoker- abstinence from smoking was advised.     - No known allergies  ============  Plan  ========   Chest discomfort and palpitations.  Rule out progression of coronary artery disease      Atrial flutter with rapid ventricular response.  Intravenous Cardizem.  Rate is better controlled.  Patient still in atrial flutter.  Received IV digoxin last night.  Continue metoprolol.  Hopefully patient will convert back to sinus rhythm.     Close cardiac monitoring.    Stress Cardiolite test 10/15/2022  Lexiscan Cardiolite test is negative for myocardial ischemia.  Gated SPECT images revealed normal left ventricular size and contractility with ejection fraction of 62%.    Echocardiogram 10/15/2022  Structurally and functionally normal cardiac valves.  Left ventricular size and contractility is normal with ejection fraction of 60%.  Small pericardial effusion.    Anticoagulation  Patient is on Lovenox     Medications were reviewed and updated.    Abstinence from smoking.    Consider MONTY cardioversion if patient does not convert to sinus rhythm.    Current medications include subcu Lovenox gabapentin insulin metoprolol XL 25 mg a day intravenous Cardizem.    Further plan will depend on patient's progress.  ]]]]]]]]]]]]]]              Donaldo Archer MD  10/16/22  08:44 EDT

## 2022-10-16 NOTE — NURSING NOTE
SPOKE WITH PATIENT REGARDING HER CODE STATUS, SHE STATES HER WISHES TO BE DNR/DNI AND SHE HAS A LIVING WILL THAT REFLECTS THIS AS WELL.

## 2022-10-16 NOTE — PLAN OF CARE
Goal Outcome Evaluation:   Still in Afib,Hr in the 100's,other VSS,no complains raised ,will continue to monitor.

## 2022-10-16 NOTE — PROGRESS NOTES
[facility] Medicine Services Daily Progress Note    Patient Name: Arianna Blanco  : 1955  MRN: [id number]  Primary Care Physician:  Christopher Jordan MD  Date of admission: 10/14/2022      Subjective      Chief Complaint: Chest pain/palpitation    Patient Reports  10/15/2022  Patient seen post stress test  Related having generalized body weakness  Denies any palpitation nor chest pain    10/16/2022  Patient seen and examined  Complain of generalized body weakness  On Cardizem drip  Heart rates is regulated but heart rhythm still irregular      Review of Systems   Constitutional: Negative for chills and fever.   HENT: Negative for congestion.    Eyes: Negative for blurred vision.   Cardiovascular: Negative for chest pain and palpitations.   Respiratory: Negative for shortness of breath.    Endocrine: Negative for cold intolerance and heat intolerance.   Gastrointestinal: Negative for abdominal pain, nausea and vomiting.   Genitourinary: Negative for flank pain.   Neurological: Positive for weakness.   Psychiatric/Behavioral: Negative for altered mental status.        Objective      Vitals:   Temp:  [97.5 °F (36.4 °C)-98.4 °F (36.9 °C)] 98.3 °F (36.8 °C)  Heart Rate:  [] 105  Resp:  [16-20] 16  BP: (133-163)/(60-77) 140/62  Flow (L/min):  [2] 2    Physical Exam  Vitals reviewed.   Constitutional:       General: She is not in acute distress.  HENT:      Head: Normocephalic.      Nose: Nose normal.      Mouth/Throat:      Mouth: Mucous membranes are moist.   Eyes:      Extraocular Movements: Extraocular movements intact.      Conjunctiva/sclera: Conjunctivae normal.      Pupils: Pupils are equal, round, and reactive to light.   Cardiovascular:      Rate and Rhythm: Normal rate. Rhythm irregular.   Pulmonary:      Effort: No respiratory distress.      Breath sounds: Normal breath sounds.   Abdominal:      General: Bowel sounds are normal. There is no distension.       Palpations: Abdomen is soft.      Tenderness: There is no abdominal tenderness.   Musculoskeletal:         General: Normal range of motion.      Cervical back: Neck supple.   Skin:     General: Skin is warm and dry.   Neurological:      Mental Status: She is alert and oriented to person, place, and time.   Psychiatric:         Mood and Affect: Mood normal.            Result Review    Result Review:  I have personally reviewed the results from the time of this admission to 10/16/2022 13:43 EDT and agree with these findings:  [x]  Laboratory  [x]  Microbiology  [x]  Radiology  []  EKG/Telemetry   []  Cardiology/Vascular   []  Pathology  []  Old records  []  Other:  Most notable findings include:           Assessment & Plan      Brief Patient Summary:  Patient is a 67-year-old female with multiple comorbidities including diabetes mellitus type 2, hypertension, COPD, depression, obstructive sleep apnea and hyperlipidemia.     Patient presented to [facility] ED on 10/14/2022 with complaints of chest pain.  Stated chest pain is pressure-like, left-sided, nonradiating with no aggravating or relieving factor.  Has associated generalized body weakness, shortness of breath but no nausea or vomiting.     Has had similar presentation about a week ago and [facility] was being made to transfer her to [facility] for further evaluation and management however patient left AMA.        In the ED patient was noted to be in atrial flutter, she was given Cardizem bolus and started on Cardizem drip.  Also given therapeutic dose Lovenox.  Cardiologist was consulted from the ED.  Patient was admitted for further care         ARIPiprazole, 2 mg, Oral, Nightly  enoxaparin, 80 mg, Subcutaneous, Q12H  gabapentin, 300 mg, Oral, TID  insulin glargine, 40 Units, Subcutaneous, Nightly  insulin lispro, 0-14 Units, Subcutaneous, TID AC  latanoprost, 1 drop, Both Eyes, Nightly  metoprolol succinate XL, 25  mg, Oral, Q24H  sertraline, 100 mg, Oral, Daily  sodium chloride, 3 mL, Intravenous, Q12H       dilTIAZem, 5-15 mg/hr, Last Rate: 7.5 mg/hr (10/15/22 9836)  Pharmacy to Dose enoxaparin (LOVENOX),          Active Hospital Problems:  Active Hospital Problems    Diagnosis    • **Other chest pain    • Atrial fibrillation (HCC)    • Type 2 diabetes mellitus with hyperglycemia (HCC)    • Anxiety associated with depression      Plan:   Atrial flutter with rapid ventricular response  Electrolytes including magnesium and potassium okay  TSH okay  Was given Cardizem bolus and started on Cardizem drip  Also started on therapeutic dose Lovenox  Rate controlled but still in atrial flutter  Was given IV digoxin x1  Started on metoprolol 25 mg daily  Cardiology following     Chest pain  History of CAD  Cardiac catheterization 10/30/2020-right coronary artery with 50% mid segment stenosis.  Medical management advised at that time and patient started on aspirin and Imdur  Patient is noncompliant to medications  serial troponin insignificant  Cardiologist following  Lexiscan Cardiolite test 10/15/2022-is negative for myocardial ischemia  2D echo normal LV size and contractility with EF of 60%    Diabetes mellitus with hyperglycemia  Blood glucose on presentation 400 however patient was not in DKA  A1c 12.3  on Lantus, Premeal insulin and sliding scale insulin  Blood sugar not controlled-increase Lantus to 40 units at bedtime  Monitor blood sugar and adjust insulin as needed  Consult diabetic educator to see     Depression-on Zoloft     Hypertension  On Cardizem drip and metoprolol       COPD-not in exacerbation  Active smoker  Respiratory care with bronchodilators  Oxygen therapy and titration     Tobacco dependence-counseled on smoking cessation    DVT prophylaxis:  Medical DVT prophylaxis orders are present.    CODE STATUS:    Level Of Support Discussed With: Patient  Code Status (Patient has no pulse and is not breathing): No CPR  (Do Not Attempt to Resuscitate)  Medical Interventions (Patient has pulse or is breathing): Full Support      Disposition: Pending clinical progress    This patient has been examined with appropriate PPE . 10/16/22      Electronically signed by Ruben Cruz MD, 10/16/22, 13:43 EDT.  [facility] Hospitalist Team

## 2022-10-17 LAB
ANION GAP SERPL CALCULATED.3IONS-SCNC: 11 MMOL/L (ref 5–15)
BASOPHILS # BLD AUTO: 0 10*3/MM3 (ref 0–0.2)
BASOPHILS NFR BLD AUTO: 0.6 % (ref 0–1.5)
BUN SERPL-MCNC: 10 MG/DL (ref 8–23)
BUN/CREAT SERPL: 19.6 (ref 7–25)
CALCIUM SPEC-SCNC: 9.1 MG/DL (ref 8.6–10.5)
CHLORIDE SERPL-SCNC: 102 MMOL/L (ref 98–107)
CO2 SERPL-SCNC: 24 MMOL/L (ref 22–29)
CREAT SERPL-MCNC: 0.51 MG/DL (ref 0.57–1)
DEPRECATED RDW RBC AUTO: 40.7 FL (ref 37–54)
EGFRCR SERPLBLD CKD-EPI 2021: 103.1 ML/MIN/1.73
EOSINOPHIL # BLD AUTO: 0.2 10*3/MM3 (ref 0–0.4)
EOSINOPHIL NFR BLD AUTO: 3.2 % (ref 0.3–6.2)
ERYTHROCYTE [DISTWIDTH] IN BLOOD BY AUTOMATED COUNT: 12.8 % (ref 12.3–15.4)
GLUCOSE BLDC GLUCOMTR-MCNC: 231 MG/DL (ref 70–105)
GLUCOSE BLDC GLUCOMTR-MCNC: 259 MG/DL (ref 70–105)
GLUCOSE BLDC GLUCOMTR-MCNC: 287 MG/DL (ref 70–105)
GLUCOSE BLDC GLUCOMTR-MCNC: 369 MG/DL (ref 70–105)
GLUCOSE SERPL-MCNC: 165 MG/DL (ref 65–99)
HCT VFR BLD AUTO: 40.4 % (ref 34–46.6)
HGB BLD-MCNC: 14.2 G/DL (ref 12–15.9)
LYMPHOCYTES # BLD AUTO: 2.1 10*3/MM3 (ref 0.7–3.1)
LYMPHOCYTES NFR BLD AUTO: 36.2 % (ref 19.6–45.3)
MCH RBC QN AUTO: 32.4 PG (ref 26.6–33)
MCHC RBC AUTO-ENTMCNC: 35.2 G/DL (ref 31.5–35.7)
MCV RBC AUTO: 91.8 FL (ref 79–97)
MONOCYTES # BLD AUTO: 0.6 10*3/MM3 (ref 0.1–0.9)
MONOCYTES NFR BLD AUTO: 9.4 % (ref 5–12)
NEUTROPHILS NFR BLD AUTO: 3 10*3/MM3 (ref 1.7–7)
NEUTROPHILS NFR BLD AUTO: 50.6 % (ref 42.7–76)
NRBC BLD AUTO-RTO: 0.1 /100 WBC (ref 0–0.2)
PLATELET # BLD AUTO: 146 10*3/MM3 (ref 140–450)
PMV BLD AUTO: 10 FL (ref 6–12)
POTASSIUM SERPL-SCNC: 3.4 MMOL/L (ref 3.5–5.2)
RBC # BLD AUTO: 4.4 10*6/MM3 (ref 3.77–5.28)
SODIUM SERPL-SCNC: 137 MMOL/L (ref 136–145)
WBC NRBC COR # BLD: 5.9 10*3/MM3 (ref 3.4–10.8)

## 2022-10-17 PROCEDURE — 93005 ELECTROCARDIOGRAM TRACING: CPT | Performed by: INTERNAL MEDICINE

## 2022-10-17 PROCEDURE — 63710000001 INSULIN LISPRO (HUMAN) PER 5 UNITS: Performed by: PHYSICIAN ASSISTANT

## 2022-10-17 PROCEDURE — 99232 SBSQ HOSP IP/OBS MODERATE 35: CPT | Performed by: INTERNAL MEDICINE

## 2022-10-17 PROCEDURE — 25010000002 AMIODARONE IN DEXTROSE 5% 360-4.14 MG/200ML-% SOLUTION: Performed by: INTERNAL MEDICINE

## 2022-10-17 PROCEDURE — 80048 BASIC METABOLIC PNL TOTAL CA: CPT | Performed by: INTERNAL MEDICINE

## 2022-10-17 PROCEDURE — 63710000001 INSULIN GLARGINE PER 5 UNITS: Performed by: INTERNAL MEDICINE

## 2022-10-17 PROCEDURE — 25010000002 ENOXAPARIN PER 10 MG: Performed by: INTERNAL MEDICINE

## 2022-10-17 PROCEDURE — 93010 ELECTROCARDIOGRAM REPORT: CPT | Performed by: INTERNAL MEDICINE

## 2022-10-17 PROCEDURE — 82962 GLUCOSE BLOOD TEST: CPT

## 2022-10-17 PROCEDURE — 25010000002 AMIODARONE IN DEXTROSE 5% 150-4.21 MG/100ML-% SOLUTION: Performed by: INTERNAL MEDICINE

## 2022-10-17 PROCEDURE — 85025 COMPLETE CBC W/AUTO DIFF WBC: CPT | Performed by: INTERNAL MEDICINE

## 2022-10-17 RX ORDER — NICOTINE 21 MG/24HR
1 PATCH, TRANSDERMAL 24 HOURS TRANSDERMAL
Status: DISCONTINUED | OUTPATIENT
Start: 2022-10-17 | End: 2022-10-18 | Stop reason: HOSPADM

## 2022-10-17 RX ORDER — DILTIAZEM HYDROCHLORIDE 240 MG/1
240 CAPSULE, COATED, EXTENDED RELEASE ORAL
Status: DISCONTINUED | OUTPATIENT
Start: 2022-10-17 | End: 2022-10-18 | Stop reason: HOSPADM

## 2022-10-17 RX ADMIN — POTASSIUM CHLORIDE 40 MEQ: 1500 TABLET, EXTENDED RELEASE ORAL at 09:22

## 2022-10-17 RX ADMIN — Medication 3 ML: at 09:32

## 2022-10-17 RX ADMIN — LATANOPROST 1 DROP: 50 SOLUTION/ DROPS OPHTHALMIC at 21:19

## 2022-10-17 RX ADMIN — AMIODARONE HYDROCHLORIDE 150 MG: 1.5 INJECTION, SOLUTION INTRAVENOUS at 09:21

## 2022-10-17 RX ADMIN — INSULIN LISPRO 8 UNITS: 100 INJECTION, SOLUTION INTRAVENOUS; SUBCUTANEOUS at 12:15

## 2022-10-17 RX ADMIN — ENOXAPARIN SODIUM 80 MG: 100 INJECTION SUBCUTANEOUS at 17:23

## 2022-10-17 RX ADMIN — METOPROLOL SUCCINATE 25 MG: 25 TABLET, EXTENDED RELEASE ORAL at 09:22

## 2022-10-17 RX ADMIN — ENOXAPARIN SODIUM 80 MG: 100 INJECTION SUBCUTANEOUS at 06:13

## 2022-10-17 RX ADMIN — DILTIAZEM HYDROCHLORIDE 240 MG: 240 CAPSULE, COATED, EXTENDED RELEASE ORAL at 09:22

## 2022-10-17 RX ADMIN — ACETAMINOPHEN 650 MG: 325 TABLET, FILM COATED ORAL at 14:00

## 2022-10-17 RX ADMIN — NICOTINE 1 PATCH: 21 PATCH, EXTENDED RELEASE TRANSDERMAL at 17:07

## 2022-10-17 RX ADMIN — GABAPENTIN 300 MG: 300 CAPSULE ORAL at 09:22

## 2022-10-17 RX ADMIN — SERTRALINE 100 MG: 100 TABLET, FILM COATED ORAL at 09:22

## 2022-10-17 RX ADMIN — INSULIN LISPRO 5 UNITS: 100 INJECTION, SOLUTION INTRAVENOUS; SUBCUTANEOUS at 17:22

## 2022-10-17 RX ADMIN — INSULIN LISPRO 5 UNITS: 100 INJECTION, SOLUTION INTRAVENOUS; SUBCUTANEOUS at 09:22

## 2022-10-17 RX ADMIN — POTASSIUM CHLORIDE 40 MEQ: 1500 TABLET, EXTENDED RELEASE ORAL at 12:15

## 2022-10-17 RX ADMIN — GABAPENTIN 300 MG: 300 CAPSULE ORAL at 21:17

## 2022-10-17 RX ADMIN — Medication 3 ML: at 21:18

## 2022-10-17 RX ADMIN — AMIODARONE HYDROCHLORIDE 1 MG/MIN: 1.8 INJECTION, SOLUTION INTRAVENOUS at 09:32

## 2022-10-17 RX ADMIN — INSULIN GLARGINE 40 UNITS: 100 INJECTION, SOLUTION SUBCUTANEOUS at 21:18

## 2022-10-17 RX ADMIN — ARIPIPRAZOLE 2 MG: 2 TABLET ORAL at 21:18

## 2022-10-17 RX ADMIN — GABAPENTIN 300 MG: 300 CAPSULE ORAL at 17:07

## 2022-10-17 NOTE — PLAN OF CARE
Goal Outcome Evaluation:  Plan of Care Reviewed With: patient        Progress: improving  Outcome Evaluation: Continued Afib/flutter overnight with controlled HR 80s. Still on cardizem gtt, attempt to wean off in AM. Continue to monitor

## 2022-10-17 NOTE — PAYOR COMM NOTE
"  RE: [id number]    CLINICAL REVIEW FOR INPATIENT AUTHORIZATION REQUEST  ADMIT DATE 10/14/2022  ======================================    UTILIZATION REVIEW  NEVILLE MNOTGOMERY RN   PH: [phone]  FAX: [phone]    [facility]  NPI# [id number]  TID # [id number]  ========================================    10/17-  STILL ON CARDIZEM IV GTT          Bella, Arianna LUKE (67 y.o. Female)     Date of Birth   1955    Social Security Number       Address   [address] [place], [place] [address] [address] [place] [address]    Home Phone   [phone]    MRN   [id number]       Christianity   Gnosticism    Marital Status                               Admission Date   10/14/22    Admission Type   Emergency    Admitting Provider   Ruben Cruz MD    Attending Provider   Olimpia Valdez DO    Department, Room/Bed   [facility],        Discharge Date       Discharge Disposition       Discharge Destination                               Attending Provider: Olimpia Valdez DO    Allergies: Codeine    Isolation: None   Infection: None   Code Status: No CPR    Ht: 172.7 cm (68\")   Wt: 79.8 kg (176 lb)    Admission Cmt: None   Principal Problem: Other chest pain [R07.89]                 Active Insurance as of 10/14/2022     Primary Coverage     Payor Plan Insurance Group Employer/Plan Group    [place]      Payor Plan Address Payor Plan Phone Number Payor Plan Fax Number Effective Dates    [address]   2022 - None Entered    [place] [address]       Subscriber Name Subscriber Birth Date Member ID       ARIANNA BLANCO LUKE 1955 [id number]                 Emergency Contacts      (Rel.) Home Phone Work Phone Mobile Phone    JENNIFER MARIEE (Son) -- -- [phone]               History & Physical      Ruben Cruz MD at 10/14/22 1732              [facility] Hospital Medicine Services      Patient Name: Ariannatim Blanco  : 1955  MRN: " [id number]  Primary Care Physician:  Christopher Jordan MD  Date of admission: 10/14/2022      Subjective       Chief Complaint: Chest pain    History of Present Illness:     Patient is a 67-year-old female with multiple comorbidities including diabetes mellitus type 2, hypertension, COPD, depression, obstructive sleep apnea and hyperlipidemia.    Patient presented to [facility] ED on 10/14/2022 with complaints of chest pain.  Stated chest pain is pressure-like, left-sided, nonradiating with no aggravating or relieving factor.  Has associated generalized body weakness, shortness of breath but no nausea or vomiting.    [facility] had similar presentation about a week ago [facility] for further evaluation and management however patient left AMA.      In the ED patient was noted to be in atrial flutter, she was given Cardizem bolus and started on Cardizem drip.  Also given therapeutic dose Lovenox.  Cardiologist was consulted from the ED.  Patient was admitted for further care    Review of Systems   Constitutional: Negative for chills and fever.   HENT: Negative for congestion.    Eyes: Negative for blurred vision.   Cardiovascular: Positive for chest pain and palpitations.   Respiratory: Positive for shortness of breath.    Endocrine: Negative for cold intolerance and heat intolerance.   Gastrointestinal: Negative for abdominal pain, nausea and vomiting.   Genitourinary: Negative for dysuria.   Neurological: Positive for weakness.   Psychiatric/Behavioral: Negative for altered mental status.        Personal History     Past Medical History:   Diagnosis Date   • COPD (chronic obstructive pulmonary disease) (CMS/HCC)    • Depression    • Diabetes mellitus (CMS/HCC)    • Hyperlipidemia    • Hypertension        Past Surgical History:   Procedure Laterality Date   • CARDIAC CATHETERIZATION Left 10/30/2020    Procedure: Left Heart  Cath with Coronary Angiography;  Surgeon: Donaldo Archer MD;  Location: [facility] CATH INVASIVE LOC[facility];  Service: Cardiovascular;  Laterality: Left;   • CARPAL TUNNEL RELEASE Bilateral 2017   • CHOLECYSTECTOMY  1980   • HYSTERECTOMY  1980       Family History: family history includes Diabetes in her mother; Heart disease in her mother; Hypertension in her mother; Stroke in her mother. Otherwise pertinent FHx was reviewed and not pertinent to current issue.    Social History:  reports that she has been smoking cigarettes. She has a 20.00 pack-year smoking history. She has never used smokeless tobacco. She reports that she does not drink alcohol.    Home Medications:  Prior to Admission Medications     Prescriptions Last Dose Informant Patient Reported? Taking?    albuterol sulfate  (90 Base) MCG/ACT inhaler  Self Yes Yes    Inhale 2 puffs Every 4 (Four) Hours As Needed for Wheezing or Shortness of Air.    amitriptyline (ELAVIL) 75 MG tablet  Self Yes Yes    Take 75 mg by mouth Every Night.    atenolol (TENORMIN) 25 MG tablet  Self Yes Yes    Take 1 tablet by mouth Daily.    Brexpiprazole (Rexulti) 2 MG tablet   Yes Yes    Take 2 mg by mouth Every Night.    butalbital-acetaminophen  MG tablet tablet   Yes Yes    Take 1 tablet by mouth Daily As Needed.    cilostazol (PLETAL) 100 MG tablet   Yes Yes    Take 1 tablet by mouth 2 (Two) Times a Day.    gabapentin (NEURONTIN) 300 MG capsule  Self Yes Yes    Take 1 capsule by mouth 3 (Three) Times a Day.    Galcanezumab-gnlm (Emgality) 120 MG/ML solution prefilled syringe   Yes Yes    Inject 1 mL under the skin into the appropriate area as directed Every 30 (Thirty) Days.    glipiZIDE (GLUCOTROL) 10 MG tablet   Yes Yes    Take 10 mg by mouth 2 (Two) Times a Day.    ibuprofen (ADVIL,MOTRIN) 800 MG tablet   Yes Yes    Take 800 mg by mouth 3 (Three) Times a Day.    insulin detemir (LEVEMIR) 100 UNIT/ML injection   Yes Yes    Inject 60 Units under the skin into the  appropriate area as directed Every Night.    insulin glulisine (Apidra) 100 UNIT/ML injection   Yes Yes    Inject 10 Units under the skin into the appropriate area as directed 3 (Three) Times a Day Before Meals.    isosorbide mononitrate (IMDUR) 60 MG 24 hr tablet   No Yes    TAKE 1 TABLET BY MOUTH EVERY DAY IN THE MORNING    latanoprost (XALATAN) 0.005 % ophthalmic solution   Yes Yes    Administer 1 drop to both eyes Every Night.    lisinopril (PRINIVIL,ZESTRIL) 20 MG tablet  Self Yes Yes    Take 20 mg by mouth Daily.    metFORMIN (GLUCOPHAGE) 1000 MG tablet  Self Yes Yes    Take 1,000 mg by mouth 2 (Two) Times a Day With Meals.    sertraline (ZOLOFT) 100 MG tablet  Self Yes Yes    Take 1 tablet by mouth Daily.    tiZANidine (ZANAFLEX) 4 MG tablet  Self Yes Yes    Take 1 tablet by mouth Every Morning.    tiZANidine (ZANAFLEX) 4 MG tablet   Yes Yes    Take 8 mg by mouth Every Evening.    zolpidem (AMBIEN) 5 MG tablet   Yes Yes    Take 1 tablet by mouth At Night As Needed for Sleep.            Allergies:  Allergies   Allergen Reactions   • Codeine GI Intolerance       Objective       Vitals:   Temp:  [98.3 °F (36.8 °C)] 98.3 °F (36.8 °C)  Heart Rate:  [104-160] 115  Resp:  [16] 16  BP: (107-120)/(62-78) 110/67    Physical Exam  Vitals reviewed.   Constitutional:       General: She is not in acute distress.  HENT:      Head: Normocephalic.      Nose: Nose normal.      Mouth/Throat:      Mouth: Mucous membranes are moist.   Eyes:      Extraocular Movements: Extraocular movements intact.      Conjunctiva/sclera: Conjunctivae normal.      Pupils: Pupils are equal, round, and reactive to light.   Cardiovascular:      Rate and Rhythm: Tachycardia present. Rhythm irregular.   Pulmonary:      Comments: Decreased air entry at the bases  Abdominal:      General: Bowel sounds are normal.      Palpations: Abdomen is soft.      Tenderness: There is no abdominal tenderness.   Musculoskeletal:         General: Normal range of  motion.      Cervical back: Neck supple.   Skin:     General: Skin is warm and dry.   Neurological:      Mental Status: She is alert and oriented to person, place, and time.   Psychiatric:         Mood and Affect: Mood normal.         Result Review    Result Review:  I have personally reviewed the results from the time of this admission to 10/14/2022 18:48 EDT and agree with these findings:  [x]  Laboratory  [x]  Microbiology  [x]  Radiology  []  EKG/Telemetry   []  Cardiology/Vascular   []  Pathology  []  Old records  []  Other:  Most notable findings include:    Assessment & Plan        Active Hospital Problems:  Active Hospital Problems    Diagnosis    • **Other chest pain    • Atrial fibrillation (HCC)    • Type 2 diabetes mellitus with hyperglycemia (HCC)    • Anxiety associated with depression      Plan:     Atrial flutter/fibrillation with rapid ventricular response  Electrolytes including magnesium and potassium okay  TSH okay  Was given Cardizem bolus and started on Cardizem drip  Also started on therapeutic dose Lovenox  Cardiologist consulted    Chest pain  History of CAD  Cardiac catheterization 10/30/2020-right coronary artery with 50% mid segment stenosis.  Medical management advised at that time and patient started on aspirin and Imdur  Patient is noncompliant to medications  Check serial troponin  Cardiologist following    Diabetes mellitus with hyperglycemia  Blood glucose on presentation 400 however patient was not in DKA  Check A1c  Start on Lantus, Premeal insulin and sliding scale insulin  Monitor blood sugar and adjust insulin as needed    Depression-on Zoloft    Hypertension  Blood pressure marginal  On Cardizem drip  Hold home medication of lisinopril at this time    COPD-not in exacerbation  Active smoker  Respiratory care with bronchodilators  Oxygen therapy and titration    Tobacco dependence-counseled on smoking cessation      Further recommendation following clinical course    DVT  prophylaxis:  Medical DVT prophylaxis orders are present.    CODE STATUS:    Level Of Support Discussed With: Patient  Code Status (Patient has no pulse and is not breathing): No CPR (Do Not Attempt to Resuscitate)  Medical Interventions (Patient has pulse or is breathing): Full Support    Admission Status:  I believe this patient meets inpatient status.    I discussed the patient's findings and my recommendations with patient    This patient has been examined appropriate PPE. 10/14/22      Signature: Electronically signed by Ruben Cruz MD, 10/14/22, 6:34 PM EDT.      Electronically signed by Ruben Cruz MD at 10/15/22 1652          Emergency Department Notes      Chanel Cordova APRN at 10/14/22 1554      Procedure Orders    1. ECG 12 Lead [[id number]] ordered by Chanel Cordova APRN    2. ECG 12 Lead [[id number]] ordered by Chanel Cordova APRN          Attestation signed by Scott Wynn MD at 10/14/22 2034        NON FACE TO FACE: This visit was performed by BOTH a physician and an APC. I performed all aspects of the MDM as documented.  Scott Wynn MD 10/14/2022 20:34 EDT                         Subjective   History of Present Illness  Chief complaint: chest pain      Context: Patient is a 67-year-old female with a past medical history significant for hypertension dyslipidemia COPD obstructive sleep apnea CAD diabetes who comes in with complaints of chest pain that she describes as a heavy pressure and intermittently sharp that is not reproducible.  She denies any associated nausea or diaphoresis.  She denies any vomiting diarrhea or urinary complaints..  She denies any fever cough congestion or upper respiratory complaints.  She denies any unilateral leg swelling recent trauma surgery immobilization prior history of DVT PE or exogenous hormone use.  She saw her family doctor on October 6 for ankle pain and was sent to [facility] as they noted that she was tachycardic; she ended  "up leaving AGAINST MEDICAL ADVICE from the emergency department.  She has had some intermittent shortness of breath.  She reports her last cardiac evaluation was in October 2020 she had a heart cath.  She states she is not on any blood thinners and has not been taking her insulin in over 2 months because she \"did not want to.\"  She is unsure of what medications she currently takes.  She denies any known history of irregular heartbeat.  No reported unilateral focal deficits confusion ataxia slurred speech facial asymmetry or lethargy    Location: Chest without radiation  Duration: Over a week, worse today  Timing: Waxes and wanes  Quality/Severity: Heavy pressure sharp  Modifying factors: Worse with exertion  Associated symptoms: Shortness of breath        Additional hx provided by: self    PCP: Julian Archer                Review of Systems   Constitutional: Negative for fever.   HENT: Negative.    Eyes: Negative for visual disturbance.   Respiratory: Positive for shortness of breath. Negative for cough.    Cardiovascular: Positive for chest pain and palpitations. Negative for leg swelling.   Gastrointestinal: Negative.    Endocrine: Negative for polydipsia and polyuria.   Genitourinary: Negative.    Musculoskeletal: Negative.    Skin: Negative for rash.   Allergic/Immunologic: Negative for immunocompromised state.   Neurological: Positive for weakness.   Hematological: Does not bruise/bleed easily.   Psychiatric/Behavioral: Negative for confusion.       Past Medical History:   Diagnosis Date   • COPD (chronic obstructive pulmonary disease) (CMS/[facility])    • Depression    • Diabetes mellitus (CMS/[facility])    • Hyperlipidemia    • Hypertension    Sleep apnea    Allergies   Allergen Reactions   • Codeine GI Intolerance       Past Surgical History:   Procedure Laterality Date   • CARDIAC CATHETERIZATION Left 10/30/2020    Procedure: Left Heart Cath with Coronary Angiography;  Surgeon: Donaldo Archer MD;  [facility]: [facility] C[facility];  Service: Cardiovascular;  Laterality: Left;   • CARPAL TUNNEL RELEASE Bilateral 2017   • CHOLECYSTECTOMY  1980   • HYSTERECTOMY  1980       Family History   Problem Relation Age of Onset   • Heart disease Mother    • Hypertension Mother    • Diabetes Mother    • Stroke Mother        Social History     Socioeconomic History   • Marital status:    Tobacco Use   • Smoking status: Every Day     Packs/day: 1.00     Years: 20.00     Pack years: 20.00     Types: Cigarettes   • Smokeless tobacco: Never   Substance and Sexual Activity   • Alcohol use: No   • Sexual activity: Defer           Objective   Physical Exam     Vital signs and triage nurse note reviewed.   Constitutional: Awake, alert; uncomfortable but nontoxic  HEENT: Normocephalic, atraumatic; pupils are PERRL with intact EOM; oropharynx is pink and moist without exudate or erythema.   Neck: Supple, full range of motion without pain; no JVD   Cardiovascular: Tachycardic rate   Pulmonary: Respiratory effort regular nonlabored, breath sounds clear to auscultation all fields.   Abdomen: Soft, nontender nondistended with normoactive bowel sounds; no rebound or guarding.   Musculoskeletal: Independent range of motion of all extremities with no palpable tenderness or edema.  Negative Eyad   Neuro: Alert oriented x3, speech is clear and appropriate, GCS 15   Skin:  Fleshtone warm, dry, intact; no erythematous or petechial rash or lesion       ECG 12 Lead      Date/Time: 10/14/2022 4:20 PM  Performed by: Chanel Cordova APRN  Authorized by: Chanel Cordova APRN   Interpreted by physician  Comparison: compared with previous ECG from 10/6/2022  Comparison to previous ECG: From [facility]: ; after adenosine 2:1 flutter 155  BPM: 158  Conduction: right bundle branch block      ECG 12 Lead      Date/Time: 10/14/2022 5:01 PM  Performed by: Chanel Cordova APRN  Authorized by: Chanel Cordova APRN   Interpreted by physician  ([place])  Rhythm: atrial flutter                                ED Course  ED Course as of 10/14/22 1728   Fri Oct 14, 2022   1542 Seen after being placed in a room from triage [JW]   1701 Repeat EKG was obtained due to right improvement and noted to be in a flutter [JW]   1704 Spoke with dr meza []      ED Course User Index  [JW] Chanel Cordova, APRN            Labs Reviewed   COMPREHENSIVE METABOLIC PANEL - Abnormal; Notable for the following components:       Result Value    Glucose 400 (*)     Sodium 133 (*)     Chloride 97 (*)     BUN/Creatinine Ratio 31.3 (*)     All other components within normal limits    Narrative:     GFR Normal >60  Chronic Kidney Disease <60  Kidney Failure <15     APTT - Abnormal; Notable for the following components:    PTT 24.8 (*)     All other components within normal limits   URINALYSIS W/ CULTURE IF INDICATED - Abnormal; Notable for the following components:    Specific Gravity, UA 1.039 (*)     Glucose, UA >=1000 mg/dL (3+) (*)     All other components within normal limits    Narrative:     In absence of clinical symptoms, the presence of pyuria, bacteria, and/or nitrites on the urinalysis result does not correlate with infection.  Urine microscopic not indicated.   BLOOD GAS, ARTERIAL - Abnormal; Notable for the following components:    pCO2, Arterial 32.5 (*)     pO2, Arterial 62.6 (*)     Base Excess, Arterial -2.5 (*)     O2 Saturation, Arterial 92.4 (*)     All other components within normal limits   POCT GLUCOSE FINGERSTICK - Abnormal; Notable for the following components:    Glucose 376 (*)     All other components within normal limits   PROTIME-INR - Normal   LIPASE - Normal   TROPONIN (IN-HOUSE) - Normal    Narrative:     Troponin T Reference Range:  <= 0.03 ng/mL-   Negative for AMI  >0.03 ng/mL-     Abnormal for myocardial necrosis.  Clinicians would have to utilize clinical acumen, EKG, Troponin and serial changes to determine if it is an Acute Myocardial  Infarction or myocardial injury due to an underlying chronic condition.       Results may be falsely decreased if patient taking Biotin.     BNP (IN-HOUSE) - Normal    Narrative:     Among patients with dyspnea, NT-proBNP is highly sensitive for the detection of acute congestive heart failure. In addition NT-proBNP of <300 pg/ml effectively rules out acute congestive heart failure with 99% negative predictive value.    Results may be falsely decreased if patient taking Biotin.     CK - Normal   MAGNESIUM - Normal   TSH - Normal   CBC WITH AUTO DIFFERENTIAL - Normal   ACETONE - Normal   D-DIMER, QUANTITATIVE - Normal    Narrative:     Reference Range  --------------------------------------------------------------------     < 0.50   Negative Predictive Value  0.50-0.59   Indeterminate    >= 0.60   Probable VTE             A very low percentage of patients with DVT may yield D-Dimer results   below the cut-off of 0.50 mg/L FEU.  This is known to be more   prevalent in patients with distal DVT.             Results of this test should always be interpreted in conjunction with   the patient's medical history, clinical presentation and other   findings.  Clinical diagnosis should not be based on the result of   INNOVANCE D-Dimer alone.   POC LACTATE - Normal   BLOOD CULTURE   BLOOD CULTURE   BLOOD GAS, ARTERIAL   RAINBOW DRAW    Narrative:     The following orders were created for panel order [place] Draw.  Procedure                               Abnormality         Status                     ---------                               -----------         ------                     Green Top (Gel)[[id number]]                                  Final result               Lavender Top[[id number]]                                     Final result               Gold Top - SST[[id number]]                                   Final result               Light Blue Top[[id number]]                                                                 Please view results for these tests on the individual orders.   POCT GLUCOSE FINGERSTICK   POC LACTATE   CBC AND DIFFERENTIAL    Narrative:     The following orders were created for panel order CBC & Differential.  Procedure                               Abnormality         Status                     ---------                               -----------         ------                     CBC Auto Differential[[id number]]        Normal              Final result                 Please view results for these tests on the individual orders.   KETONE BODIES SERUM    Narrative:     The following orders were created for panel order Ketone Bodies, Serum (Not performed at [facility]).  Procedure                               Abnormality         Status                     ---------                               -----------         ------                     Acetone[[id number]]                      Normal              Final result                 Please view results for these tests on the individual orders.   GREEN TOP   LAVENDER TOP   GOLD TOP - SST     Medications   sodium chloride 0.9 % flush 10 mL (has no administration in time range)   sodium chloride 0.9 % flush 10 mL (has no administration in time range)   dilTIAZem (CARDIZEM) 125 mg in 125 mL D5W infusion (10 mg/hr Intravenous Rate/Dose Change 10/14/22 1705)   nitroglycerin (NITROSTAT) SL tablet 0.4 mg (has no administration in time range)   insulin lispro (ADMELOG) injection 8 Units (has no administration in time range)   sodium chloride 0.9 % bolus 1,000 mL (1,000 mL Intravenous New Bag 10/14/22 1612)   aspirin chewable tablet 324 mg (324 mg Oral Given 10/14/22 1604)   dilTIAZem (CARDIZEM) bolus from bag 1 mg/mL 10 mg (10 mg Intravenous Bolus from Bag 10/14/22 1617)   Enoxaparin Sodium (LOVENOX) syringe 80 mg (80 mg Subcutaneous Given 10/14/22 1719)     XR Chest 1 View    Result Date: 10/14/2022  IMPRESSION : Limited negative low lung volume portable chest[   "Electronically Signed By-Mayur Allen On:10/14/2022 4:33 PM This report was finalized on [id number] by  Mayur Allen, .                               MDM  Number of Diagnoses or Management Options  Atrial flutter, unspecified type (HCC)  Chest pain, unspecified type  Dyspnea, unspecified type  Hx of medication noncompliance  Hyperglycemia  Diagnosis management comments: Chart review:  10/2020 heart cath: 1. HEMODYNAMICS:  Left ventricle end-diastolic pressure is normal.  No gradient was noted across aortic valve.  2. LEFT VENTRICULOGRAPHY:  Left ventricle size and contractility is normal with ejection fraction of 60%.  3. CORONARY ANGIOGRAPHY:  Left main coronary artery is normal.  Left anterior descending artery is normal.  Circumflex coronary artery is normal.  Right coronary artery has mid segment 50% disease     SUMMARY:  Left ventricle size and contractility is normal with ejection fraction of 60%.     Left main coronary artery is normal.  Left anterior descending artery is normal.  Circumflex coronary artery is normal.  Right coronary artery has mid segment 50% disease     RECOMMENDATIONS:  Medical therapy.  Patient was started on baby aspirin and Imdur 60 mg a day.  Follow-up in the office in 2 weeks.      Records were obtained from [facility] dated 10/6/2022.  Patient was noted to be in SVT with a heart rate of 160.  Patient was given 6 mg of adenosine and repeat EKG showed 2-1 a flutter with a right bundle branch block.  Troponin 0.14, , glucose 484.  Patient was given Cardizem and IV metoprolol but remained tachycardic and ended up leaving AGAINST MEDICAL ADVICE      /62   Pulse 107   Temp 98.3 °F (36.8 °C) (Oral)   Resp 16   Ht 172.7 cm (68\")   Wt 79.8 kg (176 lb)   SpO2 95%   BMI 26.76 kg/m²      Chart review:      Comorbidities:  has a past medical history of COPD (chronic obstructive pulmonary disease) (CMS/[facility]), Depression, Diabetes mellitus (CMS/[facility]), " Hyperlipidemia, and Hypertension.  Differentials:   not all inclusive of differentials considered  Discussion with provider:  Radiology interpretation:  X-rays reviewed by me and interpreted by radiologist,   XR Chest 1 View    Result Date: 10/14/2022  IMPRESSION : Limited negative low lung volume portable chest(  Electronically Signed By-Mayur Allen On:10/14/2022 4:33 PM This report was finalized on [id number] by  Mayur Allen, .    Lab interpretation:  Labs viewed by me significant for,    Appropriate PPE worn during exam.  Patient had an IV established labs obtained.  She was given aspirin and Cardizem bolus and drip, IV fluids.  Given Lovenox.  Refused sublingual nitro as her heart rate has improved her pain has subsided and she is resting comfortably.  I discussed with cardiology.  After her rate slowed it was noted to be in a flutter.    she was given Humalog as there are no other labs concerning for acidosis.   Pharmacy notified and working on current med list as she is a poor historian and does not know what current meds she is taking.    i discussed findings with patient who voices understanding of admission    This document is intended for medical expert use only. Reading of this document by patients and/or patient's family without participating medical staff guidance may result in misinterpretation and unintended morbidity.  Any interpretation of such data is the responsibility of the patient and/or family member responsible for the patient in concert with their primary or specialist providers, not to be left for sources of online searches such as [employer], [employer] or similar queries. Relying on these approaches to knowledge may result in misinterpretation, misguided goals of care and even death should patients or family members try recommendations outside of the realm of professional medical care in a supervised inpatient environment.     Discussed with Dr. Kingsley Calvillo and/or  Complexity of Data Reviewed  Independent visualization of images, tracings, or specimens: yes    Critical Care  Total time providing critical care: 30-74 minutes (35)      Final diagnoses:   Chest pain, unspecified type   Atrial flutter, unspecified type (HCC)   Dyspnea, unspecified type   Hyperglycemia   Hx of medication noncompliance       ED Disposition  ED Disposition     ED Disposition   Decision to Admit    Condition   --    Comment   --             No follow-up provider specified.       Medication List      No changes were made to your prescriptions during this visit.          Chanel Cordova, THOMAS  10/14/22 1727       Chanel Cordova APRN  10/14/22 1728      Electronically signed by Scott Wynn MD at 10/14/22 2034     Marissa Tapia RN at 10/15/22 1326          Nursing report ED to floor  Arianna Blanco  67 y.o.  female    HPI:   Chief Complaint   Patient presents with   • Chest Pain       Admitting doctor:   Ruben Cruz MD    Admitting diagnosis:   The primary encounter diagnosis was Chest pain, unspecified type. Diagnoses of Atrial flutter, unspecified type (HCC), Dyspnea, unspecified type, Hyperglycemia, and Hx of medication noncompliance were also pertinent to this visit.    Code status:   Current Code Status       Date Active Code Status Order ID Comments User Context       10/14/2022 1843 No CPR (Do Not Attempt to Resuscitate) [id number]  Ruben Cruz MD ED        Question Answer    Code Status (Patient has no pulse and is not breathing) No CPR (Do Not Attempt to Resuscitate)    Medical Interventions (Patient has pulse or is breathing) Full Support    Level Of Support Discussed With Patient                    Allergies:   Codeine    Isolation:  No active isolations     Fall Risk:  Fall Risk Assessment was completed, and patient is at low risk for falls.   Predictive Model Details         25 (Low) Factor Value    Calculated 10/15/2022 10:07 Age 67    Risk of Fall Model Musculoskeletal  Assessment WDL     Number of Distinct Medication Classes administered 9     Active Peripheral IV Present     Imaging order in this encounter Present     Drug Use Not Asked     Number of administrations of Anti-Convulsants 2     Respiratory Rate 18     Skin Assessment WDL     Magnesium 1.6 mg/dL     Hemoglobin A1c 12.3 %     Tobacco Use Current     Calcium 8.7 mg/dL     Diastolic BP 75     Peripheral Vascular Assessment WDL     Chloride 99 mmol/L     Cardiac Assessment X     Rikki Scale 21     Days after Admission 0.78     Financial Class Medicaid     Gastrointestinal Assessment WDL     Number of administrations of Anti-Coagulants 2     Albumin 4 g/dL     Creatinine 0.58 mg/dL     ALT 31 U/L     Potassium 4.2 mmol/L     Total Bilirubin 0.2 mg/dL         Weight:       10/14/22  1522   Weight: 79.8 kg (176 lb)       Intake and Output    Intake/Output Summary (Last 24 hours) at 10/15/2022 1326  Last data filed at 10/15/2022 1245  Gross per 24 hour   Intake 2020 ml   Output 450 ml   Net 1570 ml       Diet:   Dietary Orders (From admission, onward)       Start     Ordered    10/15/22 1141  Diet Diabetic/Consistent Carbs; Diabetic - Consistent Carb  Diet Effective Now        Question Answer Comment   Diet / Texture / Consistency Diabetic/Consistent Carbs    Select Type: Diabetic - Consistent Carb        10/15/22 1141                     Most recent vitals:   Vitals:    10/15/22 0500 10/15/22 0600 10/15/22 0846 10/15/22 1115   BP: 159/64 157/92 148/75 106/78   BP Location:  Left arm Right arm Right arm   Patient Position:  Lying  Lying   Pulse: 105 92 104 96   Resp: 18 20 18 18   Temp:       TempSrc:       SpO2: 91% 97% 94% 97%   Weight:       Height:           Active LDAs/IV Access:   Lines, Drains & Airways       Active LDAs       Name Placement date Placement time Site Days    Peripheral IV 10/14/22 1520 Forearm 10/14/22  1520  Forearm  less than 1    Peripheral IV 10/14/22 1549 Right Antecubital 10/14/22  1549   Antecubital  less than 1                    Skin Condition:   Skin Assessments (last day)       Date/Time Skin WDL Sensory Perception Moisture Activity Mobility Nutrition Friction and Shear Rikki Score    10/14/22 2200 -- 4-->no impairment 4-->rarely moist 3-->walks occasionally 4-->no limitation 3-->adequate 3-->no apparent problem 21    10/14/22 2215 WDL 4-->no impairment 4-->rarely moist 3-->walks occasionally 4-->no limitation 3-->adequate 3-->no apparent problem 21    10/15/22 0000 WDL -- -- -- -- -- -- --    10/15/22 0400 WDL -- -- -- -- -- -- --    10/15/22 0800 WDL 4-->no impairment 4-->rarely moist 3-->walks occasionally 4-->no limitation 3-->adequate 3-->no apparent problem 21             Labs (abnormal labs have a star):   Labs Reviewed   COMPREHENSIVE METABOLIC PANEL - Abnormal; Notable for the following components:       Result Value    Glucose 400 (*)     Sodium 133 (*)     Chloride 97 (*)     BUN/Creatinine Ratio 31.3 (*)     All other components within normal limits    Narrative:     GFR Normal >60  Chronic Kidney Disease <60  Kidney Failure <15     APTT - Abnormal; Notable for the following components:    PTT 24.8 (*)     All other components within normal limits   URINALYSIS W/ CULTURE IF INDICATED - Abnormal; Notable for the following components:    Specific Gravity, UA 1.039 (*)     Glucose, UA >=1000 mg/dL (3+) (*)     All other components within normal limits    Narrative:     In absence of clinical symptoms, the presence of pyuria, bacteria, and/or nitrites on the urinalysis result does not correlate with infection.  Urine microscopic not indicated.   BLOOD GAS, ARTERIAL - Abnormal; Notable for the following components:    pCO2, Arterial 32.5 (*)     pO2, Arterial 62.6 (*)     Base Excess, Arterial -2.5 (*)     O2 Saturation, Arterial 92.4 (*)     All other components within normal limits   HEMOGLOBIN A1C - Abnormal; Notable for the following components:    Hemoglobin A1C 12.3 (*)     All  other components within normal limits    Narrative:     Hemoglobin A1C Reference Range:    <5.7 %        Normal  5.7-6.4 %     Increased risk for diabetes  > 6.4 %        Diabetes       These guidelines have been recommended by the American Diabetic Association for Hgb A1c.      The following 2010 guidelines have been recommended by the American Diabetes Association for Hemoglobin A1c.    HBA1c 5.7-6.4% Increased risk for future diabetes (pre-diabetes)  HBA1c     >6.4% Diabetes     BASIC METABOLIC PANEL - Abnormal; Notable for the following components:    Glucose 294 (*)     Sodium 133 (*)     BUN/Creatinine Ratio 29.3 (*)     All other components within normal limits    Narrative:     GFR Normal >60  Chronic Kidney Disease <60  Kidney Failure <15     POCT GLUCOSE FINGERSTICK - Abnormal; Notable for the following components:    Glucose 376 (*)     All other components within normal limits   POCT GLUCOSE FINGERSTICK - Abnormal; Notable for the following components:    Glucose 389 (*)     All other components within normal limits   POCT GLUCOSE FINGERSTICK - Abnormal; Notable for the following components:    Glucose 296 (*)     All other components within normal limits   POCT GLUCOSE FINGERSTICK - Abnormal; Notable for the following components:    Glucose 238 (*)     All other components within normal limits   PROTIME-INR - Normal   LIPASE - Normal   TROPONIN (IN-HOUSE) - Normal    Narrative:     Troponin T Reference Range:  <= 0.03 ng/mL-   Negative for AMI  >0.03 ng/mL-     Abnormal for myocardial necrosis.  Clinicians would have to utilize clinical acumen, EKG, Troponin and serial changes to determine if it is an Acute Myocardial Infarction or myocardial injury due to an underlying chronic condition.       Results may be falsely decreased if patient taking Biotin.     BNP (IN-HOUSE) - Normal    Narrative:     Among patients with dyspnea, NT-proBNP is highly sensitive for the detection of acute congestive heart  failure. In addition NT-proBNP of <300 pg/ml effectively rules out acute congestive heart failure with 99% negative predictive value.    Results may be falsely decreased if patient taking Biotin.     CK - Normal   MAGNESIUM - Normal   TSH - Normal   CBC WITH AUTO DIFFERENTIAL - Normal   ACETONE - Normal   D-DIMER, QUANTITATIVE - Normal    Narrative:     Reference Range  --------------------------------------------------------------------     < 0.50   Negative Predictive Value  0.50-0.59   Indeterminate    >= 0.60   Probable VTE             A very low percentage of patients with DVT may yield D-Dimer results   below the cut-off of 0.50 mg/L FEU.  This is known to be more   prevalent in patients with distal DVT.             Results of this test should always be interpreted in conjunction with   the patient's medical history, clinical presentation and other   findings.  Clinical diagnosis should not be based on the result of   [place] D-Dimer alone.   MAGNESIUM - Normal   TSH - Normal   TROPONIN (IN-HOUSE) - Normal    Narrative:     Troponin T Reference Range:  <= 0.03 ng/mL-   Negative for AMI  >0.03 ng/mL-     Abnormal for myocardial necrosis.  Clinicians would have to utilize clinical acumen, EKG, Troponin and serial changes to determine if it is an Acute Myocardial Infarction or myocardial injury due to an underlying chronic condition.       Results may be falsely decreased if patient taking Biotin.     CBC WITH AUTO DIFFERENTIAL - Normal   DIGOXIN LEVEL - Normal   POC LACTATE - Normal   BLOOD CULTURE   BLOOD CULTURE   BLOOD GAS, ARTERIAL   RAINBOW DRAW    Narrative:     The following orders were created for panel order [place] Draw.  Procedure                               Abnormality         Status                     ---------                               -----------         ------                     Green Top (Gel)[[id number]]                                  Final result               [place]  A) For an optimal medical care, please take your medications as prescribed    B) For an optimal medical care, please make the suggested appointments    C) Your exercise nuclear medicine test came back negative. You are cleared for discharge from cardiology stand point, you will not to follow up as an outpatient with Dr. King. Top[[id number]]                                     Final result               [facility][[id number]]                                   Final result               Light Blue Top[[id number]]                                                                Please view results for these tests on the individual orders.   POCT GLUCOSE FINGERSTICK   POC LACTATE   POCT GLUCOSE FINGERSTICK   POCT GLUCOSE FINGERSTICK   POCT GLUCOSE FINGERSTICK   POCT GLUCOSE FINGERSTICK   POCT GLUCOSE FINGERSTICK   CBC AND DIFFERENTIAL    Narrative:     The following orders were created for panel order CBC & Differential.  Procedure                               Abnormality         Status                     ---------                               -----------         ------                     CBC Auto Differential[[id number]]        Normal              Final result                 Please view results for these tests on the individual orders.   KETONE BODIES SERUM    Narrative:     The following orders were created for panel order Ketone Bodies, Serum (Not performed at [facility]).  Procedure                               Abnormality         Status                     ---------                               -----------         ------                     Acetone[[id number]]                      Normal              Final result                 Please view results for these tests on the individual orders.   GREEN TOP   LAVENDER TOP   GOLD TOP - SST       LOC: Person, Place, Time, and Situation    Telemetry:  Telemetry    Cardiac Monitoring Ordered: yes    EKG:   ECG 12 Lead   Preliminary Result   HEART RATE= 86  bpm   RR Interval= 616  ms   MI Interval=   ms   P Horizontal Axis=   deg   P Front Axis=   deg   QRSD Interval= 124  ms   QT Interval= 415  ms   QRS Axis= 102  deg   T Wave Axis= 45  deg   - ABNORMAL ECG -   Atrial flutter   Ventricular premature complex   Right bundle branch block   Electronically Signed By:    Date and Time of Study:  2022-10-15 05:22:57      ECG 12 Lead   ED Interpretation   Chanel Cordova APRN     10/14/2022  5:28 PM   ECG 12 Lead         Date/Time: 10/14/2022 4:20 PM   Performed by: Chanel Cordova APRN   Authorized by: Chanel Cordova APRN    Interpreted by physician   Comparison: compared with previous ECG from 10/6/2022   Comparison to previous ECG: From [facility]: ; after adenosine 2:1 flutter    155   BPM: 158   Conduction: right bundle branch block         ECG 12 Lead   ED Interpretation   Chanel Cordova APRN     10/14/2022  5:28 PM   ECG 12 Lead         Date/Time: 10/14/2022 5:01 PM   Performed by: Chanel Cordova APRN   Authorized by: Chanel Cordova APRN    Interpreted by physician ([place])   Rhythm: atrial flutter         ECG 12 Lead   Preliminary Result   HEART RATE= 106  bpm   RR Interval= 566  ms   TN Interval=   ms   P Horizontal Axis=   deg   P Front Axis=   deg   QRSD Interval= 124  ms   QT Interval= 380  ms   QRS Axis= 96  deg   T Wave Axis= 24  deg   - ABNORMAL ECG -   Atrial flutter with varied AV block,   Right bundle branch block   Electronically Signed By:    Date and Time of Study: 2022-10-14 17:01:34      ECG 12 Lead   Preliminary Result   HEART RATE= 158  bpm   RR Interval= 380  ms   TN Interval= 56  ms   P Horizontal Axis=   deg   P Front Axis= -87  deg   QRSD Interval= 128  ms   QT Interval= 335  ms   QRS Axis= 150  deg   T Wave Axis= 2  deg   - ABNORMAL ECG -   Serial comparison ignored immediately previous ECG(s) - Too recent   Wide-QRS tachycardia   RBBB and LPFB   When compared with ECG of 29-Jun-2021 19:20:04,   Significant change in rhythm: previously sinus   New conduction abnormality   Electronically Signed By:    Date and Time of Study: 2022-10-14 15:50:50      ECG 12 Lead   Preliminary Result   HEART RATE= 159  bpm   RR Interval= 376  ms   TN Interval= 232  ms   P Horizontal Axis= 30  deg   P Front Axis= -9  deg   QRSD Interval= 126  ms   QT Interval= 340  ms   QRS  Axis= 129  deg   T Wave Axis= 19  deg   - ABNORMAL ECG -   Incomplete analysis due to missing data in precordial lead(s)   Wide-QRS tachycardia   RBBB and LPFB   When compared with ECG of 29-Jun-2021 19:20:04,   Significant change in rhythm: previously sinus   New conduction abnormality   Electronically Signed By:    Date and Time of Study: 2022-10-14 15:35:11          Medications Given in the ED:   Medications   sodium chloride 0.9 % flush 10 mL (has no administration in time range)   sodium chloride 0.9 % flush 10 mL (has no administration in time range)   dilTIAZem (CARDIZEM) 125 mg in 125 mL D5W infusion (10 mg/hr Intravenous Currently Infusing 10/15/22 0846)   nitroglycerin (NITROSTAT) SL tablet 0.4 mg (has no administration in time range)   ARIPiprazole (ABILIFY) tablet 2 mg (2 mg Oral Not Given 10/14/22 2259)   gabapentin (NEURONTIN) capsule 300 mg (300 mg Oral Given 10/15/22 0842)   insulin glargine (LANTUS, SEMGLEE) injection 30 Units (30 Units Subcutaneous Given 10/14/22 2257)   latanoprost (XALATAN) 0.005 % ophthalmic solution 1 drop (1 drop Both Eyes Not Given 10/14/22 2259)   sertraline (ZOLOFT) tablet 100 mg (100 mg Oral Given 10/15/22 1108)   sodium chloride 0.9 % flush 3 mL (3 mL Intravenous Given 10/15/22 0842)   sodium chloride 0.9 % flush 3-10 mL (has no administration in time range)   acetaminophen (TYLENOL) tablet 650 mg (has no administration in time range)     Or   acetaminophen (TYLENOL) 160 MG/5ML solution 650 mg (has no administration in time range)     Or   acetaminophen (TYLENOL) suppository 650 mg (has no administration in time range)   melatonin tablet 5 mg (5 mg Oral Given 10/14/22 2257)   sennosides-docusate (PERICOLACE) 8.6-50 MG per tablet 2 tablet (has no administration in time range)     And   polyethylene glycol (MIRALAX) packet 17 g (has no administration in time range)     And   bisacodyl (DULCOLAX) EC tablet 5 mg (has no administration in time range)     And   bisacodyl  (DULCOLAX) suppository 10 mg (has no administration in time range)   ondansetron (ZOFRAN) tablet 4 mg (has no administration in time range)     Or   ondansetron (ZOFRAN) injection 4 mg (has no administration in time range)   Pharmacy to Dose enoxaparin (LOVENOX) (has no administration in time range)   potassium chloride (K-DUR,KLOR-CON) CR tablet 40 mEq (has no administration in time range)     Or   potassium chloride (KLOR-CON) packet 40 mEq (has no administration in time range)     Or   potassium chloride 10 mEq in 100 mL IVPB (has no administration in time range)   Magnesium Sulfate 2 gram Bolus, followed by 8 gram infusion (total Mg dose 10 grams)- Mg less than or equal to 1mg/dL ( Intravenous Not Given:  See Alt 10/15/22 0515)     Or   Magnesium Sulfate 2 gram / 50mL Infusion (GIVE X 3 BAGS TO EQUAL 6GM TOTAL DOSE) - Mg 1.1 - 1.5 mg/dl ( Intravenous Not Given:  See Alt 10/15/22 0515)     Or   Magnesium Sulfate 4 gram infusion- Mg 1.6-1.9 mg/dL (4 g Intravenous New Bag 10/15/22 0515)   potassium & sodium phosphates (PHOS-NAK) 280-160-250 MG packet - for Phosphorus less than 1.25 mg/dL (has no administration in time range)     Or   potassium & sodium phosphates (PHOS-NAK) 280-160-250 MG packet - for Phosphorus 1.25 - 2.5 mg/dL (has no administration in time range)   calcium gluconate 1g/50ml 0.675% NaCl IV SOLN (has no administration in time range)     And   calcium gluconate 2-0.675 GM/100ML NACL IVPB (has no administration in time range)   Enoxaparin Sodium (LOVENOX) syringe 80 mg (80 mg Subcutaneous Given 10/15/22 0841)   digoxin (LANOXIN) injection 0.25 mg (has no administration in time range)   dextrose (GLUTOSE) oral gel 15 g (has no administration in time range)   dextrose (D50W) (25 g/50 mL) IV injection 25 g (has no administration in time range)   glucagon (human recombinant) (GLUCAGEN DIAGNOSTIC) 1 mg in sterile water (preservative free) 1 mL injection (has no administration in time range)   insulin  lispro (ADMELOG) injection 0-14 Units (5 Units Subcutaneous Given 10/15/22 1108)   sodium chloride 0.9 % bolus 1,000 mL (0 mL Intravenous Stopped 10/14/22 1820)   aspirin chewable tablet 324 mg (324 mg Oral Given 10/14/22 1604)   dilTIAZem (CARDIZEM) bolus from bag 1 mg/mL 10 mg (10 mg Intravenous Bolus from Bag 10/14/22 1617)   Enoxaparin Sodium (LOVENOX) syringe 80 mg (80 mg Subcutaneous Given 10/14/22 1719)   insulin lispro (ADMELOG) injection 8 Units (8 Units Subcutaneous Given 10/14/22 1737)   digoxin (LANOXIN) injection 0.5 mg (0.5 mg Intravenous Given 10/14/22 2018)   technetium tetrofosmin (Tc-MYOVIEW) injection 1 dose (1 dose Intravenous Given 10/15/22 0800)   regadenoson (LEXISCAN) injection 0.4 mg (0.4 mg Intravenous Given 10/15/22 1020)   technetium tetrofosmin (Tc-MYOVIEW) injection 1 dose (1 dose Intravenous Given 10/15/22 1020)       Imaging results:  XR Chest 1 View    Result Date: 10/14/2022  IMPRESSION : Limited negative low lung volume portable chest[  Electronically Signed By-Mayur Allen On:10/14/2022 4:33 PM This report was finalized on [id number] by  Mayur Allen, .     Social issues:   Social History     Socioeconomic History   • Marital status:    Tobacco Use   • Smoking status: Every Day     Packs/day: 1.00     Years: 20.00     Pack years: 20.00     Types: Cigarettes   • Smokeless tobacco: Never   Substance and Sexual Activity   • Alcohol use: No   • Sexual activity: Defer       NIH Stroke Scale:  Interval: (not recorded)  1a. Level of Consciousness: (not recorded)  1b. LOC Questions: (not recorded)  1c. LOC Commands: (not recorded)  2. Best Gaze: (not recorded)  3. Visual: (not recorded)  4. Facial Palsy: (not recorded)  5a. Motor Arm, Left: (not recorded)  5b. Motor Arm, Right: (not recorded)  6a. Motor Leg, Left: (not recorded)  6b. Motor Leg, Right: (not recorded)  7. Limb Ataxia: (not recorded)  8. Sensory: (not recorded)  9. Best Language: (not recorded)  10.  Dysarthria: (not recorded)  11. Extinction and Inattention (formerly Neglect): (not recorded)    Total (NIH Stroke Scale): (not recorded)     Additional notable assessment information:    Nursing report ED to floor:  Jana RN    Marissa Tapia RN   10/15/22 13:26 EDT      Electronically signed by Marissa Tapia RN at 10/15/22 1327       Vital Signs (last 2 days)     Date/Time Temp Temp src Pulse Resp BP Patient Position SpO2    10/17/22 0557 97.8 (36.6) Oral 81 20 169/74 Lying 98    10/17/22 0445 -- -- 83 -- 154/72 -- --    10/17/22 0345 -- -- 81 -- 153/83 -- --    10/17/22 0245 -- -- 81 -- 138/73 -- --    10/17/22 0226 98.1 (36.7) Oral 81 16 127/68 Lying 96    10/17/22 0145 -- -- 81 -- 121/44 -- --    10/17/22 0045 -- -- 80 -- 137/62 -- --    10/16/22 2345 -- -- 80 -- 131/53 -- --    10/16/22 2208 98.2 (36.8) Oral 81 18 144/73 Lying 95    10/16/22 2145 -- -- 80 -- 144/73 -- --    10/16/22 1945 -- -- 106 -- 180/69 -- --    10/16/22 1845 -- -- 81 -- 157/73 -- --    10/16/22 1829 98.1 (36.7) Oral 87 -- 144/67 Lying 95    10/16/22 1555 -- -- 120 -- -- -- --    10/16/22 1550 -- -- 114 -- -- -- --    10/16/22 1545 -- -- 105 -- 137/112 -- --    10/16/22 1540 -- -- 106 -- -- -- --    10/16/22 1535 -- -- 97 -- -- -- --    10/16/22 1530 -- -- 106 -- 161/78 -- 96    10/16/22 1526 98.8 (37.1) Oral 106 -- 161/78 Lying 96    10/16/22 1525 -- -- 106 -- -- -- --    10/16/22 1520 -- -- 106 -- -- -- --    10/16/22 1515 -- -- 105 -- -- -- --    10/16/22 1510 -- -- 106 -- -- -- --    10/16/22 1505 -- -- 106 -- -- -- --    10/16/22 1500 -- -- 103 -- -- -- --    10/16/22 1455 -- -- 105 -- -- -- --    10/16/22 1450 -- -- 105 -- -- -- --    10/16/22 1445 -- -- 115 -- 155/55 -- --    10/16/22 1440 -- -- 107 -- -- -- --    10/16/22 1435 -- -- 106 -- -- -- --    10/16/22 1430 -- -- 104 -- -- -- --    10/16/22 1425 -- -- 106 -- -- -- --    10/16/22 1420 -- -- 105 -- -- -- --    10/16/22 1415 -- -- 108 -- -- -- --    10/16/22 1355 -- -- 106 --  -- -- --    10/16/22 1350 -- -- 108 -- -- -- --    10/16/22 1345 -- -- 111 -- 155/55 -- --    10/16/22 1340 -- -- 105 -- -- -- --    10/16/22 1335 -- -- 115 -- -- -- --    10/16/22 1330 -- -- -- -- 139/62 -- --    10/16/22 1325 -- -- 106 -- -- -- --    10/16/22 1320 -- -- 93 -- -- -- --    10/16/22 1315 -- -- 100 -- -- -- --    10/16/22 1310 -- -- 101 -- -- -- --    10/16/22 1245 -- -- 108 -- 139/62 -- --    10/16/22 1145 -- -- 116 -- 162/100 -- --    10/16/22 1144 -- -- 105 -- -- -- --    10/16/22 1045 -- -- 104 -- 140/62 -- --    10/16/22 1043 98.3 (36.8) Oral 105 16 140/62 Sitting --    10/16/22 0815 -- -- 120 -- -- -- --    10/16/22 0800 -- -- 119 -- 170/84 -- --    10/16/22 0625 -- -- 106 -- 162/60 -- --    10/16/22 0400 -- -- 80 -- 152/60 -- --    10/16/22 0119 98.4 (36.9) Oral 83 16 147/68 -- --    10/15/22 2143 98.4 (36.9) Oral 79 18 163/67 -- 95    10/15/22 1759 97.7 (36.5) Oral 100 20 145/65 Lying 96    10/15/22 1527 97.5 (36.4) Oral 91 16 156/72 Lying 99    10/15/22 1502 -- -- 86 20 150/77 Lying 97    10/15/22 1416 -- -- -- -- 133/74 -- --    10/15/22 1401 -- -- 80 -- 133/74 Lying --    10/15/22 1115 -- -- 96 18 106/78 Lying 97    10/15/22 0846 -- -- 104 18 148/75 -- 94    10/15/22 0600 -- -- 92 20 157/92 Lying 97    10/15/22 0500 -- -- 105 18 159/64 -- 91    10/15/22 0400 -- -- 90 16 135/70 -- 90    10/15/22 0300 -- -- 86 17 151/74 Lying 99    10/15/22 0210 -- -- 80 -- -- -- --    10/15/22 0200 -- -- 83 19 147/72 Lying 94    10/15/22 0100 98 (36.7) Oral 80 18 145/76 Lying 91    10/15/22 0038 -- -- 80 -- 131/62 -- 93           Physician Progress Notes (last 48 hours)      Ruben Cruz MD at 10/16/22 0904              [facility] Medicine Services Daily Progress Note    Patient Name: Arianna Blanco  : 1955  MRN: [id number]  Primary Care Physician:  Christopher Jordan MD  Date of admission: 10/14/2022      Subjective       Chief Complaint: Chest pain/palpitation    Patient  Reports  10/15/2022  Patient seen post stress test  Related having generalized body weakness  Denies any palpitation nor chest pain    10/16/2022  Patient seen and examined  Complain of generalized body weakness  On Cardizem drip  Heart rates is regulated but heart rhythm still irregular      Review of Systems   Constitutional: Negative for chills and fever.   HENT: Negative for congestion.    Eyes: Negative for blurred vision.   Cardiovascular: Negative for chest pain and palpitations.   Respiratory: Negative for shortness of breath.    Endocrine: Negative for cold intolerance and heat intolerance.   Gastrointestinal: Negative for abdominal pain, nausea and vomiting.   Genitourinary: Negative for flank pain.   Neurological: Positive for weakness.   Psychiatric/Behavioral: Negative for altered mental status.        Objective       Vitals:   Temp:  [97.5 °F (36.4 °C)-98.4 °F (36.9 °C)] 98.3 °F (36.8 °C)  Heart Rate:  [] 105  Resp:  [16-20] 16  BP: (133-163)/(60-77) 140/62  Flow (L/min):  [2] 2    Physical Exam  Vitals reviewed.   Constitutional:       General: She is not in acute distress.  HENT:      Head: Normocephalic.      Nose: Nose normal.      Mouth/Throat:      Mouth: Mucous membranes are moist.   Eyes:      Extraocular Movements: Extraocular movements intact.      Conjunctiva/sclera: Conjunctivae normal.      Pupils: Pupils are equal, round, and reactive to light.   Cardiovascular:      Rate and Rhythm: Normal rate. Rhythm irregular.   Pulmonary:      Effort: No respiratory distress.      Breath sounds: Normal breath sounds.   Abdominal:      General: Bowel sounds are normal. There is no distension.      Palpations: Abdomen is soft.      Tenderness: There is no abdominal tenderness.   Musculoskeletal:         General: Normal range of motion.      Cervical back: Neck supple.   Skin:     General: Skin is warm and dry.   Neurological:      Mental Status: She is alert and oriented to person, place, and  time.   Psychiatric:         Mood and Affect: Mood normal.            Result Review    Result Review:  I have personally reviewed the results from the time of this admission to 10/16/2022 13:43 EDT and agree with these findings:  [x]  Laboratory  [x]  Microbiology  [x]  Radiology  []  EKG/Telemetry   []  Cardiology/Vascular   []  Pathology  []  Old records  []  Other:  Most notable findings include:           Assessment & Plan      Brief Patient Summary:  Patient is a 67-year-old female with multiple comorbidities including diabetes mellitus type 2, hypertension, COPD, depression, obstructive sleep apnea and hyperlipidemia.     Patient presented to [facility] ED on 10/14/2022 with complaints of chest pain.  Stated chest pain is pressure-like, left-sided, nonradiating with no aggravating or relieving factor.  Has associated generalized body weakness, shortness of breath but no nausea or vomiting.     Has had similar presentation about a week ago and had gone to [facility] ED where plans was being made to transfer her to [facility] for further evaluation and management however patient left AMA.        In the ED patient was noted to be in atrial flutter, she was given Cardizem bolus and started on Cardizem drip.  Also given therapeutic dose Lovenox.  Cardiologist was consulted from the ED.  Patient was admitted for further care         ARIPiprazole, 2 mg, Oral, Nightly  enoxaparin, 80 mg, Subcutaneous, Q12H  gabapentin, 300 mg, Oral, TID  insulin glargine, 40 Units, Subcutaneous, Nightly  insulin lispro, 0-14 Units, Subcutaneous, TID AC  latanoprost, 1 drop, Both Eyes, Nightly  metoprolol succinate XL, 25 mg, Oral, Q24H  sertraline, 100 mg, Oral, Daily  sodium chloride, 3 mL, Intravenous, Q12H       dilTIAZem, 5-15 mg/hr, Last Rate: 7.5 mg/hr (10/15/22 9626)  Pharmacy to Dose enoxaparin (LOVENOX),          Active Hospital Problems:  Active Hospital Problems    Diagnosis    • **Other chest pain    •  Atrial fibrillation (HCC)    • Type 2 diabetes mellitus with hyperglycemia (HCC)    • Anxiety associated with depression      Plan:   Atrial flutter with rapid ventricular response  Electrolytes including magnesium and potassium okay  TSH okay  Was given Cardizem bolus and started on Cardizem drip  Also started on therapeutic dose Lovenox  Rate controlled but still in atrial flutter  Was given IV digoxin x1  Started on metoprolol 25 mg daily  Cardiology following     Chest pain  History of CAD  Cardiac catheterization 10/30/2020-right coronary artery with 50% mid segment stenosis.  Medical management advised at that time and patient started on aspirin and Imdur  Patient is noncompliant to medications  serial troponin insignificant  Cardiologist following  Lexiscan Cardiolite test 10/15/2022-is negative for myocardial ischemia  2D echo normal LV size and contractility with EF of 60%    Diabetes mellitus with hyperglycemia  Blood glucose on presentation 400 however patient was not in DKA  A1c 12.3  on Lantus, Premeal insulin and sliding scale insulin  Blood sugar not controlled-increase Lantus to 40 units at bedtime  Monitor blood sugar and adjust insulin as needed  Consult diabetic educator to see     Depression-on Zoloft     Hypertension  On Cardizem drip and metoprolol       COPD-not in exacerbation  Active smoker  Respiratory care with bronchodilators  Oxygen therapy and titration     Tobacco dependence-counseled on smoking cessation    DVT prophylaxis:  Medical DVT prophylaxis orders are present.    CODE STATUS:    Level Of Support Discussed With: Patient  Code Status (Patient has no pulse and is not breathing): No CPR (Do Not Attempt to Resuscitate)  Medical Interventions (Patient has pulse or is breathing): Full Support      Disposition: Pending clinical progress    This patient has been examined with appropriate PPE . 10/16/22      Electronically signed by Ruben Cruz MD, 10/16/22, 13:43 EDT.  Isabell Louis  Hospitalist Team             Electronically signed by Ruben Cruz MD at 10/16/22 1343     Donaldo Archer MD at 10/16/22 0805          Referring Provider: Ruben Cruz MD    Reason for follow-up:  Atrial flutter  Chest discomfort     Patient Care Team:  Christopher Jordan MD as PCP - General (Sports Medicine)  Donaldo Archer MD as Consulting Physician (Cardiology)    Subjective .      ROS    Since I have last seen her yesterday, the patient has been without any chest discomfort ,shortness of breath, palpitations, dizziness or syncope.  Denies having any headache ,abdominal pain ,nausea, vomiting , diarrhea constipation, loss of weight or loss of appetite.  Denies having any excessive bruising ,hematuria or blood in the stool.    Review of all systems negative except as indicated    History  Past Medical History:   Diagnosis Date   • COPD (chronic obstructive pulmonary disease) (CMS/[facility])    • Depression    • Diabetes mellitus (CMS/[facility])    • Hyperlipidemia    • Hypertension        Past Surgical History:   Procedure Laterality Date   • CARDIAC CATHETERIZATION Left 10/30/2020    Procedure: Left Heart Cath with Coronary Angiography;  Surgeon: Donaldo Archer MD;  Location: [facility] CATH INVASIVE LOCATION;  Service: Cardiovascular;  Laterality: Left;   • CARPAL TUNNEL RELEASE Bilateral 2017   • CHOLECYSTECTOMY  1980   • HYSTERECTOMY  1980       Family History   Problem Relation Age of Onset   • Heart disease Mother    • Hypertension Mother    • Diabetes Mother    • Stroke Mother        Social History     Tobacco Use   • Smoking status: Every Day     Packs/day: 1.00     Years: 20.00     Pack years: 20.00     Types: Cigarettes   • Smokeless tobacco: Never   Substance Use Topics   • Alcohol use: No        Medications Prior to Admission   Medication Sig Dispense Refill Last Dose   • albuterol sulfate  (90 Base) MCG/ACT inhaler Inhale 2 puffs Every 4 (Four) Hours As Needed for Wheezing or Shortness of  Air.   Past Month   • amitriptyline (ELAVIL) 75 MG tablet Take 75 mg by mouth Every Night.  0 10/13/2022 at 2100   • atenolol (TENORMIN) 25 MG tablet Take 1 tablet by mouth Daily.  0 Past Month   • Brexpiprazole (Rexulti) 2 MG tablet Take 2 mg by mouth Every Night.   10/13/2022 at 2100   • butalbital-acetaminophen  MG tablet tablet Take 1 tablet by mouth Daily As Needed.   Past Month   • cilostazol (PLETAL) 100 MG tablet Take 1 tablet by mouth 2 (Two) Times a Day.   10/14/2022 at 0900   • gabapentin (NEURONTIN) 300 MG capsule Take 1 capsule by mouth 3 (Three) Times a Day.   10/14/2022 at 0900   • Galcanezumab-gnlm 120 MG/ML solution prefilled syringe Inject 1 mL under the skin into the appropriate area as directed Every 30 (Thirty) Days.   Past Month   • glipiZIDE (GLUCOTROL) 10 MG tablet Take 10 mg by mouth 2 (Two) Times a Day.  0 10/14/2022 at 0900   • ibuprofen (ADVIL,MOTRIN) 800 MG tablet Take 800 mg by mouth 3 (Three) Times a Day.   10/14/2022 at 0900   • isosorbide mononitrate (IMDUR) 60 MG 24 hr tablet TAKE 1 TABLET BY MOUTH EVERY DAY IN THE MORNING 30 tablet 0 10/14/2022 at 0900   • latanoprost (XALATAN) 0.005 % ophthalmic solution Administer 1 drop to both eyes Every Night.   10/13/2022 at 2100   • metFORMIN (GLUCOPHAGE) 1000 MG tablet Take 1,000 mg by mouth 2 (Two) Times a Day With Meals.  0 10/14/2022 at 0900   • sertraline (ZOLOFT) 100 MG tablet Take 1 tablet by mouth Daily.  0 10/14/2022 at 0900   • tiZANidine (ZANAFLEX) 4 MG tablet Take 1 tablet by mouth Every Morning.   10/14/2022 at 0900   • tiZANidine (ZANAFLEX) 4 MG tablet Take 8 mg by mouth Every Evening.   10/13/2022 at 2100   • zolpidem (AMBIEN) 5 MG tablet Take 1 tablet by mouth At Night As Needed for Sleep.   10/13/2022 at 2100   • insulin detemir (LEVEMIR) 100 UNIT/ML injection Inject 60 Units under the skin into the appropriate area as directed Every Night. (Patient not taking: Reported on 10/14/2022)   Not Taking   • insulin glulisine  (Apidra) 100 UNIT/ML injection Inject 10 Units under the skin into the appropriate area as directed 3 (Three) Times a Day Before Meals. (Patient not taking: Reported on 10/14/2022)   Not Taking   • lisinopril (PRINIVIL,ZESTRIL) 20 MG tablet Take 20 mg by mouth Daily. (Patient not taking: Reported on 10/14/2022)  0 Not Taking       Allergies  Codeine    Scheduled Meds:ARIPiprazole, 2 mg, Oral, Nightly  enoxaparin, 80 mg, Subcutaneous, Q12H  gabapentin, 300 mg, Oral, TID  insulin glargine, 37 Units, Subcutaneous, Nightly  insulin lispro, 0-14 Units, Subcutaneous, TID AC  latanoprost, 1 drop, Both Eyes, Nightly  metoprolol succinate XL, 25 mg, Oral, Q24H  sertraline, 100 mg, Oral, Daily  sodium chloride, 3 mL, Intravenous, Q12H      Continuous Infusions:dilTIAZem, 5-15 mg/hr, Last Rate: 7.5 mg/hr (10/15/22 1546)  Pharmacy to Dose enoxaparin (LOVENOX),       PRN Meds:.•  acetaminophen **OR** acetaminophen **OR** acetaminophen  •  senna-docusate sodium **AND** polyethylene glycol **AND** bisacodyl **AND** bisacodyl  •  Calcium Gluconate-NaCl **AND** calcium gluconate **AND** Calcium, Ionized  •  dextrose  •  dextrose  •  digoxin  •  glucagon (human recombinant)  •  magnesium sulfate **OR** magnesium sulfate **OR** magnesium sulfate  •  melatonin  •  nitroglycerin  •  ondansetron **OR** ondansetron  •  Pharmacy to Dose enoxaparin (LOVENOX)  •  potassium & sodium phosphates **OR** potassium & sodium phosphates  •  potassium chloride **OR** potassium chloride **OR** potassium chloride  •  [COMPLETED] Insert peripheral IV **AND** sodium chloride  •  [COMPLETED] Insert peripheral IV **AND** sodium chloride  •  sodium chloride    Objective     VITAL SIGNS  Vitals:    10/16/22 0119 10/16/22 0400 10/16/22 0625 10/16/22 0815   BP: 147/68 152/60 162/60    BP Location:       Patient Position:       Pulse: 83 80 106 120   Resp: 16      Temp: 98.4 °F (36.9 °C)      TempSrc: Oral      SpO2:       Weight:       Height:      "      Flowsheet Rows    Flowsheet Row First Filed Value   Admission Height 172.7 cm (68\") Documented at 10/14/2022 1522   Admission Weight 79.8 kg (176 lb) Documented at 10/14/2022 1522          Intake/Output Summary (Last 24 hours) at 10/16/2022 0844  Last data filed at 10/15/2022 1759  Gross per 24 hour   Intake 520 ml   Output --   Net 520 ml        TELEMETRY: Atrial flutter    Physical Exam:  The patient is alert, oriented and in no distress.  Vital signs as noted above.  Head and neck revealed no carotid bruits or jugular venous distention.  No thyromegaly or lymphadenopathy is present  Lungs clear.  No wheezing.  Breath sounds are normal bilaterally.  Heart normal first and second heart sounds.  No murmur. No precordial rub is present.  No gallop is present.  Abdomen soft and nontender.  No organomegaly is present.  Extremities with good peripheral pulses without any pedal edema.  Skin warm and dry.  Musculoskeletal system is grossly normal  CNS grossly normal      Results Review:   I reviewed the patient's new clinical results.  Lab Results (last 24 hours)     Procedure Component Value Units Date/Time    POC Glucose Once [[id number]]  (Abnormal) Collected: 10/16/22 0706    Specimen: Blood Updated: 10/16/22 0707     Glucose 282 mg/dL      Comment: Serial Number: [id number]:  [id number]       Basic Metabolic Panel [[id number]]  (Abnormal) Collected: 10/16/22 0555    Specimen: Blood Updated: 10/16/22 0644     Glucose 227 mg/dL      BUN 9 mg/dL      Creatinine 0.51 mg/dL      Sodium 135 mmol/L      Potassium 3.6 mmol/L      Comment: Slight hemolysis detected by analyzer. Results may be affected.        Chloride 102 mmol/L      CO2 24.0 mmol/L      Calcium 9.0 mg/dL      BUN/Creatinine Ratio 17.6     Anion Gap 9.0 mmol/L      eGFR 103.1 mL/min/1.73      Comment: National Kidney Foundation and American Society of Nephrology (ASN) Task Force recommended calculation based on the Chronic Kidney Disease " Epidemiology Collaboration (CKD-EPI) equation refit without adjustment for race.       Narrative:      GFR Normal >60  Chronic Kidney Disease <60  Kidney Failure <15      CBC & Differential [[id number]]  (Normal) Collected: 10/16/22 0555    Specimen: Blood Updated: 10/16/22 0630    Narrative:      The following orders were created for panel order CBC & Differential.  Procedure                               Abnormality         Status                     ---------                               -----------         ------                     CBC Auto Differential[[id number]]        Normal              Final result                 Please view results for these tests on the individual orders.    CBC Auto Differential [[id number]]  (Normal) Collected: 10/16/22 0555    Specimen: Blood Updated: 10/16/22 0630     WBC 5.80 10*3/mm3      RBC 4.47 10*6/mm3      Hemoglobin 13.7 g/dL      Hematocrit 40.8 %      MCV 91.3 fL      MCH 30.7 pg      MCHC 33.6 g/dL      RDW 12.8 %      RDW-SD 40.7 fl      MPV 10.4 fL      Platelets 140 10*3/mm3      Neutrophil % 56.3 %      Lymphocyte % 30.9 %      Monocyte % 8.5 %      Eosinophil % 3.7 %      Basophil % 0.6 %      Neutrophils, Absolute 3.20 10*3/mm3      Lymphocytes, Absolute 1.80 10*3/mm3      Monocytes, Absolute 0.50 10*3/mm3      Eosinophils, Absolute 0.20 10*3/mm3      Basophils, Absolute 0.00 10*3/mm3      nRBC 0.1 /100 WBC     POC Glucose Once [[id number]]  (Abnormal) Collected: 10/15/22 2028    Specimen: Blood Updated: 10/15/22 2031     Glucose 298 mg/dL      Comment: Serial Number: [id number]:  [id number]       POC Glucose Once [[id number]]  (Abnormal) Collected: 10/15/22 1648    Specimen: Blood Updated: 10/15/22 1649     Glucose 272 mg/dL      Comment: Serial Number: [id number]:  [id number]       Blood Culture - Blood, Arm, Left [[id number]]  (Normal) Collected: 10/14/22 1612    Specimen: Blood from Arm, Left Updated: 10/15/22 1631     Blood Culture No growth at 24 hours     Blood Culture - Blood, Arm, Right [[id number]]  (Normal) Collected: 10/14/22 1612    Specimen: Blood from Arm, Right Updated: 10/15/22 1631     Blood Culture No growth at 24 hours    POC Glucose Once [[id number]]  (Abnormal) Collected: 10/15/22 1104    Specimen: Blood Updated: 10/15/22 1105     Glucose 238 mg/dL      Comment: Serial Number: [id number]:  [id number]             Imaging Results (Last 24 Hours)     ** No results found for the last 24 hours. **      LAB RESULTS (LAST 7 DAYS)    CBC  Results from last 7 days   Lab Units 10/16/22  0555 10/15/22  0214 10/14/22  1558   WBC 10*3/mm3 5.80 8.20 7.80   RBC 10*6/mm3 4.47 4.17 4.15   HEMOGLOBIN g/dL 13.7 13.0 12.7   HEMATOCRIT % 40.8 37.3 37.9   MCV fL 91.3 89.3 91.2   PLATELETS 10*3/mm3 140 146 167       BMP  Results from last 7 days   Lab Units 10/16/22  0555 10/15/22  0214 10/14/22  1558   SODIUM mmol/L 135* 133* 133*   POTASSIUM mmol/L 3.6 4.2 4.0   CHLORIDE mmol/L 102 99 97*   CO2 mmol/L 24.0 22.0 24.0   BUN mg/dL 9 17 20   CREATININE mg/dL 0.51* 0.58 0.64   GLUCOSE mg/dL 227* 294* 400*   MAGNESIUM mg/dL  --  1.6 1.6       CMP   Results from last 7 days   Lab Units 10/16/22  0555 10/15/22  0214 10/14/22  1558   SODIUM mmol/L 135* 133* 133*   POTASSIUM mmol/L 3.6 4.2 4.0   CHLORIDE mmol/L 102 99 97*   CO2 mmol/L 24.0 22.0 24.0   BUN mg/dL 9 17 20   CREATININE mg/dL 0.51* 0.58 0.64   GLUCOSE mg/dL 227* 294* 400*   ALBUMIN g/dL  --   --  4.00   BILIRUBIN mg/dL  --   --  0.2   ALK PHOS U/L  --   --  116   AST (SGOT) U/L  --   --  21   ALT (SGPT) U/L  --   --  31   LIPASE U/L  --   --  33         BNP        TROPONIN  Results from last 7 days   Lab Units 10/15/22  0214 10/14/22  1558   CK TOTAL U/L  --  107   TROPONIN T ng/mL <0.010 <0.010       CoAg  Results from last 7 days   Lab Units 10/14/22  1626   INR  1.00   APTT seconds 24.8*       Creatinine Clearance  Estimated Creatinine Clearance: 120.4 mL/min (A) (by C-G formula based on SCr of 0.51 mg/dL  (L)).    ABG  Results from last 7 days   Lab Units 10/14/22  1618   PH, ARTERIAL pH units 7.422   PCO2, ARTERIAL mm Hg 32.5*   PO2 ART mm Hg 62.6*   O2 SATURATION ART % 92.4*   BASE EXCESS ART mmol/L -2.5*       Radiology  XR Chest 1 View    Result Date: 10/14/2022  IMPRESSION : Limited negative low lung volume portable chest[  Electronically Signed By-Mayur Allen On:10/14/2022 4:33 PM This report was finalized on [id number] by  Mayur Allen, .          EKG                                    I personally viewed and interpreted the patient's EKG/Telemetry data: Atrial flutter    ECHOCARDIOGRAM:    Results for orders placed during the hospital encounter of 10/14/22    Adult Transthoracic Echo Complete W/ Cont if Necessary Per Protocol    Interpretation Summary  •  Estimated left ventricular EF = 60% Left ventricular systolic function is normal.    Indication  Chest pain  Arrhythmia    Technically satisfactory study.  Mitral valve is structurally normal.  Tricuspid valve is structurally normal.  Aortic valve is structurally normal.  Pulmonic valve could not be well visualized.  No evidence for mitral tricuspid or aortic regurgitation is seen by Doppler study.  Left atrium is normal in size.  Right atrium is normal in size.  Left ventricle is normal in size and contractility with ejection fraction of 60%.  Right ventricle is normal in size.  Atrial septum is intact.  Aorta is normal.  Small pericardial effusion.    Impression  Structurally and functionally normal cardiac valves.  Left ventricular size and contractility is normal with ejection fraction of 60%.  Small pericardial effusion.          STRESS MYOVIEW:    Cardiolite (Tc-99m Sestamibi) stress test    CARDIAC CATHETERIZATION:            OTHER:         Assessment & Plan     Principal Problem:    Other chest pain  Active Problems:    Atrial fibrillation (HCC)    Type 2 diabetes mellitus with hyperglycemia (HCC)    Anxiety associated with  depression      ]]]]]]]]]]]]]]]]]]]  Impression  =========  - Chest discomfort and palpitations.     - Atrial flutter with rapid ventricular response.     Cardiac cath 10/30/2020 revealed  Left ventricle size and contractility is normal with ejection fraction of 60%.     Left main coronary artery is normal.  Left anterior descending artery is normal.  Circumflex coronary artery is normal.  Right coronary artery has mid segment 50% disease      - Diabetes hypertension COPD dyslipidemia     -Status post hysterectomy cholecystectomy and carpal tunnel surgery.     -Family history is positive for coronary artery disease.     -Smoker- abstinence from smoking was advised.     - No known allergies  ============  Plan  ========   Chest discomfort and palpitations.  Rule out progression of coronary artery disease      Atrial flutter with rapid ventricular response.  Intravenous Cardizem.  Rate is better controlled.  Patient still in atrial flutter.  Received IV digoxin last night.  Continue metoprolol.  Hopefully patient will convert back to sinus rhythm.     Close cardiac monitoring.    Stress Cardiolite test 10/15/2022  Lexiscan Cardiolite test is negative for myocardial ischemia.  Gated SPECT images revealed normal left ventricular size and contractility with ejection fraction of 62%.    Echocardiogram 10/15/2022  Structurally and functionally normal cardiac valves.  Left ventricular size and contractility is normal with ejection fraction of 60%.  Small pericardial effusion.    Anticoagulation  Patient is on Lovenox     Medications were reviewed and updated.    Abstinence from smoking.    Consider MONTY cardioversion if patient does not convert to sinus rhythm.    Current medications include subcu Lovenox gabapentin insulin metoprolol XL 25 mg a day intravenous Cardizem.    Further plan will depend on patient's progress.  ]]]]]]]]]]]]]]              Donaldo Archer MD  10/16/22  08:44 EDT                Electronically  signed by Donaldo Archer MD at 10/16/22 1524       Consult Notes (last 48 hours)  Notes from 10/15/22 1001 through 10/17/22 1001   No notes of this type exist for this encounter.        show

## 2022-10-17 NOTE — CASE MANAGEMENT/SOCIAL WORK
Discharge Planning Assessment   [facility]     Patient Name: Arianna Blanco  MRN: [id number]  Today's Date: 10/17/2022    Admit Date: 10/14/2022    Plan: DC Plan: Anticipate routine home, declined needs at this time.   Discharge Needs Assessment     Row Name 10/17/22 1511       Living Environment    People in Home alone    Current Living Arrangements apartment    Primary Care Provided by self    Provides Primary Care For no one    Family Caregiver if Needed child(dilia), adult    Family Caregiver Names Son- Drake    Quality of Family Relationships unable to assess    Able to Return to Prior Arrangements yes       Resource/Environmental Concerns    Resource/Environmental Concerns none    Transportation Concerns none       Transition Planning    Patient/Family Anticipates Transition to home    Patient/Family Anticipated Services at Transition none    Transportation Anticipated family or friend will provide;health plan transportation       Discharge Needs Assessment    Readmission Within the Last 30 Days no previous admission in last 30 days    Equipment Currently Used at Home none    Concerns to be Addressed denies needs/concerns at this time;no discharge needs identified    Anticipated Changes Related to Illness none    Equipment Needed After Discharge none    Provided Post Acute Provider List? N/A               Discharge Plan     Row Name 10/17/22 1512       Plan    Plan DC Plan: Anticipate routine home, declined needs at this time.    Patient/Family in Agreement with Plan yes    Plan Comments CM met with patient at bedside to discuss dc planning. PCP and pharmacy confirmed, reported no trouble affording medications, and declined needs at this time for any DME/HH/PT services. She reported she does not drive but she does use transportation through Medicaid. DC Barriers: Cardiology following for recommendations for atrial flutter.           Expected Discharge Date and Time     Expected Discharge Date Expected Discharge  Time    Oct 19, 2022          Demographic Summary     Row Name 10/17/22 1511       General Information    Admission Type inpatient    Referral Source admission list    Reason for Consult discharge planning    Preferred Language English       Contact Information    Permission Granted to Share Info With                Functional Status     Row Name 10/17/22 1511       Functional Status    Usual Activity Tolerance good    Current Activity Tolerance moderate       Functional Status, IADL    Medications independent    Meal Preparation independent    Housekeeping independent    Laundry independent    Shopping assistive person              Met with patient in room wearing PPE: mask.      Maintained distance greater than six feet and spent less than 15 minutes in the room.      Toshia Iglesias RN     Office Phone: [phone]  Office Cell: [phone]

## 2022-10-17 NOTE — CONSULTS
Diabetes Education    Patient Name:  Arianna Blanco  YOB: 1955  MRN: [id number]  Admit Date:  10/14/2022        MD consult for A1c >7.0%, admission blood sugar 376, and on 10/14/2022 A1c was 12.3%. A1c info sheet given with discussion on A1c target and healthy blood sugar range. Asked patient if she had a glucometer and patient said no and she has no interest in checking her blood sugar. At home patient stated she takes Ozempic weekly, Metformin 1000 mg BID, Glipizide 10 mg BID, and is supposed to take insulin but has chosen not to take it. Patient stated she knows what she should do and chooses not to do it. Patient stated she would start taking her insulin when she got home. Discussed with patient that insulin has expiration dates and to check expiration dates prior to giving. Patient stated she doesn't need any education and asked educator to leave. Educator thanked patient for time and left room.      Electronically signed by:  Annabelle Davies RN  10/17/22 08:57 EDT

## 2022-10-17 NOTE — PROGRESS NOTES
[facility] Daily Progress Note    Patient Name: Arianna Blanco  : 1955  MRN: [id number]  Primary Care Physician:  Christopher Jordan MD  Date of admission: 10/14/2022      Subjective      Chief Complaint: Chest pain/palpitation    Patient Reports  10/15/2022  Patient seen post stress test  Related having generalized body weakness  Denies any palpitation nor chest pain    10/16/2022  Patient seen and examined  Complain of generalized body weakness  On Cardizem drip  Heart rates is regulated but heart rhythm still irregular    10/17/2022  Pt converted to NSR.  She is on Amiodarone IV.  Pt is feeling better.  Unsure if pt will continue Amiodarone and be switched to PO meds.  HR and BP are stable.  Pt denies chest pain, palpitations, SHAIKH, SOB or nausea.  Pt is on Lovenox BID, and will need to be switched to oral anti coagulation.      Review of Systems   Constitutional: Negative for chills and fever.   HENT: Negative for congestion.    Eyes: Negative for blurred vision.   Cardiovascular: Negative for chest pain and palpitations.   Respiratory: Negative for shortness of breath.    Endocrine: Negative for cold intolerance and heat intolerance.   Gastrointestinal: Negative for abdominal pain, nausea and vomiting.   Genitourinary: Negative for flank pain.   Neurological: Positive for weakness.   Psychiatric/Behavioral: Negative for altered mental status.        Objective      Vitals:   Temp:  [97.8 °F (36.6 °C)-98.8 °F (37.1 °C)] 98 °F (36.7 °C)  Heart Rate:  [] 93  Resp:  [16-20] 19  BP: (121-180)/() 130/46    Physical Exam  Vitals reviewed.   Constitutional:       General: She is not in acute distress.  HENT:      Head: Normocephalic.      Nose: Nose normal.      Mouth/Throat:      Mouth: Mucous membranes are moist.   Eyes:      Extraocular Movements: Extraocular movements intact.      Conjunctiva/sclera: Conjunctivae normal.      Pupils: Pupils are  equal, round, and reactive to light.   Cardiovascular:      Rate and Rhythm: Normal rate and regular rhythm.      Pulses: Normal pulses.      Heart sounds: Normal heart sounds.   Pulmonary:      Effort: No respiratory distress.      Breath sounds: Normal breath sounds.   Abdominal:      General: Bowel sounds are normal. There is no distension.      Palpations: Abdomen is soft.      Tenderness: There is no abdominal tenderness.   Musculoskeletal:         General: Normal range of motion.      Cervical back: Neck supple.   Skin:     General: Skin is warm and dry.   Neurological:      Mental Status: She is alert and oriented to person, place, and time.   Psychiatric:         Mood and Affect: Mood normal.       Result Review    Result Review:  I have personally reviewed the results from the time of this admission to 10/17/2022 14:54 EDT and agree with these findings:  [x]  Laboratory  [x]  Microbiology  [x]  Radiology  [x]  EKG/Telemetry   [x]  Cardiology/Vascular   []  Pathology  []  Old records  []  Other:  Most notable findings include:       Assessment & Plan      Brief Patient Summary:  Patient is a 67-year-old female with multiple comorbidities including diabetes mellitus type 2, hypertension, COPD, depression, obstructive sleep apnea and hyperlipidemia.     Patient presented to [facility] ED on 10/14/2022 with complaints of chest pain.  Stated chest pain is pressure-like, left-sided, nonradiating with no aggravating or relieving factor.  Has associated generalized body weakness, shortness of breath but no nausea or vomiting.     Has had similar presentation about a week ago and had gone to outlying ED where plans was being made to transfer her to [facility] for further evaluation and management however patient left AMA.     In the ED patient was noted to be in atrial flutter, she was given Cardizem bolus and started on Cardizem drip.  Also given therapeutic dose Lovenox.  Cardiologist was  consulted from the ED.  Patient was admitted for further care    Pt was on Cardizem gtt and was converted to NSR.  She was on Amiodarone gtt and Lovenox BID.  Might need to be switched to oral tabs, prior to discharge.  Waiting on recommendation from Cardiology.       ARIPiprazole, 2 mg, Oral, Nightly  dilTIAZem CD, 240 mg, Oral, Q24H  enoxaparin, 80 mg, Subcutaneous, Q12H  gabapentin, 300 mg, Oral, TID  insulin glargine, 40 Units, Subcutaneous, Nightly  insulin lispro, 0-14 Units, Subcutaneous, TID AC  latanoprost, 1 drop, Both Eyes, Nightly  metoprolol succinate XL, 25 mg, Oral, Q24H  sertraline, 100 mg, Oral, Daily  sodium chloride, 3 mL, Intravenous, Q12H       amiodarone, 1 mg/min, Last Rate: Stopped (10/17/22 1000)   Followed by  amiodarone, 0.5 mg/min  Pharmacy to Dose enoxaparin (LOVENOX),          Active Hospital Problems:  Active Hospital Problems    Diagnosis    • **Other chest pain    • Atrial fibrillation (HCC)    • Type 2 diabetes mellitus with hyperglycemia (HCC)    • Anxiety associated with depression      Plan:     Atrial flutter with rapid ventricular response  Electrolytes including magnesium and potassium okay  TSH okay  Was given Cardizem bolus and started on Cardizem drip  Pt on Lovenox BID, therapeutic dose.  Was on Cardizem gtt and switched to Amio gtt  Started on metoprolol 25 mg daily  Meds and recommendation by Cardiology.     Chest pain  History of CAD  Cardiac catheterization 10/30/2020-right coronary artery with 50% mid segment stenosis.  Medical management advised at that time and patient started on aspirin and Imdur  Patient is noncompliant to medications  serial troponin insignificant  Cardiologist following  Lexiscan Cardiolite test 10/15/2022-is negative for myocardial ischemia  2D echo normal LV size and contractility with EF of 62%    Diabetes mellitus with hyperglycemia  Blood glucose on presentation 400 however patient was not in DKA  A1c 12.3  on Lantus, Premeal insulin and  sliding scale insulin  Blood sugar not controlled-increase Lantus to 40 units at bedtime  Monitor blood sugar and adjust insulin as needed  Consult diabetic educator to see     Depression-on Zoloft     Hypertension  On Cardizem drip and metoprolol       COPD-not in exacerbation  Active smoker  Respiratory care with bronchodilators  Oxygen therapy and titration       Tobacco dependence-counseled on smoking cessation      DVT prophylaxis:  Medical DVT prophylaxis orders are present.    CODE STATUS:    Level Of Support Discussed With: Patient  Code Status (Patient has no pulse and is not breathing): No CPR (Do Not Attempt to Resuscitate)  Medical Interventions (Patient has pulse or is breathing): Full Support      Disposition: Pending clinical progress      Electronically signed by Olimpia Valdez DO, 10/17/22, 14:54 EDT.  [facility] Hospitalist Team

## 2022-10-17 NOTE — PROGRESS NOTES
Referring Provider: Ruben Cruz MD    Reason for follow-up:  Atrial flutter  Chest discomfort     Patient Care Team:  Christopher Jordan MD as PCP - General (Sports Medicine)  Donaldo Archer MD as Consulting Physician (Cardiology)    Subjective .  Feeling okay.     ROS    Since I have last seen her yesterday, the patient has been without any chest discomfort ,shortness of breath, palpitations, dizziness or syncope.  Denies having any headache ,abdominal pain ,nausea, vomiting , diarrhea constipation, loss of weight or loss of appetite.  Denies having any excessive bruising ,hematuria or blood in the stool.    Review of all systems negative except as indicated    History  Past Medical History:   Diagnosis Date   • COPD (chronic obstructive pulmonary disease) (CMS/Formerly [facility])    • Depression    • Diabetes mellitus (CMS/Formerly [facility])    • Hyperlipidemia    • Hypertension        Past Surgical History:   Procedure Laterality Date   • CARDIAC CATHETERIZATION Left 10/30/2020    Procedure: Left Heart Cath with Coronary Angiography;  Surgeon: Donaldo Archer MD;  Location: [facility] CATH INVASIVE LOCATION;  Service: Cardiovascular;  Laterality: Left;   • CARPAL TUNNEL RELEASE Bilateral 2017   • CHOLECYSTECTOMY  1980   • HYSTERECTOMY  1980       Family History   Problem Relation Age of Onset   • Heart disease Mother    • Hypertension Mother    • Diabetes Mother    • Stroke Mother        Social History     Tobacco Use   • Smoking status: Every Day     Packs/day: 1.00     Years: 20.00     Pack years: 20.00     Types: Cigarettes   • Smokeless tobacco: Never   Substance Use Topics   • Alcohol use: No        Medications Prior to Admission   Medication Sig Dispense Refill Last Dose   • albuterol sulfate  (90 Base) MCG/ACT inhaler Inhale 2 puffs Every 4 (Four) Hours As Needed for Wheezing or Shortness of Air.   Past Month   • amitriptyline (ELAVIL) 75 MG tablet Take 75 mg by mouth Every Night.  0 10/13/2022 at 2100   • atenolol  (TENORMIN) 25 MG tablet Take 1 tablet by mouth Daily.  0 Past Month   • Brexpiprazole (Rexulti) 2 MG tablet Take 2 mg by mouth Every Night.   10/13/2022 at 2100   • butalbital-acetaminophen  MG tablet tablet Take 1 tablet by mouth Daily As Needed.   Past Month   • cilostazol (PLETAL) 100 MG tablet Take 1 tablet by mouth 2 (Two) Times a Day.   10/14/2022 at 0900   • gabapentin (NEURONTIN) 300 MG capsule Take 1 capsule by mouth 3 (Three) Times a Day.   10/14/2022 at 0900   • Galcanezumab-gnlm 120 MG/ML solution prefilled syringe Inject 1 mL under the skin into the appropriate area as directed Every 30 (Thirty) Days.   Past Month   • glipiZIDE (GLUCOTROL) 10 MG tablet Take 10 mg by mouth 2 (Two) Times a Day.  0 10/14/2022 at 0900   • ibuprofen (ADVIL,MOTRIN) 800 MG tablet Take 800 mg by mouth 3 (Three) Times a Day.   10/14/2022 at 0900   • isosorbide mononitrate (IMDUR) 60 MG 24 hr tablet TAKE 1 TABLET BY MOUTH EVERY DAY IN THE MORNING 30 tablet 0 10/14/2022 at 0900   • latanoprost (XALATAN) 0.005 % ophthalmic solution Administer 1 drop to both eyes Every Night.   10/13/2022 at 2100   • metFORMIN (GLUCOPHAGE) 1000 MG tablet Take 1,000 mg by mouth 2 (Two) Times a Day With Meals.  0 10/14/2022 at 0900   • sertraline (ZOLOFT) 100 MG tablet Take 1 tablet by mouth Daily.  0 10/14/2022 at 0900   • tiZANidine (ZANAFLEX) 4 MG tablet Take 1 tablet by mouth Every Morning.   10/14/2022 at 0900   • tiZANidine (ZANAFLEX) 4 MG tablet Take 8 mg by mouth Every Evening.   10/13/2022 at 2100   • zolpidem (AMBIEN) 5 MG tablet Take 1 tablet by mouth At Night As Needed for Sleep.   10/13/2022 at 2100   • insulin detemir (LEVEMIR) 100 UNIT/ML injection Inject 60 Units under the skin into the appropriate area as directed Every Night. (Patient not taking: Reported on 10/14/2022)   Not Taking   • insulin glulisine (Apidra) 100 UNIT/ML injection Inject 10 Units under the skin into the appropriate area as directed 3 (Three) Times a Day  "Before Meals. (Patient not taking: Reported on 10/14/2022)   Not Taking   • lisinopril (PRINIVIL,ZESTRIL) 20 MG tablet Take 20 mg by mouth Daily. (Patient not taking: Reported on 10/14/2022)  0 Not Taking       Allergies  Codeine    Scheduled Meds:ARIPiprazole, 2 mg, Oral, Nightly  enoxaparin, 80 mg, Subcutaneous, Q12H  gabapentin, 300 mg, Oral, TID  insulin glargine, 40 Units, Subcutaneous, Nightly  insulin lispro, 0-14 Units, Subcutaneous, TID AC  latanoprost, 1 drop, Both Eyes, Nightly  metoprolol succinate XL, 25 mg, Oral, Q24H  sertraline, 100 mg, Oral, Daily  sodium chloride, 3 mL, Intravenous, Q12H      Continuous Infusions:dilTIAZem, 5-15 mg/hr, Last Rate: 5 mg/hr (10/16/22 1916)  Pharmacy to Dose enoxaparin (LOVENOX),       PRN Meds:.•  acetaminophen **OR** acetaminophen **OR** acetaminophen  •  senna-docusate sodium **AND** polyethylene glycol **AND** bisacodyl **AND** bisacodyl  •  Calcium Gluconate-NaCl **AND** calcium gluconate **AND** Calcium, Ionized  •  dextrose  •  dextrose  •  digoxin  •  glucagon (human recombinant)  •  magnesium sulfate **OR** magnesium sulfate **OR** magnesium sulfate  •  melatonin  •  nitroglycerin  •  ondansetron **OR** ondansetron  •  Pharmacy to Dose enoxaparin (LOVENOX)  •  potassium & sodium phosphates **OR** potassium & sodium phosphates  •  potassium chloride **OR** potassium chloride **OR** potassium chloride  •  [COMPLETED] Insert peripheral IV **AND** sodium chloride  •  [COMPLETED] Insert peripheral IV **AND** sodium chloride  •  sodium chloride    Objective     VITAL SIGNS  Vitals:    10/17/22 0245 10/17/22 0345 10/17/22 0445 10/17/22 0557   BP: 138/73 153/83 154/72 169/74   BP Location:    Left arm   Patient Position:    Lying   Pulse: 81 81 83 81   Resp:    20   Temp:    97.8 °F (36.6 °C)   TempSrc:    Oral   SpO2:    98%   Weight:       Height:           Flowsheet Rows    Flowsheet Row First Filed Value   Admission Height 172.7 cm (68\") Documented at 10/14/2022 " 1522   Admission Weight 79.8 kg (176 lb) Documented at 10/14/2022 1522            Intake/Output Summary (Last 24 hours) at 10/17/2022 0657  Last data filed at 10/17/2022 0600  Gross per 24 hour   Intake 1680 ml   Output --   Net 1680 ml        TELEMETRY: Atrial flutter    Physical Exam:  The patient is alert, oriented and in no distress.  Vital signs as noted above.  Head and neck revealed no carotid bruits or jugular venous distention.  No thyromegaly or lymphadenopathy is present  Lungs clear.  No wheezing.  Breath sounds are normal bilaterally.  Heart normal first and second heart sounds.  No murmur. No precordial rub is present.  No gallop is present.  Abdomen soft and nontender.  No organomegaly is present.  Extremities with good peripheral pulses without any pedal edema.  Skin warm and dry.  Musculoskeletal system is grossly normal  CNS grossly normal      Results Review:   I reviewed the patient's new clinical results.  Lab Results (last 24 hours)     Procedure Component Value Units Date/Time    Basic Metabolic Panel [[id number]]  (Abnormal) Collected: 10/17/22 0430    Specimen: Blood Updated: 10/17/22 0522     Glucose 165 mg/dL      BUN 10 mg/dL      Creatinine 0.51 mg/dL      Sodium 137 mmol/L      Potassium 3.4 mmol/L      Chloride 102 mmol/L      CO2 24.0 mmol/L      Calcium 9.1 mg/dL      BUN/Creatinine Ratio 19.6     Anion Gap 11.0 mmol/L      eGFR 103.1 mL/min/1.73      Comment: National Kidney Foundation and American Society of Nephrology (ASN) Task Force recommended calculation based on the Chronic Kidney Disease Epidemiology Collaboration (CKD-EPI) equation refit without adjustment for race.       Narrative:      GFR Normal >60  Chronic Kidney Disease <60  Kidney Failure <15      CBC & Differential [[id number]]  (Normal) Collected: 10/17/22 0430    Specimen: Blood Updated: 10/17/22 0456    Narrative:      The following orders were created for panel order CBC & Differential.  Procedure                                Abnormality         Status                     ---------                               -----------         ------                     CBC Auto Differential[[id number]]        Normal              Final result                 Please view results for these tests on the individual orders.    CBC Auto Differential [[id number]]  (Normal) Collected: 10/17/22 0430    Specimen: Blood Updated: 10/17/22 0456     WBC 5.90 10*3/mm3      RBC 4.40 10*6/mm3      Hemoglobin 14.2 g/dL      Hematocrit 40.4 %      MCV 91.8 fL      MCH 32.4 pg      MCHC 35.2 g/dL      RDW 12.8 %      RDW-SD 40.7 fl      MPV 10.0 fL      Platelets 146 10*3/mm3      Neutrophil % 50.6 %      Lymphocyte % 36.2 %      Monocyte % 9.4 %      Eosinophil % 3.2 %      Basophil % 0.6 %      Neutrophils, Absolute 3.00 10*3/mm3      Lymphocytes, Absolute 2.10 10*3/mm3      Monocytes, Absolute 0.60 10*3/mm3      Eosinophils, Absolute 0.20 10*3/mm3      Basophils, Absolute 0.00 10*3/mm3      nRBC 0.1 /100 WBC     POC Glucose Once [[id number]]  (Abnormal) Collected: 10/16/22 2018    Specimen: Blood Updated: 10/16/22 2020     Glucose 279 mg/dL      Comment: Serial Number: [id number]:  [id number]       Blood Culture - Blood, Arm, Left [[id number]]  (Normal) Collected: 10/14/22 1612    Specimen: Blood from Arm, Left Updated: 10/16/22 1631     Blood Culture No growth at 2 days    Blood Culture - Blood, Arm, Right [[id number]]  (Normal) Collected: 10/14/22 1612    Specimen: Blood from Arm, Right Updated: 10/16/22 1631     Blood Culture No growth at 2 days    POC Glucose Once [[id number]]  (Abnormal) Collected: 10/16/22 1605    Specimen: Blood Updated: 10/16/22 1606     Glucose 180 mg/dL      Comment: Serial Number: [id number]:  [id number]       POC Glucose Once [[id number]]  (Abnormal) Collected: 10/16/22 1118    Specimen: Blood Updated: 10/16/22 1119     Glucose 241 mg/dL      Comment: Serial Number: [id number]:  [id number]       POC Glucose Once  [[id number]]  (Abnormal) Collected: 10/16/22 0706    Specimen: Blood Updated: 10/16/22 0707     Glucose 282 mg/dL      Comment: Serial Number: [id number]:  [id number]             Imaging Results (Last 24 Hours)     ** No results found for the last 24 hours. **      LAB RESULTS (LAST 7 DAYS)    CBC  Results from last 7 days   Lab Units 10/17/22  0430 10/16/22  0555 10/15/22  0214 10/14/22  1558   WBC 10*3/mm3 5.90 5.80 8.20 7.80   RBC 10*6/mm3 4.40 4.47 4.17 4.15   HEMOGLOBIN g/dL 14.2 13.7 13.0 12.7   HEMATOCRIT % 40.4 40.8 37.3 37.9   MCV fL 91.8 91.3 89.3 91.2   PLATELETS 10*3/mm3 146 140 146 167       BMP  Results from last 7 days   Lab Units 10/17/22  0430 10/16/22  0555 10/15/22  0214 10/14/22  1558   SODIUM mmol/L 137 135* 133* 133*   POTASSIUM mmol/L 3.4* 3.6 4.2 4.0   CHLORIDE mmol/L 102 102 99 97*   CO2 mmol/L 24.0 24.0 22.0 24.0   BUN mg/dL 10 9 17 20   CREATININE mg/dL 0.51* 0.51* 0.58 0.64   GLUCOSE mg/dL 165* 227* 294* 400*   MAGNESIUM mg/dL  --   --  1.6 1.6       CMP   Results from last 7 days   Lab Units 10/17/22  0430 10/16/22  0555 10/15/22  0214 10/14/22  1558   SODIUM mmol/L 137 135* 133* 133*   POTASSIUM mmol/L 3.4* 3.6 4.2 4.0   CHLORIDE mmol/L 102 102 99 97*   CO2 mmol/L 24.0 24.0 22.0 24.0   BUN mg/dL 10 9 17 20   CREATININE mg/dL 0.51* 0.51* 0.58 0.64   GLUCOSE mg/dL 165* 227* 294* 400*   ALBUMIN g/dL  --   --   --  4.00   BILIRUBIN mg/dL  --   --   --  0.2   ALK PHOS U/L  --   --   --  116   AST (SGOT) U/L  --   --   --  21   ALT (SGPT) U/L  --   --   --  31   LIPASE U/L  --   --   --  33         BNP        TROPONIN  Results from last 7 days   Lab Units 10/15/22  0214 10/14/22  1558   CK TOTAL U/L  --  107   TROPONIN T ng/mL <0.010 <0.010       CoAg  Results from last 7 days   Lab Units 10/14/22  1626   INR  1.00   APTT seconds 24.8*       Creatinine Clearance  Estimated Creatinine Clearance: 120.4 mL/min (A) (by C-G formula based on SCr of 0.51 mg/dL (L)).    ABG  Results from last 7  days   Lab Units 10/14/22  1618   PH, ARTERIAL pH units 7.422   PCO2, ARTERIAL mm Hg 32.5*   PO2 ART mm Hg 62.6*   O2 SATURATION ART % 92.4*   BASE EXCESS ART mmol/L -2.5*       Radiology  No radiology results for the last day        EKG                                          I personally viewed and interpreted the patient's EKG/Telemetry data: Atrial flutter and latest EKG showed sinus rhythm with right bundle branch block.    ECHOCARDIOGRAM:    Results for orders placed during the hospital encounter of 10/14/22    Adult Transthoracic Echo Complete W/ Cont if Necessary Per Protocol    Interpretation Summary  •  Estimated left ventricular EF = 60% Left ventricular systolic function is normal.    Indication  Chest pain  Arrhythmia    Technically satisfactory study.  Mitral valve is structurally normal.  Tricuspid valve is structurally normal.  Aortic valve is structurally normal.  Pulmonic valve could not be well visualized.  No evidence for mitral tricuspid or aortic regurgitation is seen by Doppler study.  Left atrium is normal in size.  Right atrium is normal in size.  Left ventricle is normal in size and contractility with ejection fraction of 60%.  Right ventricle is normal in size.  Atrial septum is intact.  Aorta is normal.  Small pericardial effusion.    Impression  Structurally and functionally normal cardiac valves.  Left ventricular size and contractility is normal with ejection fraction of 60%.  Small pericardial effusion.          STRESS MYOVIEW:    Cardiolite (Tc-99m Sestamibi) stress test    CARDIAC CATHETERIZATION:            OTHER:         Assessment & Plan     Principal Problem:    Other chest pain  Active Problems:    Atrial fibrillation (HCC)    Type 2 diabetes mellitus with hyperglycemia (HCC)    Anxiety associated with depression      ]]]]]]]]]]]]]]]]]]]  Impression  =========  - Chest discomfort and palpitations.     - Atrial flutter with rapid ventricular response.     Cardiac cath  10/30/2020 revealed  Left ventricle size and contractility is normal with ejection fraction of 60%.     Left main coronary artery is normal.  Left anterior descending artery is normal.  Circumflex coronary artery is normal.  Right coronary artery has mid segment 50% disease      - Diabetes hypertension COPD dyslipidemia     -Status post hysterectomy cholecystectomy and carpal tunnel surgery.     -Family history is positive for coronary artery disease.     -Smoker- abstinence from smoking was advised.     - No known allergies  ============  Plan  ========   Chest discomfort and palpitations.  Rule out progression of coronary artery disease      Atrial flutter with rapid ventricular response.  Planned for change of IV Cardizem to p.o. Cardizem and start IV amiodarone.  Continue metoprolol.  MONTY cardioversion was planned if patient does not convert to sinus rhythm.    Patient has converted to sinus rhythm after I have seen her.  Will not start IV amiodarone.  Close cardiac monitoring.    Stress Cardiolite test 10/15/2022  Lexiscan Cardiolite test is negative for myocardial ischemia.  Gated SPECT images revealed normal left ventricular size and contractility with ejection fraction of 62%.    Echocardiogram 10/15/2022  Structurally and functionally normal cardiac valves.  Left ventricular size and contractility is normal with ejection fraction of 60%.  Small pericardial effusion.    Anticoagulation  Patient is on Lovenox     Medications were reviewed and updated.    Abstinence from smoking.    Current medications include subcu Lovenox gabapentin insulin metoprolol XL 25 mg a day intravenous Cardizem.    Further plan will depend on patient's progress.  ]]]]]]]]]]]]]]              Donaldo Archer MD  10/17/22  06:57 EDT

## 2022-10-17 NOTE — CONSULTS
"Nutrition Services    Patient Name: Arianna Blanco  YOB: 1955  MRN: [id number]  Admission date: 10/14/2022      PPE Documentation        PPE Worn By Provider mask and eye protection   PPE Worn By Patient  None      Nutrition Counseling & Education       Reason for Visit: Criteria noted by RDN during nutrition assessment (Hemoglobin A1c 12.3%)     Session topic: Diet rationale, Key food habit change, Consistent Carbohydrate Diet and Type 2 Diabetes     Session comments: Education offered and refused.  Patient stated \"done been through that with the other lady\".  Noted diabetes educator saw patient this AM and patient refused further education from her as well.  Noted with poor compliance with checking blood sugar and taking insulin per nursing notes.       Provided print material via: No materials provided related to refusal     Goals: Nutrition related lab values to trend towards desirable value     Monitoring/Follow Up: RD to follow up PRN    Patient to follow up PRN on outpatient basis       Electronically signed by:  Juany Hare RD  10/17/22 11:26 EDT    "

## 2022-10-17 NOTE — PLAN OF CARE
Goal Outcome Evaluation:              Outcome Evaluation: Dr. Archer switched cardizem gtt to 240mg po and started amio gtt. pt EKG this am showed NSR-Suzi aware and wants to hold IV amio for now-EKG scheduled for tonight. VSS on room air-no SOA-pt c/o mild headache-tylenol helped. pt requested nicotine patch-she smokes up to 2 packs a day. diabetes educator seen pt this am and pt told her that she did not need her help. pt told me she is going to start using her insulin at home. She has been resting on and off and up ad ame without difficulty. Will continue to monitor

## 2022-10-17 NOTE — NURSING NOTE
MD switching pt from IV cardizem to PO and starting Amio-EKG shows NSR-informed Dr. Archer and he wants to hold off on amio gtt for now and monitor.

## 2022-10-18 ENCOUNTER — READMISSION MANAGEMENT (OUTPATIENT)
Dept: CALL CENTER | Facility: HOSPITAL | Age: 67
End: 2022-10-18

## 2022-10-18 VITALS
WEIGHT: 159.83 LBS | HEART RATE: 87 BPM | DIASTOLIC BLOOD PRESSURE: 81 MMHG | RESPIRATION RATE: 17 BRPM | BODY MASS INDEX: 24.22 KG/M2 | SYSTOLIC BLOOD PRESSURE: 148 MMHG | HEIGHT: 68 IN | OXYGEN SATURATION: 99 % | TEMPERATURE: 98.1 F

## 2022-10-18 LAB
ANION GAP SERPL CALCULATED.3IONS-SCNC: 12 MMOL/L (ref 5–15)
BASOPHILS # BLD AUTO: 0.1 10*3/MM3 (ref 0–0.2)
BASOPHILS NFR BLD AUTO: 1 % (ref 0–1.5)
BUN SERPL-MCNC: 14 MG/DL (ref 8–23)
BUN/CREAT SERPL: 22.6 (ref 7–25)
CALCIUM SPEC-SCNC: 9 MG/DL (ref 8.6–10.5)
CHLORIDE SERPL-SCNC: 99 MMOL/L (ref 98–107)
CO2 SERPL-SCNC: 25 MMOL/L (ref 22–29)
CREAT SERPL-MCNC: 0.62 MG/DL (ref 0.57–1)
DEPRECATED RDW RBC AUTO: 41.6 FL (ref 37–54)
EGFRCR SERPLBLD CKD-EPI 2021: 98.4 ML/MIN/1.73
EOSINOPHIL # BLD AUTO: 0.2 10*3/MM3 (ref 0–0.4)
EOSINOPHIL NFR BLD AUTO: 3.9 % (ref 0.3–6.2)
ERYTHROCYTE [DISTWIDTH] IN BLOOD BY AUTOMATED COUNT: 13 % (ref 12.3–15.4)
GLUCOSE BLDC GLUCOMTR-MCNC: 214 MG/DL (ref 70–105)
GLUCOSE BLDC GLUCOMTR-MCNC: 273 MG/DL (ref 70–105)
GLUCOSE SERPL-MCNC: 230 MG/DL (ref 65–99)
HCT VFR BLD AUTO: 40 % (ref 34–46.6)
HGB BLD-MCNC: 13.5 G/DL (ref 12–15.9)
LYMPHOCYTES # BLD AUTO: 2.2 10*3/MM3 (ref 0.7–3.1)
LYMPHOCYTES NFR BLD AUTO: 37.9 % (ref 19.6–45.3)
MAGNESIUM SERPL-MCNC: 1.8 MG/DL (ref 1.6–2.4)
MCH RBC QN AUTO: 30.7 PG (ref 26.6–33)
MCHC RBC AUTO-ENTMCNC: 33.7 G/DL (ref 31.5–35.7)
MCV RBC AUTO: 91 FL (ref 79–97)
MONOCYTES # BLD AUTO: 0.7 10*3/MM3 (ref 0.1–0.9)
MONOCYTES NFR BLD AUTO: 12.4 % (ref 5–12)
NEUTROPHILS NFR BLD AUTO: 2.5 10*3/MM3 (ref 1.7–7)
NEUTROPHILS NFR BLD AUTO: 44.8 % (ref 42.7–76)
NRBC BLD AUTO-RTO: 0 /100 WBC (ref 0–0.2)
PLATELET # BLD AUTO: 146 10*3/MM3 (ref 140–450)
PMV BLD AUTO: 10.3 FL (ref 6–12)
POTASSIUM SERPL-SCNC: 3.9 MMOL/L (ref 3.5–5.2)
RBC # BLD AUTO: 4.39 10*6/MM3 (ref 3.77–5.28)
SODIUM SERPL-SCNC: 136 MMOL/L (ref 136–145)
WBC NRBC COR # BLD: 5.7 10*3/MM3 (ref 3.4–10.8)

## 2022-10-18 PROCEDURE — 83735 ASSAY OF MAGNESIUM: CPT | Performed by: INTERNAL MEDICINE

## 2022-10-18 PROCEDURE — 63710000001 INSULIN LISPRO (HUMAN) PER 5 UNITS: Performed by: PHYSICIAN ASSISTANT

## 2022-10-18 PROCEDURE — 25010000002 ENOXAPARIN PER 10 MG: Performed by: INTERNAL MEDICINE

## 2022-10-18 PROCEDURE — 93005 ELECTROCARDIOGRAM TRACING: CPT | Performed by: INTERNAL MEDICINE

## 2022-10-18 PROCEDURE — 82962 GLUCOSE BLOOD TEST: CPT

## 2022-10-18 PROCEDURE — 85025 COMPLETE CBC W/AUTO DIFF WBC: CPT | Performed by: INTERNAL MEDICINE

## 2022-10-18 PROCEDURE — 80048 BASIC METABOLIC PNL TOTAL CA: CPT | Performed by: INTERNAL MEDICINE

## 2022-10-18 PROCEDURE — 93010 ELECTROCARDIOGRAM REPORT: CPT | Performed by: INTERNAL MEDICINE

## 2022-10-18 PROCEDURE — 99233 SBSQ HOSP IP/OBS HIGH 50: CPT | Performed by: INTERNAL MEDICINE

## 2022-10-18 RX ORDER — NICOTINE 21 MG/24HR
1 PATCH, TRANSDERMAL 24 HOURS TRANSDERMAL
Qty: 14 PATCH | Refills: 0 | Status: SHIPPED | OUTPATIENT
Start: 2022-10-19 | End: 2022-11-02

## 2022-10-18 RX ORDER — DILTIAZEM HYDROCHLORIDE 240 MG/1
240 CAPSULE, COATED, EXTENDED RELEASE ORAL
Qty: 30 CAPSULE | Refills: 0 | Status: SHIPPED | OUTPATIENT
Start: 2022-10-19 | End: 2022-11-18

## 2022-10-18 RX ORDER — METOPROLOL SUCCINATE 25 MG/1
25 TABLET, EXTENDED RELEASE ORAL
Qty: 30 TABLET | Refills: 0 | Status: SHIPPED | OUTPATIENT
Start: 2022-10-19 | End: 2022-11-18

## 2022-10-18 RX ORDER — INSULIN GLULISINE 100 [IU]/ML
5 INJECTION, SOLUTION SUBCUTANEOUS
Refills: 12
Start: 2022-10-18

## 2022-10-18 RX ADMIN — SERTRALINE 100 MG: 100 TABLET, FILM COATED ORAL at 08:55

## 2022-10-18 RX ADMIN — ENOXAPARIN SODIUM 80 MG: 100 INJECTION SUBCUTANEOUS at 06:00

## 2022-10-18 RX ADMIN — Medication 3 ML: at 08:57

## 2022-10-18 RX ADMIN — NICOTINE 1 PATCH: 21 PATCH, EXTENDED RELEASE TRANSDERMAL at 08:57

## 2022-10-18 RX ADMIN — INSULIN LISPRO 8 UNITS: 100 INJECTION, SOLUTION INTRAVENOUS; SUBCUTANEOUS at 11:56

## 2022-10-18 RX ADMIN — GABAPENTIN 300 MG: 300 CAPSULE ORAL at 08:55

## 2022-10-18 RX ADMIN — METOPROLOL SUCCINATE 25 MG: 25 TABLET, EXTENDED RELEASE ORAL at 08:55

## 2022-10-18 RX ADMIN — INSULIN LISPRO 5 UNITS: 100 INJECTION, SOLUTION INTRAVENOUS; SUBCUTANEOUS at 08:56

## 2022-10-18 RX ADMIN — DILTIAZEM HYDROCHLORIDE 240 MG: 240 CAPSULE, COATED, EXTENDED RELEASE ORAL at 08:55

## 2022-10-18 NOTE — DISCHARGE SUMMARY
[facility]  DISCHARGE SUMMARY    Patient Name: Arianna Blanco  : 1955  MRN: [id number]    Date of Admission: 10/14/2022  Discharge Diagnosis: A. fib with RVR  Chest pain  Condition stable  Date of Discharge: 10/18/2022  Primary Care Physician: Christopher Jordan MD      Presenting Problem:   Hyperglycemia [R73.9]  Hx of medication noncompliance [Z91.14]  Atrial flutter, unspecified type (HCC) [I48.92]  Dyspnea, unspecified type [R06.00]  Chest pain, unspecified type [R07.9]    Active and Resolved Hospital Problems:  Active Hospital Problems    Diagnosis POA   • **Other chest pain [R07.89] Yes   • Atrial fibrillation (HCC) [I48.91] Yes   • Type 2 diabetes mellitus with hyperglycemia (HCC) [E11.65] Yes   • Anxiety associated with depression [F41.8] Yes      Resolved Hospital Problems   No resolved problems to display.         Hospital Course     Hospital Course:  Arianna Blanco is a 67 y.o. female     Patient is a 67-year-old female with multiple comorbidities including diabetes mellitus type 2, hypertension, COPD, depression, obstructive sleep apnea and hyperlipidemia.     Patient presented to [facility] ED on 10/14/2022 with complaints of chest pain.  Stated chest pain is pressure-like, left-sided, nonradiating with no aggravating or relieving factor.  Has associated generalized body weakness, shortness of breath but no nausea or vomiting.     Has had similar presentation about a week ago and had gone to outlying ED where plans was being made to transfer her to [facility] for further evaluation and management however patient left AMA.     In the ED patient was noted to be in atrial flutter, she was given Cardizem bolus and started on Cardizem drip.  Also given therapeutic dose Lovenox.  Cardiologist was consulted from the ED.  Patient was admitted for further care       Patient Reports  10/15/2022  Patient seen post stress  test  Related having generalized body weakness  Denies any palpitation nor chest pain     10/16/2022  Patient seen and examined  Complain of generalized body weakness  On Cardizem drip  Heart rates is regulated but heart rhythm still irregular     10/17/2022  Pt converted to NSR.  She is on Amiodarone IV.  Pt is feeling better.  Unsure if pt will continue Amiodarone and be switched to PO meds.  HR and BP are stable.  Pt denies chest pain, palpitations, SHAIKH, SOB or nausea.  Pt is on Lovenox BID, and will need to be switched to oral anti coagulation.       10/18  Seen and examined  Doing very well and cleared by cardiology for discharge  She is very eager to go home today    Assessment/plan    Atrial flutter with rapid ventricular response  Electrolytes including magnesium and potassium okay  TSH okay  Was given Cardizem bolus and started on Cardizem drip  Pt on Lovenox BID, therapeutic dose.  Was on Cardizem gtt and switched to Amio gtt.  Amiodarone drip discontinued  Heart rate controlled with Toprol and Cardizem CD  Meds and recommendation by Cardiology.  Started on Eliquis    Chest pain  History of CAD  Cardiac catheterization 10/30/2020-right coronary artery with 50% mid segment stenosis.  Medical management advised at that time and patient started on aspirin and Imdur  Patient is noncompliant to medications  serial troponin insignificant  Cardiologist following  Lexiscan Cardiolite test 10/15/2022-is negative for myocardial ischemia  2D echo normal LV size and contractility with EF of 62%     Diabetes mellitus with hyperglycemia  Blood glucose on presentation 400 however patient was not in DKA  A1c 12.3  on Lantus,-on Levemir at home  - May resume Premeal insulin with Apidra smaller dose  Premeal insulin and sliding scale insulin  May resume metformin at discharge and hold sulfonylureas.  Diabetes educator consulted    Depression-on home medications     Hypertension  On Cardizem drip and  metoprolol        COPD-not in exacerbation  Active smoker  Respiratory care with bronchodilators  Oxygen therapy and titration        Tobacco dependence-counseled on smoking cessation       DISCHARGE Follow Up Recommendations for labs and diagnostics: None follow-up with PCP soon as possible  Follow-up with cardiology 1 month  Take medication as prescribed  Monitor blood glucose and blood pressure at home        Reasons For Change In Medications and Indications for New Medications:      Day of Discharge     Vital Signs:  Temp:  [96.8 °F (36 °C)-98.4 °F (36.9 °C)] 98.1 °F (36.7 °C)  Heart Rate:  [79-88] 87  Resp:  [17-18] 17  BP: (142-165)/(63-86) 148/81    Physical Exam:  Physical Exam  Alert oriented x3 without distress no cardiopulmonary exam unremarkable    She seems to be in sinus rhythm with some PVCs  No focal neurologic deficit      Pertinent  and/or Most Recent Results     LAB RESULTS:      Lab 10/18/22  0414 10/17/22  0430 10/16/22  0555 10/15/22  0214 10/14/22  1626 10/14/22  1558 10/14/22  1556   WBC 5.70 5.90 5.80 8.20  --  7.80  --    HEMOGLOBIN 13.5 14.2 13.7 13.0  --  12.7  --    HEMATOCRIT 40.0 40.4 40.8 37.3  --  37.9  --    PLATELETS 146 146 140 146  --  167  --    NEUTROS ABS 2.50 3.00 3.20 4.90  --  4.60  --    LYMPHS ABS 2.20 2.10 1.80 2.30  --  2.30  --    MONOS ABS 0.70 0.60 0.50 0.60  --  0.50  --    EOS ABS 0.20 0.20 0.20 0.20  --  0.20  --    MCV 91.0 91.8 91.3 89.3  --  91.2  --    LACTATE  --   --   --   --   --   --  1.6   PROTIME  --   --   --   --  10.3  --   --    APTT  --   --   --   --  24.8*  --   --          Lab 10/18/22  0414 10/17/22  0430 10/16/22  0555 10/15/22  0214 10/14/22  1558   SODIUM 136 137 135* 133* 133*   POTASSIUM 3.9 3.4* 3.6 4.2 4.0   CHLORIDE 99 102 102 99 97*   CO2 25.0 24.0 24.0 22.0 24.0   ANION GAP 12.0 11.0 9.0 12.0 12.0   BUN 14 10 9 17 20   CREATININE 0.62 0.51* 0.51* 0.58 0.64   EGFR 98.4 103.1 103.1 99.9 97.6   GLUCOSE 230* 165* 227* 294* 400*   CALCIUM  9.0 9.1 9.0 8.7 9.0   MAGNESIUM 1.8  --   --  1.6 1.6   HEMOGLOBIN A1C  --   --   --   --  12.3*   TSH  --   --   --  3.020 1.370         Lab 10/14/22  1558   TOTAL PROTEIN 6.5   ALBUMIN 4.00   GLOBULIN 2.5   ALT (SGPT) 31   AST (SGOT) 21   BILIRUBIN 0.2   ALK PHOS 116   LIPASE 33         Lab 10/15/22  0214 10/14/22  1626 10/14/22  1558   PROBNP  --   --  391.9   TROPONIN T <0.010  --  <0.010   PROTIME  --  10.3  --    INR  --  1.00  --                  Lab 10/14/22  1618   PH, ARTERIAL 7.422   PCO2, ARTERIAL 32.5*   PO2 ART 62.6*   O2 SATURATION ART 92.4*   FIO2 21   HCO3 ART 21.2   BASE EXCESS ART -2.5*     Brief Urine Lab Results  (Last result in the past 365 days)      Color   Clarity   Blood   Leuk Est   Nitrite   Protein   CREAT   Urine HCG        10/14/22 1715 Yellow   Clear   Negative   Negative   Negative   Negative               Microbiology Results (last 10 days)     Procedure Component Value - Date/Time    Blood Culture - Blood, Arm, Left [[id number]]  (Normal) Collected: 10/14/22 1612    Lab Status: Preliminary result Specimen: Blood from Arm, Left Updated: 10/17/22 1631     Blood Culture No growth at 3 days    Blood Culture - Blood, Arm, Right [[id number]]  (Normal) Collected: 10/14/22 1612    Lab Status: Preliminary result Specimen: Blood from Arm, Right Updated: 10/17/22 1631     Blood Culture No growth at 3 days          XR Chest 1 View    Result Date: 10/14/2022  Impression: IMPRESSION : Limited negative low lung volume portable chest[  Electronically Signed By-Mayur Allen On:10/14/2022 4:33 PM This report was finalized on [id number] by  Mayur Allen, .              Results for orders placed during the hospital encounter of 10/14/22    Adult Transthoracic Echo Complete W/ Cont if Necessary Per Protocol    Interpretation Summary  •  Estimated left ventricular EF = 60% Left ventricular systolic function is normal.    Indication  Chest pain  Arrhythmia    Technically satisfactory  study.  Mitral valve is structurally normal.  Tricuspid valve is structurally normal.  Aortic valve is structurally normal.  Pulmonic valve could not be well visualized.  No evidence for mitral tricuspid or aortic regurgitation is seen by Doppler study.  Left atrium is normal in size.  Right atrium is normal in size.  Left ventricle is normal in size and contractility with ejection fraction of 60%.  Right ventricle is normal in size.  Atrial septum is intact.  Aorta is normal.  Small pericardial effusion.    Impression  Structurally and functionally normal cardiac valves.  Left ventricular size and contractility is normal with ejection fraction of 60%.  Small pericardial effusion.      Labs Pending at Discharge:  Pending Labs     Order Current Status    Blood Culture - Blood, Arm, Left Preliminary result    Blood Culture - Blood, Arm, Right Preliminary result          Procedures Performed           Consults:   Consults     Date and Time Order Name Status Description    10/14/2022  4:58 PM Cardiology (on-call MD unless specified) Completed             Discharge Details        Discharge Medications      New Medications      Instructions Start Date   dilTIAZem  MG 24 hr capsule  Commonly known as: CARDIZEM CD   240 mg, Oral, Every 24 Hours Scheduled   Start Date: October 19, 2022     Eliquis 5 MG tablet tablet  Generic drug: apixaban   5 mg, Oral, Every 12 Hours Scheduled      lisinopril 20 MG tablet  Commonly known as: PRINIVIL,ZESTRIL   20 mg, Oral, Daily      metoprolol succinate XL 25 MG 24 hr tablet  Commonly known as: TOPROL-XL   25 mg, Oral, Every 24 Hours Scheduled   Start Date: October 19, 2022     Nicotine Step 1 21 MG/24HR patch  Generic drug: nicotine   1 patch, Transdermal, Every 24 Hours Scheduled   Start Date: October 19, 2022        Changes to Medications      Instructions Start Date   Apidra 100 UNIT/ML injection  Generic drug: insulin glulisine  What changed: how much to take   5 Units,  Subcutaneous, 3 Times Daily Before Meals      insulin detemir 100 UNIT/ML injection  Commonly known as: LEVEMIR  What changed: how much to take   50 Units, Subcutaneous, Nightly         Continue These Medications      Instructions Start Date   albuterol sulfate  (90 Base) MCG/ACT inhaler  Commonly known as: PROVENTIL HFA;VENTOLIN HFA;PROAIR HFA   2 puffs, Inhalation, Every 4 Hours PRN      amitriptyline 75 MG tablet  Commonly known as: ELAVIL   75 mg, Oral, Nightly      butalbital-acetaminophen  MG tablet tablet   1 tablet, Oral, Daily PRN      cilostazol 100 MG tablet  Commonly known as: PLETAL   100 mg, Oral, 2 Times Daily      gabapentin 300 MG capsule  Commonly known as: NEURONTIN   300 mg, Oral, 3 Times Daily      Galcanezumab-gnlm 120 MG/ML solution prefilled syringe   120 mg, Subcutaneous, Every 30 Days      latanoprost 0.005 % ophthalmic solution  Commonly known as: XALATAN   1 drop, Both Eyes, Nightly      metFORMIN 1000 MG tablet  Commonly known as: GLUCOPHAGE   1,000 mg, Oral, 2 Times Daily With Meals      Rexulti 2 MG tablet  Generic drug: Brexpiprazole   2 mg, Oral, Nightly      sertraline 100 MG tablet  Commonly known as: ZOLOFT   100 mg, Oral, Daily      tiZANidine 4 MG tablet  Commonly known as: ZANAFLEX   4 mg, Oral, Every Morning      tiZANidine 4 MG tablet  Commonly known as: ZANAFLEX   8 mg, Oral, Every Evening      zolpidem 5 MG tablet  Commonly known as: AMBIEN   5 mg, Oral, Nightly PRN         Stop These Medications    atenolol 25 MG tablet  Commonly known as: TENORMIN     glipizide 10 MG tablet  Commonly known as: GLUCOTROL     ibuprofen 800 MG tablet  Commonly known as: ADVIL,MOTRIN     isosorbide mononitrate 60 MG 24 hr tablet  Commonly known as: IMDUR            Allergies   Allergen Reactions   • Codeine GI Intolerance         Discharge Disposition:     Home or Self Care    Diet:  Hospital:No active diet order  Diabetic diet      Discharge Activity:     As tolerated    CODE  STATUS:  Code Status and Medical Interventions:   Ordered at: 10/14/22 1843     Level Of Support Discussed With:    Patient     Code Status (Patient has no pulse and is not breathing):    No CPR (Do Not Attempt to Resuscitate)     Medical Interventions (Patient has pulse or is breathing):    Full Support         Future Appointments   Date Time Provider Department Center   11/8/2022  1:10 PM Donaldo Archer MD [facility]           Time spent on Discharge including face to face service:  35 minutes    This patient has been examined wearing appropriate Personal Protective Equipment and discussed with hospital infection control department. 10/18/22      Signature: Electronically signed by Tevin Nuñez MD, 10/18/22, 3:18 PM EDT.  [facility]

## 2022-10-18 NOTE — DISCHARGE SUMMARY
[facility] Medicine Services  DISCHARGE SUMMARY    Patient Name: Arianna Blanco  : 1955  MRN: [id number]    Date of Admission: 10/14/2022  Discharge Diagnosis: A. fib with RVR  Chest pain  Condition stable  Date of Discharge: 10/18/2022  Primary Care Physician: Christopher Jordan MD      Presenting Problem:   Hyperglycemia [R73.9]  Hx of medication noncompliance [Z91.14]  Atrial flutter, unspecified type (HCC) [I48.92]  Dyspnea, unspecified type [R06.00]  Chest pain, unspecified type [R07.9]    Active and Resolved Hospital Problems:  Active Hospital Problems    Diagnosis POA   • **Other chest pain [R07.89] Yes   • Atrial fibrillation (HCC) [I48.91] Yes   • Type 2 diabetes mellitus with hyperglycemia (HCC) [E11.65] Yes   • Anxiety associated with depression [F41.8] Yes      Resolved Hospital Problems   No resolved problems to display.         Hospital Course     Hospital Course:  Arianna Blanco is a 67 y.o. female     Patient is a 67-year-old female with multiple comorbidities including diabetes mellitus type 2, hypertension, COPD, depression, obstructive sleep apnea and hyperlipidemia.     Patient presented to [facility] ED on 10/14/2022 with complaints of chest pain.  Stated chest pain is pressure-like, left-sided, nonradiating with no aggravating or relieving factor.  Has associated generalized body weakness, shortness of breath but no nausea or vomiting.     Has had similar presentation about a week ago and had gone to outlying ED where plans was being made to transfer her to [facility] for further evaluation and management however patient left AMA.     In the ED patient was noted to be in atrial flutter, she was given Cardizem bolus and started on Cardizem drip.  Also given therapeutic dose Lovenox.  Cardiologist was consulted from the ED.  Patient was admitted for further care       Patient Reports  10/15/2022  Patient seen post stress  test  Related having generalized body weakness  Denies any palpitation nor chest pain     10/16/2022  Patient seen and examined  Complain of generalized body weakness  On Cardizem drip  Heart rates is regulated but heart rhythm still irregular     10/17/2022  Pt converted to NSR.  She is on Amiodarone IV.  Pt is feeling better.  Unsure if pt will continue Amiodarone and be switched to PO meds.  HR and BP are stable.  Pt denies chest pain, palpitations, SHAIKH, SOB or nausea.  Pt is on Lovenox BID, and will need to be switched to oral anti coagulation.       10/18  Seen and examined  Doing very well and cleared by cardiology for discharge  She is very eager to go home today    Assessment/plan    Atrial flutter with rapid ventricular response  Electrolytes including magnesium and potassium okay  TSH okay  Was given Cardizem bolus and started on Cardizem drip  Pt on Lovenox BID, therapeutic dose.  Was on Cardizem gtt and switched to Amio gtt.  Amiodarone drip discontinued  Heart rate controlled with Toprol and Cardizem CD  Meds and recommendation by Cardiology.  Started on Eliquis    Chest pain  History of CAD  Cardiac catheterization 10/30/2020-right coronary artery with 50% mid segment stenosis.  Medical management advised at that time and patient started on aspirin and Imdur  Patient is noncompliant to medications  serial troponin insignificant  Cardiologist following  Lexiscan Cardiolite test 10/15/2022-is negative for myocardial ischemia  2D echo normal LV size and contractility with EF of 62%     Diabetes mellitus with hyperglycemia  Blood glucose on presentation 400 however patient was not in DKA  A1c 12.3  on Lantus,-on Levemir at home  - May resume Premeal insulin with Apidra smaller dose  Premeal insulin and sliding scale insulin  May resume metformin at discharge and hold sulfonylureas.  Diabetes educator consulted    Depression-on home medications     Hypertension  On Cardizem drip and  metoprolol        COPD-not in exacerbation  Active smoker  Respiratory care with bronchodilators  Oxygen therapy and titration        Tobacco dependence-counseled on smoking cessation       DISCHARGE Follow Up Recommendations for labs and diagnostics: None follow-up with PCP soon as possible  Follow-up with cardiology 1 month  Take medication as prescribed  Monitor blood glucose and blood pressure at home        Reasons For Change In Medications and Indications for New Medications:      Day of Discharge     Vital Signs:  Temp:  [96.8 °F (36 °C)-98.4 °F (36.9 °C)] 98.1 °F (36.7 °C)  Heart Rate:  [79-88] 87  Resp:  [17-18] 17  BP: (142-165)/(63-86) 148/81    Physical Exam:  Physical Exam  Alert oriented x3 without distress no cardiopulmonary exam unremarkable    She seems to be in sinus rhythm with some PVCs  No focal neurologic deficit      Pertinent  and/or Most Recent Results     LAB RESULTS:      Lab 10/18/22  0414 10/17/22  0430 10/16/22  0555 10/15/22  0214 10/14/22  1626 10/14/22  1558 10/14/22  1556   WBC 5.70 5.90 5.80 8.20  --  7.80  --    HEMOGLOBIN 13.5 14.2 13.7 13.0  --  12.7  --    HEMATOCRIT 40.0 40.4 40.8 37.3  --  37.9  --    PLATELETS 146 146 140 146  --  167  --    NEUTROS ABS 2.50 3.00 3.20 4.90  --  4.60  --    LYMPHS ABS 2.20 2.10 1.80 2.30  --  2.30  --    MONOS ABS 0.70 0.60 0.50 0.60  --  0.50  --    EOS ABS 0.20 0.20 0.20 0.20  --  0.20  --    MCV 91.0 91.8 91.3 89.3  --  91.2  --    LACTATE  --   --   --   --   --   --  1.6   PROTIME  --   --   --   --  10.3  --   --    APTT  --   --   --   --  24.8*  --   --          Lab 10/18/22  0414 10/17/22  0430 10/16/22  0555 10/15/22  0214 10/14/22  1558   SODIUM 136 137 135* 133* 133*   POTASSIUM 3.9 3.4* 3.6 4.2 4.0   CHLORIDE 99 102 102 99 97*   CO2 25.0 24.0 24.0 22.0 24.0   ANION GAP 12.0 11.0 9.0 12.0 12.0   BUN 14 10 9 17 20   CREATININE 0.62 0.51* 0.51* 0.58 0.64   EGFR 98.4 103.1 103.1 99.9 97.6   GLUCOSE 230* 165* 227* 294* 400*   CALCIUM  9.0 9.1 9.0 8.7 9.0   MAGNESIUM 1.8  --   --  1.6 1.6   HEMOGLOBIN A1C  --   --   --   --  12.3*   TSH  --   --   --  3.020 1.370         Lab 10/14/22  1558   TOTAL PROTEIN 6.5   ALBUMIN 4.00   GLOBULIN 2.5   ALT (SGPT) 31   AST (SGOT) 21   BILIRUBIN 0.2   ALK PHOS 116   LIPASE 33         Lab 10/15/22  0214 10/14/22  1626 10/14/22  1558   PROBNP  --   --  391.9   TROPONIN T <0.010  --  <0.010   PROTIME  --  10.3  --    INR  --  1.00  --                  Lab 10/14/22  1618   PH, ARTERIAL 7.422   PCO2, ARTERIAL 32.5*   PO2 ART 62.6*   O2 SATURATION ART 92.4*   FIO2 21   HCO3 ART 21.2   BASE EXCESS ART -2.5*     Brief Urine Lab Results  (Last result in the past 365 days)      Color   Clarity   Blood   Leuk Est   Nitrite   Protein   CREAT   Urine HCG        10/14/22 1715 Yellow   Clear   Negative   Negative   Negative   Negative               Microbiology Results (last 10 days)     Procedure Component Value - Date/Time    Blood Culture - Blood, Arm, Left [[id number]]  (Normal) Collected: 10/14/22 1612    Lab Status: Preliminary result Specimen: Blood from Arm, Left Updated: 10/17/22 1631     Blood Culture No growth at 3 days    Blood Culture - Blood, Arm, Right [[id number]]  (Normal) Collected: 10/14/22 1612    Lab Status: Preliminary result Specimen: Blood from Arm, Right Updated: 10/17/22 1631     Blood Culture No growth at 3 days          XR Chest 1 View    Result Date: 10/14/2022  Impression: IMPRESSION : Limited negative low lung volume portable chest[  Electronically Signed By-Mayur Allen On:10/14/2022 4:33 PM This report was finalized on [id number] by  Mayur Allen, .              Results for orders placed during the hospital encounter of 10/14/22    Adult Transthoracic Echo Complete W/ Cont if Necessary Per Protocol    Interpretation Summary  •  Estimated left ventricular EF = 60% Left ventricular systolic function is normal.    Indication  Chest pain  Arrhythmia    Technically satisfactory  study.  Mitral valve is structurally normal.  Tricuspid valve is structurally normal.  Aortic valve is structurally normal.  Pulmonic valve could not be well visualized.  No evidence for mitral tricuspid or aortic regurgitation is seen by Doppler study.  Left atrium is normal in size.  Right atrium is normal in size.  Left ventricle is normal in size and contractility with ejection fraction of 60%.  Right ventricle is normal in size.  Atrial septum is intact.  Aorta is normal.  Small pericardial effusion.    Impression  Structurally and functionally normal cardiac valves.  Left ventricular size and contractility is normal with ejection fraction of 60%.  Small pericardial effusion.      Labs Pending at Discharge:  Pending Labs     Order Current Status    Blood Culture - Blood, Arm, Left Preliminary result    Blood Culture - Blood, Arm, Right Preliminary result          Procedures Performed           Consults:   Consults     Date and Time Order Name Status Description    10/14/2022  4:58 PM Cardiology (on-call MD unless specified) Completed             Discharge Details        Discharge Medications      New Medications      Instructions Start Date   dilTIAZem  MG 24 hr capsule  Commonly known as: CARDIZEM CD   240 mg, Oral, Every 24 Hours Scheduled   Start Date: October 19, 2022     Eliquis 5 MG tablet tablet  Generic drug: apixaban   5 mg, Oral, Every 12 Hours Scheduled      lisinopril 20 MG tablet  Commonly known as: PRINIVIL,ZESTRIL   20 mg, Oral, Daily      metoprolol succinate XL 25 MG 24 hr tablet  Commonly known as: TOPROL-XL   25 mg, Oral, Every 24 Hours Scheduled   Start Date: October 19, 2022     Nicotine Step 1 21 MG/24HR patch  Generic drug: nicotine   1 patch, Transdermal, Every 24 Hours Scheduled   Start Date: October 19, 2022        Changes to Medications      Instructions Start Date   Apidra 100 UNIT/ML injection  Generic drug: insulin glulisine  What changed: how much to take   5 Units,  Subcutaneous, 3 Times Daily Before Meals      insulin detemir 100 UNIT/ML injection  Commonly known as: LEVEMIR  What changed: how much to take   50 Units, Subcutaneous, Nightly         Continue These Medications      Instructions Start Date   albuterol sulfate  (90 Base) MCG/ACT inhaler  Commonly known as: PROVENTIL HFA;VENTOLIN HFA;PROAIR HFA   2 puffs, Inhalation, Every 4 Hours PRN      amitriptyline 75 MG tablet  Commonly known as: ELAVIL   75 mg, Oral, Nightly      butalbital-acetaminophen  MG tablet tablet   1 tablet, Oral, Daily PRN      cilostazol 100 MG tablet  Commonly known as: PLETAL   100 mg, Oral, 2 Times Daily      gabapentin 300 MG capsule  Commonly known as: NEURONTIN   300 mg, Oral, 3 Times Daily      Galcanezumab-gnlm 120 MG/ML solution prefilled syringe   120 mg, Subcutaneous, Every 30 Days      latanoprost 0.005 % ophthalmic solution  Commonly known as: XALATAN   1 drop, Both Eyes, Nightly      metFORMIN 1000 MG tablet  Commonly known as: GLUCOPHAGE   1,000 mg, Oral, 2 Times Daily With Meals      Rexulti 2 MG tablet  Generic drug: Brexpiprazole   2 mg, Oral, Nightly      sertraline 100 MG tablet  Commonly known as: ZOLOFT   100 mg, Oral, Daily      tiZANidine 4 MG tablet  Commonly known as: ZANAFLEX   4 mg, Oral, Every Morning      tiZANidine 4 MG tablet  Commonly known as: ZANAFLEX   8 mg, Oral, Every Evening      zolpidem 5 MG tablet  Commonly known as: AMBIEN   5 mg, Oral, Nightly PRN         Stop These Medications    atenolol 25 MG tablet  Commonly known as: TENORMIN     glipizide 10 MG tablet  Commonly known as: GLUCOTROL     ibuprofen 800 MG tablet  Commonly known as: ADVIL,MOTRIN     isosorbide mononitrate 60 MG 24 hr tablet  Commonly known as: IMDUR            Allergies   Allergen Reactions   • Codeine GI Intolerance         Discharge Disposition:     Home or Self Care    Diet:  Hospital:No active diet order  Diabetic diet      Discharge Activity:     As tolerated    CODE  STATUS:  Code Status and Medical Interventions:   Ordered at: 10/14/22 1843     Level Of Support Discussed With:    Patient     Code Status (Patient has no pulse and is not breathing):    No CPR (Do Not Attempt to Resuscitate)     Medical Interventions (Patient has pulse or is breathing):    Full Support         Future Appointments   Date Time Provider Department Center   11/8/2022  1:10 PM Donaldo Archer MD [facility]           Time spent on Discharge including face to face service:  35 minutes    This patient has been examined wearing appropriate Personal Protective Equipment and discussed with hospital infection control department. 10/18/22      Signature: Electronically signed by Tevin Nuñez MD, 10/18/22, 3:18 PM EDT.  [facility] Hospitalist Team

## 2022-10-18 NOTE — CASE MANAGEMENT/SOCIAL WORK
Case Management Discharge Note      Final Note: Home      Selected Continued Care - Discharged on 10/18/2022 Admission date: 10/14/2022 - Discharge disposition: Home or Self Care     Transportation Services  Private: Car    Final Discharge Disposition Code: 01 - home or self-care

## 2022-10-18 NOTE — PLAN OF CARE
Goal Outcome Evaluation:      Pt has been calm and cooperative this shift. Pts vital signs have been at baseline. Pt is in normal sinus rhythm. Pt has been changing position independently. Pts BG is still elevated in 300's. Pt is still receiving insulin glargine (see MAR). Pt has had no complaints of pain. Pt is currently resting.          Problem: Adult Inpatient Plan of Care  Goal: Plan of Care Review  Outcome: Ongoing, Progressing  Goal: Patient-Specific Goal (Individualized)  Outcome: Ongoing, Progressing  Goal: Absence of Hospital-Acquired Illness or Injury  Outcome: Ongoing, Progressing  Intervention: Identify and Manage Fall Risk  Recent Flowsheet Documentation  Taken 10/18/2022 0228 by Asia Chua RN  Safety Promotion/Fall Prevention:   activity supervised   assistive device/personal items within reach   clutter free environment maintained   nonskid shoes/slippers when out of bed   safety round/check completed   room organization consistent  Taken 10/18/2022 0041 by Asia Chua RN  Safety Promotion/Fall Prevention:   activity supervised   clutter free environment maintained   assistive device/personal items within reach   nonskid shoes/slippers when out of bed   room organization consistent   safety round/check completed  Taken 10/17/2022 2230 by Asia Chua RN  Safety Promotion/Fall Prevention:   activity supervised   assistive device/personal items within reach   clutter free environment maintained   nonskid shoes/slippers when out of bed   room organization consistent   safety round/check completed  Taken 10/17/2022 2058 by Asia Chua RN  Safety Promotion/Fall Prevention:   activity supervised   assistive device/personal items within reach   clutter free environment maintained   nonskid shoes/slippers when out of bed   safety round/check completed   room organization consistent  Intervention: Prevent Skin Injury  Recent Flowsheet Documentation  Taken 10/18/2022 0041 by Asia Chua  N, RN  Body Position: position changed independently  Skin Protection: adhesive use limited  Taken 10/17/2022 2058 by Asia Chua RN  Body Position: position changed independently  Skin Protection: adhesive use limited  Intervention: Prevent and Manage VTE (Venous Thromboembolism) Risk  Recent Flowsheet Documentation  Taken 10/18/2022 0041 by Asia Chua RN  Activity Management:   activity adjusted per tolerance   activity encouraged  VTE Prevention/Management: patient refused intervention  Range of Motion: active ROM (range of motion) encouraged  Taken 10/17/2022 2058 by Asia Chua RN  Activity Management:   activity encouraged   activity adjusted per tolerance  VTE Prevention/Management: patient refused intervention  Range of Motion: active ROM (range of motion) encouraged  Intervention: Prevent Infection  Recent Flowsheet Documentation  Taken 10/18/2022 0228 by Asia Chua RN  Infection Prevention:   hand hygiene promoted   personal protective equipment utilized   rest/sleep promoted   single patient room provided   environmental surveillance performed  Taken 10/18/2022 0041 by Asia Chua RN  Infection Prevention:   hand hygiene promoted   personal protective equipment utilized   rest/sleep promoted   single patient room provided   environmental surveillance performed  Taken 10/17/2022 2230 by Asia Chua RN  Infection Prevention:   hand hygiene promoted   personal protective equipment utilized   rest/sleep promoted   single patient room provided   environmental surveillance performed  Taken 10/17/2022 2058 by Asia Chua RN  Infection Prevention:   hand hygiene promoted   personal protective equipment utilized   rest/sleep promoted   single patient room provided   environmental surveillance performed  Goal: Optimal Comfort and Wellbeing  Outcome: Ongoing, Progressing  Intervention: Provide Person-Centered Care  Recent Flowsheet Documentation  Taken 10/17/2022 2058 by Toma  Asia SIM RN  Trust Relationship/Rapport:   care explained   questions answered   questions encouraged  Goal: Readiness for Transition of Care  Outcome: Ongoing, Progressing     Problem: Fall Injury Risk  Goal: Absence of Fall and Fall-Related Injury  Outcome: Ongoing, Progressing  Intervention: Identify and Manage Contributors  Recent Flowsheet Documentation  Taken 10/18/2022 0228 by Asia Chua RN  Medication Review/Management: medications reviewed  Taken 10/18/2022 0041 by Asia Chua RN  Medication Review/Management: medications reviewed  Self-Care Promotion: independence encouraged  Taken 10/17/2022 2230 by Asia Chua RN  Medication Review/Management: medications reviewed  Taken 10/17/2022 2058 by Asia Chua RN  Medication Review/Management: medications reviewed  Self-Care Promotion: independence encouraged  Intervention: Promote Injury-Free Environment  Recent Flowsheet Documentation  Taken 10/18/2022 0228 by Asia Chua RN  Safety Promotion/Fall Prevention:   activity supervised   assistive device/personal items within reach   clutter free environment maintained   nonskid shoes/slippers when out of bed   safety round/check completed   room organization consistent  Taken 10/18/2022 0041 by Asia Chua RN  Safety Promotion/Fall Prevention:   activity supervised   clutter free environment maintained   assistive device/personal items within reach   nonskid shoes/slippers when out of bed   room organization consistent   safety round/check completed  Taken 10/17/2022 2230 by Asia Chua RN  Safety Promotion/Fall Prevention:   activity supervised   assistive device/personal items within reach   clutter free environment maintained   nonskid shoes/slippers when out of bed   room organization consistent   safety round/check completed  Taken 10/17/2022 2058 by Asia Chua RN  Safety Promotion/Fall Prevention:   activity supervised   assistive device/personal items within  reach   clutter free environment maintained   nonskid shoes/slippers when out of bed   safety round/check completed   room organization consistent     Problem: Asthma Comorbidity  Goal: Maintenance of Asthma Control  Outcome: Ongoing, Progressing  Intervention: Maintain Asthma Symptom Control  Recent Flowsheet Documentation  Taken 10/18/2022 0228 by Asia Chua RN  Medication Review/Management: medications reviewed  Taken 10/18/2022 0041 by Asia Chua RN  Medication Review/Management: medications reviewed  Taken 10/17/2022 2230 by Asia Chua RN  Medication Review/Management: medications reviewed  Taken 10/17/2022 2058 by Asia Chua RN  Medication Review/Management: medications reviewed     Problem: Behavioral Health Comorbidity  Goal: Maintenance of Behavioral Health Symptom Control  Outcome: Ongoing, Progressing  Intervention: Maintain Behavioral Health Symptom Control  Recent Flowsheet Documentation  Taken 10/18/2022 0228 by Asia Chua RN  Medication Review/Management: medications reviewed  Taken 10/18/2022 0041 by Asia Chua RN  Medication Review/Management: medications reviewed  Taken 10/17/2022 2230 by Asia Chua RN  Medication Review/Management: medications reviewed  Taken 10/17/2022 2058 by Asia Chua RN  Medication Review/Management: medications reviewed     Problem: COPD (Chronic Obstructive Pulmonary Disease) Comorbidity  Goal: Maintenance of COPD Symptom Control  Outcome: Ongoing, Progressing  Intervention: Maintain COPD-Symptom Control  Recent Flowsheet Documentation  Taken 10/18/2022 0228 by Asia Chua RN  Medication Review/Management: medications reviewed  Taken 10/18/2022 0041 by Asia Chua RN  Supportive Measures: relaxation techniques promoted  Medication Review/Management: medications reviewed  Taken 10/17/2022 2230 by Asia Chua RN  Medication Review/Management: medications reviewed  Taken 10/17/2022 2058 by Asia Chua  RN  Supportive Measures: active listening utilized  Medication Review/Management: medications reviewed     Problem: Diabetes Comorbidity  Goal: Blood Glucose Level Within Targeted Range  Outcome: Ongoing, Progressing  Intervention: Monitor and Manage Glycemia  Recent Flowsheet Documentation  Taken 10/18/2022 0041 by Asia Chua RN  Glycemic Management: blood glucose monitored  Taken 10/17/2022 2058 by Asia Chua RN  Glycemic Management: blood glucose monitored     Problem: Heart Failure Comorbidity  Goal: Maintenance of Heart Failure Symptom Control  Outcome: Ongoing, Progressing  Intervention: Maintain Heart Failure-Management  Recent Flowsheet Documentation  Taken 10/18/2022 0228 by Asia Chua RN  Medication Review/Management: medications reviewed  Taken 10/18/2022 0041 by Asia Chua RN  Medication Review/Management: medications reviewed  Taken 10/17/2022 2230 by Asia Chua RN  Medication Review/Management: medications reviewed  Taken 10/17/2022 2058 by Asia Chua RN  Medication Review/Management: medications reviewed     Problem: Hypertension Comorbidity  Goal: Blood Pressure in Desired Range  Outcome: Ongoing, Progressing  Intervention: Maintain Blood Pressure Management  Recent Flowsheet Documentation  Taken 10/18/2022 0228 by Asia Chua RN  Medication Review/Management: medications reviewed  Taken 10/18/2022 0041 by Asia Chua RN  Syncope Management: position changed slowly  Medication Review/Management: medications reviewed  Taken 10/17/2022 2230 by Asia Chua RN  Medication Review/Management: medications reviewed  Taken 10/17/2022 2058 by Asia Chua RN  Syncope Management: position changed slowly  Medication Review/Management: medications reviewed     Problem: Obstructive Sleep Apnea Risk or Actual Comorbidity Management  Goal: Unobstructed Breathing During Sleep  Outcome: Ongoing, Progressing     Problem: Osteoarthritis Comorbidity  Goal:  Maintenance of Osteoarthritis Symptom Control  Outcome: Ongoing, Progressing  Intervention: Maintain Osteoarthritis Symptom Control  Recent Flowsheet Documentation  Taken 10/18/2022 0228 by Asia Chua RN  Medication Review/Management: medications reviewed  Taken 10/18/2022 0041 by Asia Chua RN  Activity Management:   activity adjusted per tolerance   activity encouraged  Medication Review/Management: medications reviewed  Taken 10/17/2022 2230 by Asia Chua RN  Medication Review/Management: medications reviewed  Taken 10/17/2022 2058 by Asia Chua RN  Activity Management:   activity encouraged   activity adjusted per tolerance  Medication Review/Management: medications reviewed     Problem: Pain Chronic (Persistent) (Comorbidity Management)  Goal: Acceptable Pain Control and Functional Ability  Outcome: Ongoing, Progressing  Intervention: Manage Persistent Pain  Recent Flowsheet Documentation  Taken 10/18/2022 0228 by Asia Chua RN  Medication Review/Management: medications reviewed  Taken 10/18/2022 0041 by Asia Chua RN  Bowel Elimination Promotion: adequate fluid intake promoted  Sleep/Rest Enhancement: relaxation techniques promoted  Medication Review/Management: medications reviewed  Taken 10/17/2022 2230 by Asia Chua RN  Medication Review/Management: medications reviewed  Taken 10/17/2022 2058 by Asia Chua RN  Bowel Elimination Promotion: adequate fluid intake promoted  Sleep/Rest Enhancement: relaxation techniques promoted  Medication Review/Management: medications reviewed  Intervention: Optimize Psychosocial Wellbeing  Recent Flowsheet Documentation  Taken 10/18/2022 0041 by Asia Chua RN  Supportive Measures: relaxation techniques promoted  Diversional Activities:   television   smartphone  Family/Support System Care:   support provided   self-care encouraged   presence promoted  Taken 10/17/2022 2058 by Asia Chua RN  Supportive Measures:  active listening utilized  Diversional Activities:   television   smartphone  Family/Support System Care:   self-care encouraged   presence promoted     Problem: Seizure Disorder Comorbidity  Goal: Maintenance of Seizure Control  Outcome: Ongoing, Progressing

## 2022-10-18 NOTE — PROGRESS NOTES
Referring Provider: Olimpia Valdez DO    Reason for follow-up:  Atrial flutter  Chest discomfort     Patient Care Team:  Christopher Jordan MD as PCP - General (Sports Medicine)  Donaldo Archer MD as Consulting Physician (Cardiology)    Subjective .  Feeling okay.     ROS    Since I have last seen her yesterday, the patient has been without any chest discomfort ,shortness of breath, palpitations, dizziness or syncope.  Denies having any headache ,abdominal pain ,nausea, vomiting , diarrhea constipation, loss of weight or loss of appetite.  Denies having any excessive bruising ,hematuria or blood in the stool.    Review of all systems negative except as indicated    History  Past Medical History:   Diagnosis Date   • COPD (chronic obstructive pulmonary disease) (CMS/Formerly [facility])    • Depression    • Diabetes mellitus (CMS/Formerly [facility])    • Hyperlipidemia    • Hypertension        Past Surgical History:   Procedure Laterality Date   • CARDIAC CATHETERIZATION Left 10/30/2020    Procedure: Left Heart Cath with Coronary Angiography;  Surgeon: Donaldo Archer MD;  Location: [facility] CATH INVASIVE LOCATION;  Service: Cardiovascular;  Laterality: Left;   • CARPAL TUNNEL RELEASE Bilateral 2017   • CHOLECYSTECTOMY  1980   • HYSTERECTOMY  1980       Family History   Problem Relation Age of Onset   • Heart disease Mother    • Hypertension Mother    • Diabetes Mother    • Stroke Mother        Social History     Tobacco Use   • Smoking status: Every Day     Packs/day: 1.00     Years: 20.00     Pack years: 20.00     Types: Cigarettes   • Smokeless tobacco: Never   Substance Use Topics   • Alcohol use: No        Medications Prior to Admission   Medication Sig Dispense Refill Last Dose   • albuterol sulfate  (90 Base) MCG/ACT inhaler Inhale 2 puffs Every 4 (Four) Hours As Needed for Wheezing or Shortness of Air.   Past Month   • amitriptyline (ELAVIL) 75 MG tablet Take 75 mg by mouth Every Night.  0 10/13/2022 at 2100   • atenolol  (TENORMIN) 25 MG tablet Take 1 tablet by mouth Daily.  0 Past Month   • Brexpiprazole (Rexulti) 2 MG tablet Take 2 mg by mouth Every Night.   10/13/2022 at 2100   • butalbital-acetaminophen  MG tablet tablet Take 1 tablet by mouth Daily As Needed.   Past Month   • cilostazol (PLETAL) 100 MG tablet Take 1 tablet by mouth 2 (Two) Times a Day.   10/14/2022 at 0900   • gabapentin (NEURONTIN) 300 MG capsule Take 1 capsule by mouth 3 (Three) Times a Day.   10/14/2022 at 0900   • Galcanezumab-gnlm 120 MG/ML solution prefilled syringe Inject 1 mL under the skin into the appropriate area as directed Every 30 (Thirty) Days.   Past Month   • glipiZIDE (GLUCOTROL) 10 MG tablet Take 10 mg by mouth 2 (Two) Times a Day.  0 10/14/2022 at 0900   • ibuprofen (ADVIL,MOTRIN) 800 MG tablet Take 800 mg by mouth 3 (Three) Times a Day.   10/14/2022 at 0900   • isosorbide mononitrate (IMDUR) 60 MG 24 hr tablet TAKE 1 TABLET BY MOUTH EVERY DAY IN THE MORNING 30 tablet 0 10/14/2022 at 0900   • latanoprost (XALATAN) 0.005 % ophthalmic solution Administer 1 drop to both eyes Every Night.   10/13/2022 at 2100   • metFORMIN (GLUCOPHAGE) 1000 MG tablet Take 1,000 mg by mouth 2 (Two) Times a Day With Meals.  0 10/14/2022 at 0900   • sertraline (ZOLOFT) 100 MG tablet Take 1 tablet by mouth Daily.  0 10/14/2022 at 0900   • tiZANidine (ZANAFLEX) 4 MG tablet Take 1 tablet by mouth Every Morning.   10/14/2022 at 0900   • tiZANidine (ZANAFLEX) 4 MG tablet Take 8 mg by mouth Every Evening.   10/13/2022 at 2100   • zolpidem (AMBIEN) 5 MG tablet Take 1 tablet by mouth At Night As Needed for Sleep.   10/13/2022 at 2100   • insulin detemir (LEVEMIR) 100 UNIT/ML injection Inject 60 Units under the skin into the appropriate area as directed Every Night. (Patient not taking: Reported on 10/14/2022)   Not Taking   • insulin glulisine (Apidra) 100 UNIT/ML injection Inject 10 Units under the skin into the appropriate area as directed 3 (Three) Times a Day  Before Meals. (Patient not taking: Reported on 10/14/2022)   Not Taking   • lisinopril (PRINIVIL,ZESTRIL) 20 MG tablet Take 20 mg by mouth Daily. (Patient not taking: Reported on 10/14/2022)  0 Not Taking       Allergies  Codeine    Scheduled Meds:ARIPiprazole, 2 mg, Oral, Nightly  dilTIAZem CD, 240 mg, Oral, Q24H  enoxaparin, 80 mg, Subcutaneous, Q12H  gabapentin, 300 mg, Oral, TID  insulin glargine, 40 Units, Subcutaneous, Nightly  insulin lispro, 0-14 Units, Subcutaneous, TID AC  latanoprost, 1 drop, Both Eyes, Nightly  metoprolol succinate XL, 25 mg, Oral, Q24H  nicotine, 1 patch, Transdermal, Q24H  sertraline, 100 mg, Oral, Daily  sodium chloride, 3 mL, Intravenous, Q12H      Continuous Infusions:amiodarone, 0.5 mg/min  Pharmacy to Dose enoxaparin (LOVENOX),       PRN Meds:.•  acetaminophen **OR** acetaminophen **OR** acetaminophen  •  senna-docusate sodium **AND** polyethylene glycol **AND** bisacodyl **AND** bisacodyl  •  Calcium Gluconate-NaCl **AND** calcium gluconate **AND** Calcium, Ionized  •  dextrose  •  dextrose  •  digoxin  •  glucagon (human recombinant)  •  magnesium sulfate **OR** magnesium sulfate **OR** magnesium sulfate  •  melatonin  •  nitroglycerin  •  ondansetron **OR** ondansetron  •  Pharmacy to Dose enoxaparin (LOVENOX)  •  potassium & sodium phosphates **OR** potassium & sodium phosphates  •  potassium chloride **OR** potassium chloride **OR** potassium chloride  •  [COMPLETED] Insert peripheral IV **AND** sodium chloride  •  [COMPLETED] Insert peripheral IV **AND** sodium chloride  •  sodium chloride    Objective     VITAL SIGNS  Vitals:    10/17/22 1700 10/17/22 2250 10/18/22 0212 10/18/22 0520   BP: 144/85 142/63 165/63 151/86   BP Location: Left arm Left arm Left arm Left arm   Patient Position: Lying Lying Lying Lying   Pulse: 88  79 79   Resp: 17 18 18 18   Temp: 96.8 °F (36 °C) 98.3 °F (36.8 °C) 98.1 °F (36.7 °C) 98.4 °F (36.9 °C)   TempSrc: Axillary Oral Oral Oral   SpO2: 100%  "97% 100% 98%   Weight:    72.5 kg (159 lb 13.3 oz)   Height:           Flowsheet Rows    Flowsheet Row First Filed Value   Admission Height 172.7 cm (68\") Documented at 10/14/2022 1522   Admission Weight 79.8 kg (176 lb) Documented at 10/14/2022 1522            Intake/Output Summary (Last 24 hours) at 10/18/2022 0650  Last data filed at 10/17/2022 1500  Gross per 24 hour   Intake 1200 ml   Output --   Net 1200 ml        TELEMETRY: Sinus rhythm  Physical Exam:  The patient is alert, oriented and in no distress.  Vital signs as noted above.  Head and neck revealed no carotid bruits or jugular venous distention.  No thyromegaly or lymphadenopathy is present  Lungs clear.  No wheezing.  Breath sounds are normal bilaterally.  Heart normal first and second heart sounds.  No murmur. No precordial rub is present.  No gallop is present.  Abdomen soft and nontender.  No organomegaly is present.  Extremities with good peripheral pulses without any pedal edema.  Skin warm and dry.  Musculoskeletal system is grossly normal  CNS grossly normal      Results Review:   I reviewed the patient's new clinical results.  Lab Results (last 24 hours)     Procedure Component Value Units Date/Time    Basic Metabolic Panel [[id number]]  (Abnormal) Collected: 10/18/22 0414    Specimen: Blood Updated: 10/18/22 0504     Glucose 230 mg/dL      BUN 14 mg/dL      Creatinine 0.62 mg/dL      Sodium 136 mmol/L      Potassium 3.9 mmol/L      Chloride 99 mmol/L      CO2 25.0 mmol/L      Calcium 9.0 mg/dL      BUN/Creatinine Ratio 22.6     Anion Gap 12.0 mmol/L      eGFR 98.4 mL/min/1.73      Comment: National Kidney Foundation and American Society of Nephrology (ASN) Task Force recommended calculation based on the Chronic Kidney Disease Epidemiology Collaboration (CKD-EPI) equation refit without adjustment for race.       Narrative:      GFR Normal >60  Chronic Kidney Disease <60  Kidney Failure <15      Magnesium [[id number]]  (Normal) Collected: " 10/18/22 0414    Specimen: Blood Updated: 10/18/22 0504     Magnesium 1.8 mg/dL     CBC & Differential [[id number]]  (Abnormal) Collected: 10/18/22 0414    Specimen: Blood Updated: 10/18/22 0442    Narrative:      The following orders were created for panel order CBC & Differential.  Procedure                               Abnormality         Status                     ---------                               -----------         ------                     CBC Auto Differential[[id number]]        Abnormal            Final result                 Please view results for these tests on the individual orders.    CBC Auto Differential [[id number]]  (Abnormal) Collected: 10/18/22 0414    Specimen: Blood Updated: 10/18/22 0442     WBC 5.70 10*3/mm3      RBC 4.39 10*6/mm3      Hemoglobin 13.5 g/dL      Hematocrit 40.0 %      MCV 91.0 fL      MCH 30.7 pg      MCHC 33.7 g/dL      RDW 13.0 %      RDW-SD 41.6 fl      MPV 10.3 fL      Platelets 146 10*3/mm3      Neutrophil % 44.8 %      Lymphocyte % 37.9 %      Monocyte % 12.4 %      Eosinophil % 3.9 %      Basophil % 1.0 %      Neutrophils, Absolute 2.50 10*3/mm3      Lymphocytes, Absolute 2.20 10*3/mm3      Monocytes, Absolute 0.70 10*3/mm3      Eosinophils, Absolute 0.20 10*3/mm3      Basophils, Absolute 0.10 10*3/mm3      nRBC 0.0 /100 WBC     POC Glucose Once [[id number]]  (Abnormal) Collected: 10/17/22 1923    Specimen: Blood Updated: 10/17/22 1924     Glucose 369 mg/dL      Comment: Serial Number: [id number]:  [id number]       POC Glucose Once [[id number]]  (Abnormal) Collected: 10/17/22 1713    Specimen: Blood Updated: 10/17/22 1715     Glucose 259 mg/dL      Comment: Serial Number: [id number]:  [id number]       Blood Culture - Blood, Arm, Left [[id number]]  (Normal) Collected: 10/14/22 1612    Specimen: Blood from Arm, Left Updated: 10/17/22 1631     Blood Culture No growth at 3 days    Blood Culture - Blood, Arm, Right [[id number]]  (Normal) Collected: 10/14/22 2140     Specimen: Blood from Arm, Right Updated: 10/17/22 1631     Blood Culture No growth at 3 days    POC Glucose Once [[id number]]  (Abnormal) Collected: 10/17/22 1104    Specimen: Blood Updated: 10/17/22 1105     Glucose 287 mg/dL      Comment: Serial Number: [id number]:  [id number]       POC Glucose Once [[id number]]  (Abnormal) Collected: 10/17/22 0721    Specimen: Blood Updated: 10/17/22 0727     Glucose 231 mg/dL      Comment: Serial Number: [id number]:  [id number]             Imaging Results (Last 24 Hours)     ** No results found for the last 24 hours. **      LAB RESULTS (LAST 7 DAYS)    CBC  Results from last 7 days   Lab Units 10/18/22  0414 10/17/22  0430 10/16/22  0555 10/15/22  0214 10/14/22  1558   WBC 10*3/mm3 5.70 5.90 5.80 8.20 7.80   RBC 10*6/mm3 4.39 4.40 4.47 4.17 4.15   HEMOGLOBIN g/dL 13.5 14.2 13.7 13.0 12.7   HEMATOCRIT % 40.0 40.4 40.8 37.3 37.9   MCV fL 91.0 91.8 91.3 89.3 91.2   PLATELETS 10*3/mm3 146 146 140 146 167       BMP  Results from last 7 days   Lab Units 10/18/22  0414 10/17/22  0430 10/16/22  0555 10/15/22  0214 10/14/22  1558   SODIUM mmol/L 136 137 135* 133* 133*   POTASSIUM mmol/L 3.9 3.4* 3.6 4.2 4.0   CHLORIDE mmol/L 99 102 102 99 97*   CO2 mmol/L 25.0 24.0 24.0 22.0 24.0   BUN mg/dL 14 10 9 17 20   CREATININE mg/dL 0.62 0.51* 0.51* 0.58 0.64   GLUCOSE mg/dL 230* 165* 227* 294* 400*   MAGNESIUM mg/dL 1.8  --   --  1.6 1.6       CMP   Results from last 7 days   Lab Units 10/18/22  0414 10/17/22  0430 10/16/22  0555 10/15/22  0214 10/14/22  1558   SODIUM mmol/L 136 137 135* 133* 133*   POTASSIUM mmol/L 3.9 3.4* 3.6 4.2 4.0   CHLORIDE mmol/L 99 102 102 99 97*   CO2 mmol/L 25.0 24.0 24.0 22.0 24.0   BUN mg/dL 14 10 9 17 20   CREATININE mg/dL 0.62 0.51* 0.51* 0.58 0.64   GLUCOSE mg/dL 230* 165* 227* 294* 400*   ALBUMIN g/dL  --   --   --   --  4.00   BILIRUBIN mg/dL  --   --   --   --  0.2   ALK PHOS U/L  --   --   --   --  116   AST (SGOT) U/L  --   --   --   --  21    ALT (SGPT) U/L  --   --   --   --  31   LIPASE U/L  --   --   --   --  33         BNP        TROPONIN  Results from last 7 days   Lab Units 10/15/22  0214 10/14/22  1558   CK TOTAL U/L  --  107   TROPONIN T ng/mL <0.010 <0.010       CoAg  Results from last 7 days   Lab Units 10/14/22  1626   INR  1.00   APTT seconds 24.8*       Creatinine Clearance  Estimated Creatinine Clearance: 102.2 mL/min (by C-G formula based on SCr of 0.62 mg/dL).    ABG  Results from last 7 days   Lab Units 10/14/22  1618   PH, ARTERIAL pH units 7.422   PCO2, ARTERIAL mm Hg 32.5*   PO2 ART mm Hg 62.6*   O2 SATURATION ART % 92.4*   BASE EXCESS ART mmol/L -2.5*       Radiology  No radiology results for the last day        EKG                                                I personally viewed and interpreted the patient's EKG/Telemetry data: Atrial flutter and latest EKG showed sinus rhythm with right bundle branch block.    ECHOCARDIOGRAM:    Results for orders placed during the hospital encounter of 10/14/22    Adult Transthoracic Echo Complete W/ Cont if Necessary Per Protocol    Interpretation Summary  •  Estimated left ventricular EF = 60% Left ventricular systolic function is normal.    Indication  Chest pain  Arrhythmia    Technically satisfactory study.  Mitral valve is structurally normal.  Tricuspid valve is structurally normal.  Aortic valve is structurally normal.  Pulmonic valve could not be well visualized.  No evidence for mitral tricuspid or aortic regurgitation is seen by Doppler study.  Left atrium is normal in size.  Right atrium is normal in size.  Left ventricle is normal in size and contractility with ejection fraction of 60%.  Right ventricle is normal in size.  Atrial septum is intact.  Aorta is normal.  Small pericardial effusion.    Impression  Structurally and functionally normal cardiac valves.  Left ventricular size and contractility is normal with ejection fraction of 60%.  Small pericardial  effusion.          STRESS MYOVIEW:    Cardiolite (Tc-99m Sestamibi) stress test    CARDIAC CATHETERIZATION:            OTHER:         Assessment & Plan     Principal Problem:    Other chest pain  Active Problems:    Atrial fibrillation (HCC)    Type 2 diabetes mellitus with hyperglycemia (HCC)    Anxiety associated with depression      ]]]]]]]]]]]]]]]]]]]  Impression  =========  - Chest discomfort and palpitations.    Stress Cardiolite test 10/15/2022  Lexiscan Cardiolite test is negative for myocardial ischemia.  Gated SPECT images revealed normal left ventricular size and contractility with ejection fraction of 62%.    Echocardiogram 10/15/2022  Structurally and functionally normal cardiac valves.  Left ventricular size and contractility is normal with ejection fraction of 60%.  Small pericardial effusion.     - Atrial flutter with rapid ventricular response.  Has converted to sinus rhythm 10/17/2022    -Chronic RBBB    - Anticoagulation-patient is on Eliquis     Cardiac cath 10/30/2020 revealed  Left ventricle size and contractility is normal with ejection fraction of 60%.  Left main coronary artery is normal.  Left anterior descending artery is normal.  Circumflex coronary artery is normal.  Right coronary artery has mid segment 50% disease      - Diabetes hypertension COPD dyslipidemia     -Status post hysterectomy cholecystectomy and carpal tunnel surgery.     -Family history is positive for coronary artery disease.     -Smoker- abstinence from smoking was advised.     - No known allergies  ============  Plan  ========   Chest discomfort and palpitations.  Stress Cardiolite test 10/15/2022  Lexiscan Cardiolite test is negative for myocardial ischemia.  Gated SPECT images revealed normal left ventricular size and contractility with ejection fraction of 62%.    Echocardiogram 10/15/2022  Structurally and functionally normal cardiac valves.  Left ventricular size and contractility is normal with ejection fraction  of 60%.  Small pericardial effusion.      Atrial flutter with rapid ventricular response.  Patient has converted to sinus rhythm.    Chronic right bundle branch block-stable    Anticoagulation  Change Lovenox to Eliquis 5 mg twice a day.Medications were reviewed and updated.    Abstinence from smoking.    Current medications include  Eliquis 5 mg twice a day diltiazem CD to 40 mg a day patient will be on subcu Lovenox gabapentin insulin metoprolol XL 25 mg a day.    Okay with discharge plans from cardiac standpoint.    Follow-up in the office in 2 weeks.    Further plan will depend on patient's progress.  ]]]]]]]]]]]]]]              Donaldo Archer MD  10/18/22  06:50 EDT

## 2022-10-19 LAB
BACTERIA SPEC AEROBE CULT: NORMAL
BACTERIA SPEC AEROBE CULT: NORMAL

## 2022-10-19 NOTE — OUTREACH NOTE
Prep Survey    Flowsheet Row Responses   [facility] patient discharged from? [facility]   Is LACE score < 7 ? No   Emergency Room discharge w/ pulse ox? No   Eligibility Readm Mgmt   Discharge diagnosis Hyperglycemia   Does the patient have one of the following disease processes/diagnoses(primary or secondary)? Other   Does the patient have Home health ordered? No   Is there a DME ordered? No   Prep survey completed? Yes          DINH DRAKE - Registered Nurse

## 2022-10-21 ENCOUNTER — READMISSION MANAGEMENT (OUTPATIENT)
Dept: CALL CENTER | Facility: HOSPITAL | Age: 67
End: 2022-10-21

## 2022-10-21 NOTE — OUTREACH NOTE
Medical Week 1 Survey    Flowsheet Row Responses   [facility] patient discharged from? [facility]   Does the patient have one of the following disease processes/diagnoses(primary or secondary)? Other   Week 1 attempt successful? Yes   Call start time 1413   Call end time 1416   Discharge diagnosis Hyperglycemia   Meds reviewed with patient/caregiver? Yes   Is the patient having any side effects they believe may be caused by any medication additions or changes? No   Does the patient have all medications ordered at discharge? Yes   Is the patient taking all medications as directed (includes completed medication regime)? Yes   Comments regarding appointments cardiology appt. 11/8/22   Does the patient have a primary care provider?  Yes   Does the patient have an appointment with their PCP within 7 days of discharge? Greater than 7 days   Comments regarding PCP PCP appt 11/22/22   What is preventing the patient from scheduling follow up appointments within 7 days of discharge? Earlier appointment not available   Nursing Interventions Verified appointment date/time/provider   Has the patient kept scheduled appointments due by today? N/A   Has home health visited the patient within 72 hours of discharge? N/A   Psychosocial issues? No   Did the patient receive a copy of their discharge instructions? Yes   Nursing interventions Reviewed instructions with patient   What is the patient's perception of their health status since discharge? Improving  [fatigue]   Is the patient/caregiver able to teach back signs and symptoms related to disease process for when to call PCP? Yes   Is the patient/caregiver able to teach back signs and symptoms related to disease process for when to call 911? Yes   Is the patient/caregiver able to teach back the hierarchy of who to call/visit for symptoms/problems? PCP, Specialist, Home health nurse, Urgent Care, ED, 911 Yes   If the patient is a current smoker, are they able to teach back  resources for cessation? Smoking cessation medications  [Has nicotine patches]   Week 1 call completed? Yes          MANN BURDICK - Registered Nurse

## 2022-10-30 LAB — QT INTERVAL: 391 MS

## 2022-11-01 LAB
BH CV NUCLEAR PRIOR STUDY: 3
BH CV REST NUCLEAR ISOTOPE DOSE: 10.6 MCI
BH CV STRESS BP STAGE 1: NORMAL
BH CV STRESS COMMENTS STAGE 1: NORMAL
BH CV STRESS DOSE REGADENOSON STAGE 1: 0.4
BH CV STRESS DURATION MIN STAGE 1: 0
BH CV STRESS DURATION SEC STAGE 1: 10
BH CV STRESS HR STAGE 1: 99
BH CV STRESS NUCLEAR ISOTOPE DOSE: 29.9 MCI
BH CV STRESS PROTOCOL 1: NORMAL
BH CV STRESS RECOVERY BP: NORMAL MMHG
BH CV STRESS RECOVERY HR: 103 BPM
BH CV STRESS STAGE 1: 1
MAXIMAL PREDICTED HEART RATE: 154 BPM
PERCENT MAX PREDICTED HR: 73.38 %
STRESS BASELINE BP: NORMAL MMHG
STRESS BASELINE HR: 84 BPM
STRESS PERCENT HR: 86 %
STRESS POST PEAK BP: NORMAL MMHG
STRESS POST PEAK HR: 113 BPM
STRESS TARGET HR: 131 BPM

## 2022-11-04 LAB
QT INTERVAL: 371 MS
QT INTERVAL: 393 MS

## 2022-11-08 ENCOUNTER — OFFICE VISIT (OUTPATIENT)
Dept: CARDIOLOGY | Facility: CLINIC | Age: 67
End: 2022-11-08

## 2022-11-08 VITALS
OXYGEN SATURATION: 97 % | BODY MASS INDEX: 24.1 KG/M2 | HEIGHT: 68 IN | SYSTOLIC BLOOD PRESSURE: 122 MMHG | HEART RATE: 89 BPM | WEIGHT: 159 LBS | DIASTOLIC BLOOD PRESSURE: 73 MMHG

## 2022-11-08 DIAGNOSIS — Z79.01 CHRONIC ANTICOAGULATION: ICD-10-CM

## 2022-11-08 DIAGNOSIS — I10 ESSENTIAL HYPERTENSION: Primary | ICD-10-CM

## 2022-11-08 DIAGNOSIS — I48.92 ATRIAL FLUTTER WITH CONTROLLED RESPONSE: ICD-10-CM

## 2022-11-08 DIAGNOSIS — E08.9 DIABETES MELLITUS DUE TO UNDERLYING CONDITION WITHOUT COMPLICATION, WITH LONG-TERM CURRENT USE OF INSULIN: ICD-10-CM

## 2022-11-08 DIAGNOSIS — Z79.4 DIABETES MELLITUS DUE TO UNDERLYING CONDITION WITHOUT COMPLICATION, WITH LONG-TERM CURRENT USE OF INSULIN: ICD-10-CM

## 2022-11-08 DIAGNOSIS — E78.5 DYSLIPIDEMIA: ICD-10-CM

## 2022-11-08 DIAGNOSIS — I45.10 RIGHT BUNDLE BRANCH BLOCK: ICD-10-CM

## 2022-11-08 PROCEDURE — 99214 OFFICE O/P EST MOD 30 MIN: CPT | Performed by: INTERNAL MEDICINE

## 2022-11-08 RX ORDER — IBUPROFEN 800 MG/1
800 TABLET ORAL 3 TIMES DAILY
COMMUNITY
Start: 2022-10-21

## 2022-11-08 RX ORDER — VARENICLINE TARTRATE 25 MG
KIT ORAL
COMMUNITY
Start: 2022-10-15

## 2022-11-08 RX ORDER — BLOOD SUGAR DIAGNOSTIC
STRIP MISCELLANEOUS 4 TIMES DAILY
COMMUNITY
Start: 2022-09-06

## 2022-11-08 NOTE — PROGRESS NOTES
Encounter Date:11/08/2022  Hospital follow-up  Last seen 10/18/2022      Patient ID: Arianna Blanco is a 67 y.o. female.    Chief Complaint:  Chest discomfort  Atrial flutter  Chronic right bundle branch block  Anticoagulation management.    History of Present Illness    Patient recently was admitted to the hospital with chest discomfort and palpitations.  Patient was found to be in atrial flutter.  Patient has converted to sinus rhythm.  Patient was discharged home.    Having mild tiredness.    Since I have last seen, the patient has been without any chest discomfort ,shortness of breath, palpitations, dizziness or syncope.  Denies having any headache ,abdominal pain ,nausea, vomiting , diarrhea constipation, loss of weight or loss of appetite.  Denies having any excessive bruising ,hematuria or blood in the stool.    Review of all systems negative except as indicated.    Reviewed ROS.  Assessment and Plan       ]]]]]]]]]]]]]]]]]]]  Impression  =========  - Chest discomfort and palpitations.-Improved     Stress Cardiolite test 10/15/2022  Lexiscan Cardiolite test is negative for myocardial ischemia.  Gated SPECT images revealed normal left ventricular size and contractility with ejection fraction of 62%.     Echocardiogram 10/15/2022  Structurally and functionally normal cardiac valves.  Left ventricular size and contractility is normal with ejection fraction of 60%.  Small pericardial effusion.     - Atrial flutter with rapid ventricular response.  Has converted to sinus rhythm 10/17/2022     -Chronic RBBB     - Anticoagulation-patient is on Eliquis     Cardiac cath 10/30/2020 revealed  Left ventricle size and contractility is normal with ejection fraction of 60%.  Left main coronary artery is normal.  Left anterior descending artery is normal.  Circumflex coronary artery is normal.  Right coronary artery has mid segment 50% disease      - Diabetes hypertension COPD dyslipidemia     -Status post hysterectomy  cholecystectomy and carpal tunnel surgery.     -Family history is positive for coronary artery disease.     -Smoker- abstinence from smoking was advised.     - No known allergies  ============  Plan  ========   Chest discomfort and palpitations.-Improved     Atrial flutter with rapid ventricular response.  Patient has converted to sinus rhythm.  Maintaining sinus rhythm     Chronic right bundle branch block-stable     Anticoagulation  Continue Eliquis 5 mg twice a day.  Observe for toxic effects.    Medications were reviewed and updated.     Abstinence from smoking.     Current medications include  Eliquis 5 mg twice a day diltiazem  mg a day gabapentin insulin metoprolol XL 25 mg a day.    Follow-up in the office in 3 months with EKG.      Further plan will depend on patient's progress.  ]]]]]]]]]]]]]]            Diagnosis Plan   1. Essential hypertension        2. Atrial flutter with controlled response (HCC)        3. Chronic anticoagulation        4. Diabetes mellitus due to underlying condition without complication, with long-term current use of insulin (HCC)        5. Dyslipidemia        6. Right bundle branch block        LAB RESULTS (LAST 7 DAYS)    CBC        BMP        CMP         BNP        TROPONIN        CoAg        Creatinine Clearance  Estimated Creatinine Clearance: 101.6 mL/min (by C-G formula based on SCr of 0.62 mg/dL).    ABG        Radiology  No radiology results for the last day                The following portions of the patient's history were reviewed and updated as appropriate: allergies, current medications, past family history, past medical history, past social history, past surgical history and problem list.    Review of Systems   Constitutional: Positive for malaise/fatigue.   Cardiovascular: Negative for chest pain, dyspnea on exertion, leg swelling and palpitations.   Respiratory: Positive for shortness of breath. Negative for cough.    Gastrointestinal: Negative for abdominal  pain, nausea and vomiting.   Neurological: Positive for numbness and weakness. Negative for dizziness, focal weakness, headaches and light-headedness.   All other systems reviewed and are negative.        Current Outpatient Medications:   •  Accu-Chek Guide test strip, by Other route 4 (Four) Times a Day. Use to test blood sugar 4 times daily, Disp: , Rfl:   •  albuterol sulfate  (90 Base) MCG/ACT inhaler, Inhale 2 puffs Every 4 (Four) Hours As Needed for Wheezing or Shortness of Air., Disp: , Rfl:   •  amitriptyline (ELAVIL) 75 MG tablet, Take 75 mg by mouth Every Night., Disp: , Rfl: 0  •  apixaban (ELIQUIS) 5 MG tablet tablet, Take 1 tablet by mouth Every 12 (Twelve) Hours for 30 days. Indications: Atrial Fibrillation, Disp: 60 tablet, Rfl: 0  •  Brexpiprazole (Rexulti) 2 MG tablet, Take 2 mg by mouth Every Night., Disp: , Rfl:   •  butalbital-acetaminophen  MG tablet tablet, Take 1 tablet by mouth Daily As Needed., Disp: , Rfl:   •  cilostazol (PLETAL) 100 MG tablet, Take 1 tablet by mouth 2 (Two) Times a Day., Disp: , Rfl:   •  dilTIAZem CD (CARDIZEM CD) 240 MG 24 hr capsule, Take 1 capsule by mouth Daily for 30 days., Disp: 30 capsule, Rfl: 0  •  gabapentin (NEURONTIN) 300 MG capsule, Take 1 capsule by mouth 3 (Three) Times a Day., Disp: , Rfl:   •  Galcanezumab-gnlm 120 MG/ML solution prefilled syringe, Inject 1 mL under the skin into the appropriate area as directed Every 30 (Thirty) Days., Disp: , Rfl:   •  ibuprofen (ADVIL,MOTRIN) 800 MG tablet, Take 1 tablet by mouth 3 (Three) Times a Day., Disp: , Rfl:   •  insulin detemir (LEVEMIR) 100 UNIT/ML injection, Inject 50 Units under the skin into the appropriate area as directed Every Night., Disp: , Rfl: 12  •  insulin glulisine (Apidra) 100 UNIT/ML injection, Inject 5 Units under the skin into the appropriate area as directed 3 (Three) Times a Day Before Meals., Disp: , Rfl: 12  •  latanoprost (XALATAN) 0.005 % ophthalmic solution, Administer 1  drop to both eyes Every Night., Disp: , Rfl:   •  lisinopril (PRINIVIL,ZESTRIL) 20 MG tablet, Take 20 mg by mouth Daily., Disp: , Rfl: 0  •  metFORMIN (GLUCOPHAGE) 1000 MG tablet, Take 1,000 mg by mouth 2 (Two) Times a Day With Meals., Disp: , Rfl: 0  •  metoprolol succinate XL (TOPROL-XL) 25 MG 24 hr tablet, Take 1 tablet by mouth Daily for 30 days., Disp: 30 tablet, Rfl: 0  •  sertraline (ZOLOFT) 100 MG tablet, Take 1 tablet by mouth Daily., Disp: , Rfl: 0  •  tiZANidine (ZANAFLEX) 4 MG tablet, Take 8 mg by mouth Every Evening., Disp: , Rfl:   •  Varenicline Tartrate 0.5 MG X 11 & 1 MG X 42 tablet, TAKE 1 TABLET BY MOUTH TWICE A DAY AS DIRECTED ON PACKAGE LABELING, Disp: , Rfl:   •  zolpidem (AMBIEN) 5 MG tablet, Take 1 tablet by mouth At Night As Needed for Sleep., Disp: , Rfl:   •  tiZANidine (ZANAFLEX) 4 MG tablet, Take 1 tablet by mouth Every Morning., Disp: , Rfl:     Allergies   Allergen Reactions   • Codeine GI Intolerance       Family History   Problem Relation Age of Onset   • Heart disease Mother    • Hypertension Mother    • Diabetes Mother    • Stroke Mother        Past Surgical History:   Procedure Laterality Date   • CARDIAC CATHETERIZATION Left 10/30/2020    Procedure: Left Heart Cath with Coronary Angiography;  Surgeon: Donaldo Archer MD;  Location: [facility] CATH INVASIVE LOCATION;  Service: Cardiovascular;  Laterality: Left;   • CARPAL TUNNEL RELEASE Bilateral 2017   • CHOLECYSTECTOMY  1980   • HYSTERECTOMY  1980       Past Medical History:   Diagnosis Date   • COPD (chronic obstructive pulmonary disease) (HCC)    • Depression    • Diabetes mellitus (HCC)    • Hyperlipidemia    • Hypertension        Family History   Problem Relation Age of Onset   • Heart disease Mother    • Hypertension Mother    • Diabetes Mother    • Stroke Mother        Social History     Socioeconomic History   • Marital status:    Tobacco Use   • Smoking status: Every Day     Packs/day: 1.00     Years: 20.00      "Pack years: 20.00     Types: Cigarettes   • Smokeless tobacco: Never   Substance and Sexual Activity   • Alcohol use: No   • Sexual activity: Defer         Procedures      Objective:       Physical Exam    /73 (BP Location: Right arm, Patient Position: Sitting, Cuff Size: Adult)   Pulse 89   Ht 172.7 cm (68\")   Wt 72.1 kg (159 lb)   SpO2 97%   BMI 24.18 kg/m²   The patient is alert, oriented and in no distress.    Vital signs as noted above.    Head and neck revealed no carotid bruits or jugular venous distension.  No thyromegaly or lymphadenopathy is present.    Lungs clear.  No wheezing.  Breath sounds are normal bilaterally.    Heart normal first and second heart sounds.  No murmur..  No pericardial rub is present.  No gallop is present.    Abdomen soft and nontender.  No organomegaly is present.    Extremities revealed good peripheral pulses without any pedal edema.    Skin warm and dry.    Musculoskeletal system is grossly normal.    CNS grossly normal.    Reviewed and updated.        "

## 2022-11-17 ENCOUNTER — TELEPHONE (OUTPATIENT)
Dept: CARDIOLOGY | Facility: CLINIC | Age: 67
End: 2022-11-17

## 2022-11-17 NOTE — TELEPHONE ENCOUNTER
[facility]  Cataract surgery with lens implant-bilateral  Scheduled 11/28/22  Phone# [phone]  Fax# [phone]          Placed on Dr. Archer desk

## 2023-08-14 ENCOUNTER — HOSPITAL ENCOUNTER (OUTPATIENT)
Dept: GENERAL RADIOLOGY | Facility: HOSPITAL | Age: 68
Discharge: HOME | End: 2023-08-14
Payer: MEDICAID

## 2023-08-14 ENCOUNTER — OFFICE VISIT (OUTPATIENT)
Dept: FAMILY MEDICINE CLINIC | Facility: CLINIC | Age: 68
End: 2023-08-14
Payer: MEDICAID

## 2023-08-14 VITALS
SYSTOLIC BLOOD PRESSURE: 112 MMHG | HEART RATE: 69 BPM | HEIGHT: 68 IN | BODY MASS INDEX: 27.58 KG/M2 | RESPIRATION RATE: 16 BRPM | DIASTOLIC BLOOD PRESSURE: 71 MMHG | WEIGHT: 182 LBS | OXYGEN SATURATION: 97 % | TEMPERATURE: 98.2 F

## 2023-08-14 DIAGNOSIS — G89.29 CHRONIC RIGHT HIP PAIN: ICD-10-CM

## 2023-08-14 DIAGNOSIS — Z12.31 SCREENING MAMMOGRAM FOR BREAST CANCER: ICD-10-CM

## 2023-08-14 DIAGNOSIS — E78.5 DYSLIPIDEMIA: ICD-10-CM

## 2023-08-14 DIAGNOSIS — R19.01 ABDOMINAL MASS, RIGHT UPPER QUADRANT: ICD-10-CM

## 2023-08-14 DIAGNOSIS — Z11.59 ENCOUNTER FOR HEPATITIS C SCREENING TEST FOR LOW RISK PATIENT: ICD-10-CM

## 2023-08-14 DIAGNOSIS — J45.909 ASTHMA, UNSPECIFIED ASTHMA SEVERITY, UNSPECIFIED WHETHER COMPLICATED, UNSPECIFIED WHETHER PERSISTENT: ICD-10-CM

## 2023-08-14 DIAGNOSIS — M54.41 CHRONIC RIGHT-SIDED LOW BACK PAIN WITH RIGHT-SIDED SCIATICA: ICD-10-CM

## 2023-08-14 DIAGNOSIS — F41.9 ANXIETY: ICD-10-CM

## 2023-08-14 DIAGNOSIS — Z12.11 SCREENING FOR COLON CANCER: ICD-10-CM

## 2023-08-14 DIAGNOSIS — M25.551 CHRONIC RIGHT HIP PAIN: ICD-10-CM

## 2023-08-14 DIAGNOSIS — Z13.820 SCREENING FOR OSTEOPOROSIS: ICD-10-CM

## 2023-08-14 DIAGNOSIS — G47.00 INSOMNIA, UNSPECIFIED TYPE: ICD-10-CM

## 2023-08-14 DIAGNOSIS — I10 ESSENTIAL HYPERTENSION: ICD-10-CM

## 2023-08-14 DIAGNOSIS — I48.91 ATRIAL FIBRILLATION, UNSPECIFIED TYPE: ICD-10-CM

## 2023-08-14 DIAGNOSIS — Z13.29 SCREENING FOR THYROID DISORDER: ICD-10-CM

## 2023-08-14 DIAGNOSIS — N28.89 RIGHT KIDNEY MASS: ICD-10-CM

## 2023-08-14 DIAGNOSIS — G89.29 CHRONIC RIGHT-SIDED LOW BACK PAIN WITH RIGHT-SIDED SCIATICA: ICD-10-CM

## 2023-08-14 DIAGNOSIS — Z76.89 ENCOUNTER TO ESTABLISH CARE: Primary | ICD-10-CM

## 2023-08-14 DIAGNOSIS — Z12.2 SCREENING FOR LUNG CANCER: ICD-10-CM

## 2023-08-14 DIAGNOSIS — E11.65 TYPE 2 DIABETES MELLITUS WITH HYPERGLYCEMIA, WITHOUT LONG-TERM CURRENT USE OF INSULIN: ICD-10-CM

## 2023-08-14 DIAGNOSIS — F17.200 SMOKER: ICD-10-CM

## 2023-08-14 DIAGNOSIS — M51.36 DEGENERATION OF LUMBAR INTERVERTEBRAL DISC: ICD-10-CM

## 2023-08-14 PROBLEM — G56.23 LESION OF ULNAR NERVE, BILATERAL UPPER LIMBS: Status: ACTIVE | Noted: 2017-08-02

## 2023-08-14 PROBLEM — J44.9 CHRONIC OBSTRUCTIVE PULMONARY DISEASE: Status: ACTIVE | Noted: 2023-08-14

## 2023-08-14 PROBLEM — M51.369 DEGENERATION OF LUMBAR INTERVERTEBRAL DISC: Status: ACTIVE | Noted: 2021-07-15

## 2023-08-14 PROBLEM — G43.909 MIGRAINE: Status: ACTIVE | Noted: 2021-07-15

## 2023-08-14 PROBLEM — M47.812 CERVICAL SPONDYLOSIS: Status: ACTIVE | Noted: 2021-07-15

## 2023-08-14 PROBLEM — E78.00 HYPERCHOLESTEREMIA: Status: ACTIVE | Noted: 2023-08-14

## 2023-08-14 PROBLEM — G47.30 SLEEP APNEA SYNDROME: Status: ACTIVE | Noted: 2023-08-14

## 2023-08-14 PROBLEM — H52.4 PRESBYOPIA: Status: ACTIVE | Noted: 2019-05-29

## 2023-08-14 PROBLEM — G25.81 RESTLESS LEGS SYNDROME (RLS): Status: ACTIVE | Noted: 2023-08-14

## 2023-08-14 PROBLEM — M50.30 DEGENERATIVE DISC DISEASE, CERVICAL: Status: ACTIVE | Noted: 2021-07-15

## 2023-08-14 PROBLEM — H40.10X0 OPEN-ANGLE GLAUCOMA OF RIGHT EYE: Status: ACTIVE | Noted: 2021-05-05

## 2023-08-14 PROBLEM — E78.1 HYPERTRIGLYCERIDEMIA: Status: ACTIVE | Noted: 2023-08-14

## 2023-08-14 PROBLEM — M54.81 CERVICO-OCCIPITAL NEURALGIA: Status: ACTIVE | Noted: 2021-07-15

## 2023-08-14 PROBLEM — H25.10 NUCLEAR SENILE CATARACT: Status: ACTIVE | Noted: 2019-05-29

## 2023-08-14 PROBLEM — G56.10 MEDIAN NERVE NEUROPATHY: Status: ACTIVE | Noted: 2017-08-02

## 2023-08-14 PROBLEM — G44.009 HEADACHES, CLUSTER: Status: ACTIVE | Noted: 2023-08-14

## 2023-08-14 PROBLEM — H40.022 OPEN ANGLE WITH BORDERLINE FINDINGS AND HIGH GLAUCOMA RISK IN LEFT EYE: Status: ACTIVE | Noted: 2019-05-29

## 2023-08-14 PROBLEM — H26.9 CATARACTS, BILATERAL: Status: ACTIVE | Noted: 2023-08-14

## 2023-08-14 PROBLEM — E11.9 DIABETES MELLITUS: Status: ACTIVE | Noted: 2023-08-14

## 2023-08-14 LAB
EXPIRATION DATE: NORMAL
HBA1C MFR BLD: 9.5 %
Lab: NORMAL

## 2023-08-14 PROCEDURE — 73502 X-RAY EXAM HIP UNI 2-3 VIEWS: CPT

## 2023-08-14 PROCEDURE — 72100 X-RAY EXAM L-S SPINE 2/3 VWS: CPT

## 2023-08-14 RX ORDER — ISOSORBIDE MONONITRATE 60 MG/1
60 TABLET, EXTENDED RELEASE ORAL EVERY MORNING
COMMUNITY
Start: 2023-06-18 | End: 2023-08-14

## 2023-08-14 RX ORDER — BREXPIPRAZOLE 3 MG/1
3 TABLET ORAL DAILY
COMMUNITY
Start: 2023-06-20 | End: 2023-08-14 | Stop reason: SDUPTHER

## 2023-08-14 RX ORDER — GABAPENTIN 400 MG/1
400 CAPSULE ORAL 4 TIMES DAILY
COMMUNITY
Start: 2023-04-25 | End: 2023-09-22

## 2023-08-14 RX ORDER — DILTIAZEM HYDROCHLORIDE 240 MG/1
240 CAPSULE, COATED, EXTENDED RELEASE ORAL DAILY
COMMUNITY
Start: 2023-04-14 | End: 2023-08-14 | Stop reason: SDUPTHER

## 2023-08-14 RX ORDER — TRAMADOL HYDROCHLORIDE 50 MG/1
50-100 TABLET ORAL EVERY 6 HOURS PRN
COMMUNITY
Start: 2023-05-12 | End: 2023-08-14

## 2023-08-14 RX ORDER — APIXABAN 5 MG/1
1 TABLET, FILM COATED ORAL EVERY 12 HOURS SCHEDULED
COMMUNITY
Start: 2023-06-07 | End: 2023-08-14 | Stop reason: SDUPTHER

## 2023-08-14 RX ORDER — DICLOFENAC SODIUM 75 MG/1
1 TABLET, DELAYED RELEASE ORAL EVERY 12 HOURS SCHEDULED
COMMUNITY
Start: 2023-07-03 | End: 2023-08-14

## 2023-08-14 RX ORDER — METHYLPREDNISOLONE 4 MG/1
4 TABLET ORAL DAILY
COMMUNITY
Start: 2023-07-03 | End: 2023-08-14

## 2023-08-14 RX ORDER — ONDANSETRON 4 MG/1
4 TABLET, ORALLY DISINTEGRATING ORAL
COMMUNITY
Start: 2023-07-03 | End: 2023-08-14

## 2023-08-14 RX ORDER — CYCLOBENZAPRINE HCL 10 MG
10 TABLET ORAL 3 TIMES DAILY PRN
COMMUNITY
Start: 2023-04-12 | End: 2023-08-14

## 2023-08-14 RX ORDER — HYDROCODONE BITARTRATE AND ACETAMINOPHEN 5; 325 MG/1; MG/1
1 TABLET ORAL
COMMUNITY
Start: 2023-07-03 | End: 2023-08-14

## 2023-08-14 NOTE — PROGRESS NOTES
Office Note     Name: Arianna Blanco    : 1955     MRN: [id number]     Chief Complaint  Establish Care, Back Pain, and Hip Pain  Back Pain: Patient complains of lumbar spine and sacral spine pain which is described as aching, sharp, shooting, stabbing, and throbbing.  Patient reports numbness/tingling which does radiates up the thoracic.   She reports that the pain has been present for two  weeks.  She reports pain in right hip and lower back (aching, sharp, shooting, and stabbing in character; 10/10 in severity), stiffness in back, and weakness in back and legs . Pain was first noted about two weeks ago symptoms are worse when sitting down.  Symptoms are relieved by nothing.  The back problem is not work related.  She denies fever, bladder or bowel incontinence, or weakness to the hips or legs.     Subjective     History of Present Illness:  Arianna Blanco is a 68 y.o. female who presents today for establishment of care with a new provider.   History of Present Illness    The patient is here: for coordination of medical care to discuss health maintenance and disease prevention.  Last comprehensive physical was on .  Patient's previous physician was Dr. Jordan.  Overall has: moderate activity with work/home activities, good appetite, feels well with minor complaints, decreased energy level, and is sleeping poorly. Nutrition: eating a variety of foods and inappropriate diet.  Sleep:poorly.  Hours of Sleep:3 She takes ambien, amitriptylline,  muscle relaxer and melatonin and still has difficulty sleeping.   Dental care Dent[facility]. Ophthalmology care [facility]. Specialty care Chiropractor in the past - right side pain x 3 occasions.  Dr. Craig.   Last tetanus shot was unknown.    Self Skin Exam: occasionally  Monthly Breast Self Exam:Does not perform monthly breast exam  Pap Smear:Last Completed Pap Smear - unknown. She reports it has been years and refuses.   Mammogram: Last Completed  Mammogram   - Patient no longer wants to do Pap smear, mammogram.   Bone Dexa scan: None  Colonoscopy: Last Completed Colonoscopy  - completed at least five years ago.  She has a history of colon polyps.   She has not had a follow up exam.     Recent Hospitalizations:  Recently treated at the following:  [facility] .  [facility]    Low back pain/right hip pain. Has seen [facility]. MRI Spine:   +++++  MRI (L-Spine Impression 08/27/2019: Multilevel degenerative disc disease and degenerative facet change resulting in mild spinal canal stenosis at the L3/4 level. There is also moderate left and mild right neural foraminal narrowing at the L3/4 level. There is additional multilevel mild neural foraminal narrowing but without definite nerve root compression. Indeterminate 1.3cm mass of the lateral portex of the mid right kidney. This may represent a complex cyst) Prior EMG: none Previous Surgery none Previous Injections: helped temporarily; [facility] Previous PT: none Previous Chiropractic care: helped temporarily; [facility]   +++++    Dr. Archer recent OV notes:    =========  - Chest discomfort and palpitations.-Improved     Stress Cardiolite test 10/15/2022  Lexiscan Cardiolite test is negative for myocardial ischemia.  Gated SPECT images revealed normal left ventricular size and contractility with ejection fraction of 62%.     Echocardiogram 10/15/2022  Structurally and functionally normal cardiac valves.  Left ventricular size and contractility is normal with ejection fraction of 60%.  Small pericardial effusion.     - Atrial flutter with rapid ventricular response.  Has converted to sinus rhythm 10/17/2022     -Chronic RBBB     - Anticoagulation-patient is on Eliquis     Cardiac cath 10/30/2020 revealed  Left ventricle size and contractility is normal with ejection fraction of 60%.  Left main coronary artery is normal.  Left anterior descending artery is normal.  Circumflex coronary  artery is normal.  Right coronary artery has mid segment 50% disease      - Diabetes hypertension COPD dyslipidemia     -Status post hysterectomy cholecystectomy and carpal tunnel surgery.     -Family history is positive for coronary artery disease.     -Smoker- abstinence from smoking was advised.     - No known allergies  ============  Plan  ========   Chest discomfort and palpitations.-Improved      Atrial flutter with rapid ventricular response.  Patient has converted to sinus rhythm.  Maintaining sinus rhythm     Chronic right bundle branch block-stable     Anticoagulation  Continue Eliquis 5 mg twice a day.  Observe for toxic effects.     Medications were reviewed and updated.     Abstinence from smoking.     Current medications include  Eliquis 5 mg twice a day diltiazem  mg a day gabapentin insulin metoprolol XL 25 mg a day.  Belsomra vs ambiem  Lamictal vs Rexulti/zoloft    +++  She goes to [facility].last appt June. She reports being unable to have her tramadol refilled.        Recent lab results if available were discussed as well as medications reconciled.  Genetic screening for cancers and conditions discussed if applicable.  Patient agrees with plan of care and will return as needed.  All questions answered patient agrees with plan of care.  Patient agrees to return in the near future for annual physical exam.  Allergies   Allergen Reactions    Codeine GI Intolerance         Current Outpatient Medications:     Accu-Chek Guide test strip, by Other route 4 (Four) Times a Day. Use to test blood sugar 4 times daily, Disp: , Rfl:     albuterol sulfate  (90 Base) MCG/ACT inhaler, Inhale 2 puffs Every 4 (Four) Hours As Needed for Wheezing or Shortness of Air., Disp: , Rfl:     amitriptyline (ELAVIL) 75 MG tablet, Take 1 tablet by mouth Every Night., Disp: , Rfl: 0    Brexpiprazole (Rexulti) 3 MG tablet, Take 1 tablet by mouth Daily., Disp: , Rfl:     dilTIAZem CD (CARDIZEM CD) 240 MG 24  hr capsule, Take 1 capsule by mouth Daily., Disp: , Rfl:     Eliquis 5 MG tablet tablet, Take 1 tablet by mouth Every 12 (Twelve) Hours., Disp: , Rfl:     gabapentin (NEURONTIN) 400 MG capsule, Take 1 capsule by mouth 4 (Four) Times a Day., Disp: , Rfl:     ibuprofen (ADVIL,MOTRIN) 800 MG tablet, Take 1 tablet by mouth 3 (Three) Times a Day., Disp: , Rfl:     insulin detemir (LEVEMIR) 100 UNIT/ML injection, Inject 50 Units under the skin into the appropriate area as directed Every Night., Disp: , Rfl: 12    insulin glulisine (Apidra) 100 UNIT/ML injection, Inject 5 Units under the skin into the appropriate area as directed 3 (Three) Times a Day Before Meals., Disp: , Rfl: 12    lisinopril (PRINIVIL,ZESTRIL) 20 MG tablet, Take 1 tablet by mouth Daily., Disp: , Rfl: 0    metFORMIN (GLUCOPHAGE) 1000 MG tablet, Take 1 tablet by mouth 2 (Two) Times a Day With Meals., Disp: , Rfl:     metoprolol tartrate (LOPRESSOR) 25 MG tablet, Take 1 tablet by mouth 2 (Two) Times a Day., Disp: , Rfl:     sertraline (ZOLOFT) 100 MG tablet, Take 1 tablet by mouth Daily., Disp: , Rfl: 0    zolpidem (AMBIEN) 5 MG tablet, Take 1 tablet by mouth At Night As Needed for Sleep., Disp: , Rfl:     Semaglutide, 1 MG/DOSE, (OZEMPIC) 2 MG/1.5ML solution pen-injector, Inject 1 mg under the skin into the appropriate area as directed 1 (One) Time Per Week., Disp: , Rfl:     Past Medical History:   Diagnosis Date    COPD (chronic obstructive pulmonary disease)     Depression     Diabetes mellitus     Hyperlipidemia     Hypertension        Review of Systems   Constitutional:  Positive for activity change and fatigue. Negative for appetite change, diaphoresis, fever, unexpected weight gain and unexpected weight loss.   HENT: Negative.     Eyes:  Positive for visual disturbance (glasses).   Respiratory:  Positive for shortness of breath. Negative for cough and wheezing.    Cardiovascular:  Negative for chest pain, palpitations and leg swelling.    Gastrointestinal:  Positive for constipation and diarrhea. Negative for abdominal pain, anal bleeding, blood in stool, nausea, vomiting, GERD and indigestion.   Endocrine: Negative for polydipsia and polyuria.   Genitourinary:  Negative for breast discharge, breast lump, breast pain, flank pain, frequency, pelvic pain, pelvic pressure, urgency, vaginal bleeding, vaginal discharge and vaginal pain.   Musculoskeletal:  Positive for back pain, gait problem and myalgias.   Skin: Negative.    Allergic/Immunologic: Negative for environmental allergies and food allergies.   Neurological:  Positive for dizziness, facial asymmetry, weakness, memory problem and confusion. Negative for headache.   Psychiatric/Behavioral:  Positive for sleep disturbance and depressed mood. Negative for self-injury, suicidal ideas and stress. The patient is nervous/anxious.      Social History     Socioeconomic History    Marital status:    Tobacco Use    Smoking status: Every Day     Packs/day: 1.00     Years: 20.00     Pack years: 20.00     Types: Cigarettes     Start date: 1983    Smokeless tobacco: Never   Vaping Use    Vaping Use: Some days    Substances: Nicotine, Flavoring    Devices: Refillable tank   Substance and Sexual Activity    Alcohol use: No    Sexual activity: Defer       Family History   Problem Relation Age of Onset    Heart disease Mother     Hypertension Mother     Diabetes Mother     Stroke Mother            8/14/2023     3:08 PM   PHQ-2/PHQ-9 Depression Screening   Little Interest or Pleasure in Doing Things 0-->not at all   Feeling Down, Depressed or Hopeless 0-->not at all   PHQ-9: Brief Depression Severity Measure Score 0       Fall Risk Screen:  KIANA Fall Risk Assessment was completed, and patient is at LOW risk for falls.Assessment completed on:8/14/2023      Objective     /71 (BP Location: Right arm, Patient Position: Sitting, Cuff Size: Large Adult)   Pulse 69   Temp 98.2 øF (36.8 øC) (Infrared)   " Resp 16   Ht 172.7 cm (68\")   Wt 82.6 kg (182 lb)   SpO2 97%   BMI 27.67 kg/mý     BP Readings from Last 2 Encounters:   08/14/23 112/71   11/08/22 122/73       Wt Readings from Last 2 Encounters:   08/14/23 82.6 kg (182 lb)   11/08/22 72.1 kg (159 lb)       BMI is within normal parameters. No other follow-up for BMI required.         Physical Exam  Vitals reviewed.   Constitutional:       General: She is not in acute distress.  HENT:      Head: Normocephalic.      Nose: Nose normal.      Mouth/Throat:      Mouth: Mucous membranes are moist.   Eyes:      Extraocular Movements: Extraocular movements intact.      Conjunctiva/sclera: Conjunctivae normal.      Pupils: Pupils are equal, round, and reactive to light.   Cardiovascular:      Rate and Rhythm: Normal rate and regular rhythm.      Pulses: Normal pulses.      Heart sounds: Normal heart sounds.   Pulmonary:      Effort: No respiratory distress.      Breath sounds: Normal breath sounds.   Abdominal:      General: Bowel sounds are normal. There is no distension.      Palpations: Abdomen is soft.      Tenderness: There is no abdominal tenderness.   Musculoskeletal:         General: Normal range of motion.      Cervical back: Neck supple.   Skin:     General: Skin is warm and dry.   Neurological:      Mental Status: She is alert and oriented to person, place, and time.   Psychiatric:         Mood and Affect: Mood normal.     Derm Physical Exam  Result Review :         XR Spine Lumbar 2 or 3 View    Result Date: 8/14/2023  Impression: Impression: 1. Advanced facet arthropathy is suggested on the right at L4-5. 2. Moderately advanced diminished disc height at L4-5 and L5-S1. 3. No acute lumbar spine findings. Electronically Signed: Paz Nick MD  8/14/2023 5:04 PM EDT  Workstation ID: [id number]    XR Hip With or Without Pelvis 2 - 3 View Right    Result Date: 8/14/2023  Impression: Impression: 1. No acute right hip finding. No significant right hip " osteoarthritic changes are identified. 2. Degenerative disc and endplate changes within the lower lumbar spine. Electronically Signed: Paz Nick MD  8/14/2023 4:59 PM EDT  Workstation ID: [id number]        Assessment and Plan     Procedures  Plan    Diagnoses and all orders for this visit:    1. Encounter to establish care (Primary)    2. Type 2 diabetes mellitus with hyperglycemia, without long-term current use of insulin  -     Microalbumin / Creatinine Urine Ratio - Urine, Clean Catch  -     POC Glycosylated Hemoglobin (Hb A1C)    3. Essential hypertension  -     Comprehensive Metabolic Panel  -     CBC & Differential    4. Dyslipidemia  -     Lipid Panel    5. Atrial fibrillation, unspecified type    6. Smoker  Comments:  Encourage smoking cessation  Orders:  -      CT Chest Low Dose Cancer Screening WO    7. Anxiety    8. Asthma, unspecified asthma severity, unspecified whether complicated, unspecified whether persistent    9. Degeneration of lumbar intervertebral disc    10. Chronic right hip pain  -     XR Hip With or Without Pelvis 2 - 3 View Right  -     XR Spine Lumbar 2 or 3 View    11. Right kidney mass  Comments:  1.3cm mass noted on MRI 7/2019.    12. Chronic right-sided low back pain with right-sided sciatica  -     XR Spine Lumbar 2 or 3 View    13. Abdominal mass, right upper quadrant  -     CT Abdomen Pelvis With & Without Contrast    14. Screening mammogram for breast cancer  -     Mammo Screening Digital Tomosynthesis Bilateral With CAD    15. Screening for osteoporosis  -     DEXA Bone Density Axial    16. Screening for colon cancer  Comments:  Gren packet given.    17. Encounter for hepatitis C screening test for low risk patient  -     Hepatitis C Antibody    18. Screening for thyroid disorder    19. Screening for lung cancer  -      CT Chest Low Dose Cancer Screening WO        Problem List Items Addressed This Visit          Active Problems    Anxiety    Asthma    Atrial fibrillation     Relevant Medications    dilTIAZem CD (CARDIZEM CD) 240 MG 24 hr capsule    metoprolol tartrate (LOPRESSOR) 25 MG tablet    Degeneration of lumbar intervertebral disc    Dyslipidemia    Overview     Added automatically from request for surgery [id number]         Relevant Orders    Lipid Panel    Essential hypertension    Overview     Added automatically from request for surgery [id number]         Relevant Medications    dilTIAZem CD (CARDIZEM CD) 240 MG 24 hr capsule    metoprolol tartrate (LOPRESSOR) 25 MG tablet    Other Relevant Orders    Comprehensive Metabolic Panel    CBC & Differential    Smoker    Relevant Orders     CT Chest Low Dose Cancer Screening WO    Type 2 diabetes mellitus with hyperglycemia    Relevant Medications    metFORMIN (GLUCOPHAGE) 1000 MG tablet    Semaglutide, 1 MG/DOSE, (OZEMPIC) 2 MG/1.5ML solution pen-injector    Other Relevant Orders    Microalbumin / Creatinine Urine Ratio - Urine, Clean Catch (Completed)    POC Glycosylated Hemoglobin (Hb A1C) (Completed)     Other Visit Diagnoses       Encounter to establish care    -  Primary    Chronic right hip pain        Relevant Orders    XR Hip With or Without Pelvis 2 - 3 View Right (Completed)    XR Spine Lumbar 2 or 3 View (Completed)    Right kidney mass        1.3cm mass noted on MRI 7/2019.    Chronic right-sided low back pain with right-sided sciatica        Relevant Orders    XR Spine Lumbar 2 or 3 View (Completed)    Abdominal mass, right upper quadrant        Relevant Orders    CT Abdomen Pelvis With & Without Contrast    Screening mammogram for breast cancer        Relevant Orders    Mammo Screening Digital Tomosynthesis Bilateral With CAD    Screening for osteoporosis        Relevant Orders    DEXA Bone Density Axial    Screening for colon cancer        Gren packet given.    Encounter for hepatitis C screening test for low risk patient        Relevant Orders    Hepatitis C Antibody    Screening for thyroid disorder        Screening for  lung cancer        Relevant Orders     CT Chest Low Dose Cancer Screening WO             Follow Up   Wrapup Tab  Return for Annual physical.   Patient Instructions   Please have blood work completed.  Orders are at [facility].    Complete information packet for colonoscopy  Please have mammogram, dexa scan and lung CT screenings completed.     Return for annual physical exam.  Continue plan of care as discussed.     Imaging as we discussed  Make appt with sleep study.        Patient was given instructions and counseling regarding her condition or for health maintenance advice. Please see specific information pulled into the AVS if appropriate.  Hand hygiene was performed during entrance to exam room and following assessment of patient. This document is intended for medical expert use only.     EMR Dragon/Transcription disclaimer:   Much of this encounter note is an electronic transcription/translation of spoken language to printed text. The electronic translation of spoken language may permit erroneous, or at times, nonsensical words or phrases to be inadvertently transcribed.      THOMAS Scanlon, FNP-C  [address]  [facility]  [address] [place] [address] [address]  [place] [address] [phone]

## 2023-08-14 NOTE — PATIENT INSTRUCTIONS
Please have blood work completed.  Orders are at [facility].    Complete information packet for colonoscopy  Please have mammogram, dexa scan and lung CT screenings completed.     Return for annual physical exam.  Continue plan of care as discussed.     Imaging as we discussed  Make appt with sleep study.

## 2023-08-15 LAB
ALBUMIN/CREAT UR: 31 MG/G CREAT (ref 0–29)
CREAT UR-MCNC: 64.3 MG/DL
MICROALBUMIN UR-MCNC: 20.1 UG/ML

## 2023-08-15 RX ORDER — DILTIAZEM HYDROCHLORIDE 240 MG/1
240 CAPSULE, COATED, EXTENDED RELEASE ORAL DAILY
Qty: 90 CAPSULE | Refills: 1 | Status: SHIPPED | OUTPATIENT
Start: 2023-08-15

## 2023-08-15 RX ORDER — SERTRALINE HYDROCHLORIDE 100 MG/1
100 TABLET, FILM COATED ORAL DAILY
Qty: 90 TABLET | Refills: 1 | Status: SHIPPED | OUTPATIENT
Start: 2023-08-15

## 2023-08-15 RX ORDER — LISINOPRIL 20 MG/1
20 TABLET ORAL DAILY
Qty: 90 TABLET | Refills: 0 | Status: SHIPPED | OUTPATIENT
Start: 2023-08-15

## 2023-08-15 RX ORDER — APIXABAN 5 MG/1
5 TABLET, FILM COATED ORAL EVERY 12 HOURS SCHEDULED
Qty: 180 TABLET | Refills: 1 | Status: SHIPPED | OUTPATIENT
Start: 2023-08-15

## 2023-08-15 RX ORDER — AMITRIPTYLINE HYDROCHLORIDE 75 MG/1
75 TABLET ORAL NIGHTLY
Qty: 90 TABLET | Refills: 1 | Status: SHIPPED | OUTPATIENT
Start: 2023-08-15

## 2023-08-15 RX ORDER — INSULIN GLULISINE 100 [IU]/ML
10 INJECTION, SOLUTION SUBCUTANEOUS
Qty: 9 ML | Refills: 3
Start: 2023-08-15

## 2023-08-15 RX ORDER — ZOLPIDEM TARTRATE 5 MG/1
5 TABLET ORAL NIGHTLY PRN
Qty: 30 TABLET | Refills: 0 | Status: SHIPPED | OUTPATIENT
Start: 2023-08-15

## 2023-08-15 RX ORDER — BREXPIPRAZOLE 3 MG/1
3 TABLET ORAL DAILY
Qty: 90 TABLET | Refills: 1 | Status: SHIPPED | OUTPATIENT
Start: 2023-08-15 | End: 2024-02-11

## 2023-08-16 ENCOUNTER — TELEPHONE (OUTPATIENT)
Dept: FAMILY MEDICINE CLINIC | Facility: CLINIC | Age: 68
End: 2023-08-16
Payer: MEDICAID

## 2023-08-16 NOTE — TELEPHONE ENCOUNTER
Caller: Arianna Blanco    Relationship to patient: Self    Best call back number: [phone]    Patient is needing:     MESSAGE RELAYED TO PATIENT.    SHE STATES SHE WOULD LIKE TO KNOW WHAT SHE IS SUPPOSED TO DO ABOUT THE XRAY OF THE HIP THAT WAS DONE.     WOULD LIKE A CALLBACK.

## 2023-08-16 NOTE — TELEPHONE ENCOUNTER
----- Message from THOMAS Scanlon sent at 8/15/2023  6:47 PM EDT -----  Please call the patient regarding her abnormal result.    The hip x-ray did not indicate any acute finding except for osteoarthritis and degenerative disc disease in her lumbar spine.    Please advise patient that the x-ray of her lower spine indicated some arthritis in her facets and some decreased disc height

## 2023-08-16 NOTE — TELEPHONE ENCOUNTER
HUB READ TO PATIENT AND SHE ASKED ABOUT THE TEST ON HER STOMACH     CAN YOU CALL AND ADVISE   Arianna Blanco (Self) [phone] (Mobile)

## 2023-08-17 ENCOUNTER — TELEPHONE (OUTPATIENT)
Dept: FAMILY MEDICINE CLINIC | Facility: CLINIC | Age: 68
End: 2023-08-17

## 2023-08-17 NOTE — TELEPHONE ENCOUNTER
Caller: Arianna Blanco    Relationship: Self    Best call back number: [phone]    Requested Prescriptions:   Requested Prescriptions      No prescriptions requested or ordered in this encounter    TRAMADOL 50 MG   (REQUESTING REFILL, BUT NO SHOWING ON CURRENT LIST. STATED PCP IS SUPPOSED TO BE TAKING OVER THIS PRESCRIPTION).     Pharmacy where request should be sent: [facility] #[id number] - [place], [place] - [address] [phone] [facility] [phone] FX     Last office visit with prescribing clinician: 8/14/2023   Last telemedicine visit with prescribing clinician: Visit date not found   Next office visit with prescribing clinician: 9/12/2023     Additional details provided by patient: PT IS OUT OF MEDICATION.     Does the patient have less than a 3 day supply:  [x] Yes  [] No    Would you like a call back once the refill request has been completed: [] Yes [x] No    If the office needs to give you a call back, can they leave a voicemail: [] Yes [] No    Agnes Rader Rep   08/17/23 15:20 EDT

## 2023-08-18 ENCOUNTER — TELEPHONE (OUTPATIENT)
Dept: FAMILY MEDICINE CLINIC | Facility: CLINIC | Age: 68
End: 2023-08-18
Payer: MEDICAID

## 2023-08-18 DIAGNOSIS — M47.816 ARTHRITIS OF FACET JOINT OF LUMBAR SPINE: ICD-10-CM

## 2023-08-18 DIAGNOSIS — M51.36 DEGENERATIVE DISC DISEASE, LUMBAR: Primary | ICD-10-CM

## 2023-08-18 DIAGNOSIS — G89.29 CHRONIC RIGHT HIP PAIN: ICD-10-CM

## 2023-08-18 DIAGNOSIS — Q76.49 DEFECT OF ENDPLATE OF VERTEBRA: ICD-10-CM

## 2023-08-18 DIAGNOSIS — M25.551 CHRONIC RIGHT HIP PAIN: ICD-10-CM

## 2023-08-18 NOTE — TELEPHONE ENCOUNTER
Spoke to pt. Information given.  Pt states she was being seen at [facility] but stopped going because she did not want to take hydrocodone.  She states she is okay with referral to Dr Thakur

## 2023-08-18 NOTE — TELEPHONE ENCOUNTER
PATIENT IS CALLING ABOUT HER METOPROLOL TARTRATE 25MG SHE WANTS TO KNOW IF SHE IS SUPPOSED TO TAKE THIS ALONG WITH HER OTHER BP MEDICATION OR IF SHE IS SUPPOSED TO REPLACE THE OTHERS WITH THIS ONE. PLEASE ADVISE.

## 2023-08-24 DIAGNOSIS — G47.00 INSOMNIA, UNSPECIFIED TYPE: ICD-10-CM

## 2023-08-24 RX ORDER — METOPROLOL SUCCINATE 25 MG/1
25 TABLET, EXTENDED RELEASE ORAL DAILY
Qty: 30 TABLET | Refills: 12 | Status: SHIPPED | OUTPATIENT
Start: 2023-08-24

## 2023-08-24 RX ORDER — LANCETS 28 GAUGE
1 EACH MISCELLANEOUS 4 TIMES DAILY
Qty: 120 EACH | Refills: 12 | Status: SHIPPED | OUTPATIENT
Start: 2023-08-24

## 2023-08-24 RX ORDER — ZOLPIDEM TARTRATE 5 MG/1
5 TABLET ORAL NIGHTLY PRN
Qty: 30 TABLET | Refills: 0 | Status: SHIPPED | OUTPATIENT
Start: 2023-08-24

## 2023-08-24 RX ORDER — APIXABAN 5 MG/1
5 TABLET, FILM COATED ORAL EVERY 12 HOURS SCHEDULED
Qty: 180 TABLET | Refills: 1 | Status: SHIPPED | OUTPATIENT
Start: 2023-08-24

## 2023-08-24 RX ORDER — BREXPIPRAZOLE 3 MG/1
3 TABLET ORAL DAILY
Qty: 90 TABLET | Refills: 1 | Status: SHIPPED | OUTPATIENT
Start: 2023-08-24 | End: 2024-02-20

## 2023-08-24 RX ORDER — DILTIAZEM HYDROCHLORIDE 240 MG/1
240 CAPSULE, COATED, EXTENDED RELEASE ORAL DAILY
Qty: 90 CAPSULE | Refills: 1 | Status: SHIPPED | OUTPATIENT
Start: 2023-08-24

## 2023-08-24 RX ORDER — SERTRALINE HYDROCHLORIDE 100 MG/1
100 TABLET, FILM COATED ORAL DAILY
Qty: 90 TABLET | Refills: 1 | Status: SHIPPED | OUTPATIENT
Start: 2023-08-24

## 2023-08-24 RX ORDER — LISINOPRIL 20 MG/1
20 TABLET ORAL DAILY
Qty: 90 TABLET | Refills: 0 | Status: SHIPPED | OUTPATIENT
Start: 2023-08-24

## 2023-08-24 RX ORDER — TIZANIDINE 4 MG/1
TABLET ORAL
COMMUNITY

## 2023-08-24 RX ORDER — BLOOD SUGAR DIAGNOSTIC
1 STRIP MISCELLANEOUS 4 TIMES DAILY
Qty: 120 EACH | Refills: 11 | Status: SHIPPED | OUTPATIENT
Start: 2023-08-24

## 2023-08-24 RX ORDER — METOPROLOL SUCCINATE 25 MG/1
25 TABLET, EXTENDED RELEASE ORAL DAILY
COMMUNITY
End: 2023-08-24

## 2023-08-24 RX ORDER — ALBUTEROL SULFATE 90 UG/1
2 AEROSOL, METERED RESPIRATORY (INHALATION) EVERY 4 HOURS PRN
Qty: 6.7 G | Refills: 2 | Status: SHIPPED | OUTPATIENT
Start: 2023-08-24

## 2023-08-24 RX ORDER — AMITRIPTYLINE HYDROCHLORIDE 75 MG/1
75 TABLET ORAL NIGHTLY
Qty: 90 TABLET | Refills: 1 | Status: SHIPPED | OUTPATIENT
Start: 2023-08-24

## 2023-08-24 RX ORDER — INSULIN GLULISINE 100 [IU]/ML
5 INJECTION, SOLUTION SUBCUTANEOUS
Qty: 5 ML | Refills: 0 | Status: SHIPPED | OUTPATIENT
Start: 2023-08-24

## 2023-08-24 RX ORDER — GABAPENTIN 300 MG/1
300 CAPSULE ORAL 4 TIMES DAILY
Qty: 120 CAPSULE | Refills: 0 | Status: SHIPPED | OUTPATIENT
Start: 2023-08-24

## 2023-08-28 NOTE — PROGRESS NOTES
"Subjective   History of Present Illness: Arianna Blanco is a 68 y.o. female is being seen for consultation today at the request of THOMAS Scanlon for low back pain and right hip and leg pain.  Patient reports chronic history of back pain for several years.  She had previously been in pain management years ago and underwent unknown injections that did help.  She has a more recent onset of right-sided posterior hip and buttock pain that does radiate down to the back of her knee that has been going on since April.  She describes no numbness and tingling.  Her pain is worse when she sits and cannot sit on that side long.  She has trouble bringing that leg in and out of a car.  She has undergone no physical therapy but has been placed on muscle relaxers and is on long-term gabapentin therapy and utilizing ibuprofen for symptoms.  She describes no bowel/bladder dysfunction or saddle anesthesia.    History of Present Illness    The following portions of the patient's history were reviewed and updated as appropriate: allergies, current medications, past family history, past medical history, past social history, past surgical history, and problem list.    Review of Systems   Constitutional:  Positive for activity change.   HENT: Negative.     Eyes: Negative.    Respiratory: Negative.     Cardiovascular: Negative.    Gastrointestinal: Negative.    Endocrine: Negative.    Genitourinary: Negative.    Musculoskeletal:  Positive for arthralgias, back pain (right hip) and myalgias.   Skin: Negative.    Allergic/Immunologic: Negative.    Neurological:  Weakness: bilateral legs.   Psychiatric/Behavioral:  Positive for sleep disturbance.    All other systems reviewed and are negative.    Objective     ./71   Pulse 74   Ht 172.7 cm (68\")   Wt 81.9 kg (180 lb 9.6 oz)   BMI 27.46 kg/mý    Body mass index is 27.46 kg/mý.    Vitals:    08/29/23 1045   PainSc:   9          Physical Exam  Neurologic Exam  Bilateral lower " extremity motor strength 5/5  Positive tenderness right SIJ sulcus  Positive Palmer's maneuver  Positive SI compression  Positive facet loading maneuvers    Assessment & Plan   Independent Review of Radiographic Studies:      I personally reviewed the images from the following studies.    Lumbar x-rays 8/14/2023    There is prominent marginal spurring and facet arthropathy on the right at L4-5.     Medical Decision Making:      Arianna Corona a 68 y.o. female with medical history significant for atrial fibrillation on Eliquis, DM type II with last A1c of 9, COPD and current tobacco use who is presenting to clinic today as a new patient for a component of axial back pain and more recent onset of what appears to be more SI joint related versus lumbar radiculopathy.  Patient had no motor weakness on testing and reported no red flag symptoms.  Imaging does show degenerative changes especially along the right at L4-L5 that may be responsible for her symptoms but again she was positive on her SI joint provocative test and her complaints are seemingly more SI joint related.  Patient is not an ideal surgical candidate with her comorbidities and would ideally like to continue with optimizing conservative measures to control her symptoms and I do agree.  I would like her to undergo a course of physical therapy targeting SI joint as well as a diagnostic and therapeutic SI joint injection.  We will follow-up afterwards.  Should patient not receive any benefit from her SI joint injection, further imaging to look further at the neural elements may be warranted.  I encouraged to call the office with questions or concerns.          Diagnoses and all orders for this visit:    1. Sacroiliac joint dysfunction (Primary)  -     Ambulatory Referral to Physical Therapy Evaluate and treat  -     SI Joint Injection    2. Lumbar spondylosis  -     Cancel: MRI Lumbar Spine Without Contrast; Future    3. Lumbar facet joint  pain      Return for Next scheduled follow up.    This patient was examined wearing appropriate personal protective equipment.     Arianna Blanco  reports that she has been smoking cigarettes. She started smoking about 40 years ago. She has a 20.00 pack-year smoking history. She has never used smokeless tobacco.. I have educated her on the risk of diseases from using tobacco products such as cancer, COPD, and heart disease.     I advised her to quit and she is not willing to quit.    I spent 3  minutes counseling the patient.         Body mass index is 27.46 kg/mý.    BMI is >= 25 and <30. (Overweight) The following options were offered after discussion;: exercise counseling/recommendations and nutrition counseling/recommendations    Patient's blood pressure was reviewed.  Recommendations for  a low-salt diet and exercise to maintain/improve BP in addition to taking any presribed medications.    Advance Care Planning   ACP discussion was held with the patient during this visit. Patient has an advance directive (not in EMR), copy requested.         THOMAS Mosher  08/29/23  11:37 EDT

## 2023-08-28 NOTE — PROGRESS NOTES
Patient was notified of results and can not get into pain management until November. She is wanting to know if you will prescribe her tramadol until then

## 2023-08-29 ENCOUNTER — TELEPHONE (OUTPATIENT)
Dept: FAMILY MEDICINE CLINIC | Facility: CLINIC | Age: 68
End: 2023-08-29
Payer: MEDICAID

## 2023-08-29 ENCOUNTER — OFFICE VISIT (OUTPATIENT)
Dept: NEUROSURGERY | Facility: CLINIC | Age: 68
End: 2023-08-29
Payer: MEDICAID

## 2023-08-29 VITALS
SYSTOLIC BLOOD PRESSURE: 131 MMHG | WEIGHT: 180.6 LBS | HEIGHT: 68 IN | HEART RATE: 74 BPM | DIASTOLIC BLOOD PRESSURE: 71 MMHG | BODY MASS INDEX: 27.37 KG/M2

## 2023-08-29 DIAGNOSIS — M53.3 SACROILIAC JOINT DYSFUNCTION: Primary | ICD-10-CM

## 2023-08-29 DIAGNOSIS — M54.59 LUMBAR FACET JOINT PAIN: ICD-10-CM

## 2023-08-29 DIAGNOSIS — M47.816 LUMBAR SPONDYLOSIS: ICD-10-CM

## 2023-09-05 ENCOUNTER — HOSPITAL ENCOUNTER (OUTPATIENT)
Dept: BONE DENSITY | Facility: HOSPITAL | Age: 68
Discharge: HOME | End: 2023-09-05
Payer: MEDICAID

## 2023-09-05 ENCOUNTER — HOSPITAL ENCOUNTER (OUTPATIENT)
Dept: CT IMAGING | Facility: HOSPITAL | Age: 68
Discharge: HOME | End: 2023-09-05
Payer: MEDICAID

## 2023-09-05 LAB
CREAT BLDA-MCNC: 0.7 MG/DL (ref 0.6–1.3)
EGFRCR SERPLBLD CKD-EPI 2021: 94.9 ML/MIN/1.73

## 2023-09-05 PROCEDURE — 25510000001 IOPAMIDOL PER 1 ML

## 2023-09-05 PROCEDURE — 77080 DXA BONE DENSITY AXIAL: CPT

## 2023-09-05 PROCEDURE — 82565 ASSAY OF CREATININE: CPT

## 2023-09-05 PROCEDURE — 71271 CT THORAX LUNG CANCER SCR C-: CPT

## 2023-09-05 PROCEDURE — 74178 CT ABD&PLV WO CNTR FLWD CNTR: CPT

## 2023-09-05 RX ADMIN — IOPAMIDOL 100 ML: 755 INJECTION, SOLUTION INTRAVENOUS at 11:50

## 2023-09-09 LAB
ALBUMIN SERPL-MCNC: 4.5 G/DL (ref 3.9–4.9)
ALBUMIN/GLOB SERPL: 1.9 {RATIO} (ref 1.2–2.2)
ALP SERPL-CCNC: 142 IU/L (ref 44–121)
ALT SERPL-CCNC: 32 IU/L (ref 0–32)
AST SERPL-CCNC: 29 IU/L (ref 0–40)
BASOPHILS # BLD AUTO: 0.1 X10E3/UL (ref 0–0.2)
BASOPHILS NFR BLD AUTO: 1 %
BILIRUB SERPL-MCNC: <0.2 MG/DL (ref 0–1.2)
BUN SERPL-MCNC: 9 MG/DL (ref 8–27)
BUN/CREAT SERPL: 13 (ref 12–28)
CALCIUM SERPL-MCNC: 9.2 MG/DL (ref 8.7–10.3)
CHLORIDE SERPL-SCNC: 96 MMOL/L (ref 96–106)
CHOLEST SERPL-MCNC: 235 MG/DL (ref 100–199)
CO2 SERPL-SCNC: 23 MMOL/L (ref 20–29)
CREAT SERPL-MCNC: 0.67 MG/DL (ref 0.57–1)
EGFRCR SERPLBLD CKD-EPI 2021: 96 ML/MIN/1.73
EOSINOPHIL # BLD AUTO: 0.2 X10E3/UL (ref 0–0.4)
EOSINOPHIL NFR BLD AUTO: 4 %
ERYTHROCYTE [DISTWIDTH] IN BLOOD BY AUTOMATED COUNT: 12.6 % (ref 11.7–15.4)
GLOBULIN SER CALC-MCNC: 2.4 G/DL (ref 1.5–4.5)
GLUCOSE SERPL-MCNC: 322 MG/DL (ref 70–99)
HCT VFR BLD AUTO: 39.7 % (ref 34–46.6)
HCV IGG SERPL QL IA: NON REACTIVE
HDLC SERPL-MCNC: 44 MG/DL
HGB BLD-MCNC: 13.3 G/DL (ref 11.1–15.9)
IMM GRANULOCYTES # BLD AUTO: 0 X10E3/UL (ref 0–0.1)
IMM GRANULOCYTES NFR BLD AUTO: 0 %
LDLC SERPL CALC-MCNC: 110 MG/DL (ref 0–99)
LYMPHOCYTES # BLD AUTO: 1.7 X10E3/UL (ref 0.7–3.1)
LYMPHOCYTES NFR BLD AUTO: 29 %
MCH RBC QN AUTO: 30.5 PG (ref 26.6–33)
MCHC RBC AUTO-ENTMCNC: 33.5 G/DL (ref 31.5–35.7)
MCV RBC AUTO: 91 FL (ref 79–97)
MONOCYTES # BLD AUTO: 0.4 X10E3/UL (ref 0.1–0.9)
MONOCYTES NFR BLD AUTO: 7 %
NEUTROPHILS # BLD AUTO: 3.5 X10E3/UL (ref 1.4–7)
NEUTROPHILS NFR BLD AUTO: 59 %
PLATELET # BLD AUTO: 142 X10E3/UL (ref 150–450)
POTASSIUM SERPL-SCNC: 4.4 MMOL/L (ref 3.5–5.2)
PROT SERPL-MCNC: 6.9 G/DL (ref 6–8.5)
RBC # BLD AUTO: 4.36 X10E6/UL (ref 3.77–5.28)
SODIUM SERPL-SCNC: 135 MMOL/L (ref 134–144)
TRIGL SERPL-MCNC: 468 MG/DL (ref 0–149)
VLDLC SERPL CALC-MCNC: 81 MG/DL (ref 5–40)
WBC # BLD AUTO: 6 X10E3/UL (ref 3.4–10.8)

## 2023-09-12 ENCOUNTER — OFFICE VISIT (OUTPATIENT)
Dept: FAMILY MEDICINE CLINIC | Facility: CLINIC | Age: 68
End: 2023-09-12
Payer: MEDICAID

## 2023-09-12 VITALS
OXYGEN SATURATION: 93 % | HEIGHT: 68 IN | HEART RATE: 82 BPM | WEIGHT: 177 LBS | RESPIRATION RATE: 18 BRPM | BODY MASS INDEX: 26.83 KG/M2 | DIASTOLIC BLOOD PRESSURE: 82 MMHG | SYSTOLIC BLOOD PRESSURE: 178 MMHG

## 2023-09-12 DIAGNOSIS — R16.1 SPLENOMEGALY: ICD-10-CM

## 2023-09-12 DIAGNOSIS — Z59.9 FINANCIAL DIFFICULTIES: ICD-10-CM

## 2023-09-12 DIAGNOSIS — E27.8 RIGHT ADRENAL MASS: ICD-10-CM

## 2023-09-12 DIAGNOSIS — E11.9 ENCOUNTER FOR DIABETIC FOOT EXAM: ICD-10-CM

## 2023-09-12 DIAGNOSIS — I10 HYPERTENSION, UNSPECIFIED TYPE: ICD-10-CM

## 2023-09-12 DIAGNOSIS — Z00.00 ANNUAL PHYSICAL EXAM: Primary | ICD-10-CM

## 2023-09-12 DIAGNOSIS — N28.89 RIGHT RENAL MASS: ICD-10-CM

## 2023-09-12 DIAGNOSIS — K76.0 HEPATIC STEATOSIS: ICD-10-CM

## 2023-09-12 DIAGNOSIS — M85.852 OSTEOPENIA OF NECK OF LEFT FEMUR: ICD-10-CM

## 2023-09-12 DIAGNOSIS — R16.0 HEPATOMEGALY: ICD-10-CM

## 2023-09-12 RX ORDER — OYSTER SHELL CALCIUM WITH VITAMIN D 500; 200 MG/1; [IU]/1
2 TABLET, FILM COATED ORAL DAILY
Qty: 180 TABLET | Refills: 3 | Status: SHIPPED | OUTPATIENT
Start: 2023-09-12

## 2023-09-12 RX ORDER — TIZANIDINE 4 MG/1
4 TABLET ORAL EVERY 12 HOURS PRN
Qty: 180 TABLET | Refills: 0 | Status: SHIPPED | OUTPATIENT
Start: 2023-09-12

## 2023-09-12 SDOH — ECONOMIC STABILITY - INCOME SECURITY: PROBLEM RELATED TO HOUSING AND ECONOMIC CIRCUMSTANCES, UNSPECIFIED: Z59.9

## 2023-09-12 NOTE — PROGRESS NOTES
Office Note     Name: Arianna Blanco    : 1955     MRN: [id number]     Chief Complaint  Wellness Check and Diabetes    Subjective     History of Present Illness:  Arianna Blanco is a 68 y.o. female who presents today for Annual physical exam.  History of Present Illness  Patient is here for a wellness check and would like to discus her recent DEXA scan and CT scan results. She is also following up on her diabetes. She does not check her blood sugar at home and does not follow a healthy diet plan.   Diabetes  She presents for her follow-up diabetic visit. She has type 2 diabetes mellitus. Hypoglycemia symptoms include confusion, dizziness and nervousness/anxiousness. There are no diabetic associated symptoms. Pertinent negatives for diabetes include no chest pain, no polydipsia, no polyuria and no weight loss. There are no hypoglycemic complications. Symptoms are stable. There are no diabetic complications. Current diabetic treatment includes insulin injections and oral agent (dual therapy). She is following a generally unhealthy diet. When asked about meal planning, she reported none. She never participates in exercise. She does not see a podiatrist.Eye exam is current.     Allergies   Allergen Reactions    Codeine GI Intolerance         Current Outpatient Medications:     Accu-Chek Guide test strip, 1 each by Other route 4 (Four) Times a Day. Use to test blood sugar 4 times daily, Disp: 120 each, Rfl: 11    albuterol sulfate  (90 Base) MCG/ACT inhaler, Inhale 2 puffs Every 4 (Four) Hours As Needed for Wheezing or Shortness of Air., Disp: 6.7 g, Rfl: 2    amitriptyline (ELAVIL) 75 MG tablet, Take 1 tablet by mouth Every Night., Disp: 90 tablet, Rfl: 1    Brexpiprazole (Rexulti) 3 MG tablet, Take 1 tablet by mouth Daily for 180 days., Disp: 90 tablet, Rfl: 1    dilTIAZem CD (CARDIZEM CD) 240 MG 24 hr capsule, Take 1 capsule by mouth Daily., Disp: 90 capsule, Rfl: 1    Eliquis 5 MG tablet  tablet, Take 1 tablet by mouth Every 12 (Twelve) Hours., Disp: 180 tablet, Rfl: 1    gabapentin (NEURONTIN) 300 MG capsule, Take 1 capsule by mouth 4 (Four) Times a Day., Disp: 120 capsule, Rfl: 0    insulin detemir (LEVEMIR) 100 UNIT/ML injection, Inject 50 Units under the skin into the appropriate area as directed Every Night., Disp: 5 mL, Rfl: 12    insulin glulisine (Apidra) 100 UNIT/ML injection, Inject 5 Units under the skin into the appropriate area as directed 3 (Three) Times a Day Before Meals., Disp: 5 mL, Rfl: 0    Lancets (freestyle) lancets, 1 each by Other route 4 (Four) Times a Day., Disp: 120 each, Rfl: 12    lisinopril (PRINIVIL,ZESTRIL) 20 MG tablet, Take 1 tablet by mouth Daily., Disp: 90 tablet, Rfl: 0    metFORMIN (GLUCOPHAGE) 1000 MG tablet, Take 1 tablet by mouth 2 (Two) Times a Day With Meals., Disp: 180 tablet, Rfl: 3    metoprolol succinate XL (TOPROL-XL) 25 MG 24 hr tablet, Take 1 tablet by mouth Daily., Disp: 30 tablet, Rfl: 12    Semaglutide, 1 MG/DOSE, (OZEMPIC) 2 MG/1.5ML solution pen-injector, Inject 1 mg under the skin into the appropriate area as directed 1 (One) Time Per Week., Disp: 3 mL, Rfl: 1    sertraline (ZOLOFT) 100 MG tablet, Take 1 tablet by mouth Daily., Disp: 90 tablet, Rfl: 1    tiZANidine (ZANAFLEX) 4 MG tablet, Take 1 tablet by mouth Every 12 (Twelve) Hours As Needed for Muscle Spasms., Disp: 180 tablet, Rfl: 0    zolpidem (AMBIEN) 5 MG tablet, Take 1 tablet by mouth At Night As Needed for Sleep., Disp: 30 tablet, Rfl: 0    Calcium Carbonate-Vitamin D 500-5 MG-MCG tablet, Take 2 tablets by mouth Daily., Disp: 180 tablet, Rfl: 3    Past Medical History:   Diagnosis Date    COPD (chronic obstructive pulmonary disease)     Depression     Diabetes mellitus     Hyperlipidemia     Hypertension        Review of Systems   Constitutional:  Positive for activity change. Negative for appetite change, diaphoresis, fever, unexpected weight gain and unexpected weight loss.   HENT:  Negative.     Eyes:  Positive for visual disturbance (glasses).   Respiratory:  Positive for shortness of breath. Negative for cough and wheezing.    Cardiovascular:  Negative for chest pain, palpitations and leg swelling.   Gastrointestinal:  Positive for constipation and diarrhea. Negative for abdominal pain, anal bleeding, blood in stool, nausea, vomiting, GERD and indigestion.   Endocrine: Negative for polydipsia and polyuria.   Genitourinary:  Negative for breast discharge, breast lump, breast pain, flank pain, frequency, pelvic pain, pelvic pressure, urgency, vaginal bleeding, vaginal discharge and vaginal pain.   Musculoskeletal:  Positive for back pain, gait problem and myalgias.   Skin: Negative.    Allergic/Immunologic: Negative for environmental allergies and food allergies.   Neurological:  Positive for dizziness, facial asymmetry, memory problem and confusion. Negative for headache.   Psychiatric/Behavioral:  Positive for sleep disturbance and depressed mood. Negative for self-injury, suicidal ideas and stress. The patient is nervous/anxious.      Social History     Socioeconomic History    Marital status:    Tobacco Use    Smoking status: Every Day     Packs/day: 1.00     Years: 20.00     Pack years: 20.00     Types: Cigarettes     Start date: 1983    Smokeless tobacco: Never   Vaping Use    Vaping Use: Some days    Substances: Nicotine, Flavoring    Devices: Refillable tank   Substance and Sexual Activity    Alcohol use: No    Sexual activity: Defer       Family History   Problem Relation Age of Onset    Heart disease Mother     Hypertension Mother     Diabetes Mother     Stroke Mother            8/14/2023     3:08 PM   PHQ-2/PHQ-9 Depression Screening   Little Interest or Pleasure in Doing Things 0-->not at all   Feeling Down, Depressed or Hopeless 0-->not at all   PHQ-9: Brief Depression Severity Measure Score 0       Fall Risk Screen:  [facility], and patient  "is at LOW risk for falls.Assessment completed on:8/14/2023      Objective     /82 (BP Location: Right arm, Patient Position: Sitting, Cuff Size: Adult)   Pulse 82   Resp 18   Ht 172.7 cm (67.99\")   Wt 80.3 kg (177 lb)   SpO2 93%   BMI 26.92 kg/m²     BP Readings from Last 2 Encounters:   09/12/23 178/82   08/29/23 131/71       Wt Readings from Last 2 Encounters:   09/12/23 80.3 kg (177 lb)   08/29/23 81.9 kg (180 lb 9.6 oz)       BMI is >= 25 and <30. (Overweight) The following options were offered after discussion;: exercise counseling/recommendations and nutrition counseling/recommendations         Physical Exam  Vitals reviewed.   Constitutional:       General: She is not in acute distress.  HENT:      Head: Normocephalic.      Nose: Nose normal.      Mouth/Throat:      Mouth: Mucous membranes are moist.   Eyes:      Extraocular Movements: Extraocular movements intact.      Conjunctiva/sclera: Conjunctivae normal.      Pupils: Pupils are equal, round, and reactive to light.   Cardiovascular:      Rate and Rhythm: Normal rate and regular rhythm.      Pulses: Normal pulses.      Heart sounds: Normal heart sounds.   Pulmonary:      Effort: Pulmonary effort is normal. No respiratory distress.      Breath sounds: Normal breath sounds.   Abdominal:      General: Bowel sounds are normal. There is no distension.      Palpations: Abdomen is soft.      Tenderness: There is no abdominal tenderness.   Musculoskeletal:         General: Normal range of motion.      Cervical back: Normal range of motion and neck supple.      Right lower leg: No edema.      Left lower leg: No edema.   Skin:     General: Skin is warm and dry.   Neurological:      Mental Status: She is alert and oriented to person, place, and time. Mental status is at baseline.   Psychiatric:         Attention and Perception: Attention normal.         Mood and Affect: Mood normal.         Behavior: Behavior normal. Behavior is cooperative.     Derm " Physical Exam  Result Review :         CT Abdomen Pelvis With & Without Contrast    Result Date: 9/5/2023  Impression: Impression: 1. Hepatic steatosis and hepatomegaly. 2. Mild splenomegaly. 3. Additional findings as given above. Electronically Signed: Mario Alberto Lazo MD  9/5/2023 4:55 PM EDT  Workstation ID: [id number]    DEXA Bone Density Axial    Result Date: 9/5/2023  Impression: Osteopenia. Based upon the National Osteoporosis Foundation Guide, patient's FRAX score does meet criteria for pharmacologic treatment to prevent Osteoporosis. Individual treatment decisions should be made based upon each patient's full clinical history and risk factors.   Copies of the computerized summary reports can be obtained from [employer] via the health information department of [facility].   Electronically Signed By-Bhupinder Camarena MD On:9/5/2023 10:32 AM       CT Chest Low Dose Cancer Screening WO    Result Date: 9/5/2023  Impression: Impression: 1.No evidence of lung cancer. 2.Small pulmonary nodules measuring up to 3 mm, likely benign. 3.No acute cardiopulmonary process. 4.Minimal emphysema. Recommendation: Continue annual screening with LDCT Lung Rads Assessment: Lung-RADS L2 - Benign appearance or <1% chance of malignancy. Electronically Signed: Manoj Cheung MD  9/5/2023 5:13 PM EDT  Workstation ID: [id number]        Assessment and Plan     Procedures  Plan    Diagnoses and all orders for this visit:    1. Annual physical exam (Primary)    2. Hepatomegaly  -     Ambulatory Referral to Gastroenterology    3. Hepatic steatosis  -     Ambulatory Referral to Gastroenterology    4. Right adrenal mass  Comments:  Noted on Ct abd pelvis 9/5/23. Noted to be probable adenoma.  Orders:  -     Ambulatory Referral to Urology    5. Splenomegaly  Comments:  mild  Orders:  -     Ambulatory Referral to Gastroenterology    6. Right renal mass  -     Ambulatory Referral to Urology    7. Osteopenia of neck of left femur  -     Calcium  Carbonate-Vitamin D 500-5 MG-MCG tablet; Take 2 tablets by mouth Daily.  Dispense: 180 tablet; Refill: 3    8. Hypertension, unspecified type  -     Cancel: POCT urinalysis dipstick, automated    9. Encounter for diabetic foot exam  Comments:  wnl per Tova Cho    10. Financial difficulties  -     Ambulatory Referral to Social Care Services (Amb Case Mgmt)    Other orders  -     Cancel: Shingrix Vaccine  -     tiZANidine (ZANAFLEX) 4 MG tablet; Take 1 tablet by mouth Every 12 (Twelve) Hours As Needed for Muscle Spasms.  Dispense: 180 tablet; Refill: 0        Problem List Items Addressed This Visit          Active Problems    Hepatic steatosis    Relevant Orders    Ambulatory Referral to Gastroenterology    Hepatomegaly    Relevant Orders    Ambulatory Referral to Gastroenterology    Osteopenia of neck of left femur    Relevant Medications    Calcium Carbonate-Vitamin D 500-5 MG-MCG tablet    Right adrenal mass    Overview     Noted on Ct abd pelvis 9/5/23. Noted to be probable adenoma.         Relevant Orders    Ambulatory Referral to Urology    Right renal mass    Relevant Orders    Ambulatory Referral to Urology    Splenomegaly    Overview     mild         Relevant Orders    Ambulatory Referral to Gastroenterology     Other Visit Diagnoses       Annual physical exam    -  Primary    Hypertension, unspecified type        Encounter for diabetic foot exam        wnl per Tova Cho    Financial difficulties        Relevant Orders    Ambulatory Referral to Social Care Services (Amb Case Mgmt)             Follow Up   Wrapup Tab  No follow-ups on file.   Patient Instructions   [address] [facility]    Patient was given instructions and counseling regarding her condition or for health maintenance advice. Please see specific information pulled into the AVS if appropriate.  Hand hygiene was performed during entrance to exam room and following assessment of patient. This document is  intended for medical expert use only.     EMR Dragon/Transcription disclaimer:   Much of this encounter note is an electronic transcription/translation of spoken language to printed text. The electronic translation of spoken language may permit erroneous, or at times, nonsensical words or phrases to be inadvertently transcribed.      THOMAS Scanlon, FNP-C  [address]  [facility]  [address] [place] [address] [address]  [facility] [address] [phone]

## 2023-09-13 ENCOUNTER — REFERRAL TRIAGE (OUTPATIENT)
Dept: CASE MANAGEMENT | Facility: CLINIC | Age: 68
End: 2023-09-13
Payer: MEDICAID

## 2023-09-14 ENCOUNTER — PATIENT OUTREACH (OUTPATIENT)
Dept: CASE MANAGEMENT | Facility: CLINIC | Age: 68
End: 2023-09-14
Payer: MEDICAID

## 2023-09-14 DIAGNOSIS — G47.00 INSOMNIA, UNSPECIFIED TYPE: ICD-10-CM

## 2023-09-14 RX ORDER — ZOLPIDEM TARTRATE 5 MG/1
5 TABLET ORAL NIGHTLY PRN
Qty: 30 TABLET | Refills: 0 | OUTPATIENT
Start: 2023-09-14

## 2023-09-14 NOTE — OUTREACH NOTE
Social Work Assessment  Questions/Answers      Flowsheet Row Most Recent Value   Referral Source outpatient staff, outpatient clinic, physician   Reason for Consult financial concerns, other (see comments)  [BP Machine]   Preferred Language English   Advance Care Planning Reviewed no concerns identified, verified with patient   Decision Making Considerations patient/family ability to make health care decisions, patient/family management of healthcare needs, patient/ for healthcare needs, patient/family capacity to make sound judgments  [Reprots Son is POA. No documents on File. Advised patient to bring in paperwork to scan in, as time permits.]   People in Home alone   Current Living Arrangements --  [has elevator]   Potentially Unsafe Housing Conditions none   Primary Care Provided by self   Provides Primary Care For no one   Family Caregiver if Needed child(dilia), adult  [son]   Quality of Family Relationships helpful, involved   Able to Return to Prior Arrangements yes   Employment Status retired   Source of Income social security   Application for Public Assistance applied  [SNAP]   Usual Activity Tolerance fair   Current Activity Tolerance fair   Medications independent   Meal Preparation independent   Housekeeping independent   Laundry independent   Shopping assistive person          SDOH updated and reviewed with the patient during this program:  Financial Resource Strain: Medium Risk    Difficulty of Paying Living Expenses: Somewhat hard      Food Insecurity: No Food Insecurity    Worried About Running Out of Food in the Last Year: Never true    Ran Out of Food in the Last Year: Never true      Transportation Needs: No Transportation Needs    Lack of Transportation (Medical): No    Lack of Transportation (Non-Medical): No      Housing Stability: Low Risk     Unable to Pay for Housing in the Last Year: No    Number of Places Lived in the Last Year: 1    Unstable Housing in the Last Year: No       Patient Outreach    SW received referral via PCP re: financial concerns and BP cuff. SW called and spoke to patient. Patient lives in alone in an apartment. Son provided transportation, as needed. Patient reports she is not behind on any bills, but is in need of financial assistance. SW offered to provide resources over the phone, however, patient reports she is out and unable to write them down. Patient request resources be mailed to the home. PCP notified and agreeable to mail resource information to patient home. Verified address via demographics. SW to determine if [place] Medicaid offers OTC supplies in order to obtain BP monitor. Will follow up in approximately one week to determine if patient received resource list.     Continuing Care   [facility]   [facility]    [address] [place] [address]    Phone: [phone]    Resource for: Financial Resource Strain   [facility]    [address] [place] [address] [place] [address]    Phone: [phone]    Resource for: Financial Resource Strain   [facility]    [address] [facility] [place] [address]    Phone: [phone]    Resource for: Financial Resource Strain     Pauline ALEXANDER -   Ambulatory Case Management    9/14/2023, 10:40 EDT

## 2023-09-21 DIAGNOSIS — G47.00 INSOMNIA, UNSPECIFIED TYPE: ICD-10-CM

## 2023-09-25 ENCOUNTER — PATIENT OUTREACH (OUTPATIENT)
Dept: CASE MANAGEMENT | Facility: CLINIC | Age: 68
End: 2023-09-25

## 2023-09-25 RX ORDER — ZOLPIDEM TARTRATE 5 MG/1
5 TABLET ORAL NIGHTLY PRN
Qty: 30 TABLET | Refills: 0 | Status: SHIPPED | OUTPATIENT
Start: 2023-09-25

## 2023-09-25 NOTE — OUTREACH NOTE
Patient Outreach    SW called and spoke to patient re: resources provided during last outreach. Patient reports she did not receive resources via mail. BRICE notified PCP office. Next outreach scheduled x2 days.     Pauline AMADO -   Ambulatory Case Management    9/25/2023, 14:48 EDT

## 2023-09-26 RX ORDER — ALBUTEROL SULFATE 90 UG/1
AEROSOL, METERED RESPIRATORY (INHALATION)
Qty: 6.7 G | Refills: 2 | Status: SHIPPED | OUTPATIENT
Start: 2023-09-26

## 2023-09-28 ENCOUNTER — TELEPHONE (OUTPATIENT)
Dept: NEUROSURGERY | Facility: CLINIC | Age: 68
End: 2023-09-28
Payer: MEDICAID

## 2023-09-28 NOTE — TELEPHONE ENCOUNTER
Caller: Arianna Blanco    Relationship: Self    Best call back numbe [phone]    Requested Prescriptions:   Requested Prescriptions     Pending Prescriptions Disp Refills    amitriptyline (ELAVIL) 75 MG tablet 90 tablet 1     Sig: Take 1 tablet by mouth Every Night.    IBPROFEN 800 MG    Pharmacy where request should be sent: [facility]/PHARMACY #[id number] - [place], [place] - [address] - [phone]  - [phone] FX     Last office visit with prescribing clinician: 9/12/2023   Last telemedicine visit with prescribing clinician: Visit date not found   Next office visit with prescribing clinician: 12/15/2023     Additional details provided by patient: OUT    Does the patient have less than a 3 day supply:  [x] Yes  [] No    Would you like a call back once the refill request has been completed: [] Yes [] No    If the office needs to give you a call back, can they leave a voicemail: [] Yes [] No    Fracisco Schaeffer   09/28/23 15:51 EDT

## 2023-09-28 NOTE — TELEPHONE ENCOUNTER
PATIENT CALLED IN AND STATES SHE IS IN LOTS OF PAIN, SHE IS NOT SCHEDULED TO SEE PAIN MANAGEMENT UNTIL NOVEMBER AND SHE IS NOT IN ANY PAIN MEDICINE.  PATIENT WANTED TO SCHEDULE FOLLOW UP TO SEE [facility] BUT PER LAST OVN SHE HAS TO GET SHOT BEFORE FOLLOW UP.    PLEASE CALL PATIENT WITH ANY ADVICE.    THANK YOU!

## 2023-09-29 ENCOUNTER — PATIENT OUTREACH (OUTPATIENT)
Dept: CASE MANAGEMENT | Facility: CLINIC | Age: 68
End: 2023-09-29
Payer: MEDICAID

## 2023-09-29 RX ORDER — AMITRIPTYLINE HYDROCHLORIDE 75 MG/1
75 TABLET ORAL NIGHTLY
Qty: 90 TABLET | Refills: 1 | Status: SHIPPED | OUTPATIENT
Start: 2023-09-29

## 2023-09-29 NOTE — OUTREACH NOTE
Patient Outreach    SW called and spoke to patient re: resources. Patient reports she has not received anything via mail, at this time. SW inquired if patient would like to write down information instead - patient declined. SW notified PCP office to re-mail resources to patient home. PCP agreeable. Will continue to follow and assist, as appropriate.     Pauline AMADO -   Ambulatory Case Management    9/29/2023, 14:49 EDT

## 2023-10-02 RX ORDER — DEXAMETHASONE 2 MG/1
TABLET ORAL
Qty: 51 TABLET | Refills: 0 | Status: SHIPPED | OUTPATIENT
Start: 2023-10-02

## 2023-10-12 ENCOUNTER — TELEPHONE (OUTPATIENT)
Dept: FAMILY MEDICINE CLINIC | Facility: CLINIC | Age: 68
End: 2023-10-12
Payer: MEDICAID

## 2023-10-12 PROBLEM — N20.0 NEPHROLITHIASIS: Status: ACTIVE | Noted: 2023-10-12

## 2023-10-12 RX ORDER — IBUPROFEN 800 MG/1
800 TABLET ORAL 3 TIMES DAILY
Qty: 90 TABLET | Refills: 1 | OUTPATIENT
Start: 2023-10-12

## 2023-10-12 NOTE — TELEPHONE ENCOUNTER
Caller: Arianna Blanco    Relationship: Self    Best call back number: [phone]    Requested Prescriptions:   Requested Prescriptions     Pending Prescriptions Disp Refills    ibuprofen (ADVIL,MOTRIN) 800 MG tablet [Pharmacy Med Name: IBUPROFEN 800 MG TABLET] 90 tablet 1     Sig: TAKE ONE TABLET BY MOUTH THREE TIMES A DAY        Pharmacy where request should be sent: [facility]/PHARMACY #[id number] - [place], [place] - [address] [facility] - [phone]  - [phone] FX     Last office visit with prescribing clinician: 9/12/2023   Last telemedicine visit with prescribing clinician: Visit date not found   Next office visit with prescribing clinician: 11/6/2023       Does the patient have less than a 3 day supply:  [x] Yes  [] No      Agnes Ramires Rep   10/12/23 15:06 EDT

## 2023-10-12 NOTE — TELEPHONE ENCOUNTER
Caller: Arianna Blanco    Relationship to patient: Self    Best call back number: [phone]    Patient is needing:     FOLLOWING UP ON A PA FOR THE MEDICATION       amitriptyline (ELAVIL) 75 MG tablet       HAS BEEN WAITING TWO WEEKS FOR THIS MEDICATION.     WOULD LIKE A CALLBACK

## 2023-10-13 ENCOUNTER — TELEPHONE (OUTPATIENT)
Dept: FAMILY MEDICINE CLINIC | Facility: CLINIC | Age: 68
End: 2023-10-13
Payer: MEDICAID

## 2023-10-13 ENCOUNTER — PATIENT OUTREACH (OUTPATIENT)
Dept: CASE MANAGEMENT | Facility: CLINIC | Age: 68
End: 2023-10-13
Payer: MEDICAID

## 2023-10-13 NOTE — OUTREACH NOTE
Patient Outreach    SW called and spoke to patient re: resources mailed. Patient reports she received resources via mail. No additional concerns identified. Please re consult SW if additional needs arise.     Pauline AMADO -   Ambulatory Case Management    10/13/2023, 14:57 EDT

## 2023-10-16 DIAGNOSIS — G47.00 INSOMNIA, UNSPECIFIED TYPE: ICD-10-CM

## 2023-10-19 ENCOUNTER — TELEPHONE (OUTPATIENT)
Dept: FAMILY MEDICINE CLINIC | Facility: CLINIC | Age: 68
End: 2023-10-19

## 2023-10-19 RX ORDER — IBUPROFEN 800 MG/1
800 TABLET ORAL 3 TIMES DAILY
Qty: 90 TABLET | Refills: 1 | OUTPATIENT
Start: 2023-10-19

## 2023-10-19 RX ORDER — ZOLPIDEM TARTRATE 5 MG/1
5 TABLET ORAL NIGHTLY PRN
Qty: 30 TABLET | Refills: 0 | OUTPATIENT
Start: 2023-10-19

## 2023-10-19 NOTE — TELEPHONE ENCOUNTER
Patient is taking Eliquis. Avoid NSAID use - including ibuprofen.  Ibuprofen prescription was stopped in 2022 after beginning Eliquis.     Too soon to refill Ambien.

## 2023-10-19 NOTE — TELEPHONE ENCOUNTER
Caller: Arianna Blanco    Relationship: Self    Best call back number: [phone]     What is the best time to reach you: ANYTIME    Who are you requesting to speak with (clinical staff, provider,  specific staff member): CLINICAL     What was the call regarding: PLEASE CALL PATIENT WITH AN UPDATE ON PRESCRIPTION REFILLS FOR  amitriptyline (ELAVIL) 75 MG tablet, sertraline (ZOLOFT) 100 MG tablet, zolpidem (AMBIEN) 5 MG tablet, AND IBUPROFEN 800 MG      PATIENT STATED SHE HAS STILL NOT HEARD BACK REGARDING THESE PRESCRIPTIONS.     PLEASE ADVISE

## 2023-10-24 NOTE — TELEPHONE ENCOUNTER
That is what I told her to do but she said that she can not afford it and that is why she was wanting the prescription. I believe for pain

## 2023-10-30 NOTE — PROGRESS NOTES
Office Note     Name: Arianna Blanco    : 1955     MRN: [id number]     Chief Complaint  Depression    Subjective     Arianna Blanco presents to [facility] for a follow up visit for  depression. She is currently taking zoloft 100mg. She cannot tell the difference.     Discussed adverse side effects of Eliquis and Celebrex combined may increase bleeding risk.      Insomnia - Ambien refilled.       Depression  Visit Type: follow-up  Patient presents with the following symptoms: depressed mood and irritability.  Patient is not experiencing: confusion and shortness of breath.    Sleep quality: poor        Current Outpatient Medications:     Accu-Chek Guide test strip, 1 each by Other route 4 (Four) Times a Day. Use to test blood sugar 4 times daily, Disp: 120 each, Rfl: 11    albuterol sulfate  (90 Base) MCG/ACT inhaler, INHALE 2 PUFFS BY MOUTH EVERY 4 HOURS AS NEEDED FOR WHEEZING OR SHORTNESS OF AIR, Disp: 6.7 g, Rfl: 2    Brexpiprazole (Rexulti) 3 MG tablet, Take 1 tablet by mouth Daily for 180 days., Disp: 90 tablet, Rfl: 1    Calcium Carbonate-Vitamin D 500-5 MG-MCG tablet, Take 2 tablets by mouth Daily., Disp: 180 tablet, Rfl: 3    CeleBREX 100 MG capsule, Take 1 capsule by mouth 2 (Two) Times a Day As Needed for Mild Pain., Disp: 60 capsule, Rfl: 1    dexAMETHasone (DECADRON) 2 MG tablet, 3 am 3 pm for 4 day's, 3 am 2 pm for 1 day, 2 am 2 pm for 3 day's, 2 am 1 pm for 1 day, 1 am 1 pm for 3 day's, 1 po am x 1 day only, Disp: 51 tablet, Rfl: 0    dilTIAZem CD (CARDIZEM CD) 240 MG 24 hr capsule, Take 1 capsule by mouth Daily., Disp: 90 capsule, Rfl: 1    Eliquis 5 MG tablet tablet, Take 1 tablet by mouth Every 12 (Twelve) Hours., Disp: 180 tablet, Rfl: 1    gabapentin (NEURONTIN) 300 MG capsule, Take 1 capsule by mouth 4 (Four) Times a Day., Disp: 120 capsule, Rfl: 0    insulin detemir (LEVEMIR) 100 UNIT/ML injection, Inject 50 Units under the skin into the  appropriate area as directed Every Night., Disp: 5 mL, Rfl: 12    insulin glulisine (Apidra) 100 UNIT/ML injection, Inject 5 Units under the skin into the appropriate area as directed 3 (Three) Times a Day Before Meals., Disp: 5 mL, Rfl: 0    Lancets (freestyle) lancets, 1 each by Other route 4 (Four) Times a Day., Disp: 120 each, Rfl: 12    lisinopril (PRINIVIL,ZESTRIL) 20 MG tablet, Take 1 tablet by mouth Daily., Disp: 90 tablet, Rfl: 0    metFORMIN (GLUCOPHAGE) 1000 MG tablet, Take 1 tablet by mouth 2 (Two) Times a Day With Meals., Disp: 180 tablet, Rfl: 3    metoprolol succinate XL (TOPROL-XL) 25 MG 24 hr tablet, Take 1 tablet by mouth Daily., Disp: 30 tablet, Rfl: 12    Semaglutide, 1 MG/DOSE, (OZEMPIC) 2 MG/1.5ML solution pen-injector, Inject 1 mg under the skin into the appropriate area as directed 1 (One) Time Per Week., Disp: 3 mL, Rfl: 1    sertraline (ZOLOFT) 100 MG tablet, Take 1 tablet by mouth Daily., Disp: 90 tablet, Rfl: 3    tiZANidine (ZANAFLEX) 4 MG tablet, Take 1 tablet by mouth Every 12 (Twelve) Hours As Needed for Muscle Spasms., Disp: 180 tablet, Rfl: 0    zolpidem (AMBIEN) 5 MG tablet, Take 1 tablet by mouth At Night As Needed for Sleep., Disp: 30 tablet, Rfl: 0    Blood Glucose Monitoring Suppl (Accu-Chek Guide) w/Device kit, Use 1 each 2 (Two) Times a Day., Disp: 1 kit, Rfl: 0    ondansetron ODT (ZOFRAN-ODT) 4 MG disintegrating tablet, Place 1 tablet on the tongue Every 8 (Eight) Hours As Needed for Nausea or Vomiting., Disp: 20 tablet, Rfl: 0    Allergies   Allergen Reactions    Codeine GI Intolerance       Past Surgical History:   Procedure Laterality Date    CARDIAC CATHETERIZATION Left 10/30/2020    Procedure: Left Heart Cath with Coronary Angiography;  Surgeon: Donaldo Archer MD;  Location: [facility] CATH INVASIVE LOCATION;  Service: Cardiovascular;  Laterality: Left;    CARPAL TUNNEL RELEASE Bilateral 2017    CHOLECYSTECTOMY  1980    HYSTERECTOMY  1980        Family History   Problem  "Relation Age of Onset    Heart disease Mother     Hypertension Mother     Diabetes Mother     Stroke Mother             8/14/2023     3:08 PM   PHQ-2/PHQ-9 Depression Screening   Little Interest or Pleasure in Doing Things 0-->not at all   Feeling Down, Depressed or Hopeless 0-->not at all   PHQ-9: Brief Depression Severity Measure Score 0       Review of Systems   Constitutional:  Positive for irritability.   Respiratory:  Negative for cough, shortness of breath and wheezing.    Cardiovascular: Negative.    Gastrointestinal:  Positive for nausea and vomiting. Negative for constipation, diarrhea and GERD.   Neurological:  Negative for weakness, light-headedness, headache and confusion.   Psychiatric/Behavioral:  Positive for depressed mood.        Objective     /86 (BP Location: Right arm, Patient Position: Sitting, Cuff Size: Adult)   Pulse 100   Resp 18   Ht 172.7 cm (67.99\")   Wt 77.1 kg (170 lb)   SpO2 96%   BMI 25.85 kg/m²     BP Readings from Last 2 Encounters:   11/06/23 152/86   11/03/23 171/94       Wt Readings from Last 2 Encounters:   11/06/23 77.1 kg (170 lb)   09/12/23 80.3 kg (177 lb)       BMI is >= 25 and <30. (Overweight) The following options were offered after discussion;: exercise counseling/recommendations and nutrition counseling/recommendations         Physical Exam  Vitals reviewed.   Constitutional:       General: She is not in acute distress.  HENT:      Head: Normocephalic.      Nose: Nose normal.      Mouth/Throat:      Mouth: Mucous membranes are moist.   Eyes:      Extraocular Movements: Extraocular movements intact.      Conjunctiva/sclera: Conjunctivae normal.      Pupils: Pupils are equal, round, and reactive to light.   Cardiovascular:      Rate and Rhythm: Normal rate and regular rhythm.      Pulses: Normal pulses.      Heart sounds: Normal heart sounds.   Pulmonary:      Effort: Pulmonary effort is normal. No respiratory distress.      Breath sounds: Normal breath " sounds.   Abdominal:      General: Bowel sounds are normal. There is no distension.      Palpations: Abdomen is soft.      Tenderness: There is no abdominal tenderness. There is left CVA tenderness. There is no right CVA tenderness.   Musculoskeletal:         General: Normal range of motion.      Cervical back: Normal range of motion and neck supple.      Right lower leg: No edema.      Left lower leg: No edema.   Skin:     General: Skin is warm and dry.   Neurological:      Mental Status: She is alert and oriented to person, place, and time. Mental status is at baseline.   Psychiatric:         Attention and Perception: Attention normal.         Mood and Affect: Mood normal.         Behavior: Behavior normal. Behavior is cooperative.       Derm Physical Exam  Result Review :{ Labs  Result Review  Imaging  Med Tab  Media :  Assessment and Plan      {CC Problem List  Visit Diagnosis  ROS  Review (Popup)  Health Maintenance  Quality  BestPractice  Medications  SmartSets  SnapShot Encounters  Media :  Problems Addressed this Visit          Endocrine and Metabolic    Right adrenal mass - Primary    Relevant Orders    Ambulatory Referral to Endocrinology     Other Visit Diagnoses       Insomnia, unspecified type        Relevant Medications    zolpidem (AMBIEN) 5 MG tablet    Nausea and vomiting, unspecified vomiting type        Episodic vomiting since ham salad dinner last night. Denies coffee ground emesis.          Diagnoses         Codes Comments    Right adrenal mass    -  Primary ICD-10-CM: E27.8  ICD-9-CM: 255.8     Insomnia, unspecified type     ICD-10-CM: G47.00  ICD-9-CM: 780.52     Nausea and vomiting, unspecified vomiting type     ICD-10-CM: R11.2  ICD-9-CM: 787.01 Episodic vomiting since ham salad dinner last night. Denies coffee ground emesis.           Procedures    Problem List Items Addressed This Visit          Endocrine and Metabolic    Right adrenal mass - Primary    Overview     Noted  on Ct abd pelvis 9/5/23. Noted to be probable adenoma.    1.5 cm hypodense nodule right adrenal gland noted CT abdomen pelvis 9/29/2023 [facility].         Relevant Orders    Ambulatory Referral to Endocrinology     Other Visit Diagnoses       Insomnia, unspecified type        Relevant Medications    zolpidem (AMBIEN) 5 MG tablet    Nausea and vomiting, unspecified vomiting type        Episodic vomiting since ham salad dinner last night. Denies coffee ground emesis.             {Instructions Charge Capture  Follow-up Communications :    Wrapup Tab  No follow-ups on file.     There are no Patient Instructions on file for this visit.   Patient was given instructions and counseling regarding her condition or for health maintenance advice. Please see specific information pulled into the AVS if appropriate.  Hand hygiene was performed during entrance to exam room and following assessment of patient. This document is intended for medical expert use only.     EMR Dragon/Transcription disclaimer:   Much of this encounter note is an electronic transcription/translation of spoken language to printed text. The electronic translation of spoken language may permit erroneous, or at times, nonsensical words or phrases to be inadvertently transcribed.      THOMAS Scanlon, FNP-C  [facility] [address]  [facility]  [address] [place] [address] [address]  [place] [address] [phone]

## 2023-11-03 ENCOUNTER — OFFICE VISIT (OUTPATIENT)
Dept: PAIN MEDICINE | Facility: CLINIC | Age: 68
End: 2023-11-03
Payer: MEDICAID

## 2023-11-03 VITALS
DIASTOLIC BLOOD PRESSURE: 94 MMHG | HEART RATE: 95 BPM | RESPIRATION RATE: 16 BRPM | OXYGEN SATURATION: 99 % | SYSTOLIC BLOOD PRESSURE: 171 MMHG

## 2023-11-03 DIAGNOSIS — M46.1 SACROILIITIS: Primary | ICD-10-CM

## 2023-11-03 PROCEDURE — 1125F AMNT PAIN NOTED PAIN PRSNT: CPT | Performed by: STUDENT IN AN ORGANIZED HEALTH CARE EDUCATION/TRAINING PROGRAM

## 2023-11-03 PROCEDURE — 99204 OFFICE O/P NEW MOD 45 MIN: CPT | Performed by: STUDENT IN AN ORGANIZED HEALTH CARE EDUCATION/TRAINING PROGRAM

## 2023-11-03 PROCEDURE — 3080F DIAST BP >= 90 MM HG: CPT | Performed by: STUDENT IN AN ORGANIZED HEALTH CARE EDUCATION/TRAINING PROGRAM

## 2023-11-03 PROCEDURE — 3077F SYST BP >= 140 MM HG: CPT | Performed by: STUDENT IN AN ORGANIZED HEALTH CARE EDUCATION/TRAINING PROGRAM

## 2023-11-03 PROCEDURE — 1160F RVW MEDS BY RX/DR IN RCRD: CPT | Performed by: STUDENT IN AN ORGANIZED HEALTH CARE EDUCATION/TRAINING PROGRAM

## 2023-11-03 PROCEDURE — 1159F MED LIST DOCD IN RCRD: CPT | Performed by: STUDENT IN AN ORGANIZED HEALTH CARE EDUCATION/TRAINING PROGRAM

## 2023-11-03 PROCEDURE — G0463 HOSPITAL OUTPT CLINIC VISIT: HCPCS | Performed by: STUDENT IN AN ORGANIZED HEALTH CARE EDUCATION/TRAINING PROGRAM

## 2023-11-03 NOTE — PROGRESS NOTES
CHIEF COMPLAINT  Chief Complaint   Patient presents with    Back Pain     New patient- No Narcotics    Hip Pain     R Hip    Leg Pain     EMILIANO Leg    Arm Pain     L Arm       Primary Care  Maddie Cornejo APRN Subjective Vicki L Belal is a 68 y.o. female  who presents for chronic low back pain and right-sided SI joint dysfunction.  She was initially evaluated by the neurosurgery nurse practitioner who recommended right-sided SI joint injection.  For somewhat unknown reasons, she was never scheduled for this procedure.  She reports that prior to being seen back by the neurosurgeon, she needs to complete the SI joint injection.  She describes significant pain across the right buttock and extending into the right posterior thigh.  She has significant pain especially with prolonged walking and prolonged sitting.  She reports that changing positions slightly improves her pain.  She has tried over-the-counter medications without any significant improvement.  She has done approximately 6 months of chiropractic without any significant benefit.  She denies any red flag symptoms such as loss of bowel or bladder or significant numbness or weakness.  She also denies any significant radicular pain.    History of Present Illness     Location: Right buttock  Onset: Approximately 6 months ago  Duration: Progressively worsening  Timing: Constant throughout the day  Quality: Sharp, stabbing  Severity: Today: 10       Last Week: 10       Worst: 10  Modifying Factors: The pain is worse with any type of movement and physical activity and slightly improved with rest  Functional Deficit: The pain makes it difficult for her to perform her activities of daily living    Physical Therapy: yes    Interval Update 11/03/2023:     The following portions of the patient's history were reviewed and updated as appropriate: allergies, current medications, past family history, past medical history, past social history, past surgical history,  and problem list.    Procedures:        Current Outpatient Medications:     Accu-Chek Guide test strip, 1 each by Other route 4 (Four) Times a Day. Use to test blood sugar 4 times daily, Disp: 120 each, Rfl: 11    albuterol sulfate  (90 Base) MCG/ACT inhaler, INHALE 2 PUFFS BY MOUTH EVERY 4 HOURS AS NEEDED FOR WHEEZING OR SHORTNESS OF AIR, Disp: 6.7 g, Rfl: 2    Brexpiprazole (Rexulti) 3 MG tablet, Take 1 tablet by mouth Daily for 180 days., Disp: 90 tablet, Rfl: 1    Calcium Carbonate-Vitamin D 500-5 MG-MCG tablet, Take 2 tablets by mouth Daily., Disp: 180 tablet, Rfl: 3    dexAMETHasone (DECADRON) 2 MG tablet, 3 am 3 pm for 4 day's, 3 am 2 pm for 1 day, 2 am 2 pm for 3 day's, 2 am 1 pm for 1 day, 1 am 1 pm for 3 day's, 1 po am x 1 day only, Disp: 51 tablet, Rfl: 0    dilTIAZem CD (CARDIZEM CD) 240 MG 24 hr capsule, Take 1 capsule by mouth Daily., Disp: 90 capsule, Rfl: 1    Eliquis 5 MG tablet tablet, Take 1 tablet by mouth Every 12 (Twelve) Hours., Disp: 180 tablet, Rfl: 1    gabapentin (NEURONTIN) 300 MG capsule, Take 1 capsule by mouth 4 (Four) Times a Day., Disp: 120 capsule, Rfl: 0    insulin detemir (LEVEMIR) 100 UNIT/ML injection, Inject 50 Units under the skin into the appropriate area as directed Every Night., Disp: 5 mL, Rfl: 12    insulin glulisine (Apidra) 100 UNIT/ML injection, Inject 5 Units under the skin into the appropriate area as directed 3 (Three) Times a Day Before Meals., Disp: 5 mL, Rfl: 0    Lancets (freestyle) lancets, 1 each by Other route 4 (Four) Times a Day., Disp: 120 each, Rfl: 12    lisinopril (PRINIVIL,ZESTRIL) 20 MG tablet, Take 1 tablet by mouth Daily., Disp: 90 tablet, Rfl: 0    metFORMIN (GLUCOPHAGE) 1000 MG tablet, Take 1 tablet by mouth 2 (Two) Times a Day With Meals., Disp: 180 tablet, Rfl: 3    metoprolol succinate XL (TOPROL-XL) 25 MG 24 hr tablet, Take 1 tablet by mouth Daily., Disp: 30 tablet, Rfl: 12    Semaglutide, 1 MG/DOSE, (OZEMPIC) 2 MG/1.5ML solution  pen-injector, Inject 1 mg under the skin into the appropriate area as directed 1 (One) Time Per Week., Disp: 3 mL, Rfl: 1    sertraline (ZOLOFT) 50 MG tablet, TAKE 1 TABLET BY MOUTH EVERY DAY, Disp: 30 tablet, Rfl: 0    tiZANidine (ZANAFLEX) 4 MG tablet, Take 1 tablet by mouth Every 12 (Twelve) Hours As Needed for Muscle Spasms., Disp: 180 tablet, Rfl: 0    zolpidem (AMBIEN) 5 MG tablet, TAKE 1 TABLET BY MOUTH AT NIGHT AS NEEDED FOR SLEEP., Disp: 30 tablet, Rfl: 0    CeleBREX 100 MG capsule, Take 1 capsule by mouth 2 (Two) Times a Day As Needed for Mild Pain., Disp: 60 capsule, Rfl: 1    Review of Systems   Musculoskeletal:  Positive for arthralgias, back pain, gait problem and joint swelling. Negative for myalgias, neck pain and neck stiffness.   Neurological:  Negative for weakness and numbness.       Vitals:    11/03/23 0853   BP: 171/94   Pulse: 95   Resp: 16   SpO2: 99%   PainSc: 10-Worst pain ever       Urine Drug Screen: Pending  Appropriate: Pending    Objective   Physical Exam  Vitals and nursing note reviewed.   Constitutional:       General: She is not in acute distress.     Appearance: Normal appearance. She is well-developed and normal weight.   Neck:      Trachea: No tracheal deviation.   Musculoskeletal:      Comments: Lumbar Spine Exam:  Tender to palpation over the lumbar paraspinal musculature Yes  Limited range of motion secondary to pain No  Facet loading positive: Negative  Facets tender to palpation: Negative  Straight leg raise test positive: Negative    SI Joint Exam:  Blake positive: right  PSIS tender: right   SI joint palpation: right  SI joint compression: right     Neurological:      General: No focal deficit present.      Mental Status: She is alert.      Sensory: No sensory deficit.      Motor: No weakness.           Assessment & Plan   Problems Addressed this Visit    None  Visit Diagnoses       Sacroiliitis    -  Primary    Relevant Orders    SI Joint Injection          Diagnoses          Codes Comments    Sacroiliitis    -  Primary ICD-10-CM: M46.1  ICD-9-CM: 720.2             Plan:  She appears to have very severe SI joint dysfunction and SI joint pain.  She was evaluated by the neurosurgeon and practitioner who recommended SI joint injections but she has not done this up to this point  We will plan for right-sided SI joint injection consistent with her physical exam and history  She is taking over-the-counter ibuprofen and Tylenol with limited benefit.  We will discontinue her other NSAIDs and start her on Celebrex twice daily  After her SI joint injection, she can follow back up with the neurosurgery nurse practitioner for additional work-up.  We can also do additional work-up.  If need be.  --- Follow-up next available for right-sided SI joint injection           INSPECT REPORT    As part of the patient's treatment plan, I may be prescribing controlled substances. The patient has been made aware of appropriate use of such medications, including potential risk of somnolence, limited ability to drive and/or work safely, and the potential for dependence or overdose. It has also bee made clear that these medications are for use by this patient only, without concomitant use of alcohol or other substances unless prescribed.     Patient has completed prescribing agreement detailing terms of continued prescribing of controlled substances, including monitoring NILDA reports, urine drug screening, and pill counts if necessary. The patient is aware that inappropriate use will results in cessation of prescribing such medications.    INSPECT report has been reviewed and scanned into the patient's chart.    As the clinician, I personally reviewed the INSPECT from 11/1/2023.    History and physical exam exhibit continued safe and appropriate use of controlled substances.      EMR Dragon/Transcription disclaimer:   Much of this encounter note is an electronic transcription/translation of spoken language  to printed text. The electronic translation of spoken language may permit erroneous, or at times, nonsensical words or phrases to be inadvertently transcribed; Although I have reviewed the note for such errors, some may still exist.

## 2023-11-06 ENCOUNTER — OFFICE VISIT (OUTPATIENT)
Dept: FAMILY MEDICINE CLINIC | Facility: CLINIC | Age: 68
End: 2023-11-06
Payer: MEDICAID

## 2023-11-06 VITALS
RESPIRATION RATE: 18 BRPM | BODY MASS INDEX: 25.76 KG/M2 | DIASTOLIC BLOOD PRESSURE: 86 MMHG | HEART RATE: 100 BPM | WEIGHT: 170 LBS | SYSTOLIC BLOOD PRESSURE: 152 MMHG | HEIGHT: 68 IN | OXYGEN SATURATION: 96 %

## 2023-11-06 DIAGNOSIS — E27.8 RIGHT ADRENAL MASS: Primary | ICD-10-CM

## 2023-11-06 DIAGNOSIS — R11.2 NAUSEA AND VOMITING, UNSPECIFIED VOMITING TYPE: ICD-10-CM

## 2023-11-06 DIAGNOSIS — G47.00 INSOMNIA, UNSPECIFIED TYPE: ICD-10-CM

## 2023-11-06 PROCEDURE — 3079F DIAST BP 80-89 MM HG: CPT

## 2023-11-06 PROCEDURE — 3046F HEMOGLOBIN A1C LEVEL >9.0%: CPT

## 2023-11-06 PROCEDURE — 99213 OFFICE O/P EST LOW 20 MIN: CPT

## 2023-11-06 PROCEDURE — 3077F SYST BP >= 140 MM HG: CPT

## 2023-11-06 RX ORDER — SERTRALINE HYDROCHLORIDE 100 MG/1
100 TABLET, FILM COATED ORAL DAILY
Qty: 90 TABLET | Refills: 3 | Status: SHIPPED | OUTPATIENT
Start: 2023-11-06

## 2023-11-06 RX ORDER — BLOOD-GLUCOSE METER
1 EACH MISCELLANEOUS 2 TIMES DAILY
Qty: 1 KIT | Refills: 0 | Status: SHIPPED | OUTPATIENT
Start: 2023-11-06

## 2023-11-06 RX ORDER — ONDANSETRON 4 MG/1
4 TABLET, ORALLY DISINTEGRATING ORAL EVERY 8 HOURS PRN
Qty: 20 TABLET | Refills: 0 | Status: SHIPPED | OUTPATIENT
Start: 2023-11-06

## 2023-11-06 RX ORDER — ZOLPIDEM TARTRATE 5 MG/1
5 TABLET ORAL NIGHTLY PRN
Qty: 30 TABLET | Refills: 0 | Status: SHIPPED | OUTPATIENT
Start: 2023-11-06

## 2023-11-06 RX ORDER — ONDANSETRON 4 MG/1
4 TABLET, ORALLY DISINTEGRATING ORAL EVERY 8 HOURS PRN
COMMUNITY
Start: 2023-07-03 | End: 2023-11-06

## 2023-11-16 ENCOUNTER — HOSPITAL ENCOUNTER (OUTPATIENT)
Dept: GENERAL RADIOLOGY | Facility: HOSPITAL | Age: 68
Discharge: HOME | End: 2023-11-16
Payer: MEDICAID

## 2023-11-16 ENCOUNTER — HOSPITAL ENCOUNTER (OUTPATIENT)
Dept: PAIN MEDICINE | Facility: HOSPITAL | Age: 68
Discharge: HOME | End: 2023-11-16
Payer: MEDICAID

## 2023-11-16 VITALS
OXYGEN SATURATION: 97 % | HEART RATE: 98 BPM | RESPIRATION RATE: 16 BRPM | DIASTOLIC BLOOD PRESSURE: 79 MMHG | TEMPERATURE: 96.6 F | SYSTOLIC BLOOD PRESSURE: 151 MMHG

## 2023-11-16 DIAGNOSIS — R52 PAIN: ICD-10-CM

## 2023-11-16 DIAGNOSIS — M46.1 SACROILIITIS: Primary | ICD-10-CM

## 2023-11-16 PROCEDURE — 25510000001 IOPAMIDOL 41 % SOLUTION: Performed by: STUDENT IN AN ORGANIZED HEALTH CARE EDUCATION/TRAINING PROGRAM

## 2023-11-16 PROCEDURE — 25010000002 METHYLPREDNISOLONE PER 40 MG: Performed by: STUDENT IN AN ORGANIZED HEALTH CARE EDUCATION/TRAINING PROGRAM

## 2023-11-16 RX ORDER — TRAMADOL HYDROCHLORIDE 50 MG/1
TABLET ORAL
COMMUNITY

## 2023-11-16 RX ORDER — METHYLPREDNISOLONE ACETATE 40 MG/ML
40 INJECTION, SUSPENSION INTRA-ARTICULAR; INTRALESIONAL; INTRAMUSCULAR; SOFT TISSUE ONCE
Status: COMPLETED | OUTPATIENT
Start: 2023-11-16 | End: 2023-11-16

## 2023-11-16 RX ORDER — TIZANIDINE 4 MG/1
TABLET ORAL
Qty: 90 TABLET | Refills: 1 | OUTPATIENT
Start: 2023-11-16

## 2023-11-16 RX ORDER — IOPAMIDOL 408 MG/ML
3 INJECTION, SOLUTION INTRATHECAL
Status: COMPLETED | OUTPATIENT
Start: 2023-11-16 | End: 2023-11-16

## 2023-11-16 RX ADMIN — METHYLPREDNISOLONE ACETATE 40 MG: 40 INJECTION, SUSPENSION INTRA-ARTICULAR; INTRALESIONAL; INTRAMUSCULAR; SOFT TISSUE at 08:44

## 2023-11-16 RX ADMIN — IOPAMIDOL 3 ML: 408 INJECTION, SOLUTION INTRATHECAL at 08:44

## 2023-11-16 NOTE — PROCEDURES
RIGHT Sacroiliac Joint Injection  [facility]    PREOPERATIVE DIAGNOSIS:   Sacroiliac joint dysfunction on the RIGHT    POSTOPERATIVE DIAGNOSIS:  Sacroiliac joint dysfunction on the RIGHT    PROCEDURE:  Sacroiliac Joint Injection, on the RIGHT, with fluoroscopic guidance    PRE-PROCEDURE DISCUSSION WITH PATIENT:    Risks and complications were discussed with the patient prior to starting the procedure and informed consent was obtained.  We discussed various topics including but not limited to bleeding, infection, injury, postprocedural site soreness, painful flareup, worsening of clinical picture, paralysis, coma, and death.     SURGEON:   Jamari Lane MD    REASON FOR PROCEDURE:    Patient has pain consistent with SI pathology on history and physical exam. Positive sacroiliac provocation maneuvers noted.   Tender to palpation over the affected SI joint. Positive FABERE.     SEDATION:  Patient declined administration of moderate sedation    ANESTHETIC AGENT:  Lidocaine 1%  STEROID AGENT:  Methylprednisolone (DEPO MEDROL) 40mg/ml    DESCRIPTON OF PROCEDURE:  After obtaining informed consent, IV access was not obtained in the preoperative area.  The patient was transported to the operative suite and placed in the prone position with a pillow under the pelvic area. The lumbosacral area was prepped with Chloraprep and draped in a sterile fashion.     Under fluoroscopic guidance the inferior most portion of the RIGHT sacroiliac joint was identified. The overlying skin and subcutaneous tissue was anesthetized with 1% lidocaine. A 25-gauge spinal needle was introduced from the inferior most portion of the joint into the sacroiliac joint under fluoroscopic guidance in the AP dimension with slight oblique rotation to the contralateral side.  Aspiration was negative.  After confirming the position of the needle with fluoroscopy, 1 mL of Omnipaque was injected and after seeing appropriate spread into the joint a  total of 2.5 mL of Marcaine, with the steroid, was injected very slowly.  Vital signs remained stable.  The onset of analgesia was noted.    ESTIMATED BLOOD LOSS:  minimal  SPECIMENS:  None    COMPLICATIONS:  No complications were noted., There was no indication of vascular uptake on live injection of contrast dye., and There was no indication of intrathecal uptake on live injection of contrast dye.    TOLERANCE & DISCHARGE CONDITION:    The patient tolerated the procedure well.  The patient was transported to the recovery area without difficulties.  The patient was discharged to home under the care of family in stable and satisfactory condition.    PLAN OF CARE:  The patient was given our standard instruction sheet and will resume all medications as per the medication reconciliation sheet.  The patient will Return to clinic 4-6 wks.  The patient is instructed to keep a pain log hourly for 8 hours after the procedure.

## 2023-11-17 NOTE — TELEPHONE ENCOUNTER
Caller: Arianna Blanco    Relationship: Self    Best call back number: [phone]  [phone]    What was the call regarding: PATIENT CALLED BACK CHECKING IN ON THIS REFILL REQUEST, NO HUB TO READ / RELAY

## 2023-11-21 PROBLEM — Z53.20 MAMMOGRAM DECLINED: Status: ACTIVE | Noted: 2023-11-21

## 2023-12-02 DIAGNOSIS — G47.00 INSOMNIA, UNSPECIFIED TYPE: ICD-10-CM

## 2023-12-04 RX ORDER — ZOLPIDEM TARTRATE 5 MG/1
5 TABLET ORAL NIGHTLY PRN
Qty: 30 TABLET | Refills: 0 | Status: SHIPPED | OUTPATIENT
Start: 2023-12-04

## 2023-12-08 ENCOUNTER — TELEPHONE (OUTPATIENT)
Dept: PAIN MEDICINE | Facility: CLINIC | Age: 68
End: 2023-12-08

## 2023-12-08 NOTE — TELEPHONE ENCOUNTER
Caller: PATIENT    Relationship to patient: SELF    Best call back number: [phone]    Patient is needing: PATIENT WAS CALLING TO DISCUSS THE INJECTION SHE RECEIVED BY DR. BASURTO ON 11.16.23 NOT HELPING WITH HER PAIN. PATIENT STATED THE PROCEDURE ONLY LASTED ONE DAY AND SHE WAS WANTING TO KNOW IF DR. BASURTO COULD RE-DO THE PROCEDURE. PATIENT STATED HER PAIN IS IN THE BONE AND SHE FELT THE INJECTION WASN'T PUT IN THE CORRECT SPOT. PATIENT THOUGHT THE INJECTION WAS GOING TO PUT ALL THE WAY IN TO THE BONE ON THE RIGHT HIP. PATIENT IS HAVING A LOT OF PAIN AND WOULD LIKE TO DISCUSS WHAT HER OPTIONS WOULD BE FOR HER RIGHT LEG AND HIP PAIN. THANK YOU!

## 2023-12-08 NOTE — TELEPHONE ENCOUNTER
She had an SI joint injection, not a hip injection.  She can be moved up to be re-evaluated.  If she is having primary hip pain then we can consider intra-articular hip injection

## 2023-12-14 ENCOUNTER — OFFICE VISIT (OUTPATIENT)
Dept: PAIN MEDICINE | Facility: CLINIC | Age: 68
End: 2023-12-14
Payer: MEDICAID

## 2023-12-14 ENCOUNTER — TELEPHONE (OUTPATIENT)
Dept: CARDIOLOGY | Facility: CLINIC | Age: 68
End: 2023-12-14
Payer: MEDICAID

## 2023-12-14 VITALS
DIASTOLIC BLOOD PRESSURE: 78 MMHG | OXYGEN SATURATION: 98 % | HEART RATE: 87 BPM | RESPIRATION RATE: 16 BRPM | SYSTOLIC BLOOD PRESSURE: 157 MMHG

## 2023-12-14 DIAGNOSIS — M54.16 LUMBAR RADICULOPATHY: ICD-10-CM

## 2023-12-14 DIAGNOSIS — M46.1 SACROILIITIS: Primary | ICD-10-CM

## 2023-12-14 PROCEDURE — 99214 OFFICE O/P EST MOD 30 MIN: CPT | Performed by: STUDENT IN AN ORGANIZED HEALTH CARE EDUCATION/TRAINING PROGRAM

## 2023-12-14 PROCEDURE — G0463 HOSPITAL OUTPT CLINIC VISIT: HCPCS | Performed by: STUDENT IN AN ORGANIZED HEALTH CARE EDUCATION/TRAINING PROGRAM

## 2023-12-14 PROCEDURE — 1125F AMNT PAIN NOTED PAIN PRSNT: CPT | Performed by: STUDENT IN AN ORGANIZED HEALTH CARE EDUCATION/TRAINING PROGRAM

## 2023-12-14 PROCEDURE — 3077F SYST BP >= 140 MM HG: CPT | Performed by: STUDENT IN AN ORGANIZED HEALTH CARE EDUCATION/TRAINING PROGRAM

## 2023-12-14 PROCEDURE — 1159F MED LIST DOCD IN RCRD: CPT | Performed by: STUDENT IN AN ORGANIZED HEALTH CARE EDUCATION/TRAINING PROGRAM

## 2023-12-14 PROCEDURE — 3078F DIAST BP <80 MM HG: CPT | Performed by: STUDENT IN AN ORGANIZED HEALTH CARE EDUCATION/TRAINING PROGRAM

## 2023-12-14 PROCEDURE — 1160F RVW MEDS BY RX/DR IN RCRD: CPT | Performed by: STUDENT IN AN ORGANIZED HEALTH CARE EDUCATION/TRAINING PROGRAM

## 2023-12-14 RX ORDER — TRAMADOL HYDROCHLORIDE 50 MG/1
50 TABLET ORAL EVERY 8 HOURS PRN
Qty: 90 TABLET | Refills: 0 | Status: SHIPPED | OUTPATIENT
Start: 2023-12-14

## 2023-12-14 NOTE — PROGRESS NOTES
CHIEF COMPLAINT  Chief Complaint   Patient presents with    Back Pain     LD Tramadol over 2 weeks    Leg Pain       Primary Care  Maddie Cornejo APRN Subjective Victim Blanco is a 68 y.o. female  who presents for chronic low back pain and right-sided SI joint dysfunction.  She was initially evaluated by the neurosurgery nurse practitioner who recommended right-sided SI joint injection.  For somewhat unknown reasons, she was never scheduled for this procedure.  She reports that prior to being seen back by the neurosurgeon, she needs to complete the SI joint injection.  She describes significant pain across the right buttock and extending into the right posterior thigh.  She has significant pain especially with prolonged walking and prolonged sitting.  She reports that changing positions slightly improves her pain.  She has tried over-the-counter medications without any significant improvement.  She has done approximately 6 months of chiropractic without any significant benefit.  She denies any red flag symptoms such as loss of bowel or bladder or significant numbness or weakness.  She also denies any significant radicular pain.    History of Present Illness     Location: Right buttock  Onset: Approximately 6 months ago  Duration: Progressively worsening  Timing: Constant throughout the day  Quality: Sharp, stabbing  Severity: Today: 10       Last Week: 10       Worst: 10  Modifying Factors: The pain is worse with any type of movement and physical activity and slightly improved with rest  Functional Deficit: The pain makes it difficult for her to perform her activities of daily living    Physical Therapy: yes    Interval Update 12/14/2023: She reports no significant for from her SI joint injection.  Today, it appears that she is primarily complaining more of right-sided hamstring weakness and tendinopathy as opposed to SI joint pain.  She also has significant muscular weakness throughout with loss of  muscle bulk and mass concerning for high-grade stenosis.  She has an MRI however it is several years old.  She is an active smoker but her recent low-dose chest CT did not reveal any evidence of a mass.    The following portions of the patient's history were reviewed and updated as appropriate: allergies, current medications, past family history, past medical history, past social history, past surgical history, and problem list.    Procedures:  11/16/2023: Right SI: No benefit      Current Outpatient Medications:     Accu-Chek Guide test strip, 1 each by Other route 4 (Four) Times a Day. Use to test blood sugar 4 times daily, Disp: 120 each, Rfl: 11    albuterol sulfate  (90 Base) MCG/ACT inhaler, INHALE 2 PUFFS BY MOUTH EVERY 4 HOURS AS NEEDED FOR WHEEZING OR SHORTNESS OF AIR, Disp: 6.7 g, Rfl: 2    Blood Glucose Monitoring Suppl (Accu-Chek Guide) w/Device kit, Use 1 each 2 (Two) Times a Day., Disp: 1 kit, Rfl: 0    Brexpiprazole (Rexulti) 3 MG tablet, Take 1 tablet by mouth Daily for 180 days., Disp: 90 tablet, Rfl: 1    Calcium Carbonate-Vitamin D 500-5 MG-MCG tablet, Take 2 tablets by mouth Daily., Disp: 180 tablet, Rfl: 3    CeleBREX 100 MG capsule, Take 1 capsule by mouth 2 (Two) Times a Day As Needed for Mild Pain., Disp: 60 capsule, Rfl: 1    dexAMETHasone (DECADRON) 2 MG tablet, 3 am 3 pm for 4 day's, 3 am 2 pm for 1 day, 2 am 2 pm for 3 day's, 2 am 1 pm for 1 day, 1 am 1 pm for 3 day's, 1 po am x 1 day only, Disp: 51 tablet, Rfl: 0    dilTIAZem CD (CARDIZEM CD) 240 MG 24 hr capsule, Take 1 capsule by mouth Daily., Disp: 90 capsule, Rfl: 1    Eliquis 5 MG tablet tablet, Take 1 tablet by mouth Every 12 (Twelve) Hours., Disp: 180 tablet, Rfl: 1    gabapentin (NEURONTIN) 300 MG capsule, Take 1 capsule by mouth 4 (Four) Times a Day., Disp: 120 capsule, Rfl: 0    insulin detemir (LEVEMIR) 100 UNIT/ML injection, Inject 50 Units under the skin into the appropriate area as directed Every Night., Disp: 5 mL,  Rfl: 12    insulin glulisine (Apidra) 100 UNIT/ML injection, Inject 5 Units under the skin into the appropriate area as directed 3 (Three) Times a Day Before Meals., Disp: 5 mL, Rfl: 0    Lancets (freestyle) lancets, 1 each by Other route 4 (Four) Times a Day., Disp: 120 each, Rfl: 12    lisinopril (PRINIVIL,ZESTRIL) 20 MG tablet, Take 1 tablet by mouth Daily., Disp: 90 tablet, Rfl: 0    metFORMIN (GLUCOPHAGE) 1000 MG tablet, Take 1 tablet by mouth 2 (Two) Times a Day With Meals., Disp: 180 tablet, Rfl: 3    metoprolol succinate XL (TOPROL-XL) 25 MG 24 hr tablet, Take 1 tablet by mouth Daily., Disp: 30 tablet, Rfl: 12    ondansetron ODT (ZOFRAN-ODT) 4 MG disintegrating tablet, Place 1 tablet on the tongue Every 8 (Eight) Hours As Needed for Nausea or Vomiting., Disp: 20 tablet, Rfl: 0    Semaglutide, 1 MG/DOSE, (OZEMPIC) 2 MG/1.5ML solution pen-injector, Inject 1 mg under the skin into the appropriate area as directed 1 (One) Time Per Week., Disp: 3 mL, Rfl: 1    sertraline (ZOLOFT) 100 MG tablet, Take 1 tablet by mouth Daily., Disp: 90 tablet, Rfl: 3    tiZANidine (ZANAFLEX) 4 MG tablet, Take 1 tablet by mouth Every 12 (Twelve) Hours As Needed for Muscle Spasms., Disp: 180 tablet, Rfl: 0    traMADol (ULTRAM) 50 MG tablet, Take 1 tablet by mouth Every 8 (Eight) Hours As Needed for Moderate Pain., Disp: 90 tablet, Rfl: 0    zolpidem (AMBIEN) 5 MG tablet, TAKE 1 TABLET BY MOUTH AT NIGHT AS NEEDED FOR SLEEP., Disp: 30 tablet, Rfl: 0    Review of Systems   Musculoskeletal:  Positive for arthralgias, back pain, gait problem and joint swelling. Negative for myalgias, neck pain and neck stiffness.   Neurological:  Negative for weakness and numbness.       Vitals:    12/14/23 0843   BP: 157/78   Pulse: 87   Resp: 16   SpO2: 98%   PainSc:   9       Urine Drug Screen: Pending  Appropriate: Pending    Objective   Physical Exam  Vitals and nursing note reviewed.   Constitutional:       General: She is not in acute distress.      Appearance: Normal appearance. She is well-developed and normal weight.   Neck:      Trachea: No tracheal deviation.   Musculoskeletal:      Comments: Lumbar Spine Exam:  Tender to palpation over the lumbar paraspinal musculature Yes  Limited range of motion secondary to pain No  Facet loading positive: Negative  Facets tender to palpation: Negative  Straight leg raise test positive: Negative    SI Joint Exam:  Blake positive: right  PSIS tender: right   SI joint palpation: right  SI joint compression: right     Neurological:      General: No focal deficit present.      Mental Status: She is alert.      Sensory: No sensory deficit.      Motor: No weakness.           Assessment & Plan   Problems Addressed this Visit    None  Visit Diagnoses       Sacroiliitis    -  Primary    Lumbar radiculopathy        Relevant Medications    traMADol (ULTRAM) 50 MG tablet    Other Relevant Orders    Epidural Block    MRI Lumbar Spine Without Contrast          Diagnoses         Codes Comments    Sacroiliitis    -  Primary ICD-10-CM: M46.1  ICD-9-CM: 720.2     Lumbar radiculopathy     ICD-10-CM: M54.16  ICD-9-CM: 724.4             Plan:  Will go ahead and order repeat MRI as she has evidence of high-grade stenosis based on her physical exam as well as the most recent plain film  Plan for LESI at L5-S1 to help with her right-sided radiculopathy as it looks like this may be more of a nerve dysfunction as opposed to sacroiliitis  Refill tramadol 3 times daily  She will need to hold Eliquis 3 days  --- Follow-up next available for L5-S1 LESI           INSPECT REPORT    As part of the patient's treatment plan, I may be prescribing controlled substances. The patient has been made aware of appropriate use of such medications, including potential risk of somnolence, limited ability to drive and/or work safely, and the potential for dependence or overdose. It has also bee made clear that these medications are for use by this patient only,  without concomitant use of alcohol or other substances unless prescribed.     Patient has completed prescribing agreement detailing terms of continued prescribing of controlled substances, including monitoring NILDA reports, urine drug screening, and pill counts if necessary. The patient is aware that inappropriate use will results in cessation of prescribing such medications.    INSPECT report has been reviewed and scanned into the patient's chart.    As the clinician, I personally reviewed the INSPECT from 12/12/2023.    History and physical exam exhibit continued safe and appropriate use of controlled substances.      EMR Dragon/Transcription disclaimer:   Much of this encounter note is an electronic transcription/translation of spoken language to printed text. The electronic translation of spoken language may permit erroneous, or at times, nonsensical words or phrases to be inadvertently transcribed; Although I have reviewed the note for such errors, some may still exist.

## 2023-12-14 NOTE — TELEPHONE ENCOUNTER
FACILITY: [facility]   DR: Jamari Lane  PHONE: [phone]  FAX: [phone]  PROCEDURE: Injection  SCHEDULED: 12/28/23  MEDS TO HOLD: Eliquis

## 2023-12-14 NOTE — TELEPHONE ENCOUNTER
Pt has not been since 2022. Called pt to scheduled her an appt to be seen to get her clearance signed as well .

## 2023-12-15 DIAGNOSIS — M54.16 LUMBAR RADICULOPATHY: ICD-10-CM

## 2023-12-15 RX ORDER — TIZANIDINE 4 MG/1
4 TABLET ORAL EVERY 12 HOURS PRN
Qty: 180 TABLET | Refills: 0 | Status: SHIPPED | OUTPATIENT
Start: 2023-12-15

## 2023-12-15 NOTE — TELEPHONE ENCOUNTER
Medicaid Insurance denied Tramadol PA. Pt doesn't meet their criteria. Script must not exceed 7 days & will not exceed 14 days' supply total in 45 days. Denial in chart.    Informed pt of denial & pt cannot afford to pay $28 ([employer] price) Pt is requesting a refill on tizanidine & wants to know if there is a non narcotic pain medication that can be prescribed?

## 2023-12-20 ENCOUNTER — TELEPHONE (OUTPATIENT)
Dept: FAMILY MEDICINE CLINIC | Facility: CLINIC | Age: 68
End: 2023-12-20
Payer: MEDICAID

## 2023-12-20 ENCOUNTER — TELEPHONE (OUTPATIENT)
Dept: CARDIOLOGY | Facility: CLINIC | Age: 68
End: 2023-12-20
Payer: MEDICAID

## 2023-12-20 NOTE — TELEPHONE ENCOUNTER
Caller: Arianna Blanco    Relationship: Self    Best call back number: [phone] (home) [phone] (work)    What is the best time to reach you: ANY    Who are you requesting to speak with (clinical staff, provider,  specific staff member): ANY    Do you know the name of the person who called: NO VM LEFT    What was the call regarding: PATIENT MISSED A CALL, AS SOON AS SHE WENT TO ANSWER IT HUNG UP. DID CONFIRM HER APPT FOR TOMORROW, IF NEEDING ANYTHING ELSE PLEASE CALL THE PATIENT BACK, THANK YOU!    Is it okay if the provider responds through [facility]: PLEASE CALL

## 2023-12-20 NOTE — TELEPHONE ENCOUNTER
Caller: Arianna Blanco    Relationship to patient: Self    Best call back number: [phone]    Patient is needing: THE PHARMACY WILL NOT FILL HER AMITRIPTILINE ANY MORE AS MEDICARE WONT COVER IT.  SHE NEEDS SOMETHING ELSE AS SHE IS VERY IRRITABLE AND HAVING TROUBLE SLEEPING.       [id number] - [place], [place] - [address] - [phone]  - [phone] FX     PLEASE TEXT HER AND LET HER KNOW WHAT YOU ARE GOING TO DO.

## 2023-12-20 NOTE — TELEPHONE ENCOUNTER
REMINDER CALL WENT OUT TODAY @ 2:31 PM. THAT WAS PROBABLY THE MARY SHE MISSED.  NO OTHER NOTES IN CHART. CALLED PATIENT AND LEFT MESSAGE.

## 2023-12-21 ENCOUNTER — OFFICE VISIT (OUTPATIENT)
Dept: CARDIOLOGY | Facility: CLINIC | Age: 68
End: 2023-12-21
Payer: MEDICAID

## 2023-12-21 VITALS
HEART RATE: 82 BPM | WEIGHT: 165 LBS | BODY MASS INDEX: 25.01 KG/M2 | OXYGEN SATURATION: 98 % | HEIGHT: 68 IN | SYSTOLIC BLOOD PRESSURE: 140 MMHG | DIASTOLIC BLOOD PRESSURE: 76 MMHG

## 2023-12-21 DIAGNOSIS — E08.9 DIABETES MELLITUS DUE TO UNDERLYING CONDITION WITHOUT COMPLICATION, WITH LONG-TERM CURRENT USE OF INSULIN: ICD-10-CM

## 2023-12-21 DIAGNOSIS — I10 ESSENTIAL HYPERTENSION: Primary | ICD-10-CM

## 2023-12-21 DIAGNOSIS — I45.10 RIGHT BUNDLE BRANCH BLOCK: ICD-10-CM

## 2023-12-21 DIAGNOSIS — Z79.4 DIABETES MELLITUS DUE TO UNDERLYING CONDITION WITHOUT COMPLICATION, WITH LONG-TERM CURRENT USE OF INSULIN: ICD-10-CM

## 2023-12-21 DIAGNOSIS — E78.5 DYSLIPIDEMIA: ICD-10-CM

## 2023-12-21 DIAGNOSIS — I48.92 ATRIAL FLUTTER WITH CONTROLLED RESPONSE: ICD-10-CM

## 2023-12-21 DIAGNOSIS — Z79.01 CHRONIC ANTICOAGULATION: ICD-10-CM

## 2023-12-21 NOTE — PROGRESS NOTES
Encounter Date:12/21/2023  Last seen 11/8/2022      Patient ID: Arianna Blanco is a 68 y.o. female.    Chief Complaint:  Chest discomfort  Atrial flutter  Chronic right bundle branch block  Anticoagulation management.     History of Present Illness  Patient is planning on having injections of the back.    Since I have last seen, the patient has been without any chest discomfort ,shortness of breath, palpitations, dizziness or syncope.  Denies having any headache ,abdominal pain ,nausea, vomiting , diarrhea constipation, loss of weight or loss of appetite.  Denies having any excessive bruising ,hematuria or blood in the stool.    Review of all systems negative except as indicated.    Reviewed ROS.    Assessment and Plan         ]]]]]]]]]]]]]]]]]]]  History  =========  - Chest discomfort and palpitations.-Improved     Stress Cardiolite test 10/15/2022  Lexiscan Cardiolite test is negative for myocardial ischemia.  Gated SPECT images revealed normal left ventricular size and contractility with ejection fraction of 62%.     Echocardiogram 10/15/2022  Structurally and functionally normal cardiac valves.  Left ventricular size and contractility is normal with ejection fraction of 60%.  Small pericardial effusion.     - Atrial flutter with rapid ventricular response.  Has converted to sinus rhythm 10/17/2022     -Chronic RBBB     - Anticoagulation-patient is on Eliquis     Cardiac cath 10/30/2020 revealed  Left ventricle size and contractility is normal with ejection fraction of 60%.  Left main coronary artery is normal.  Left anterior descending artery is normal.  Circumflex coronary artery is normal.  Right coronary artery has mid segment 50% disease      - Diabetes hypertension COPD dyslipidemia     -Status post hysterectomy cholecystectomy and carpal tunnel surgery.     -Family history is positive for coronary artery disease.     -Smoker- abstinence from smoking was advised.     - No known  allergies  ============  Plan  ========  Patient needs injections of the back.  Okay to hold off on Eliquis for 3 days prior to the procedure.  Patient was provided with supporting document.     Chest discomfort and palpitations.-Improved      Atrial flutter with rapid ventricular response.  Patient has converted to sinus rhythm.  Maintaining sinus rhythm  EKG 12/21/2023-sinus rhythm right bundle branch block     Chronic right bundle branch block-stable     Anticoagulation  Continue Eliquis 5 mg twice a day.  Observe for toxic effects.     Medications were reviewed and updated.     Abstinence from smoking.     Current medications include  Eliquis 5 mg twice a day diltiazem  mg a day gabapentin insulin metoprolol XL 25 mg a day.     Follow-up in the office in 6 months with EKG.      Further plan will depend on patient's progress.    Reviewed and updated-12/21/2023  ]]]]]]]]]]]]]]               Diagnosis Plan   1. Essential hypertension        2. Atrial flutter with controlled response        3. Chronic anticoagulation        4. Dyslipidemia        5. Diabetes mellitus due to underlying condition without complication, with long-term current use of insulin        6. Right bundle branch block        LAB RESULTS (LAST 7 DAYS)    CBC        BMP        CMP         BNP        TROPONIN        CoAg        Creatinine Clearance  CrCl cannot be calculated (Patient's most recent lab result is older than the maximum 30 days allowed.).    ABG        Radiology  No radiology results for the last day                The following portions of the patient's history were reviewed and updated as appropriate: allergies, current medications, past family history, past medical history, past social history, past surgical history, and problem list.    Review of Systems   Constitutional: Negative for malaise/fatigue.   Cardiovascular:  Negative for chest pain, leg swelling, palpitations and syncope.   Respiratory:  Negative for shortness of  breath.    Skin:  Negative for rash.   Gastrointestinal:  Negative for nausea and vomiting.   Neurological:  Negative for dizziness, light-headedness and numbness.   All other systems reviewed and are negative.        Current Outpatient Medications:     Accu-Chek Guide test strip, 1 each by Other route 4 (Four) Times a Day. Use to test blood sugar 4 times daily, Disp: 120 each, Rfl: 11    albuterol sulfate  (90 Base) MCG/ACT inhaler, INHALE 2 PUFFS BY MOUTH EVERY 4 HOURS AS NEEDED FOR WHEEZING OR SHORTNESS OF AIR, Disp: 6.7 g, Rfl: 2    Blood Glucose Monitoring Suppl (Accu-Chek Guide) w/Device kit, Use 1 each 2 (Two) Times a Day., Disp: 1 kit, Rfl: 0    Brexpiprazole (Rexulti) 3 MG tablet, Take 1 tablet by mouth Daily for 180 days., Disp: 90 tablet, Rfl: 1    Calcium Carbonate-Vitamin D 500-5 MG-MCG tablet, Take 2 tablets by mouth Daily., Disp: 180 tablet, Rfl: 3    CeleBREX 100 MG capsule, Take 1 capsule by mouth 2 (Two) Times a Day As Needed for Mild Pain., Disp: 60 capsule, Rfl: 1    dexAMETHasone (DECADRON) 2 MG tablet, 3 am 3 pm for 4 day's, 3 am 2 pm for 1 day, 2 am 2 pm for 3 day's, 2 am 1 pm for 1 day, 1 am 1 pm for 3 day's, 1 po am x 1 day only, Disp: 51 tablet, Rfl: 0    dilTIAZem CD (CARDIZEM CD) 240 MG 24 hr capsule, Take 1 capsule by mouth Daily., Disp: 90 capsule, Rfl: 1    Eliquis 5 MG tablet tablet, Take 1 tablet by mouth Every 12 (Twelve) Hours., Disp: 180 tablet, Rfl: 1    gabapentin (NEURONTIN) 300 MG capsule, Take 1 capsule by mouth 4 (Four) Times a Day., Disp: 120 capsule, Rfl: 0    insulin detemir (LEVEMIR) 100 UNIT/ML injection, Inject 50 Units under the skin into the appropriate area as directed Every Night., Disp: 5 mL, Rfl: 12    insulin glulisine (Apidra) 100 UNIT/ML injection, Inject 5 Units under the skin into the appropriate area as directed 3 (Three) Times a Day Before Meals., Disp: 5 mL, Rfl: 0    Lancets (freestyle) lancets, 1 each by Other route 4 (Four) Times a Day., Disp:  120 each, Rfl: 12    lisinopril (PRINIVIL,ZESTRIL) 20 MG tablet, Take 1 tablet by mouth Daily., Disp: 90 tablet, Rfl: 0    metFORMIN (GLUCOPHAGE) 1000 MG tablet, Take 1 tablet by mouth 2 (Two) Times a Day With Meals., Disp: 180 tablet, Rfl: 3    metoprolol succinate XL (TOPROL-XL) 25 MG 24 hr tablet, Take 1 tablet by mouth Daily., Disp: 30 tablet, Rfl: 12    ondansetron ODT (ZOFRAN-ODT) 4 MG disintegrating tablet, Place 1 tablet on the tongue Every 8 (Eight) Hours As Needed for Nausea or Vomiting., Disp: 20 tablet, Rfl: 0    Semaglutide, 1 MG/DOSE, (OZEMPIC) 2 MG/1.5ML solution pen-injector, Inject 1 mg under the skin into the appropriate area as directed 1 (One) Time Per Week., Disp: 3 mL, Rfl: 1    sertraline (ZOLOFT) 100 MG tablet, Take 1 tablet by mouth Daily., Disp: 90 tablet, Rfl: 3    tiZANidine (ZANAFLEX) 4 MG tablet, Take 1 tablet by mouth Every 12 (Twelve) Hours As Needed for Muscle Spasms., Disp: 180 tablet, Rfl: 0    traMADol (ULTRAM) 50 MG tablet, Take 1 tablet by mouth Every 8 (Eight) Hours As Needed for Moderate Pain., Disp: 90 tablet, Rfl: 0    zolpidem (AMBIEN) 5 MG tablet, TAKE 1 TABLET BY MOUTH AT NIGHT AS NEEDED FOR SLEEP., Disp: 30 tablet, Rfl: 0    Allergies   Allergen Reactions    Codeine GI Intolerance       Family History   Problem Relation Age of Onset    Heart disease Mother     Hypertension Mother     Diabetes Mother     Stroke Mother        Past Surgical History:   Procedure Laterality Date    CARDIAC CATHETERIZATION Left 10/30/2020    Procedure: Left Heart Cath with Coronary Angiography;  Surgeon: Donaldo Archer MD;  Location: [facility] CATH INVASIVE LOCATION;  Service: Cardiovascular;  Laterality: Left;    CARPAL TUNNEL RELEASE Bilateral 2017    CHOLECYSTECTOMY  1980    HYSTERECTOMY  1980       Past Medical History:   Diagnosis Date    COPD (chronic obstructive pulmonary disease)     Depression     Diabetes mellitus     Hyperlipidemia     Hypertension        Family History   Problem  "Relation Age of Onset    Heart disease Mother     Hypertension Mother     Diabetes Mother     Stroke Mother        Social History     Socioeconomic History    Marital status:    Tobacco Use    Smoking status: Every Day     Packs/day: 1.00     Years: 20.00     Additional pack years: 0.00     Total pack years: 20.00     Types: Cigarettes     Start date: 1983     Passive exposure: Current    Smokeless tobacco: Never   Vaping Use    Vaping Use: Some days    Substances: Nicotine, Flavoring    Devices: Refillable tank   Substance and Sexual Activity    Alcohol use: No    Sexual activity: Defer           ECG 12 Lead    Date/Time: 12/21/2023 2:41 PM  Performed by: Donaldo Archer MD    Authorized by: Donaldo Archer MD  Comparison: compared with previous ECG   Similar to previous ECG  Comparison to previous ECG: Normal sinus rhythm nonspecific ST-T wave changes incomplete right bundle branch block 82/min no ectopy no significant change from previous EKG.        Objective:       Physical Exam    /76 (BP Location: Left arm, Patient Position: Sitting, Cuff Size: Adult)   Pulse 82   Ht 172.7 cm (68\")   Wt 74.8 kg (165 lb)   SpO2 98% Comment: RA  BMI 25.09 kg/m²   The patient is alert, oriented and in no distress.    Vital signs as noted above.    Head and neck revealed no carotid bruits or jugular venous distension.  No thyromegaly or lymphadenopathy is present.    Lungs clear.  No wheezing.  Breath sounds are normal bilaterally.    Heart normal first and second heart sounds.  No murmur..  No pericardial rub is present.  No gallop is present.    Abdomen soft and nontender.  No organomegaly is present.    Extremities revealed good peripheral pulses without any pedal edema.    Skin warm and dry.    Musculoskeletal system is grossly normal.    CNS grossly normal.    Reviewed and updated.        "

## 2023-12-28 ENCOUNTER — HOSPITAL ENCOUNTER (OUTPATIENT)
Dept: GENERAL RADIOLOGY | Facility: HOSPITAL | Age: 68
Discharge: HOME | End: 2023-12-28
Payer: MEDICAID

## 2023-12-28 ENCOUNTER — HOSPITAL ENCOUNTER (OUTPATIENT)
Dept: PAIN MEDICINE | Facility: HOSPITAL | Age: 68
Discharge: HOME | End: 2023-12-28
Payer: MEDICAID

## 2023-12-28 VITALS
SYSTOLIC BLOOD PRESSURE: 149 MMHG | TEMPERATURE: 97.2 F | RESPIRATION RATE: 16 BRPM | HEART RATE: 92 BPM | DIASTOLIC BLOOD PRESSURE: 81 MMHG | OXYGEN SATURATION: 98 %

## 2023-12-28 DIAGNOSIS — R52 PAIN: ICD-10-CM

## 2023-12-28 DIAGNOSIS — M54.16 LUMBAR RADICULOPATHY: Primary | ICD-10-CM

## 2023-12-28 PROCEDURE — 25010000002 METHYLPREDNISOLONE PER 40 MG: Performed by: STUDENT IN AN ORGANIZED HEALTH CARE EDUCATION/TRAINING PROGRAM

## 2023-12-28 PROCEDURE — 77003 FLUOROGUIDE FOR SPINE INJECT: CPT

## 2023-12-28 PROCEDURE — 25510000001 IOPAMIDOL 41 % SOLUTION: Performed by: STUDENT IN AN ORGANIZED HEALTH CARE EDUCATION/TRAINING PROGRAM

## 2023-12-28 PROCEDURE — 25010000002 BUPIVACAINE (PF) 0.25 % SOLUTION: Performed by: STUDENT IN AN ORGANIZED HEALTH CARE EDUCATION/TRAINING PROGRAM

## 2023-12-28 RX ORDER — BUPIVACAINE HYDROCHLORIDE 2.5 MG/ML
10 INJECTION, SOLUTION EPIDURAL; INFILTRATION; INTRACAUDAL ONCE
Status: COMPLETED | OUTPATIENT
Start: 2023-12-28 | End: 2023-12-28

## 2023-12-28 RX ORDER — METHYLPREDNISOLONE ACETATE 40 MG/ML
40 INJECTION, SUSPENSION INTRA-ARTICULAR; INTRALESIONAL; INTRAMUSCULAR; SOFT TISSUE ONCE
Status: COMPLETED | OUTPATIENT
Start: 2023-12-28 | End: 2023-12-28

## 2023-12-28 RX ORDER — IOPAMIDOL 408 MG/ML
3 INJECTION, SOLUTION INTRATHECAL
Status: COMPLETED | OUTPATIENT
Start: 2023-12-28 | End: 2023-12-28

## 2023-12-28 RX ORDER — BACLOFEN 10 MG/1
10 TABLET ORAL 3 TIMES DAILY
Qty: 30 TABLET | Refills: 0 | Status: SHIPPED | OUTPATIENT
Start: 2023-12-28

## 2023-12-28 RX ADMIN — BUPIVACAINE HYDROCHLORIDE 10 ML: 2.5 INJECTION, SOLUTION EPIDURAL; INFILTRATION; INTRACAUDAL; PERINEURAL at 08:32

## 2023-12-28 RX ADMIN — METHYLPREDNISOLONE ACETATE 40 MG: 40 INJECTION, SUSPENSION INTRA-ARTICULAR; INTRALESIONAL; INTRAMUSCULAR; SOFT TISSUE at 08:33

## 2023-12-28 RX ADMIN — IOPAMIDOL 3 ML: 408 INJECTION, SOLUTION INTRATHECAL at 08:33

## 2023-12-28 NOTE — PROCEDURES
Lumbar Epidural Steroid Injection  [facility]    PREOPERATIVE DIAGNOSIS:   Chronic low back pain, Lumbar Disc Displacement, and Lumbar Radiculopathy  POSTOPERATIVE DIAGNOSIS:  Same as preop diagnosis    PROCEDURE:   Lumbar Epidural Steroid Injection, Therapeutic Translaminar Injection, with epidurogram, at  L5/S1 level    PRE-PROCEDURE DISCUSSION WITH PATIENT:    Risks and complications were discussed with the patient prior to starting the procedure and informed consent was obtained.  We discussed various topics including but not limited to bleeding, infection, injury, paralysis, nerve injury, dural puncture, coma, death, worsening of clinical picture, lack of pain relief, and postprocedural soreness.    SURGEON:  Bill Lane MD    REASON FOR PROCEDURE:    Diagnostic injection at this level is needed, Degenerative changes are noted in the area., Stenotic area is noted, and is likely contributing to this chronic &/or recurrent pain. , and Radiating pattern of pain is likely consistent with degenerative changes in the area.    SEDATION:  Patient declined administration of moderate sedation    ANESTHETIC:  Marcaine 0.25%  STEROID:   Methylprednisolone (DEPO MEDROL) 40mg/ml    DESCRIPTON OF PROCEDURE:    After obtaining informed consent, I.V. was not started in the preop area.   The patient was taken to the operating room and placed in the prone position.  All pressure points were well padded.  The lumbar spine area was prepped with Chloraprep and draped in a sterile fashion.  Under fluoroscopic guidance, the above mentioned interlaminar space was identified. Skin and subcutaneous tissues were anesthetized with 1% lidocaine in the middle of the space. A Tuohy needle was introduced through the skin and advanced to this interlaminar space and into the epidural space under fluoroscopic guidance and verified with loss-of-resistance technique to air.  After confirming the position of the needle with the  fluoroscope with all the views, and after aspiration was confirmed negative for blood and CSF, 1.5 mL of Omnipaque was injected.  After seeing appropriate epidurogram with lateral and PA views, a total of 4 cc solution was injected, consisting of 3cc of local anesthetic as above, with normal saline and injectable steroid as above.     ESTIMATED BLOOD LOSS:  <5 mL  SPECIMENS:  None    COMPLICATIONS:     No complications were noted., There was no indication of vascular uptake on live injection of contrast dye., There was no indication of intrathecal uptake on live injection of contrast dye., and There was not any evidence of dural puncture.      TOLERANCE & DISCHARGE CONDITION:    The patient tolerated the procedure well.  The patient was transported to the recovery area without difficulties.  The patient was discharged to home under the care of family in stable and satisfactory condition.    PLAN OF CARE:  The patient was given our standard instruction sheet.  The patient will Return to clinic 3-4 wks  The patient will resume all medications as per the medication reconciliation sheet.

## 2024-01-04 DIAGNOSIS — G47.00 INSOMNIA, UNSPECIFIED TYPE: ICD-10-CM

## 2024-01-04 RX ORDER — SEMAGLUTIDE 1.34 MG/ML
1 INJECTION, SOLUTION SUBCUTANEOUS
Qty: 3 ML | Refills: 1 | Status: SHIPPED | OUTPATIENT
Start: 2024-01-04

## 2024-01-04 RX ORDER — ZOLPIDEM TARTRATE 5 MG/1
5 TABLET ORAL NIGHTLY PRN
Qty: 30 TABLET | Refills: 0 | OUTPATIENT
Start: 2024-01-04

## 2024-01-05 NOTE — PROGRESS NOTES
Office Note     Name: Arianna Blanco    : 1955     MRN: [id number]     Chief Complaint  Diabetes    Subjective     Arianna Blanco presents to [facility] for a follow up visit for  diabetes      -A1C result 2023- 9.5  -A1C today-     Diabetes  She presents for her follow-up diabetic visit. She has type 2 diabetes mellitus. Hypoglycemia symptoms include nervousness/anxiousness. There are no diabetic associated symptoms. There are no hypoglycemic complications. Risk factors for coronary artery disease include diabetes mellitus, dyslipidemia, hypertension and tobacco exposure. Current diabetic treatment includes insulin injections and oral agent (dual therapy). She is following a generally healthy diet. When asked about meal planning, she reported none. She has not had a previous visit with a dietitian. She does not see a podiatrist.Eye exam is current.     A1C improving.   Not checking bs due to no monitor due to insurance. RX Freestyle Willie.  She will advise if she doesn't receive it.     Low platelet 2023 -   Recheck cbc cmp.   H/o diltiazem - may cause low platelet    Elevated alk phos -  Recheck cmp, ggt.    Screening for colon cancer -  Reports appt 1/3/24 at [facility] and states she was not given meds for scope.   Recommend Arianna call gastroenterology and have colonoscopy rescheduled.    Anxiety-  Elavil not covered by insurance.  Requests alternate medication.   Buspirone prescription sent.    Current Outpatient Medications:     Accu-Chek Guide test strip, 1 each by Other route 4 (Four) Times a Day. Use to test blood sugar 4 times daily, Disp: 120 each, Rfl: 11    albuterol sulfate  (90 Base) MCG/ACT inhaler, INHALE 2 PUFFS BY MOUTH EVERY 4 HOURS AS NEEDED FOR WHEEZING OR SHORTNESS OF AIR, Disp: 6.7 g, Rfl: 2    baclofen (LIORESAL) 10 MG tablet, Take 1 tablet by mouth 3 (Three) Times a Day., Disp: 30 tablet, Rfl: 0    Brexpiprazole (Rexulti) 3 MG tablet,  Take 1 tablet by mouth Daily for 180 days., Disp: 90 tablet, Rfl: 1    Calcium Carbonate-Vitamin D 500-5 MG-MCG tablet, Take 2 tablets by mouth Daily., Disp: 180 tablet, Rfl: 3    dilTIAZem CD (CARDIZEM CD) 240 MG 24 hr capsule, Take 1 capsule by mouth Daily., Disp: 90 capsule, Rfl: 1    Eliquis 5 MG tablet tablet, Take 1 tablet by mouth Every 12 (Twelve) Hours., Disp: 180 tablet, Rfl: 1    gabapentin (NEURONTIN) 300 MG capsule, Take 1 capsule by mouth 4 (Four) Times a Day., Disp: 120 capsule, Rfl: 0    insulin detemir (LEVEMIR) 100 UNIT/ML injection, Inject 60 Units under the skin into the appropriate area as directed Every Night., Disp: 54 mL, Rfl: 3    insulin glulisine (Apidra) 100 UNIT/ML injection, Inject 5 Units under the skin into the appropriate area as directed 3 (Three) Times a Day Before Meals., Disp: 10 mL, Rfl: 3    lisinopril (PRINIVIL,ZESTRIL) 20 MG tablet, Take 1 tablet by mouth Daily., Disp: 90 tablet, Rfl: 0    metFORMIN (GLUCOPHAGE) 1000 MG tablet, Take 1 tablet by mouth 2 (Two) Times a Day With Meals., Disp: 180 tablet, Rfl: 3    metoprolol succinate XL (TOPROL-XL) 25 MG 24 hr tablet, Take 1 tablet by mouth Daily., Disp: 90 tablet, Rfl: 3    Semaglutide, 1 MG/DOSE, (Ozempic, 1 MG/DOSE,) 4 MG/3ML solution pen-injector, Inject 1 mg under the skin into the appropriate area as directed Every 7 (Seven) Days., Disp: 3 mL, Rfl: 1    sertraline (ZOLOFT) 100 MG tablet, Take 1 tablet by mouth Daily., Disp: 90 tablet, Rfl: 3    zolpidem (AMBIEN) 5 MG tablet, Take 1 tablet by mouth At Night As Needed for Sleep., Disp: 30 tablet, Rfl: 0    busPIRone (BUSPAR) 7.5 MG tablet, Take 1 tablet by mouth At Night As Needed (anxiety)., Disp: 90 tablet, Rfl: 0    Continuous Blood Gluc  (FreeStyle Willie 2 [place]) device, Use 1 each Daily. To be used daily to monitor continuous blood sugar reading for type 2 diabetic patient on insulin regimen., Disp: 1 each, Rfl: 0    Continuous Blood Gluc Sensor (FreeStyle  "Willie 2 Sensor) misc, Use 1 each Every 14 (Fourteen) Days. Dispense 2 sensor pack. To be used daily with freestyle willie device to monitor continuous blood sugar reading for type 2 diabetic patient on insulin regimen., Disp: 2 each, Rfl: 12    Allergies   Allergen Reactions    Codeine GI Intolerance       Past Surgical History:   Procedure Laterality Date    CARDIAC CATHETERIZATION Left 10/30/2020    Procedure: Left Heart Cath with Coronary Angiography;  Surgeon: Donaldo Archer MD;  Location: [facility] CATH INVASIVE LOCATION;  Service: Cardiovascular;  Laterality: Left;    CARPAL TUNNEL RELEASE Bilateral 2017    CHOLECYSTECTOMY  1980    HYSTERECTOMY  1980        Family History   Problem Relation Age of Onset    Heart disease Mother     Hypertension Mother     Diabetes Mother     Stroke Mother             8/14/2023     3:08 PM   PHQ-2/PHQ-9 Depression Screening   Little Interest or Pleasure in Doing Things 0-->not at all   Feeling Down, Depressed or Hopeless 0-->not at all   PHQ-9: Brief Depression Severity Measure Score 0       Review of Systems   Respiratory: Negative.     Cardiovascular: Negative.    Psychiatric/Behavioral:  Positive for sleep disturbance. Negative for self-injury, suicidal ideas, depressed mood and stress. The patient is nervous/anxious.        Objective     /86 (BP Location: Right arm, Patient Position: Sitting, Cuff Size: Adult)   Pulse 84   Temp 96.6 °F (35.9 °C) (Temporal)   Resp 18   Ht 172.7 cm (67.99\")   Wt 75.3 kg (166 lb)   SpO2 100%   BMI 25.25 kg/m²     BP Readings from Last 2 Encounters:   01/09/24 136/86   12/28/23 149/81       Wt Readings from Last 2 Encounters:   01/09/24 75.3 kg (166 lb)   12/21/23 74.8 kg (165 lb)                Physical Exam  Vitals and nursing note reviewed.   Constitutional:       General: She is not in acute distress.     Appearance: Normal appearance. She is well-groomed. She is not ill-appearing.   HENT:      Head: Normocephalic and atraumatic. "   Eyes:      General: Lids are normal. Vision grossly intact. Gaze aligned appropriately.      Extraocular Movements: Extraocular movements intact.      Pupils: Pupils are equal, round, and reactive to light.   Neck:      Vascular: No carotid bruit.   Cardiovascular:      Rate and Rhythm: Normal rate and regular rhythm.      Heart sounds: Normal heart sounds. No murmur heard.  Pulmonary:      Effort: Pulmonary effort is normal.      Breath sounds: Normal breath sounds and air entry.   Musculoskeletal:      Cervical back: Normal range of motion and neck supple.   Skin:     General: Skin is warm and dry.   Neurological:      General: No focal deficit present.      Mental Status: She is alert and oriented to person, place, and time. Mental status is at baseline.   Psychiatric:         Attention and Perception: Attention and perception normal.         Mood and Affect: Mood normal.         Behavior: Behavior normal. Behavior is cooperative.       Derm Physical Exam  Result Review :{ Labs  Result Review  Imaging  Med Tab  Media   Assessment and Plan         Problems Addressed this Visit       Anxiety    Relevant Medications    busPIRone (BUSPAR) 7.5 MG tablet    Dyslipidemia    Essential hypertension    Relevant Medications    lisinopril (PRINIVIL,ZESTRIL) 20 MG tablet    dilTIAZem CD (CARDIZEM CD) 240 MG 24 hr capsule    metoprolol succinate XL (TOPROL-XL) 25 MG 24 hr tablet    Eliquis 5 MG tablet tablet    Hypercholesteremia    Hypertriglyceridemia    Type 2 diabetes mellitus with hyperglycemia - Primary    Relevant Medications    Semaglutide, 1 MG/DOSE, (Ozempic, 1 MG/DOSE,) 4 MG/3ML solution pen-injector    insulin detemir (LEVEMIR) 100 UNIT/ML injection    insulin glulisine (Apidra) 100 UNIT/ML injection    Continuous Blood Gluc  (FreeStyle Willie 2 [place]) device    Continuous Blood Gluc Sensor (FreeStyle Willie 2 Sensor) misc    Other Relevant Orders    POC Glycosylated Hemoglobin (Hb A1C) (Completed)      Other Visit Diagnoses       Insomnia, unspecified type        Relevant Medications    zolpidem (AMBIEN) 5 MG tablet    Thrombocytopenia        Relevant Medications    Eliquis 5 MG tablet tablet    Other Relevant Orders    CBC & Differential (Completed)    Elevated alkaline phosphatase level        Relevant Orders    Comprehensive metabolic panel (Completed)    Gamma GT (Completed)    Flu vaccine need        Relevant Orders    Fluzone High-Dose 65+yrs (9390-6479) (Completed)    Need for Tdap vaccination              Diagnoses         Codes Comments    Type 2 diabetes mellitus with hyperglycemia, without long-term current use of insulin    -  Primary ICD-10-CM: E11.65  ICD-9-CM: 250.00, 790.29     Insomnia, unspecified type     ICD-10-CM: G47.00  ICD-9-CM: 780.52     Essential hypertension     ICD-10-CM: I10  ICD-9-CM: 401.9     Thrombocytopenia     ICD-10-CM: D69.6  ICD-9-CM: 287.5     Dyslipidemia     ICD-10-CM: E78.5  ICD-9-CM: 272.4     Hypertriglyceridemia     ICD-10-CM: E78.1  ICD-9-CM: 272.1     Hypercholesteremia     ICD-10-CM: E78.00  ICD-9-CM: 272.0     Anxiety     ICD-10-CM: F41.9  ICD-9-CM: 300.00     Elevated alkaline phosphatase level     ICD-10-CM: R74.8  ICD-9-CM: 790.5     Flu vaccine need     ICD-10-CM: Z23  ICD-9-CM: V04.81     Need for Tdap vaccination     ICD-10-CM: Z23  ICD-9-CM: V06.1            Procedures    Problem List Items Addressed This Visit       Anxiety    Relevant Medications    busPIRone (BUSPAR) 7.5 MG tablet    Dyslipidemia    Overview     Added automatically from request for surgery [id number]         Essential hypertension    Overview     Added automatically from request for surgery [id number]         Relevant Medications    lisinopril (PRINIVIL,ZESTRIL) 20 MG tablet    dilTIAZem CD (CARDIZEM CD) 240 MG 24 hr capsule    metoprolol succinate XL (TOPROL-XL) 25 MG 24 hr tablet    Eliquis 5 MG tablet tablet    Hypercholesteremia    Hypertriglyceridemia    Type 2 diabetes mellitus with  hyperglycemia - Primary    Relevant Medications    Semaglutide, 1 MG/DOSE, (Ozempic, 1 MG/DOSE,) 4 MG/3ML solution pen-injector    insulin detemir (LEVEMIR) 100 UNIT/ML injection    insulin glulisine (Apidra) 100 UNIT/ML injection    Continuous Blood Gluc  (FreeStyle Willie 2 [place]) device    Continuous Blood Gluc Sensor (FreeStyle Willie 2 Sensor) misc    Other Relevant Orders    POC Glycosylated Hemoglobin (Hb A1C) (Completed)     Other Visit Diagnoses       Insomnia, unspecified type        Relevant Medications    zolpidem (AMBIEN) 5 MG tablet    Thrombocytopenia        Relevant Medications    Eliquis 5 MG tablet tablet    Other Relevant Orders    CBC & Differential (Completed)    Elevated alkaline phosphatase level        Relevant Orders    Comprehensive metabolic panel (Completed)    Gamma GT (Completed)    Flu vaccine need        Relevant Orders    Fluzone High-Dose 65+yrs (4270-2572) (Completed)    Need for Tdap vaccination                 {Instructions Charge Capture  Follow-up Communications     Wrapup Tab  Return in about 3 months (around 4/9/2024) for Recheck A1C.     Patient Instructions   [facility] - call and reschedule colonoscopy - [phone].        Patient was given instructions and counseling regarding her condition or for health maintenance advice. Please see specific information pulled into the AVS if appropriate.  Hand hygiene was performed during entrance to exam room and following assessment of patient. This document is intended for medical expert use only.     EMR Dragon/Transcription disclaimer:   Much of this encounter note is an electronic transcription/translation of spoken language to printed text. The electronic translation of spoken language may permit erroneous, or at times, nonsensical words or phrases to be inadvertently transcribed.      THOMAS Scanlon, FNP-C  [address]  [facility]  [address] [place] [address] DR HALEY AMBRIZ  [facility]  [place] [address] [phone]

## 2024-01-09 ENCOUNTER — OFFICE VISIT (OUTPATIENT)
Dept: FAMILY MEDICINE CLINIC | Facility: CLINIC | Age: 69
End: 2024-01-09
Payer: MEDICAID

## 2024-01-09 VITALS
HEART RATE: 84 BPM | HEIGHT: 68 IN | RESPIRATION RATE: 18 BRPM | OXYGEN SATURATION: 100 % | WEIGHT: 166 LBS | BODY MASS INDEX: 25.16 KG/M2 | TEMPERATURE: 96.6 F | DIASTOLIC BLOOD PRESSURE: 86 MMHG | SYSTOLIC BLOOD PRESSURE: 136 MMHG

## 2024-01-09 DIAGNOSIS — E78.00 HYPERCHOLESTEREMIA: ICD-10-CM

## 2024-01-09 DIAGNOSIS — Z23 NEED FOR TDAP VACCINATION: ICD-10-CM

## 2024-01-09 DIAGNOSIS — E11.65 TYPE 2 DIABETES MELLITUS WITH HYPERGLYCEMIA, WITHOUT LONG-TERM CURRENT USE OF INSULIN: Primary | ICD-10-CM

## 2024-01-09 DIAGNOSIS — Z23 FLU VACCINE NEED: ICD-10-CM

## 2024-01-09 DIAGNOSIS — E78.5 DYSLIPIDEMIA: ICD-10-CM

## 2024-01-09 DIAGNOSIS — D69.6 THROMBOCYTOPENIA: ICD-10-CM

## 2024-01-09 DIAGNOSIS — E78.1 HYPERTRIGLYCERIDEMIA: ICD-10-CM

## 2024-01-09 DIAGNOSIS — G47.00 INSOMNIA, UNSPECIFIED TYPE: ICD-10-CM

## 2024-01-09 DIAGNOSIS — I10 ESSENTIAL HYPERTENSION: ICD-10-CM

## 2024-01-09 DIAGNOSIS — F41.9 ANXIETY: ICD-10-CM

## 2024-01-09 DIAGNOSIS — R74.8 ELEVATED ALKALINE PHOSPHATASE LEVEL: ICD-10-CM

## 2024-01-09 LAB
EXPIRATION DATE: ABNORMAL
HBA1C MFR BLD: 8.7 % (ref 4.5–5.7)
Lab: ABNORMAL

## 2024-01-09 RX ORDER — METOPROLOL SUCCINATE 25 MG/1
25 TABLET, EXTENDED RELEASE ORAL DAILY
Qty: 90 TABLET | Refills: 3 | Status: SHIPPED | OUTPATIENT
Start: 2024-01-09

## 2024-01-09 RX ORDER — DILTIAZEM HYDROCHLORIDE 240 MG/1
240 CAPSULE, COATED, EXTENDED RELEASE ORAL DAILY
Qty: 90 CAPSULE | Refills: 1 | Status: SHIPPED | OUTPATIENT
Start: 2024-01-09

## 2024-01-09 RX ORDER — ZOLPIDEM TARTRATE 5 MG/1
5 TABLET ORAL NIGHTLY PRN
Qty: 30 TABLET | Refills: 0 | Status: SHIPPED | OUTPATIENT
Start: 2024-01-09

## 2024-01-09 RX ORDER — LISINOPRIL 20 MG/1
20 TABLET ORAL DAILY
Qty: 90 TABLET | Refills: 0 | Status: SHIPPED | OUTPATIENT
Start: 2024-01-09

## 2024-01-09 RX ORDER — BUSPIRONE HYDROCHLORIDE 7.5 MG/1
7.5 TABLET ORAL NIGHTLY PRN
Qty: 90 TABLET | Refills: 0 | Status: SHIPPED | OUTPATIENT
Start: 2024-01-09

## 2024-01-09 RX ORDER — INSULIN GLULISINE 100 [IU]/ML
5 INJECTION, SOLUTION SUBCUTANEOUS
Qty: 10 ML | Refills: 3 | Status: SHIPPED | OUTPATIENT
Start: 2024-01-09

## 2024-01-09 RX ORDER — APIXABAN 5 MG/1
5 TABLET, FILM COATED ORAL EVERY 12 HOURS SCHEDULED
Qty: 180 TABLET | Refills: 1 | Status: SHIPPED | OUTPATIENT
Start: 2024-01-09

## 2024-01-09 RX ORDER — SEMAGLUTIDE 1.34 MG/ML
1 INJECTION, SOLUTION SUBCUTANEOUS
Qty: 3 ML | Refills: 1 | Status: SHIPPED | OUTPATIENT
Start: 2024-01-09

## 2024-01-10 LAB
ALBUMIN SERPL-MCNC: 4.3 G/DL (ref 3.9–4.9)
ALBUMIN/GLOB SERPL: 1.9 {RATIO} (ref 1.2–2.2)
ALP SERPL-CCNC: 92 IU/L (ref 44–121)
ALT SERPL-CCNC: 27 IU/L (ref 0–32)
AST SERPL-CCNC: 23 IU/L (ref 0–40)
BASOPHILS # BLD AUTO: 0.1 X10E3/UL (ref 0–0.2)
BASOPHILS NFR BLD AUTO: 1 %
BILIRUB SERPL-MCNC: <0.2 MG/DL (ref 0–1.2)
BUN SERPL-MCNC: 9 MG/DL (ref 8–27)
BUN/CREAT SERPL: 15 (ref 12–28)
CALCIUM SERPL-MCNC: 9.9 MG/DL (ref 8.7–10.3)
CHLORIDE SERPL-SCNC: 97 MMOL/L (ref 96–106)
CO2 SERPL-SCNC: 21 MMOL/L (ref 20–29)
CREAT SERPL-MCNC: 0.61 MG/DL (ref 0.57–1)
EGFRCR SERPLBLD CKD-EPI 2021: 98 ML/MIN/1.73
EOSINOPHIL # BLD AUTO: 0.3 X10E3/UL (ref 0–0.4)
EOSINOPHIL NFR BLD AUTO: 3 %
ERYTHROCYTE [DISTWIDTH] IN BLOOD BY AUTOMATED COUNT: 13.1 % (ref 11.7–15.4)
GGT SERPL-CCNC: 57 IU/L (ref 0–60)
GLOBULIN SER CALC-MCNC: 2.3 G/DL (ref 1.5–4.5)
GLUCOSE SERPL-MCNC: 178 MG/DL (ref 70–99)
HCT VFR BLD AUTO: 38.5 % (ref 34–46.6)
HGB BLD-MCNC: 13.3 G/DL (ref 11.1–15.9)
IMM GRANULOCYTES # BLD AUTO: 0 X10E3/UL (ref 0–0.1)
IMM GRANULOCYTES NFR BLD AUTO: 0 %
LYMPHOCYTES # BLD AUTO: 2.6 X10E3/UL (ref 0.7–3.1)
LYMPHOCYTES NFR BLD AUTO: 27 %
MCH RBC QN AUTO: 30.3 PG (ref 26.6–33)
MCHC RBC AUTO-ENTMCNC: 34.5 G/DL (ref 31.5–35.7)
MCV RBC AUTO: 88 FL (ref 79–97)
MONOCYTES # BLD AUTO: 0.7 X10E3/UL (ref 0.1–0.9)
MONOCYTES NFR BLD AUTO: 7 %
NEUTROPHILS # BLD AUTO: 6 X10E3/UL (ref 1.4–7)
NEUTROPHILS NFR BLD AUTO: 62 %
PLATELET # BLD AUTO: 186 X10E3/UL (ref 150–450)
POTASSIUM SERPL-SCNC: 4.1 MMOL/L (ref 3.5–5.2)
PROT SERPL-MCNC: 6.6 G/DL (ref 6–8.5)
RBC # BLD AUTO: 4.39 X10E6/UL (ref 3.77–5.28)
SODIUM SERPL-SCNC: 136 MMOL/L (ref 134–144)
WBC # BLD AUTO: 9.6 X10E3/UL (ref 3.4–10.8)

## 2024-01-16 ENCOUNTER — TELEPHONE (OUTPATIENT)
Dept: PAIN MEDICINE | Facility: CLINIC | Age: 69
End: 2024-01-16
Payer: MEDICAID

## 2024-01-16 NOTE — TELEPHONE ENCOUNTER
Provider: DR BASURTO     Caller: PATIENT     Pharmacy: [facility] IN [place] ON [place]     Phone Number: [phone]    Reason for Call: PATIENT STATES THAT SHE REQUESTED REFILLS THE WEEK BEFORE LAST FOR BACLOFEN AND ANOTHER MEDICATION SHE CAN'T RECALL. [facility] STATES THEY HAVE CALLED THE OFFICE SEVERAL TIMES AND SPOKE TO SOMEONE AND THAT NO ONE IS GETTING BACK TO THEM REGARDING THESE REFILLS. THE PATIENT IS COMPLETELY OUT OF MEDICATION AND WOULD LIKE THIS TAKEN CARE OF ASAP.  PLEASE LET THE PATIENT KNOW WHEN THIS HAS BEEN TAKEN CARE OF. OKAY TO LEAVE A VOICEMAIL.     When was the patient last seen: 12-14-23

## 2024-01-17 RX ORDER — BACLOFEN 10 MG/1
10 TABLET ORAL 3 TIMES DAILY
Qty: 90 TABLET | Refills: 0 | Status: SHIPPED | OUTPATIENT
Start: 2024-01-17

## 2024-01-17 NOTE — TELEPHONE ENCOUNTER
Spoke w pt & confirmed the Baclofen RX. Pt unsure of other RX but will call us once she knows.  Sent request

## 2024-01-17 NOTE — TELEPHONE ENCOUNTER
Previous RX was for #30 but directions say TID.  Updated Quantity to #90 (Please verify I changed it correctly)

## 2024-01-26 ENCOUNTER — OFFICE VISIT (OUTPATIENT)
Dept: PAIN MEDICINE | Facility: CLINIC | Age: 69
End: 2024-01-26
Payer: MEDICAID

## 2024-01-26 VITALS
DIASTOLIC BLOOD PRESSURE: 99 MMHG | RESPIRATION RATE: 16 BRPM | OXYGEN SATURATION: 97 % | SYSTOLIC BLOOD PRESSURE: 153 MMHG | HEART RATE: 95 BPM

## 2024-01-26 DIAGNOSIS — M54.16 LUMBAR RADICULOPATHY: Primary | ICD-10-CM

## 2024-01-26 DIAGNOSIS — M46.1 SACROILIITIS: ICD-10-CM

## 2024-01-26 PROCEDURE — 1125F AMNT PAIN NOTED PAIN PRSNT: CPT | Performed by: STUDENT IN AN ORGANIZED HEALTH CARE EDUCATION/TRAINING PROGRAM

## 2024-01-26 PROCEDURE — 1160F RVW MEDS BY RX/DR IN RCRD: CPT | Performed by: STUDENT IN AN ORGANIZED HEALTH CARE EDUCATION/TRAINING PROGRAM

## 2024-01-26 PROCEDURE — 99214 OFFICE O/P EST MOD 30 MIN: CPT | Performed by: STUDENT IN AN ORGANIZED HEALTH CARE EDUCATION/TRAINING PROGRAM

## 2024-01-26 PROCEDURE — 3077F SYST BP >= 140 MM HG: CPT | Performed by: STUDENT IN AN ORGANIZED HEALTH CARE EDUCATION/TRAINING PROGRAM

## 2024-01-26 PROCEDURE — 1159F MED LIST DOCD IN RCRD: CPT | Performed by: STUDENT IN AN ORGANIZED HEALTH CARE EDUCATION/TRAINING PROGRAM

## 2024-01-26 PROCEDURE — 3080F DIAST BP >= 90 MM HG: CPT | Performed by: STUDENT IN AN ORGANIZED HEALTH CARE EDUCATION/TRAINING PROGRAM

## 2024-01-26 PROCEDURE — G0463 HOSPITAL OUTPT CLINIC VISIT: HCPCS | Performed by: STUDENT IN AN ORGANIZED HEALTH CARE EDUCATION/TRAINING PROGRAM

## 2024-01-26 RX ORDER — BACLOFEN 20 MG/1
20 TABLET ORAL 3 TIMES DAILY
Qty: 90 TABLET | Refills: 0 | Status: SHIPPED | OUTPATIENT
Start: 2024-01-26

## 2024-01-26 NOTE — PROGRESS NOTES
CHIEF COMPLAINT  Chief Complaint   Patient presents with    Back Pain     No Narcotics       Primary Care  Maddie Cornejo APRN Subjective   Arianna Blanco is a 68 y.o. female  who presents for chronic low back pain and right-sided SI joint dysfunction.  She was initially evaluated by the neurosurgery nurse practitioner who recommended right-sided SI joint injection.  For somewhat unknown reasons, she was never scheduled for this procedure.  She reports that prior to being seen back by the neurosurgeon, she needs to complete the SI joint injection.  She describes significant pain across the right buttock and extending into the right posterior thigh.  She has significant pain especially with prolonged walking and prolonged sitting.  She reports that changing positions slightly improves her pain.  She has tried over-the-counter medications without any significant improvement.  She has done approximately 6 months of chiropractic without any significant benefit.  She denies any red flag symptoms such as loss of bowel or bladder or significant numbness or weakness.  She also denies any significant radicular pain.    History of Present Illness     Location: Right buttock  Onset: Approximately 6 months ago  Duration: Progressively worsening  Timing: Constant throughout the day  Quality: Sharp, stabbing  Severity: Today: 10       Last Week: 10       Worst: 10  Modifying Factors: The pain is worse with any type of movement and physical activity and slightly improved with rest  Functional Deficit: The pain makes it difficult for her to perform her activities of daily living    Physical Therapy: yes    Interval Update 01/26/2024: She reports substantial benefit from her LESI however she continues to rate her pain a 7.  Requesting higher doses of baclofen.    The following portions of the patient's history were reviewed and updated as appropriate: allergies, current medications, past family history, past medical history,  past social history, past surgical history, and problem list.    Procedures:  11/16/2023: Right SI: No benefit  12/28/2023: LESI: 90% benefit      Current Outpatient Medications:     Accu-Chek Guide test strip, 1 each by Other route 4 (Four) Times a Day. Use to test blood sugar 4 times daily, Disp: 120 each, Rfl: 11    albuterol sulfate  (90 Base) MCG/ACT inhaler, INHALE 2 PUFFS BY MOUTH EVERY 4 HOURS AS NEEDED FOR WHEEZING OR SHORTNESS OF AIR, Disp: 6.7 g, Rfl: 2    baclofen (LIORESAL) 20 MG tablet, Take 1 tablet by mouth 3 (Three) Times a Day., Disp: 90 tablet, Rfl: 0    Brexpiprazole (Rexulti) 3 MG tablet, Take 1 tablet by mouth Daily for 180 days., Disp: 90 tablet, Rfl: 1    busPIRone (BUSPAR) 7.5 MG tablet, Take 1 tablet by mouth At Night As Needed (anxiety)., Disp: 90 tablet, Rfl: 0    Calcium Carbonate-Vitamin D 500-5 MG-MCG tablet, Take 2 tablets by mouth Daily., Disp: 180 tablet, Rfl: 3    Continuous Blood Gluc  (FreeStyle Willie 2 [place]) device, Use 1 each Daily. To be used daily to monitor continuous blood sugar reading for type 2 diabetic patient on insulin regimen., Disp: 1 each, Rfl: 0    Continuous Blood Gluc Sensor (FreeStyle Willie 2 Sensor) misc, Use 1 each Every 14 (Fourteen) Days. Dispense 2 sensor pack. To be used daily with freestyle willie device to monitor continuous blood sugar reading for type 2 diabetic patient on insulin regimen., Disp: 2 each, Rfl: 12    dilTIAZem CD (CARDIZEM CD) 240 MG 24 hr capsule, Take 1 capsule by mouth Daily., Disp: 90 capsule, Rfl: 1    Eliquis 5 MG tablet tablet, Take 1 tablet by mouth Every 12 (Twelve) Hours., Disp: 180 tablet, Rfl: 1    gabapentin (NEURONTIN) 300 MG capsule, Take 1 capsule by mouth 4 (Four) Times a Day., Disp: 120 capsule, Rfl: 0    insulin detemir (LEVEMIR) 100 UNIT/ML injection, Inject 60 Units under the skin into the appropriate area as directed Every Night., Disp: 54 mL, Rfl: 3    insulin glulisine (Apidra) 100 UNIT/ML  injection, Inject 5 Units under the skin into the appropriate area as directed 3 (Three) Times a Day Before Meals., Disp: 10 mL, Rfl: 3    lisinopril (PRINIVIL,ZESTRIL) 20 MG tablet, Take 1 tablet by mouth Daily., Disp: 90 tablet, Rfl: 0    metFORMIN (GLUCOPHAGE) 1000 MG tablet, Take 1 tablet by mouth 2 (Two) Times a Day With Meals., Disp: 180 tablet, Rfl: 3    metoprolol succinate XL (TOPROL-XL) 25 MG 24 hr tablet, Take 1 tablet by mouth Daily., Disp: 90 tablet, Rfl: 3    Semaglutide, 1 MG/DOSE, (Ozempic, 1 MG/DOSE,) 4 MG/3ML solution pen-injector, Inject 1 mg under the skin into the appropriate area as directed Every 7 (Seven) Days., Disp: 3 mL, Rfl: 1    sertraline (ZOLOFT) 100 MG tablet, Take 1 tablet by mouth Daily., Disp: 90 tablet, Rfl: 3    zolpidem (AMBIEN) 5 MG tablet, Take 1 tablet by mouth At Night As Needed for Sleep., Disp: 30 tablet, Rfl: 0    Review of Systems   Musculoskeletal:  Positive for arthralgias, back pain, gait problem and joint swelling. Negative for myalgias, neck pain and neck stiffness.   Neurological:  Negative for weakness and numbness.       Vitals:    01/26/24 0811   BP: 153/99   Pulse: 95   Resp: 16   SpO2: 97%   PainSc:   7       Urine Drug Screen: Pending  Appropriate: Pending    Objective   Physical Exam  Vitals and nursing note reviewed.   Constitutional:       General: She is not in acute distress.     Appearance: Normal appearance. She is well-developed and normal weight.   Neck:      Trachea: No tracheal deviation.   Musculoskeletal:      Comments: Lumbar Spine Exam:  Tender to palpation over the lumbar paraspinal musculature Yes  Limited range of motion secondary to pain No  Facet loading positive: Negative  Facets tender to palpation: Negative  Straight leg raise test positive: Negative    SI Joint Exam:  Blake positive: right  PSIS tender: right   SI joint palpation: right  SI joint compression: right     Neurological:      General: No focal deficit present.      Mental  Status: She is alert.      Sensory: No sensory deficit.      Motor: No weakness.           Assessment & Plan   Problems Addressed this Visit    None  Visit Diagnoses       Lumbar radiculopathy    -  Primary    Sacroiliitis              Diagnoses         Codes Comments    Lumbar radiculopathy    -  Primary ICD-10-CM: M54.16  ICD-9-CM: 724.4     Sacroiliitis     ICD-10-CM: M46.1  ICD-9-CM: 720.2             Plan:  Pending MRI  Increase baclofen to 20 mg 3 times daily  Reportedly good benefit with LESI  --- Follow-up 3 to 4 weeks after MRI           INSPECT REPORT    As part of the patient's treatment plan, I may be prescribing controlled substances. The patient has been made aware of appropriate use of such medications, including potential risk of somnolence, limited ability to drive and/or work safely, and the potential for dependence or overdose. It has also bee made clear that these medications are for use by this patient only, without concomitant use of alcohol or other substances unless prescribed.     Patient has completed prescribing agreement detailing terms of continued prescribing of controlled substances, including monitoring NILDA reports, urine drug screening, and pill counts if necessary. The patient is aware that inappropriate use will results in cessation of prescribing such medications.    INSPECT report has been reviewed and scanned into the patient's chart.    As the clinician, I personally reviewed the INSPECT from 1/24/2024.    History and physical exam exhibit continued safe and appropriate use of controlled substances.      EMR Dragon/Transcription disclaimer:   Much of this encounter note is an electronic transcription/translation of spoken language to printed text. The electronic translation of spoken language may permit erroneous, or at times, nonsensical words or phrases to be inadvertently transcribed; Although I have reviewed the note for such errors, some may still exist.

## 2024-01-31 ENCOUNTER — TELEPHONE (OUTPATIENT)
Dept: PAIN MEDICINE | Facility: CLINIC | Age: 69
End: 2024-01-31
Payer: MEDICAID

## 2024-01-31 NOTE — TELEPHONE ENCOUNTER
Caller: Arianna Blanco    Relationship to patient: Self    Best call back number:     Patient is needing: MS BLANCO STATED THE PHARMACY WILL NOT FILL HER BACLOFEN RX UNTIL THE OFFICE CALLS AND VERIFIES THAT THE HIGHER DOES OF MEDICATION IS OK    [id number] - [place], [place] - [address] - [phone]  - [phone]  [phone]     PT WOULD LIKE A CALL BACK ONCE THE PHARMACY HAS BEEN CONTACTED AND TOLD THE DOSE IS OK, PLEASE.

## 2024-02-01 ENCOUNTER — TELEPHONE (OUTPATIENT)
Dept: PAIN MEDICINE | Facility: CLINIC | Age: 69
End: 2024-02-01
Payer: MEDICAID

## 2024-02-01 NOTE — TELEPHONE ENCOUNTER
Caller: Arianna Blanco    Relationship to patient: Self    Best call back number: [phone] (home)     Patient is needing: PATIENT STATED THAT HER MRI ORDER IS FOR [place] AND IT NEEDS TO BE MOVED TO THE [facility]. THE APPT IS CURRENTLY SCHEDULED FOR 2/9/24, PLEASE ADVISE - PATIENT WANTS A CALL TO LET HER KNOW WHEN IT HAS BEEN CHANGED. LEAVE A DETAILED MESSAGE IF SHE DOES NOT ANSWER

## 2024-02-01 NOTE — TELEPHONE ENCOUNTER
Called patient. Left  to return call so I could get a little more information from her before sending her MRI referral somewhere else.

## 2024-02-06 ENCOUNTER — TELEPHONE (OUTPATIENT)
Dept: FAMILY MEDICINE CLINIC | Facility: CLINIC | Age: 69
End: 2024-02-06
Payer: MEDICAID

## 2024-02-06 NOTE — TELEPHONE ENCOUNTER
----- Message from THOMAS Scanlon sent at 2/5/2024  8:33 AM EST -----  Regarding: Statin  Please contact patient and ask her if she would be willing to begin a statin medication to help control cholesterol level.  It is highly recommended the patient's other diabetics have a statin medication on board.  Statin options include atorvastatin, lovastatin, pravastatin, rosuvastatin, simvastatin.  Simvastatin is generally well-tolerated and has less side effects than atorvastatin.

## 2024-02-12 DIAGNOSIS — G47.00 INSOMNIA, UNSPECIFIED TYPE: ICD-10-CM

## 2024-02-14 RX ORDER — ZOLPIDEM TARTRATE 5 MG/1
5 TABLET ORAL NIGHTLY PRN
Qty: 30 TABLET | Refills: 0 | Status: SHIPPED | OUTPATIENT
Start: 2024-02-14

## 2024-02-16 ENCOUNTER — OFFICE VISIT (OUTPATIENT)
Dept: PAIN MEDICINE | Facility: CLINIC | Age: 69
End: 2024-02-16
Payer: MEDICAID

## 2024-02-16 VITALS
DIASTOLIC BLOOD PRESSURE: 82 MMHG | OXYGEN SATURATION: 98 % | SYSTOLIC BLOOD PRESSURE: 164 MMHG | HEART RATE: 82 BPM | RESPIRATION RATE: 16 BRPM

## 2024-02-16 DIAGNOSIS — M46.1 SACROILIITIS: ICD-10-CM

## 2024-02-16 DIAGNOSIS — M54.16 LUMBAR RADICULOPATHY: Primary | ICD-10-CM

## 2024-02-16 PROCEDURE — 99214 OFFICE O/P EST MOD 30 MIN: CPT | Performed by: STUDENT IN AN ORGANIZED HEALTH CARE EDUCATION/TRAINING PROGRAM

## 2024-02-16 PROCEDURE — 1159F MED LIST DOCD IN RCRD: CPT | Performed by: STUDENT IN AN ORGANIZED HEALTH CARE EDUCATION/TRAINING PROGRAM

## 2024-02-16 PROCEDURE — 1160F RVW MEDS BY RX/DR IN RCRD: CPT | Performed by: STUDENT IN AN ORGANIZED HEALTH CARE EDUCATION/TRAINING PROGRAM

## 2024-02-16 PROCEDURE — 1125F AMNT PAIN NOTED PAIN PRSNT: CPT | Performed by: STUDENT IN AN ORGANIZED HEALTH CARE EDUCATION/TRAINING PROGRAM

## 2024-02-16 PROCEDURE — G0463 HOSPITAL OUTPT CLINIC VISIT: HCPCS | Performed by: STUDENT IN AN ORGANIZED HEALTH CARE EDUCATION/TRAINING PROGRAM

## 2024-02-16 PROCEDURE — 3077F SYST BP >= 140 MM HG: CPT | Performed by: STUDENT IN AN ORGANIZED HEALTH CARE EDUCATION/TRAINING PROGRAM

## 2024-02-16 PROCEDURE — 3079F DIAST BP 80-89 MM HG: CPT | Performed by: STUDENT IN AN ORGANIZED HEALTH CARE EDUCATION/TRAINING PROGRAM

## 2024-02-16 RX ORDER — BACLOFEN 20 MG/1
20 TABLET ORAL 3 TIMES DAILY
Qty: 90 TABLET | Refills: 2 | Status: SHIPPED | OUTPATIENT
Start: 2024-02-16

## 2024-02-29 ENCOUNTER — TELEPHONE (OUTPATIENT)
Dept: FAMILY MEDICINE CLINIC | Facility: CLINIC | Age: 69
End: 2024-02-29
Payer: MEDICAID

## 2024-02-29 NOTE — TELEPHONE ENCOUNTER
Caller: Arianna Blanco    Relationship: Self    Best call back number:     [phone]        What medication are you requesting: BUTALBITAL    What are your current symptoms: MIGRAINE    How long have you been experiencing symptoms:     Have you had these symptoms before:    [x] Yes  [] No    Have you been treated for these symptoms before:   [x] Yes  [] No    If a prescription is needed, what is your preferred pharmacy and phone number: [id number] - [place], [place] - [address] - [phone] [facility] [phone] FX     Additional notes:

## 2024-03-03 DIAGNOSIS — G43.909 MIGRAINE WITHOUT STATUS MIGRAINOSUS, NOT INTRACTABLE, UNSPECIFIED MIGRAINE TYPE: Primary | ICD-10-CM

## 2024-03-03 RX ORDER — BUTALBITAL/ACETAMINOPHEN 50MG-325MG
1 TABLET ORAL EVERY 6 HOURS PRN
Qty: 30 EACH | Refills: 0 | Status: SHIPPED | OUTPATIENT
Start: 2024-03-03

## 2024-03-06 RX ORDER — INSULIN DETEMIR 100 [IU]/ML
60 INJECTION, SOLUTION SUBCUTANEOUS EVERY MORNING
Refills: 11 | OUTPATIENT
Start: 2024-03-06

## 2024-03-08 DIAGNOSIS — E11.65 TYPE 2 DIABETES MELLITUS WITH HYPERGLYCEMIA, WITHOUT LONG-TERM CURRENT USE OF INSULIN: ICD-10-CM

## 2024-03-08 NOTE — TELEPHONE ENCOUNTER
Caller: Arianna Blanco    Relationship: Self    Best call back number: [phone]    Requested Prescriptions:   Requested Prescriptions     Pending Prescriptions Disp Refills    insulin detemir (LEVEMIR) 100 UNIT/ML injection 54 mL 3     Sig: Inject 60 Units under the skin into the appropriate area as directed Every Night.        Pharmacy where request should be sent: [facility]/PHARMACY #[id number] - [place], [place]  [address] - [phone]  - [phone] FX     Last office visit with prescribing clinician: 1/9/2024   Last telemedicine visit with prescribing clinician: Visit date not found   Next office visit with prescribing clinician: 3/29/2024     Additional details provided by patient: PATIENT IS COMPLETELY OUT     Does the patient have less than a 3 day supply:  [x] Yes  [] No    Would you like a call back once the refill request has been completed: [x] Yes [] No    If the office needs to give you a call back, can they leave a voicemail: [x] Yes [] No    Sonia Cano, AMOL   03/08/24 09:06 EST

## 2024-03-09 DIAGNOSIS — E11.65 TYPE 2 DIABETES MELLITUS WITH HYPERGLYCEMIA, WITHOUT LONG-TERM CURRENT USE OF INSULIN: ICD-10-CM

## 2024-03-09 NOTE — TELEPHONE ENCOUNTER
Resent to the pharmacy, but there were three refills  on the January 2024 prescription. You should have had enough for the year.

## 2024-03-11 ENCOUNTER — TELEPHONE (OUTPATIENT)
Dept: FAMILY MEDICINE CLINIC | Facility: CLINIC | Age: 69
End: 2024-03-11
Payer: MEDICAID

## 2024-03-11 NOTE — TELEPHONE ENCOUNTER
Lantus sent to the pharmacy.  Remind Arianna to monitor blood glucose levels especially when changing from one  medication to another.

## 2024-03-11 NOTE — TELEPHONE ENCOUNTER
Caller: Arianna Blanco    Relationship: Self    Best call back number: [phone]     Which medication are you concerned about:   insulin detemir (LEVEMIR) 100 UNIT/ML injection     Who prescribed you this medication:   Maddie Cornejo APRN          What are your concerns:   PATIENT STATES INSURANCE IS NO LONGER COVERING THIS MEDICATION.  SHE HAS BEEN OUT FOR 4 DAYS.  PLEASE ADVISE

## 2024-03-14 DIAGNOSIS — G47.00 INSOMNIA, UNSPECIFIED TYPE: ICD-10-CM

## 2024-03-14 RX ORDER — ZOLPIDEM TARTRATE 5 MG/1
5 TABLET ORAL NIGHTLY PRN
Qty: 30 TABLET | Refills: 0 | Status: SHIPPED | OUTPATIENT
Start: 2024-03-14

## 2024-03-20 DIAGNOSIS — F41.9 ANXIETY: ICD-10-CM

## 2024-03-20 RX ORDER — BUSPIRONE HYDROCHLORIDE 7.5 MG/1
7.5 TABLET ORAL NIGHTLY PRN
Qty: 90 TABLET | Refills: 0 | Status: SHIPPED | OUTPATIENT
Start: 2024-03-20

## 2024-03-29 ENCOUNTER — OFFICE VISIT (OUTPATIENT)
Dept: FAMILY MEDICINE CLINIC | Facility: CLINIC | Age: 69
End: 2024-03-29
Payer: MEDICAID

## 2024-03-29 VITALS
HEART RATE: 89 BPM | TEMPERATURE: 97.3 F | BODY MASS INDEX: 25.25 KG/M2 | HEIGHT: 68 IN | SYSTOLIC BLOOD PRESSURE: 142 MMHG | RESPIRATION RATE: 16 BRPM | OXYGEN SATURATION: 96 % | WEIGHT: 166.6 LBS | DIASTOLIC BLOOD PRESSURE: 83 MMHG

## 2024-03-29 DIAGNOSIS — F32.A DEPRESSION, UNSPECIFIED DEPRESSION TYPE: ICD-10-CM

## 2024-03-29 DIAGNOSIS — R15.9 INCONTINENCE OF FECES, UNSPECIFIED FECAL INCONTINENCE TYPE: ICD-10-CM

## 2024-03-29 DIAGNOSIS — R20.2 NUMBNESS AND TINGLING IN RIGHT HAND: ICD-10-CM

## 2024-03-29 DIAGNOSIS — F17.200 TOBACCO DEPENDENCE: ICD-10-CM

## 2024-03-29 DIAGNOSIS — G47.30 SLEEP APNEA, UNSPECIFIED TYPE: ICD-10-CM

## 2024-03-29 DIAGNOSIS — I10 ESSENTIAL HYPERTENSION: ICD-10-CM

## 2024-03-29 DIAGNOSIS — R25.1 TREMOR OF RIGHT HAND: ICD-10-CM

## 2024-03-29 DIAGNOSIS — M48.02 CERVICAL STENOSIS OF SPINAL CANAL: ICD-10-CM

## 2024-03-29 DIAGNOSIS — R20.0 NUMBNESS AND TINGLING IN RIGHT HAND: ICD-10-CM

## 2024-03-29 DIAGNOSIS — E11.65 TYPE 2 DIABETES MELLITUS WITH HYPERGLYCEMIA, WITHOUT LONG-TERM CURRENT USE OF INSULIN: Primary | ICD-10-CM

## 2024-03-29 DIAGNOSIS — R59.0 ANTERIOR CERVICAL LYMPHADENOPATHY: ICD-10-CM

## 2024-03-29 DIAGNOSIS — M54.16 LUMBAR RADICULOPATHY: ICD-10-CM

## 2024-03-29 PROCEDURE — 3077F SYST BP >= 140 MM HG: CPT

## 2024-03-29 PROCEDURE — 3079F DIAST BP 80-89 MM HG: CPT

## 2024-03-29 PROCEDURE — 99214 OFFICE O/P EST MOD 30 MIN: CPT

## 2024-03-29 PROCEDURE — 3052F HG A1C>EQUAL 8.0%<EQUAL 9.0%: CPT

## 2024-03-29 RX ORDER — LISINOPRIL 20 MG/1
20 TABLET ORAL DAILY
Qty: 90 TABLET | Refills: 0 | Status: SHIPPED | OUTPATIENT
Start: 2024-03-29

## 2024-03-29 RX ORDER — BROMPHENIRAMIN/PSEUDOEPHEDRINE 1-15MG/5ML
1 LIQUID (ML) ORAL NIGHTLY PRN
Qty: 90 EACH | Refills: 3 | Status: SHIPPED | OUTPATIENT
Start: 2024-03-29 | End: 2024-03-29

## 2024-03-29 RX ORDER — BROMPHENIRAMIN/PSEUDOEPHEDRINE 1-15MG/5ML
1 LIQUID (ML) ORAL NIGHTLY PRN
Qty: 90 EACH | Refills: 3 | Status: SHIPPED | OUTPATIENT
Start: 2024-03-29

## 2024-03-29 RX ORDER — AMOXICILLIN AND CLAVULANATE POTASSIUM 875; 125 MG/1; MG/1
1 TABLET, FILM COATED ORAL 2 TIMES DAILY
Qty: 20 TABLET | Refills: 0 | Status: SHIPPED | OUTPATIENT
Start: 2024-03-29

## 2024-03-29 NOTE — PROGRESS NOTES
Office Note     Name: Arianna Blanco    : 1955     MRN: [id number]     Chief Complaint  Tremors, Earache, and Numbness    Subjective     Arianna Blanco presents to [facility] for multiple acute complaints including right hand shaking, fingertip numb, left ear pain.     History of Present Illness  Arianna is here for tremors, finger tip numbness, and bilateral ear pain. She states that the tremors started months ago and is getting worse, with finger tip numbness.     She also complains of ear pain bilateral that started a couple weeks ago.     She reports bowel incontinence that is a new concern and has been primarily occurring at night.     She does have sleep apnea but reports that her mask does not fit her appropriately and she is experiencing nonrestorative sleep.      She reports she is perpetually tired during the day and reports feeling frustrated.    She was seeing pain management for cervical neck concerns MRI of the C-spine completed in 2023.        Current Outpatient Medications:     Accu-Chek Guide test strip, 1 each by Other route 4 (Four) Times a Day. Use to test blood sugar 4 times daily, Disp: 120 each, Rfl: 11    albuterol sulfate  (90 Base) MCG/ACT inhaler, INHALE 2 PUFFS BY MOUTH EVERY 4 HOURS AS NEEDED FOR WHEEZING OR SHORTNESS OF AIR, Disp: 6.7 g, Rfl: 2    baclofen (LIORESAL) 20 MG tablet, Take 1 tablet by mouth 3 (Three) Times a Day., Disp: 90 tablet, Rfl: 2    busPIRone (BUSPAR) 7.5 MG tablet, TAKE 1 TABLET BY MOUTH AT NIGHT AS NEEDED (ANXIETY)., Disp: 90 tablet, Rfl: 0    butalbital-acetaminophen  MG tablet tablet, Take 1 tablet by mouth Every 6 (Six) Hours As Needed (headache)., Disp: 30 each, Rfl: 0    Continuous Blood Gluc  (FreeStyle Willie 2 [place]) device, Use 1 each Daily. To be used daily to monitor continuous blood sugar reading for type 2 diabetic patient on insulin regimen., Disp: 1 each, Rfl: 0    Continuous  Blood Gluc Sensor (FreeStyle Willie 2 Sensor) misc, Use 1 each Every 14 (Fourteen) Days. Dispense 2 sensor pack. To be used daily with freestyle willie device to monitor continuous blood sugar reading for type 2 diabetic patient on insulin regimen., Disp: 2 each, Rfl: 12    dilTIAZem CD (CARDIZEM CD) 240 MG 24 hr capsule, Take 1 capsule by mouth Daily., Disp: 90 capsule, Rfl: 1    Eliquis 5 MG tablet tablet, Take 1 tablet by mouth Every 12 (Twelve) Hours., Disp: 180 tablet, Rfl: 1    gabapentin (NEURONTIN) 300 MG capsule, Take 1 capsule by mouth 4 (Four) Times a Day., Disp: 120 capsule, Rfl: 0    Incontinence Supply Disposable (Briefs Overnight Large) misc, Use 1 each At Night As Needed (incontinence)., Disp: 90 each, Rfl: 3    Insulin Glargine (LANTUS SOLOSTAR) 100 UNIT/ML injection pen, Inject 60 Units under the skin into the appropriate area as directed Every Night., Disp: 54 mL, Rfl: 3    insulin glulisine (Apidra) 100 UNIT/ML injection, Inject 5 Units under the skin into the appropriate area as directed 3 (Three) Times a Day Before Meals., Disp: 10 mL, Rfl: 3    lisinopril (PRINIVIL,ZESTRIL) 20 MG tablet, Take 1 tablet by mouth Daily., Disp: 90 tablet, Rfl: 0    metFORMIN (GLUCOPHAGE) 1000 MG tablet, Take 1 tablet by mouth 2 (Two) Times a Day With Meals., Disp: 180 tablet, Rfl: 3    metoprolol succinate XL (TOPROL-XL) 25 MG 24 hr tablet, Take 1 tablet by mouth Daily., Disp: 90 tablet, Rfl: 3    Semaglutide, 1 MG/DOSE, (Ozempic, 1 MG/DOSE,) 4 MG/3ML solution pen-injector, Inject 1 mg under the skin into the appropriate area as directed Every 7 (Seven) Days., Disp: 3 mL, Rfl: 1    sertraline (ZOLOFT) 100 MG tablet, Take 1 tablet by mouth Daily., Disp: 90 tablet, Rfl: 3    zolpidem (AMBIEN) 5 MG tablet, TAKE 1 TABLET BY MOUTH AT NIGHT AS NEEDED FOR SLEEP, Disp: 30 tablet, Rfl: 0    amoxicillin-clavulanate (AUGMENTIN) 875-125 MG per tablet, Take 1 tablet by mouth 2 (Two) Times a Day., Disp: 20 tablet, Rfl:  "0    Allergies   Allergen Reactions    Codeine GI Intolerance       Past Surgical History:   Procedure Laterality Date    CARDIAC CATHETERIZATION Left 10/30/2020    Procedure: Left Heart Cath with Coronary Angiography;  Surgeon: Donaldo Archer MD;  Location: [facility] CATH INVASIVE LOCATION;  Service: Cardiovascular;  Laterality: Left;    CARPAL TUNNEL RELEASE Bilateral 2017    CHOLECYSTECTOMY  1980    HYSTERECTOMY  1980        Family History   Problem Relation Age of Onset    Heart disease Mother     Hypertension Mother     Diabetes Mother     Stroke Mother             2/16/2024     8:27 AM   PHQ-2/PHQ-9 Depression Screening   Little Interest or Pleasure in Doing Things 0-->not at all   Feeling Down, Depressed or Hopeless 0-->not at all   PHQ-9: Brief Depression Severity Measure Score 0       Review of Systems   Cardiovascular: Negative.    Musculoskeletal:  Positive for arthralgias and myalgias.   Neurological:  Positive for tremors and numbness.   Psychiatric/Behavioral:  Positive for sleep disturbance, depressed mood and stress. Negative for self-injury and suicidal ideas.        Objective     /83 (BP Location: Left arm, Patient Position: Sitting, Cuff Size: Adult)   Pulse 89   Temp 97.3 °F (36.3 °C) (Infrared)   Resp 16   Ht 172.7 cm (67.99\")   Wt 75.6 kg (166 lb 9.6 oz)   SpO2 96%   BMI 25.34 kg/m²     BP Readings from Last 2 Encounters:   03/29/24 142/83   02/16/24 164/82       Wt Readings from Last 2 Encounters:   03/29/24 75.6 kg (166 lb 9.6 oz)   01/09/24 75.3 kg (166 lb)       BMI is >= 25 and <30. (Overweight) The following options were offered after discussion;: exercise counseling/recommendations and nutrition counseling/recommendations         Physical Exam  Vitals and nursing note reviewed.   Constitutional:       General: She is not in acute distress.     Appearance: Normal appearance. She is well-groomed. She is not ill-appearing.   HENT:      Head: Normocephalic and atraumatic.      " Mouth/Throat:      Lips: Pink. No lesions.      Mouth: Mucous membranes are moist.      Pharynx: Uvula midline.   Eyes:      General: Lids are normal. Vision grossly intact. Gaze aligned appropriately.      Extraocular Movements: Extraocular movements intact.      Pupils: Pupils are equal, round, and reactive to light.   Neck:      Thyroid: No thyroid mass, thyromegaly or thyroid tenderness.      Vascular: No carotid bruit.     Cardiovascular:      Rate and Rhythm: Normal rate and regular rhythm.      Heart sounds: Normal heart sounds. No murmur heard.  Pulmonary:      Effort: Pulmonary effort is normal.      Breath sounds: Normal breath sounds and air entry.   Musculoskeletal:      Cervical back: Normal range of motion and neck supple.   Lymphadenopathy:      Cervical: Cervical adenopathy present.   Skin:     General: Skin is warm and dry.   Neurological:      General: No focal deficit present.      Mental Status: She is alert and oriented to person, place, and time. Mental status is at baseline.      Motor: Tremor (tremor to right hand.) present.   Psychiatric:         Attention and Perception: Attention and perception normal.         Mood and Affect: Mood is depressed.         Behavior: Behavior normal. Behavior is cooperative.       Physical Exam   Neck         Result Review  Labs  Result Review  Imaging  Med Tab  Media     Assessment and Plan      {CC Problem List  Visit Diagnosis  ROS  Review (Popup)  Health Maintenance  Quality  BestPractice  Medications  SmartSets  SnapShot Encounters  Media   Diagnoses and all orders for this visit:    1. Type 2 diabetes mellitus with hyperglycemia, without long-term current use of insulin (Primary)  -     Continuous Blood Gluc Sensor (FreeStyle Willie 2 Sensor) misc; Use 1 each Every 14 (Fourteen) Days. Dispense 2 sensor pack. To be used daily with freestyle willie device to monitor continuous blood sugar reading for type 2 diabetic patient on insulin  regimen.  Dispense: 2 each; Refill: 12  -     Continuous Blood Gluc  (FreeStyle Willie 2 [place]) device; Use 1 each Daily. To be used daily to monitor continuous blood sugar reading for type 2 diabetic patient on insulin regimen.  Dispense: 1 each; Refill: 0    2. Essential hypertension  Comments:  Elevated today.  Reports not taking lisinopril.  Medications claririfed and refilled.   Recheck at next appt.  Orders:  -     lisinopril (PRINIVIL,ZESTRIL) 20 MG tablet; Take 1 tablet by mouth Daily.  Dispense: 90 tablet; Refill: 0    3. Tremor of right hand  -     CT Head With & Without Contrast  -     Cancel: Ambulatory Referral to Neurology  -     XR Neck Soft Tissue  -     Ambulatory Referral to Neurology    4. Depression, unspecified depression type  Comments:  Continue  medications, work on restorative sleep,  neck pain. Resource info. given.   Follow up at next appt.    5. Incontinence of feces, unspecified fecal incontinence type  Comments:  Night time incontinence  Orders:  -     Cancel: Ambulatory Referral to Neurology  -     Incontinence Supply Disposable (Briefs Overnight Large) misc; Use 1 each At Night As Needed (incontinence).  Dispense: 90 each; Refill: 3  -     Ambulatory Referral to Neurology    6. Sleep apnea, unspecified type  Comments:  Machine not effective. Nonrestorative sleep.  Orders:  -     Ambulatory Referral to Sleep Medicine    7. Lumbar radiculopathy  -     Cancel: Ambulatory Referral to Neurology  -     Patient Lift  -     Ambulatory Referral to Neurology    8. Numbness and tingling in right hand  -     XR Neck Soft Tissue  -     Ambulatory Referral to Neurology    9. Cervical stenosis of spinal canal  -     Ambulatory Referral to Neurology    10. Anterior cervical lymphadenopathy  -     amoxicillin-clavulanate (AUGMENTIN) 875-125 MG per tablet; Take 1 tablet by mouth 2 (Two) Times a Day.  Dispense: 20 tablet; Refill: 0    11. Tobacco dependence  Comments:  Encouraged  cessation of tobacco products.  Patient not willing to stop. Will discuss at next appt.    Other orders  -     Discontinue: Incontinence Supply Disposable (Briefs Overnight Large) misc; Use 1 each At Night As Needed (incontinence).  Dispense: 90 each; Refill: 3       Procedures    Problem List Items Addressed This Visit          Cardiac and Vasculature    Essential hypertension    Overview     Added automatically from request for surgery [id number]         Relevant Medications    lisinopril (PRINIVIL,ZESTRIL) 20 MG tablet       Endocrine and Metabolic    Type 2 diabetes mellitus with hyperglycemia - Primary    Relevant Medications    Continuous Blood Gluc Sensor (FreeStyle Willie 2 Sensor) misc    Continuous Blood Gluc  (FreeStyle Willie 2 [place]) device       Gastrointestinal Abdominal     Incontinence of feces    Overview     Night time incontinence         Relevant Medications    Incontinence Supply Disposable (Briefs Overnight Large) misc    Other Relevant Orders    Ambulatory Referral to Neurology       Mental Health    Depression       Neuro    Cervical stenosis of spinal canal    Relevant Orders    Ambulatory Referral to Neurology    Lumbar radiculopathy    Relevant Orders    Patient Lift    Ambulatory Referral to Neurology    Tremor of right hand    Relevant Orders    CT Head With & Without Contrast    XR Neck Soft Tissue    Ambulatory Referral to Neurology       Sleep    Sleep apnea syndrome    Relevant Orders    Ambulatory Referral to Sleep Medicine       Symptoms and Signs    Numbness and tingling in right hand    Relevant Orders    XR Neck Soft Tissue    Ambulatory Referral to Neurology     Other Visit Diagnoses       Anterior cervical lymphadenopathy        Relevant Medications    amoxicillin-clavulanate (AUGMENTIN) 875-125 MG per tablet    Tobacco dependence        Encouraged cessation of tobacco products.  Patient not willing to stop. Will discuss at next appt.             {Instructions Charge  Capture  Follow-up Communications   Wrapup Tab  Return for Next scheduled follow up.     Patient Instructions   Make appt with sleep medicine, neurology.    CT head ordered.     Patient was given instructions and counseling regarding her condition or for health maintenance advice. Please see specific information pulled into the AVS if appropriate.  Hand hygiene was performed during entrance to exam room and following assessment of patient. This document is intended for medical expert use only.     EMR Dragon/Transcription disclaimer:   Much of this encounter note is an electronic transcription/translation of spoken language to printed text. The electronic translation of spoken language may permit erroneous, or at times, nonsensical words or phrases to be inadvertently transcribed.      THOMAS Scanlon, FNP-C  [facility] [address]  [facility]  [address] [place] [address] [address]  [place] [address] [phone]

## 2024-04-04 DIAGNOSIS — G43.909 MIGRAINE WITHOUT STATUS MIGRAINOSUS, NOT INTRACTABLE, UNSPECIFIED MIGRAINE TYPE: ICD-10-CM

## 2024-04-05 ENCOUNTER — HOSPITAL ENCOUNTER (OUTPATIENT)
Dept: CT IMAGING | Facility: HOSPITAL | Age: 69
Discharge: HOME | End: 2024-04-05
Payer: MEDICAID

## 2024-04-05 LAB
CREAT BLDA-MCNC: 0.6 MG/DL (ref 0.6–1.3)
EGFRCR SERPLBLD CKD-EPI 2021: 98.5 ML/MIN/1.73

## 2024-04-05 PROCEDURE — 82565 ASSAY OF CREATININE: CPT

## 2024-04-05 PROCEDURE — 25510000001 IOPAMIDOL PER 1 ML

## 2024-04-05 PROCEDURE — 70470 CT HEAD/BRAIN W/O & W/DYE: CPT

## 2024-04-05 RX ORDER — BUTALBITAL/ACETAMINOPHEN 50MG-325MG
1 TABLET ORAL EVERY 6 HOURS PRN
Qty: 30 TABLET | Refills: 0 | Status: SHIPPED | OUTPATIENT
Start: 2024-04-05

## 2024-04-05 RX ADMIN — IOPAMIDOL 100 ML: 755 INJECTION, SOLUTION INTRAVENOUS at 09:28

## 2024-04-08 DIAGNOSIS — F41.9 ANXIETY: ICD-10-CM

## 2024-04-08 NOTE — TELEPHONE ENCOUNTER
lilly    Caller: Arianna Blanco    Relationship: Self    Best call back number:   [phone]    Requested Prescriptions:   Requested Prescriptions     Pending Prescriptions Disp Refills    busPIRone (BUSPAR) 7.5 MG tablet 90 tablet 0     Sig: Take 1 tablet by mouth At Night As Needed (anxiety).    Insulin Glargine (LANTUS SOLOSTAR) 100 UNIT/ML injection pen 54 mL 3     Sig: Inject 60 Units under the skin into the appropriate area as directed Every Night.        Pharmacy where request should be sent: [facility]/PHARMACY #[id number] - [place], [place] - [address] - [phone]  - [phone] FX     Last office visit with prescribing clinician: 3/29/2024   Last telemedicine visit with prescribing clinician: Visit date not found   Next office visit with prescribing clinician: 4/22/2024     Additional details provided by patient: PATIENT HAS ABOUT 2 DAYS LEFT    Does the patient have less than a 3 day supply:  [x] Yes  [] No    Would you like a call back once the refill request has been completed: [x] Yes [] No    If the office needs to give you a call back, can they leave a voicemail: [x] Yes [] No    Agnes Solis Rep   04/08/24 11:45 EDT

## 2024-04-09 RX ORDER — BUSPIRONE HYDROCHLORIDE 7.5 MG/1
7.5 TABLET ORAL NIGHTLY PRN
Qty: 90 TABLET | Refills: 0 | OUTPATIENT
Start: 2024-04-09

## 2024-04-12 ENCOUNTER — OFFICE VISIT (OUTPATIENT)
Dept: PAIN MEDICINE | Facility: CLINIC | Age: 69
End: 2024-04-12
Payer: MEDICAID

## 2024-04-12 VITALS
DIASTOLIC BLOOD PRESSURE: 91 MMHG | SYSTOLIC BLOOD PRESSURE: 173 MMHG | HEART RATE: 85 BPM | RESPIRATION RATE: 16 BRPM | OXYGEN SATURATION: 97 %

## 2024-04-12 DIAGNOSIS — M54.16 LUMBAR RADICULOPATHY: ICD-10-CM

## 2024-04-12 DIAGNOSIS — M47.816 SPONDYLOSIS OF LUMBAR REGION WITHOUT MYELOPATHY OR RADICULOPATHY: Primary | ICD-10-CM

## 2024-04-12 PROCEDURE — G0463 HOSPITAL OUTPT CLINIC VISIT: HCPCS | Performed by: STUDENT IN AN ORGANIZED HEALTH CARE EDUCATION/TRAINING PROGRAM

## 2024-04-12 PROCEDURE — 1160F RVW MEDS BY RX/DR IN RCRD: CPT | Performed by: STUDENT IN AN ORGANIZED HEALTH CARE EDUCATION/TRAINING PROGRAM

## 2024-04-12 PROCEDURE — 3080F DIAST BP >= 90 MM HG: CPT | Performed by: STUDENT IN AN ORGANIZED HEALTH CARE EDUCATION/TRAINING PROGRAM

## 2024-04-12 PROCEDURE — 99214 OFFICE O/P EST MOD 30 MIN: CPT | Performed by: STUDENT IN AN ORGANIZED HEALTH CARE EDUCATION/TRAINING PROGRAM

## 2024-04-12 PROCEDURE — 1125F AMNT PAIN NOTED PAIN PRSNT: CPT | Performed by: STUDENT IN AN ORGANIZED HEALTH CARE EDUCATION/TRAINING PROGRAM

## 2024-04-12 PROCEDURE — 1159F MED LIST DOCD IN RCRD: CPT | Performed by: STUDENT IN AN ORGANIZED HEALTH CARE EDUCATION/TRAINING PROGRAM

## 2024-04-12 PROCEDURE — 3077F SYST BP >= 140 MM HG: CPT | Performed by: STUDENT IN AN ORGANIZED HEALTH CARE EDUCATION/TRAINING PROGRAM

## 2024-04-12 RX ORDER — PREGABALIN 75 MG/1
75 CAPSULE ORAL 2 TIMES DAILY
Qty: 60 CAPSULE | Refills: 0 | Status: SHIPPED | OUTPATIENT
Start: 2024-04-12

## 2024-04-12 NOTE — PROGRESS NOTES
CHIEF COMPLAINT  Chief Complaint   Patient presents with    Back Pain     No Narcotics       Primary Care  Maddie Cornejo APRN Subjective Victim Blanco is a 68 y.o. female  who presents for chronic low back pain and right-sided SI joint dysfunction.  She was initially evaluated by the neurosurgery nurse practitioner who recommended right-sided SI joint injection.  For somewhat unknown reasons, she was never scheduled for this procedure.  She reports that prior to being seen back by the neurosurgeon, she needs to complete the SI joint injection.  She describes significant pain across the right buttock and extending into the right posterior thigh.  She has significant pain especially with prolonged walking and prolonged sitting.  She reports that changing positions slightly improves her pain.  She has tried over-the-counter medications without any significant improvement.  She has done approximately 6 months of chiropractic without any significant benefit.  She denies any red flag symptoms such as loss of bowel or bladder or significant numbness or weakness.  She also denies any significant radicular pain.    History of Present Illness     Location: Right buttock  Onset: Approximately 6 months ago  Duration: Progressively worsening  Timing: Constant throughout the day  Quality: Sharp, stabbing  Severity: Today: 8       Last Week: 10       Worst: 10  Modifying Factors: The pain is worse with any type of movement and physical activity and slightly improved with rest  Functional Deficit: The pain makes it difficult for her to perform her activities of daily living    Physical Therapy: yes    Interval Update 04/12/2024: She is complaining of primarily axial back pain today.  No real radiculopathy.  She previously underwent LESI with significant benefit however reports that her pain feels somewhat different today and is focused on the left side as opposed to the right side    The following portions of the  patient's history were reviewed and updated as appropriate: allergies, current medications, past family history, past medical history, past social history, past surgical history, and problem list.    Procedures:  11/16/2023: Right SI: No benefit  12/28/2023: LESI: 90% benefit      Current Outpatient Medications:     Accu-Chek Guide test strip, 1 each by Other route 4 (Four) Times a Day. Use to test blood sugar 4 times daily, Disp: 120 each, Rfl: 11    albuterol sulfate  (90 Base) MCG/ACT inhaler, INHALE 2 PUFFS BY MOUTH EVERY 4 HOURS AS NEEDED FOR WHEEZING OR SHORTNESS OF AIR, Disp: 6.7 g, Rfl: 2    amoxicillin-clavulanate (AUGMENTIN) 875-125 MG per tablet, Take 1 tablet by mouth 2 (Two) Times a Day., Disp: 20 tablet, Rfl: 0    baclofen (LIORESAL) 20 MG tablet, Take 1 tablet by mouth 3 (Three) Times a Day., Disp: 90 tablet, Rfl: 2    busPIRone (BUSPAR) 7.5 MG tablet, TAKE 1 TABLET BY MOUTH AT NIGHT AS NEEDED (ANXIETY)., Disp: 90 tablet, Rfl: 0    butalbital-acetaminophen  MG tablet tablet, TAKE 1 TABLET BY MOUTH EVERY 6 (SIX) HOURS AS NEEDED (HEADACHE)., Disp: 30 tablet, Rfl: 0    Continuous Blood Gluc  (FreeStyle Willie 2 [place]) device, Use 1 each Daily. To be used daily to monitor continuous blood sugar reading for type 2 diabetic patient on insulin regimen., Disp: 1 each, Rfl: 0    Continuous Blood Gluc Sensor (FreeStyle Willie 2 Sensor) misc, Use 1 each Every 14 (Fourteen) Days. Dispense 2 sensor pack. To be used daily with freestyle willie device to monitor continuous blood sugar reading for type 2 diabetic patient on insulin regimen., Disp: 2 each, Rfl: 12    dilTIAZem CD (CARDIZEM CD) 240 MG 24 hr capsule, Take 1 capsule by mouth Daily., Disp: 90 capsule, Rfl: 1    Eliquis 5 MG tablet tablet, Take 1 tablet by mouth Every 12 (Twelve) Hours., Disp: 180 tablet, Rfl: 1    Incontinence Supply Disposable (Briefs Overnight Large) misc, Use 1 each At Night As Needed (incontinence)., Disp: 90 each,  Rfl: 3    Insulin Glargine (LANTUS SOLOSTAR) 100 UNIT/ML injection pen, Inject 60 Units under the skin into the appropriate area as directed Every Night., Disp: 54 mL, Rfl: 3    insulin glulisine (Apidra) 100 UNIT/ML injection, Inject 5 Units under the skin into the appropriate area as directed 3 (Three) Times a Day Before Meals., Disp: 10 mL, Rfl: 3    lisinopril (PRINIVIL,ZESTRIL) 20 MG tablet, Take 1 tablet by mouth Daily., Disp: 90 tablet, Rfl: 0    metFORMIN (GLUCOPHAGE) 1000 MG tablet, Take 1 tablet by mouth 2 (Two) Times a Day With Meals., Disp: 180 tablet, Rfl: 3    metoprolol succinate XL (TOPROL-XL) 25 MG 24 hr tablet, Take 1 tablet by mouth Daily., Disp: 90 tablet, Rfl: 3    Semaglutide, 1 MG/DOSE, (Ozempic, 1 MG/DOSE,) 4 MG/3ML solution pen-injector, Inject 1 mg under the skin into the appropriate area as directed Every 7 (Seven) Days., Disp: 3 mL, Rfl: 1    sertraline (ZOLOFT) 100 MG tablet, Take 1 tablet by mouth Daily., Disp: 90 tablet, Rfl: 3    zolpidem (AMBIEN) 5 MG tablet, TAKE 1 TABLET BY MOUTH AT NIGHT AS NEEDED FOR SLEEP, Disp: 30 tablet, Rfl: 0    pregabalin (LYRICA) 75 MG capsule, Take 1 capsule by mouth 2 (Two) Times a Day., Disp: 60 capsule, Rfl: 0    Review of Systems   Musculoskeletal:  Positive for arthralgias, back pain, gait problem and joint swelling. Negative for myalgias, neck pain and neck stiffness.   Neurological:  Negative for weakness and numbness.       Vitals:    04/12/24 0807   BP: 173/91   Pulse: 85   Resp: 16   SpO2: 97%   PainSc:   8       Urine Drug Screen: Pending  Appropriate: Pending    Objective   Physical Exam  Vitals and nursing note reviewed.   Constitutional:       General: She is not in acute distress.     Appearance: Normal appearance. She is well-developed and normal weight.   Neck:      Trachea: No tracheal deviation.   Musculoskeletal:      Comments: Lumbar Spine Exam:  Tender to palpation over the lumbar paraspinal musculature Yes  Limited range of motion  secondary to pain yes  Facet loading positive: Left  Facets tender to palpation: Left  Straight leg raise test positive: Negative    SI Joint Exam:  Blake positive: right  PSIS tender: right   SI joint palpation: right  SI joint compression: right     Neurological:      General: No focal deficit present.      Mental Status: She is alert.      Sensory: No sensory deficit.      Motor: No weakness.           Assessment & Plan   Problems Addressed this Visit          Other    Lumbar radiculopathy     Other Visit Diagnoses       Spondylosis of lumbar region without myelopathy or radiculopathy    -  Primary    Relevant Medications    pregabalin (LYRICA) 75 MG capsule    Other Relevant Orders    Facet          Diagnoses         Codes Comments    Spondylosis of lumbar region without myelopathy or radiculopathy    -  Primary ICD-10-CM: M47.816  ICD-9-CM: 721.3     Lumbar radiculopathy     ICD-10-CM: M54.16  ICD-9-CM: 724.4             Plan:  It appears that she has not gotten her MRI  We can continue baclofen 20 mg 3 times daily  Complains of significant axial back pain today.  Will plan for left-sided L4-5 and L5-S1 medial branch blocks    --- Follow-up next notable for left-sided L4-5 and L5-S1 medial branch blocks           INSPECT REPORT    As part of the patient's treatment plan, I may be prescribing controlled substances. The patient has been made aware of appropriate use of such medications, including potential risk of somnolence, limited ability to drive and/or work safely, and the potential for dependence or overdose. It has also bee made clear that these medications are for use by this patient only, without concomitant use of alcohol or other substances unless prescribed.     Patient has completed prescribing agreement detailing terms of continued prescribing of controlled substances, including monitoring NILDA reports, urine drug screening, and pill counts if necessary. The patient is aware that inappropriate use  will results in cessation of prescribing such medications.    INSPECT report has been reviewed and scanned into the patient's chart.    As the clinician, I personally reviewed the INSPECT from 4/10/2024.    History and physical exam exhibit continued safe and appropriate use of controlled substances.      EMR Dragon/Transcription disclaimer:   Much of this encounter note is an electronic transcription/translation of spoken language to printed text. The electronic translation of spoken language may permit erroneous, or at times, nonsensical words or phrases to be inadvertently transcribed; Although I have reviewed the note for such errors, some may still exist.

## 2024-04-14 RX ORDER — BREXPIPRAZOLE 3 MG/1
1 TABLET ORAL DAILY
Qty: 90 TABLET | Refills: 1 | Status: SHIPPED | OUTPATIENT
Start: 2024-04-14

## 2024-04-15 ENCOUNTER — TELEPHONE (OUTPATIENT)
Dept: FAMILY MEDICINE CLINIC | Facility: CLINIC | Age: 69
End: 2024-04-15
Payer: MEDICAID

## 2024-04-15 NOTE — TELEPHONE ENCOUNTER
Caller: RUSSELL    Relationship: Other    Best call back number:  [phone]     RUSSELL CALLED WITH [facility] THEY ARE REQUESTING, PER INSURANCE, A VERBAL OKAY TO FILL A DEXCOM FOR THE PATIENT RATHER THAN A FREESTYLE NORMA.    PLEASE ADVISE

## 2024-04-20 DIAGNOSIS — G47.00 INSOMNIA, UNSPECIFIED TYPE: ICD-10-CM

## 2024-04-22 ENCOUNTER — OFFICE VISIT (OUTPATIENT)
Dept: FAMILY MEDICINE CLINIC | Facility: CLINIC | Age: 69
End: 2024-04-22
Payer: MEDICAID

## 2024-04-22 VITALS
OXYGEN SATURATION: 95 % | DIASTOLIC BLOOD PRESSURE: 82 MMHG | SYSTOLIC BLOOD PRESSURE: 136 MMHG | HEART RATE: 78 BPM | WEIGHT: 169.2 LBS | HEIGHT: 68 IN | BODY MASS INDEX: 25.64 KG/M2 | TEMPERATURE: 97.3 F | RESPIRATION RATE: 18 BRPM

## 2024-04-22 DIAGNOSIS — G47.00 INSOMNIA, UNSPECIFIED TYPE: ICD-10-CM

## 2024-04-22 DIAGNOSIS — R59.0 LYMPHADENOPATHY, SUBMENTAL: ICD-10-CM

## 2024-04-22 DIAGNOSIS — F17.200 TOBACCO DEPENDENCE: ICD-10-CM

## 2024-04-22 DIAGNOSIS — R25.1 TREMOR OF RIGHT HAND: ICD-10-CM

## 2024-04-22 DIAGNOSIS — E11.65 TYPE 2 DIABETES MELLITUS WITH HYPERGLYCEMIA, WITHOUT LONG-TERM CURRENT USE OF INSULIN: Primary | ICD-10-CM

## 2024-04-22 LAB
EXPIRATION DATE: ABNORMAL
HBA1C MFR BLD: 7.2 % (ref 4.5–5.7)
Lab: ABNORMAL

## 2024-04-22 PROCEDURE — 3079F DIAST BP 80-89 MM HG: CPT

## 2024-04-22 PROCEDURE — 83036 HEMOGLOBIN GLYCOSYLATED A1C: CPT

## 2024-04-22 PROCEDURE — 99214 OFFICE O/P EST MOD 30 MIN: CPT

## 2024-04-22 PROCEDURE — 3075F SYST BP GE 130 - 139MM HG: CPT

## 2024-04-22 PROCEDURE — 3051F HG A1C>EQUAL 7.0%<8.0%: CPT

## 2024-04-22 RX ORDER — ZOLPIDEM TARTRATE 5 MG/1
5 TABLET ORAL NIGHTLY PRN
Qty: 30 TABLET | Refills: 0 | Status: SHIPPED | OUTPATIENT
Start: 2024-04-22

## 2024-04-22 RX ORDER — ZOLPIDEM TARTRATE 5 MG/1
5 TABLET ORAL NIGHTLY PRN
Qty: 30 TABLET | Refills: 0 | OUTPATIENT
Start: 2024-04-22

## 2024-04-22 RX ORDER — SEMAGLUTIDE 1.34 MG/ML
1 INJECTION, SOLUTION SUBCUTANEOUS
Qty: 3 ML | Refills: 1 | Status: SHIPPED | OUTPATIENT
Start: 2024-04-22

## 2024-04-22 NOTE — ASSESSMENT & PLAN NOTE
Previously referred to Neurology 3/29/24, patient does not have appt as of yet. Requesting local provider.  Referral placed for local provider.  Encouraged her to follow-up with neurology or find a way to get to [place] for provider.    Tremor affecting right hand only.  No family history of Parkinson's.

## 2024-04-22 NOTE — PROGRESS NOTES
Office Note     Name: Arianna Blanco    : 1955     MRN: [id number]     Chief Complaint  Diabetes and Tremors    Subjective     History of Present Illness:  Arianna Blanco is a 68 y.o. female who presents today for diabetes management unchanged and Tremors in right hand, tremors are getting worse.       Allergies   Allergen Reactions   • Codeine GI Intolerance         Current Outpatient Medications:   •  Accu-Chek Guide test strip, 1 each by Other route 4 (Four) Times a Day. Use to test blood sugar 4 times daily, Disp: 120 each, Rfl: 11  •  albuterol sulfate  (90 Base) MCG/ACT inhaler, INHALE 2 PUFFS BY MOUTH EVERY 4 HOURS AS NEEDED FOR WHEEZING OR SHORTNESS OF AIR, Disp: 6.7 g, Rfl: 2  •  baclofen (LIORESAL) 20 MG tablet, Take 1 tablet by mouth 3 (Three) Times a Day., Disp: 90 tablet, Rfl: 2  •  busPIRone (BUSPAR) 7.5 MG tablet, TAKE 1 TABLET BY MOUTH AT NIGHT AS NEEDED (ANXIETY)., Disp: 90 tablet, Rfl: 0  •  butalbital-acetaminophen  MG tablet tablet, TAKE 1 TABLET BY MOUTH EVERY 6 (SIX) HOURS AS NEEDED (HEADACHE)., Disp: 30 tablet, Rfl: 0  •  Continuous Blood Gluc  (FreeStyle Willie 2 [place]) device, Use 1 each Daily. To be used daily to monitor continuous blood sugar reading for type 2 diabetic patient on insulin regimen., Disp: 1 each, Rfl: 0  •  Continuous Blood Gluc Sensor (FreeStyle Willie 2 Sensor) misc, Use 1 each Every 14 (Fourteen) Days. Dispense 2 sensor pack. To be used daily with freestyle willie device to monitor continuous blood sugar reading for type 2 diabetic patient on insulin regimen., Disp: 2 each, Rfl: 12  •  dilTIAZem CD (CARDIZEM CD) 240 MG 24 hr capsule, Take 1 capsule by mouth Daily., Disp: 90 capsule, Rfl: 1  •  Eliquis 5 MG tablet tablet, Take 1 tablet by mouth Every 12 (Twelve) Hours., Disp: 180 tablet, Rfl: 1  •  Incontinence Supply Disposable (Briefs Overnight Large) misc, Use 1 each At Night As Needed (incontinence)., Disp: 90 each, Rfl: 3  •   Insulin Glargine (LANTUS SOLOSTAR) 100 UNIT/ML injection pen, Inject 60 Units under the skin into the appropriate area as directed Every Night., Disp: 54 mL, Rfl: 3  •  insulin glulisine (Apidra) 100 UNIT/ML injection, Inject 5 Units under the skin into the appropriate area as directed 3 (Three) Times a Day Before Meals. (Patient taking differently: Inject 10 Units under the skin into the appropriate area as directed 3 (Three) Times a Day Before Meals.), Disp: 10 mL, Rfl: 3  •  lisinopril (PRINIVIL,ZESTRIL) 20 MG tablet, Take 1 tablet by mouth Daily., Disp: 90 tablet, Rfl: 0  •  metFORMIN (GLUCOPHAGE) 1000 MG tablet, Take 1 tablet by mouth 2 (Two) Times a Day With Meals., Disp: 180 tablet, Rfl: 3  •  metoprolol succinate XL (TOPROL-XL) 25 MG 24 hr tablet, Take 1 tablet by mouth Daily., Disp: 90 tablet, Rfl: 3  •  pregabalin (LYRICA) 75 MG capsule, Take 1 capsule by mouth 2 (Two) Times a Day., Disp: 60 capsule, Rfl: 0  •  Rexulti 3 MG tablet, TAKE 1 TABLET BY MOUTH EVERY DAY, Disp: 90 tablet, Rfl: 1  •  Semaglutide, 1 MG/DOSE, (Ozempic, 1 MG/DOSE,) 4 MG/3ML solution pen-injector, Inject 1 mg under the skin into the appropriate area as directed Every 7 (Seven) Days., Disp: 3 mL, Rfl: 1  •  sertraline (ZOLOFT) 100 MG tablet, Take 1 tablet by mouth Daily., Disp: 90 tablet, Rfl: 3  •  zolpidem (AMBIEN) 5 MG tablet, Take 1 tablet by mouth At Night As Needed for Sleep., Disp: 30 tablet, Rfl: 0    Past Medical History:   Diagnosis Date   • COPD (chronic obstructive pulmonary disease)    • Depression    • Diabetes mellitus    • Hyperlipidemia    • Hypertension        Review of Systems   Eyes:  Negative for blurred vision.   Neurological:  Positive for tremors.   All other systems reviewed and are negative.      Social History     Socioeconomic History   • Marital status:    Tobacco Use   • Smoking status: Every Day     Current packs/day: 1.00     Average packs/day: 1 pack/day for 41.3 years (41.3 ttl pk-yrs)      "Types: Cigarettes     Start date: 1983     Passive exposure: Current   • Smokeless tobacco: Never   Vaping Use   • Vaping status: Some Days   • Substances: Nicotine, Flavoring   • Devices: Refillable tank   Substance and Sexual Activity   • Alcohol use: No   • Sexual activity: Defer       Family History   Problem Relation Age of Onset   • Heart disease Mother    • Hypertension Mother    • Diabetes Mother    • Stroke Mother            2/16/2024     8:27 AM   PHQ-2/PHQ-9 Depression Screening   Little Interest or Pleasure in Doing Things 0-->not at all   Feeling Down, Depressed or Hopeless 0-->not at all   PHQ-9: Brief Depression Severity Measure Score 0       Fall Risk Screen:  KIANA Fall Risk Assessment was completed, and patient is at LOW risk for falls.Assessment completed on:3/29/2024      Objective     /82 (BP Location: Right arm, Patient Position: Sitting, Cuff Size: Large Adult)   Pulse 78   Temp 97.3 °F (36.3 °C)   Resp 18   Ht 172.7 cm (67.99\")   Wt 76.7 kg (169 lb 3.2 oz)   SpO2 95%   BMI 25.73 kg/m²     BP Readings from Last 2 Encounters:   04/22/24 136/82   04/12/24 173/91       Wt Readings from Last 2 Encounters:   04/22/24 76.7 kg (169 lb 3.2 oz)   03/29/24 75.6 kg (166 lb 9.6 oz)       BMI is >= 25 and <30. (Overweight) The following options were offered after discussion;: weight loss educational material (shared in after visit summary)         Physical Exam  Vitals and nursing note reviewed.   Constitutional:       General: She is not in acute distress.     Appearance: Normal appearance. She is well-groomed. She is not ill-appearing.   HENT:      Head: Normocephalic and atraumatic.      Mouth/Throat:      Lips: Pink. No lesions.      Mouth: Mucous membranes are moist.      Pharynx: Oropharynx is clear. Uvula midline.   Eyes:      General: Lids are normal. Vision grossly intact.   Neck:      Vascular: No carotid bruit.   Cardiovascular:      Rate and Rhythm: Normal rate and regular rhythm. "      Heart sounds: Normal heart sounds.   Pulmonary:      Effort: Pulmonary effort is normal.      Breath sounds: Normal breath sounds and air entry.   Musculoskeletal:      Right lower leg: No edema.      Left lower leg: No edema.   Lymphadenopathy:      Head:      Right side of head: Submental adenopathy present.      Left side of head: Submental adenopathy present.   Skin:     General: Skin is warm and dry.   Neurological:      Mental Status: She is alert and oriented to person, place, and time. Mental status is at baseline.      Motor: Tremor (right hand.) present.   Psychiatric:         Mood and Affect: Mood normal.         Behavior: Behavior normal. Behavior is cooperative.       Derm Physical Exam  Result Review     The following data was reviewed by: THOMAS Scanlon on 04/22/2024:  A1C Last 3 Results          8/14/2023    15:59 1/9/2024    08:41 4/22/2024    08:57   HGBA1C Last 3 Results   Hemoglobin A1C 9.5  8.7  7.2      CT Head With & Without Contrast    Result Date: 4/5/2024  Impression: Impression: No acute intracranial abnormality Electronically Signed: Tomy Castro MD  4/5/2024 9:46 AM EDT  Workstation ID: [id number]        Assessment and Plan     Procedures  Plan   Diagnoses and all orders for this visit:    1. Type 2 diabetes mellitus with hyperglycemia, without long-term current use of insulin (Primary)  Assessment & Plan:  Last A1C 8.7  Today 7.2  Dexcom not currently in use.  Medication refills ordered.      Orders:  -     POC Glycosylated Hemoglobin (Hb A1C)  -     Semaglutide, 1 MG/DOSE, (Ozempic, 1 MG/DOSE,) 4 MG/3ML solution pen-injector; Inject 1 mg under the skin into the appropriate area as directed Every 7 (Seven) Days.  Dispense: 3 mL; Refill: 1    2. Tremor of right hand  Assessment & Plan:  Previously referred to Neurology 3/29/24, patient does not have appt as of yet. Requesting local provider.  Referral placed for local provider.  Encouraged her to follow-up with neurology or  find a way to get to [place] for provider.    Tremor affecting right hand only.  No family history of Parkinson's.        Orders:  -     Ambulatory Referral to Neurology    3. Insomnia, unspecified type  Assessment & Plan:  Patient reports good effects of Ambien.  Medication refilled.    Orders:  -     zolpidem (AMBIEN) 5 MG tablet; Take 1 tablet by mouth At Night As Needed for Sleep.  Dispense: 30 tablet; Refill: 0    4. Lymphadenopathy, submental  Assessment & Plan:  Noted on exam today.  Ultrasound soft tissue ordered.  Follow-up next appointment.    Orders:  -     US Head Neck Soft Tissue    5. Tobacco dependence  Assessment & Plan:  50 plus year x 1 ppd.   Encouraged cessation of smoking.                Problem List Items Addressed This Visit          Endocrine and Metabolic    Type 2 diabetes mellitus with hyperglycemia - Primary    Current Assessment & Plan     Last A1C 8.7  Today 7.2  Dexcom not currently in use.  Medication refills ordered.           Relevant Medications    Semaglutide, 1 MG/DOSE, (Ozempic, 1 MG/DOSE,) 4 MG/3ML solution pen-injector    Other Relevant Orders    POC Glycosylated Hemoglobin (Hb A1C) (Completed)       Neuro    Tremor of right hand    Current Assessment & Plan     Previously referred to Neurology 3/29/24, patient does not have appt as of yet. Requesting local provider.  Referral placed for local provider.  Encouraged her to follow-up with neurology or find a way to get to [place] for provider.    Tremor affecting right hand only.  No family history of Parkinson's.             Relevant Orders    Ambulatory Referral to Neurology (Completed)       Sleep    Insomnia    Current Assessment & Plan     Patient reports good effects of Ambien.  Medication refilled.         Relevant Medications    zolpidem (AMBIEN) 5 MG tablet       Symptoms and Signs    Lymphadenopathy, submental    Current Assessment & Plan     Noted on exam today.  Ultrasound soft tissue ordered.  Follow-up next  appointment.         Relevant Orders    US Head Neck Soft Tissue       Tobacco    Tobacco dependence    Current Assessment & Plan     50 plus year x 1 ppd.   Encouraged cessation of smoking.                   Follow Up   Wrapup Tab  Return in about 3 months (around 7/22/2024) for Make appt for Dexcom guidance and instruction. .   There are no Patient Instructions on file for this visit.   Patient was given instructions and counseling regarding her condition or for health maintenance advice. Please see specific information pulled into the AVS if appropriate.  Hand hygiene was performed during entrance to exam room and following assessment of patient. This document is intended for medical expert use only.     EMR Dragon/Transcription disclaimer:   Much of this encounter note is an electronic transcription/translation of spoken language to printed text. The electronic translation of spoken language may permit erroneous, or at times, nonsensical words or phrases to be inadvertently transcribed.      THOMAS Scanlon, FNP-C  [address]  [facility]  [address] [place] [address] [address]  [facility] [address] [phone]

## 2024-04-25 ENCOUNTER — HOSPITAL ENCOUNTER (OUTPATIENT)
Dept: GENERAL RADIOLOGY | Facility: HOSPITAL | Age: 69
Discharge: HOME | End: 2024-04-25
Payer: MEDICAID

## 2024-04-25 ENCOUNTER — HOSPITAL ENCOUNTER (OUTPATIENT)
Dept: PAIN MEDICINE | Facility: HOSPITAL | Age: 69
Discharge: HOME | End: 2024-04-25
Payer: MEDICAID

## 2024-04-25 ENCOUNTER — TELEPHONE (OUTPATIENT)
Dept: PAIN MEDICINE | Facility: HOSPITAL | Age: 69
End: 2024-04-25
Payer: MEDICAID

## 2024-04-25 VITALS
OXYGEN SATURATION: 97 % | TEMPERATURE: 97.1 F | SYSTOLIC BLOOD PRESSURE: 183 MMHG | DIASTOLIC BLOOD PRESSURE: 77 MMHG | HEART RATE: 93 BPM | RESPIRATION RATE: 16 BRPM

## 2024-04-25 DIAGNOSIS — R52 PAIN: ICD-10-CM

## 2024-04-25 DIAGNOSIS — M47.816 SPONDYLOSIS OF LUMBAR REGION WITHOUT MYELOPATHY OR RADICULOPATHY: Primary | ICD-10-CM

## 2024-04-25 PROCEDURE — 25510000001 IOPAMIDOL 41 % SOLUTION: Performed by: STUDENT IN AN ORGANIZED HEALTH CARE EDUCATION/TRAINING PROGRAM

## 2024-04-25 PROCEDURE — 77003 FLUOROGUIDE FOR SPINE INJECT: CPT

## 2024-04-25 PROCEDURE — 25010000002 BUPIVACAINE (PF) 0.25 % SOLUTION: Performed by: STUDENT IN AN ORGANIZED HEALTH CARE EDUCATION/TRAINING PROGRAM

## 2024-04-25 PROCEDURE — 25010000002 METHYLPREDNISOLONE PER 40 MG: Performed by: STUDENT IN AN ORGANIZED HEALTH CARE EDUCATION/TRAINING PROGRAM

## 2024-04-25 RX ORDER — METHYLPREDNISOLONE ACETATE 40 MG/ML
40 INJECTION, SUSPENSION INTRA-ARTICULAR; INTRALESIONAL; INTRAMUSCULAR; SOFT TISSUE ONCE
Status: COMPLETED | OUTPATIENT
Start: 2024-04-25 | End: 2024-04-25

## 2024-04-25 RX ORDER — BUPIVACAINE HYDROCHLORIDE 2.5 MG/ML
10 INJECTION, SOLUTION EPIDURAL; INFILTRATION; INTRACAUDAL ONCE
Status: COMPLETED | OUTPATIENT
Start: 2024-04-25 | End: 2024-04-25

## 2024-04-25 RX ORDER — IOPAMIDOL 408 MG/ML
3 INJECTION, SOLUTION INTRATHECAL
Status: COMPLETED | OUTPATIENT
Start: 2024-04-25 | End: 2024-04-25

## 2024-04-25 RX ADMIN — METHYLPREDNISOLONE ACETATE 40 MG: 40 INJECTION, SUSPENSION INTRA-ARTICULAR; INTRALESIONAL; INTRAMUSCULAR; SOFT TISSUE at 08:30

## 2024-04-25 RX ADMIN — BUPIVACAINE HYDROCHLORIDE 10 ML: 2.5 INJECTION, SOLUTION EPIDURAL; INFILTRATION; INTRACAUDAL; PERINEURAL at 08:30

## 2024-04-25 RX ADMIN — IOPAMIDOL 3 ML: 408 INJECTION, SOLUTION INTRATHECAL at 08:30

## 2024-04-25 NOTE — PROCEDURES
LEFT L3-5 Lumbar Medial Branch Blockade  [facility]    PREOPERATIVE DIAGNOSIS:  Lumbar spondylosis without myelopathy    POSTOPERATIVE DIAGNOSIS:  Lumbar spondylosis without myelopathy    PROCEDURE:   Diagnostic Left Lumbar Medial Branch Nerve Blockades, with fluoroscopy:  L3, L4, and L5 nerves (at the L4, L5 transverse processes and the sacral alar groove) to block facet joints L4-5, and L5-S1  27844 -- Lumbar Facet block, 1st Level  67331 -- Lumbar Facet block, 2nd  Level      PRE-PROCEDURE DISCUSSION WITH PATIENT:    Risks and complications were discussed with the patient prior to starting the procedure and informed consent was obtained.      SURGEON:   Bill Laen MD    REASON FOR PROCEDURE:    The patient complains of pain that seems to have a significant axial component, Increased back pain on range of motion exams, Pain on extension of the lumbar spine, and Positive lumbar facet loading maneuver    SEDATION:  Patient declined administration of moderate sedation    ANESTHETIC:  Marcaine 0.25%  STEROID:  Methylprednisolone (DEPO MEDROL) 40mg/ml  TOTAL VOLUME OF SOLUTION:  3 mL    DESCRIPTON OF PROCEDURE:  After obtaining informed consent, IV access was not obtained in the preoperative area.   The patient was taken to the operating room.  The patient was placed in the prone position with a pillow under the abdomen. All pressure points were well padded.  The lumbosacral area was prepped with Chloraprep and draped in a sterile fashion. Under fluoroscopic guidance the transverse processes of the L4 and L5 vertebrae at the junctions of the superior articular processes were identified on the affected side.  Also identified was the groove between the ala and the superior articular process of the sacrum on the ipsilateral side.  Skin and subcutaneous tissue were anesthetized with 1% lidocaine above each of these points. A spinal needle was introduced under fluoroscopic guidance at the above junctions.  Aspiration was negative for blood and CSF.  After confirming the position of the needle with fluoroscope in all views, 0.25 mL of Omnipaque was injected, and after seeing the proper spread a total of 1 mL of the anesthetic solution noted above was injected at each of these points.  Needles were removed intact from each of the areas.   Onset of analgesia was noted.  Vital signs remained stable throughout.      ESTIMATED BLOOD LOSS:  <5 mL  SPECIMENS:  none    COMPLICATIONS:   No complications were noted., There was no indication of vascular uptake on live injection of contrast dye., and There was no indication of intrathecal uptake on live injection of contrast dye.    TOLERANCE & DISCHARGE CONDITION:    The patient tolerated the procedure well.  The patient was transported to the recovery area without difficulties.  The patient was discharged to home under the care of family in stable and satisfactory condition.    PLAN OF CARE:  The patient was given our standard instruction sheet.  We discussed that Lumbar Medial Branch Blockade is a diagnostic procedure in consideration for radiofrequency ablation if two diagnostic procedures prove to be positive for significant benefit.  If sustained relief of 6 to eight weeks is obtained, then an alternative plan could be therapeutic lumbar branch blockades.  The patient is asked to keep a pain log each hour for 8 hours after the procedure today.  The patient will  Return to clinic 4-6 wks  The patient will resume all medications as per the medication reconciliation sheet.

## 2024-05-17 ENCOUNTER — TELEPHONE (OUTPATIENT)
Dept: PAIN MEDICINE | Facility: CLINIC | Age: 69
End: 2024-05-17
Payer: MEDICAID

## 2024-05-17 DIAGNOSIS — G47.00 INSOMNIA, UNSPECIFIED TYPE: ICD-10-CM

## 2024-05-17 DIAGNOSIS — M47.816 SPONDYLOSIS OF LUMBAR REGION WITHOUT MYELOPATHY OR RADICULOPATHY: ICD-10-CM

## 2024-05-17 RX ORDER — ZOLPIDEM TARTRATE 5 MG/1
5 TABLET ORAL NIGHTLY PRN
Qty: 30 TABLET | Refills: 0 | Status: SHIPPED | OUTPATIENT
Start: 2024-05-17

## 2024-05-17 NOTE — TELEPHONE ENCOUNTER
"    Caller: Stevan Blancotim BUTLER    Relationship: Self    Best call back number: [phone]     Requested Prescriptions:   Requested Prescriptions     Pending Prescriptions Disp Refills    zolpidem (AMBIEN) 5 MG tablet 30 tablet 0     Sig: Take 1 tablet by mouth At Night As Needed for Sleep.        Pharmacy where request should be sent: [facility]/PHARMACY #[id number] - [place], [place] - [address] [facility] [phone] [facility] [phone]      Last office visit with prescribing clinician: 4/22/2024   Last telemedicine visit with prescribing clinician: Visit date not found   Next office visit with prescribing clinician: 7/22/2024     Additional details provided by patient: PATIENT IS OUT OF THIS MEDICATION    Does the patient have less than a 3 day supply:  [x] Yes  [] No    Would you like a call back once the refill request has been completed: [] Yes [x] No      Agnes Pickett Rep   05/17/24 11:47 EDT         "

## 2024-05-17 NOTE — TELEPHONE ENCOUNTER
Caller: BASILIO    Relationship: SELF    Best call back number: NO PHONE    Requested Prescriptions:   LYRICA 75 MG      Pharmacy where request should be sent:    [facility] [place]  [facility] [address]  Last office visit with prescribing clinician: 4/12/2024   Last telemedicine visit with prescribing clinician: Visit date not found   Next office visit with prescribing clinician: 7/1/2024     Additional details provided by patient: PT SAID THERE HAS BEEN SOME ISSUE WITH THE PHARMACY AND NOW HAS NO DOSES REMAINING    Does the patient have less than a 3 day supply:  [x] Yes  [] No    Would you like a call back once the refill request has been completed: [] Yes [x] No    If the office needs to give you a call back, can they leave a voicemail: [] Yes [x] No    Marlon Reyna   05/17/24 11:51 EDT

## 2024-05-20 ENCOUNTER — TELEPHONE (OUTPATIENT)
Dept: FAMILY MEDICINE CLINIC | Facility: CLINIC | Age: 69
End: 2024-05-20
Payer: MEDICAID

## 2024-05-20 NOTE — TELEPHONE ENCOUNTER
Provider: DR BASURTO     Caller: PATIENT     Pharmacy: [facility] ON [facility]     Phone Number: [phone]    Reason for Call: PATIENT IS CALLING TO FOLLOW UP ON HER LYRICA REFILL. SHE HAS BEEN OUT OF MEDICATION SINCE 5-12-24. SHE WOULD LIKE THIS SENT IN ASAP.

## 2024-05-20 NOTE — TELEPHONE ENCOUNTER
Circumcision Procedure Note    Date: 2019  Patient Name: Mandy Rogers  :  2018  MRN:  [id number]    Informed consent:  Discussed the proposed procedure (risks, benefits, complications, medications and alternatives) of the circumcision with the parent(s)/legal guardian.    Time out performed: Yes    Procedure Details:  Informed consent was obtained. Patient secured on Circumcision Board.  1% Xylocaine injected for dorsal ring block.  Betadine prep.  Clamp applied and foreskin removed and discarded.  Clamp removed.  Circumcision performed using standard sterile Gomco technique.  Tolerated well.  Good cosmetic result.  Bacitracin ointment applied to site.     Complications:  None    Blood Loss: Minimal    Procedure performed by: MD Javed Delgadillo MD  2019  10:16 PM         [facility]  They have prescription it is too early to fill.  Called and spoke to pt and informed her of the information from pharmacy.  Pt is wanting to know if the ambien can be increased as the 5mg is not helping.

## 2024-05-20 NOTE — TELEPHONE ENCOUNTER
"  Please call Arianna and advise her that the pharmacy did in fact receive the medication refill on May 17.  Please see below.      Pharmacy    [id number] - [place] - [address] AT [address] [facility] - [phone]  - [phone]   [address], [place] [address]  Phone: [phone]  Fax: [phone]     Orders with any of the following pharmaceutical classes: Hypnotics/Sedatives/Sleep Disorder Agents    Name Dose Frequency Start Date End Date Medication Warnings Interventions? Order Mode    zolpidem (AMBIEN) 5 MG tablet 5 mg Nightly PRN 04/22/24 05/17/24   Outpatient      Warnings Override History    No Interaction Warnings Shown      Pharmacist Clinical Review History    This prescription has not been clinically reviewed.     Order Reconciliation Actions       Order Reconciliation Actions     E-Prescribing Status    Outpatient Medication Detail    zolpidem (AMBIEN) 5 MG tablet        Sig: Take 1 tablet by mouth At Night As Needed for Sleep.        Sent to pharmacy as: Zolpidem Tartrate 5 MG Oral Tablet (AMBIEN)        Class: Normal        Notes to Pharmacy: Not to exceed 4 additional fills before 08/12/2024        Route: Oral        E-Prescribing Status: Receipt confirmed by pharmacy (5/17/2024  2:03 PM EDT)          "

## 2024-05-20 NOTE — TELEPHONE ENCOUNTER
"Caller: Arianna Blanco    Relationship to patient: Self    Best call back number: [phone]    Patient is needing: PATIENT STATED THAT HER PHARMACY TOLD HER THEY DID NOT RECEIVED THE PRESCRIPTION FOR HER AMBIEN SO SHE IS REQUESTING IF IT CAN BE SENT AGAIN.    PREFERRED PHARMACY:  [facility]/pharmacy #[id number] - [place] - [address] [facility] - [phone] [facility] [phone]  [phone]   "

## 2024-05-21 DIAGNOSIS — G47.00 INSOMNIA, UNSPECIFIED TYPE: Primary | ICD-10-CM

## 2024-05-21 RX ORDER — PREGABALIN 75 MG/1
75 CAPSULE ORAL 2 TIMES DAILY
Qty: 60 CAPSULE | Refills: 0 | Status: SHIPPED | OUTPATIENT
Start: 2024-05-21

## 2024-05-21 RX ORDER — ZOLPIDEM TARTRATE 10 MG/1
10 TABLET ORAL NIGHTLY PRN
Qty: 28 TABLET | Refills: 0 | Status: SHIPPED | OUTPATIENT
Start: 2024-05-21

## 2024-05-21 NOTE — TELEPHONE ENCOUNTER
Provider: SB    Caller: PATIENT    Pharmacy: [facility] [place] [phone]    Phone Number: [phone]    Reason for Call: PATIENT STATES HER PHARMACY STILL HASN'T RECEIVED HER RX FOR LYRICA. SHE HAS BEEN OUT FOR A WEEK.

## 2024-05-28 DIAGNOSIS — G43.909 MIGRAINE WITHOUT STATUS MIGRAINOSUS, NOT INTRACTABLE, UNSPECIFIED MIGRAINE TYPE: ICD-10-CM

## 2024-05-30 RX ORDER — BUTALBITAL/ACETAMINOPHEN 50MG-325MG
1 TABLET ORAL EVERY 6 HOURS PRN
Qty: 30 TABLET | Refills: 0 | Status: SHIPPED | OUTPATIENT
Start: 2024-05-30

## 2024-06-18 DIAGNOSIS — G47.00 INSOMNIA, UNSPECIFIED TYPE: ICD-10-CM

## 2024-06-18 DIAGNOSIS — M47.816 SPONDYLOSIS OF LUMBAR REGION WITHOUT MYELOPATHY OR RADICULOPATHY: ICD-10-CM

## 2024-06-18 RX ORDER — PREGABALIN 75 MG/1
75 CAPSULE ORAL 2 TIMES DAILY
Qty: 60 CAPSULE | Refills: 0 | OUTPATIENT
Start: 2024-06-18

## 2024-06-18 RX ORDER — ZOLPIDEM TARTRATE 10 MG/1
10 TABLET ORAL NIGHTLY PRN
Qty: 28 TABLET | Refills: 0 | OUTPATIENT
Start: 2024-06-18

## 2024-06-18 NOTE — TELEPHONE ENCOUNTER
Will not fill.      10mg ambien picked up 5/21/24.   5mg ambien picked up 5/27/24.   No refill due until 7/21/24

## 2024-06-21 DIAGNOSIS — G47.00 INSOMNIA, UNSPECIFIED TYPE: ICD-10-CM

## 2024-06-21 DIAGNOSIS — M47.816 SPONDYLOSIS OF LUMBAR REGION WITHOUT MYELOPATHY OR RADICULOPATHY: ICD-10-CM

## 2024-06-21 RX ORDER — PREGABALIN 75 MG/1
75 CAPSULE ORAL 2 TIMES DAILY
Qty: 60 CAPSULE | Refills: 0 | Status: SHIPPED | OUTPATIENT
Start: 2024-06-21

## 2024-06-27 RX ORDER — ZOLPIDEM TARTRATE 10 MG/1
10 TABLET ORAL NIGHTLY PRN
Qty: 28 TABLET | Refills: 0 | Status: SHIPPED | OUTPATIENT
Start: 2024-06-27

## 2024-06-27 NOTE — TELEPHONE ENCOUNTER
Caller: Arianna Blanco    Relationship: Self    Best call back number: [phone]     What was the call regarding: PATIENT IS FOLLOWING UP ON THIS MED REFILL    PATIENT IS COMPLETELY OUT    PLEASE ADVISE

## 2024-07-05 ENCOUNTER — HOSPITAL ENCOUNTER (INPATIENT)
Facility: HOSPITAL | Age: 69
LOS: 3 days | Discharge: HOME | End: 2024-07-08
Attending: INTERNAL MEDICINE | Admitting: INTERNAL MEDICINE
Payer: MEDICAID

## 2024-07-05 DIAGNOSIS — F41.9 ANXIETY: ICD-10-CM

## 2024-07-05 DIAGNOSIS — G47.00 INSOMNIA, UNSPECIFIED TYPE: ICD-10-CM

## 2024-07-05 PROBLEM — I48.91 ATRIAL FIBRILLATION WITH RVR: Status: ACTIVE | Noted: 2024-07-05

## 2024-07-05 LAB — GLUCOSE BLDC GLUCOMTR-MCNC: 254 MG/DL (ref 70–105)

## 2024-07-05 PROCEDURE — 63710000001 INSULIN LISPRO (HUMAN) PER 5 UNITS: Performed by: INTERNAL MEDICINE

## 2024-07-05 PROCEDURE — 82948 REAGENT STRIP/BLOOD GLUCOSE: CPT

## 2024-07-05 PROCEDURE — 63710000001 INSULIN GLARGINE PER 5 UNITS: Performed by: INTERNAL MEDICINE

## 2024-07-05 RX ORDER — NICOTINE 21 MG/24HR
1 PATCH, TRANSDERMAL 24 HOURS TRANSDERMAL
Status: DISCONTINUED | OUTPATIENT
Start: 2024-07-05 | End: 2024-07-08 | Stop reason: HOSPADM

## 2024-07-05 RX ORDER — IBUPROFEN 600 MG/1
1 TABLET ORAL
Status: DISCONTINUED | OUTPATIENT
Start: 2024-07-05 | End: 2024-07-08 | Stop reason: HOSPADM

## 2024-07-05 RX ORDER — METOPROLOL SUCCINATE 25 MG/1
25 TABLET, EXTENDED RELEASE ORAL DAILY
Status: DISCONTINUED | OUTPATIENT
Start: 2024-07-06 | End: 2024-07-06

## 2024-07-05 RX ORDER — INSULIN LISPRO 100 [IU]/ML
2-9 INJECTION, SOLUTION INTRAVENOUS; SUBCUTANEOUS
Status: DISCONTINUED | OUTPATIENT
Start: 2024-07-05 | End: 2024-07-08 | Stop reason: HOSPADM

## 2024-07-05 RX ORDER — ACETAMINOPHEN 650 MG/1
650 SUPPOSITORY RECTAL EVERY 4 HOURS PRN
Status: DISCONTINUED | OUTPATIENT
Start: 2024-07-05 | End: 2024-07-08 | Stop reason: HOSPADM

## 2024-07-05 RX ORDER — DILTIAZEM HCL/D5W 125 MG/125
5-15 PLASTIC BAG, INJECTION (ML) INTRAVENOUS
Status: DISCONTINUED | OUTPATIENT
Start: 2024-07-05 | End: 2024-07-07

## 2024-07-05 RX ORDER — ONDANSETRON 2 MG/ML
4 INJECTION INTRAMUSCULAR; INTRAVENOUS EVERY 6 HOURS PRN
Status: DISCONTINUED | OUTPATIENT
Start: 2024-07-05 | End: 2024-07-08 | Stop reason: HOSPADM

## 2024-07-05 RX ORDER — ONDANSETRON 4 MG/1
4 TABLET, ORALLY DISINTEGRATING ORAL EVERY 6 HOURS PRN
Status: DISCONTINUED | OUTPATIENT
Start: 2024-07-05 | End: 2024-07-08 | Stop reason: HOSPADM

## 2024-07-05 RX ORDER — SODIUM CHLORIDE 0.9 % (FLUSH) 0.9 %
10 SYRINGE (ML) INJECTION AS NEEDED
Status: DISCONTINUED | OUTPATIENT
Start: 2024-07-05 | End: 2024-07-08 | Stop reason: HOSPADM

## 2024-07-05 RX ORDER — NITROGLYCERIN 0.4 MG/1
0.4 TABLET SUBLINGUAL
Status: DISCONTINUED | OUTPATIENT
Start: 2024-07-05 | End: 2024-07-08 | Stop reason: HOSPADM

## 2024-07-05 RX ORDER — ACETAMINOPHEN 160 MG/5ML
650 SOLUTION ORAL EVERY 4 HOURS PRN
Status: DISCONTINUED | OUTPATIENT
Start: 2024-07-05 | End: 2024-07-08 | Stop reason: HOSPADM

## 2024-07-05 RX ORDER — LISINOPRIL 20 MG/1
20 TABLET ORAL DAILY
Status: DISCONTINUED | OUTPATIENT
Start: 2024-07-06 | End: 2024-07-08 | Stop reason: HOSPADM

## 2024-07-05 RX ORDER — AMOXICILLIN 250 MG
2 CAPSULE ORAL 2 TIMES DAILY PRN
Status: DISCONTINUED | OUTPATIENT
Start: 2024-07-05 | End: 2024-07-08 | Stop reason: HOSPADM

## 2024-07-05 RX ORDER — SERTRALINE HYDROCHLORIDE 100 MG/1
100 TABLET, FILM COATED ORAL DAILY
Status: DISCONTINUED | OUTPATIENT
Start: 2024-07-06 | End: 2024-07-08 | Stop reason: HOSPADM

## 2024-07-05 RX ORDER — INSULIN LISPRO 100 [IU]/ML
5 INJECTION, SOLUTION INTRAVENOUS; SUBCUTANEOUS
Status: DISCONTINUED | OUTPATIENT
Start: 2024-07-06 | End: 2024-07-08 | Stop reason: HOSPADM

## 2024-07-05 RX ORDER — NICOTINE POLACRILEX 4 MG
15 LOZENGE BUCCAL
Status: DISCONTINUED | OUTPATIENT
Start: 2024-07-05 | End: 2024-07-08 | Stop reason: HOSPADM

## 2024-07-05 RX ORDER — BISACODYL 5 MG/1
5 TABLET, DELAYED RELEASE ORAL DAILY PRN
Status: DISCONTINUED | OUTPATIENT
Start: 2024-07-05 | End: 2024-07-08 | Stop reason: HOSPADM

## 2024-07-05 RX ORDER — BACLOFEN 10 MG/1
20 TABLET ORAL 3 TIMES DAILY
Status: DISCONTINUED | OUTPATIENT
Start: 2024-07-05 | End: 2024-07-06

## 2024-07-05 RX ORDER — BUSPIRONE HYDROCHLORIDE 15 MG/1
7.5 TABLET ORAL NIGHTLY PRN
Status: DISCONTINUED | OUTPATIENT
Start: 2024-07-05 | End: 2024-07-08 | Stop reason: HOSPADM

## 2024-07-05 RX ORDER — NICOTINE 21 MG/24HR
1 PATCH, TRANSDERMAL 24 HOURS TRANSDERMAL
Status: DISCONTINUED | OUTPATIENT
Start: 2024-07-06 | End: 2024-07-05

## 2024-07-05 RX ORDER — SODIUM CHLORIDE 0.9 % (FLUSH) 0.9 %
10 SYRINGE (ML) INJECTION EVERY 12 HOURS SCHEDULED
Status: DISCONTINUED | OUTPATIENT
Start: 2024-07-05 | End: 2024-07-08 | Stop reason: HOSPADM

## 2024-07-05 RX ORDER — ACETAMINOPHEN 325 MG/1
650 TABLET ORAL EVERY 4 HOURS PRN
Status: DISCONTINUED | OUTPATIENT
Start: 2024-07-05 | End: 2024-07-08 | Stop reason: HOSPADM

## 2024-07-05 RX ORDER — POLYETHYLENE GLYCOL 3350 17 G/17G
17 POWDER, FOR SOLUTION ORAL DAILY PRN
Status: DISCONTINUED | OUTPATIENT
Start: 2024-07-05 | End: 2024-07-08 | Stop reason: HOSPADM

## 2024-07-05 RX ORDER — BISACODYL 10 MG
10 SUPPOSITORY, RECTAL RECTAL DAILY PRN
Status: DISCONTINUED | OUTPATIENT
Start: 2024-07-05 | End: 2024-07-08 | Stop reason: HOSPADM

## 2024-07-05 RX ORDER — SODIUM CHLORIDE 9 MG/ML
40 INJECTION, SOLUTION INTRAVENOUS AS NEEDED
Status: DISCONTINUED | OUTPATIENT
Start: 2024-07-05 | End: 2024-07-08 | Stop reason: HOSPADM

## 2024-07-05 RX ORDER — DILTIAZEM HYDROCHLORIDE 240 MG/1
240 CAPSULE, COATED, EXTENDED RELEASE ORAL DAILY
Status: DISCONTINUED | OUTPATIENT
Start: 2024-07-06 | End: 2024-07-07

## 2024-07-05 RX ORDER — DEXTROSE MONOHYDRATE 25 G/50ML
25 INJECTION, SOLUTION INTRAVENOUS
Status: DISCONTINUED | OUTPATIENT
Start: 2024-07-05 | End: 2024-07-08 | Stop reason: HOSPADM

## 2024-07-05 RX ORDER — PREGABALIN 75 MG/1
75 CAPSULE ORAL 2 TIMES DAILY
Status: DISCONTINUED | OUTPATIENT
Start: 2024-07-05 | End: 2024-07-08 | Stop reason: HOSPADM

## 2024-07-05 RX ADMIN — Medication 5 MG/HR: at 22:07

## 2024-07-05 RX ADMIN — Medication 1 PATCH: at 22:17

## 2024-07-05 RX ADMIN — APIXABAN 5 MG: 5 TABLET, FILM COATED ORAL at 22:17

## 2024-07-05 RX ADMIN — BACLOFEN 20 MG: 10 TABLET ORAL at 22:18

## 2024-07-05 RX ADMIN — PREGABALIN 75 MG: 75 CAPSULE ORAL at 22:17

## 2024-07-05 RX ADMIN — INSULIN LISPRO 6 UNITS: 100 INJECTION, SOLUTION INTRAVENOUS; SUBCUTANEOUS at 21:39

## 2024-07-05 RX ADMIN — INSULIN GLARGINE 40 UNITS: 100 INJECTION, SOLUTION SUBCUTANEOUS at 22:04

## 2024-07-06 ENCOUNTER — APPOINTMENT (OUTPATIENT)
Dept: CARDIOLOGY | Facility: HOSPITAL | Age: 69
End: 2024-07-06
Payer: MEDICAID

## 2024-07-06 LAB
ANION GAP SERPL CALCULATED.3IONS-SCNC: 8.1 MMOL/L (ref 5–15)
BASOPHILS # BLD AUTO: 0.04 10*3/MM3 (ref 0–0.2)
BASOPHILS NFR BLD AUTO: 0.6 % (ref 0–1.5)
BUN SERPL-MCNC: 11 MG/DL (ref 8–23)
BUN/CREAT SERPL: 18.3 (ref 7–25)
CALCIUM SPEC-SCNC: 8.8 MG/DL (ref 8.6–10.5)
CHLORIDE SERPL-SCNC: 107 MMOL/L (ref 98–107)
CO2 SERPL-SCNC: 22.9 MMOL/L (ref 22–29)
CREAT SERPL-MCNC: 0.6 MG/DL (ref 0.57–1)
DEPRECATED RDW RBC AUTO: 43.9 FL (ref 37–54)
EGFRCR SERPLBLD CKD-EPI 2021: 98.5 ML/MIN/1.73
EOSINOPHIL # BLD AUTO: 0.17 10*3/MM3 (ref 0–0.4)
EOSINOPHIL NFR BLD AUTO: 2.5 % (ref 0.3–6.2)
ERYTHROCYTE [DISTWIDTH] IN BLOOD BY AUTOMATED COUNT: 13.5 % (ref 12.3–15.4)
GLUCOSE BLDC GLUCOMTR-MCNC: 227 MG/DL (ref 70–105)
GLUCOSE BLDC GLUCOMTR-MCNC: 255 MG/DL (ref 70–105)
GLUCOSE BLDC GLUCOMTR-MCNC: 297 MG/DL (ref 70–105)
GLUCOSE BLDC GLUCOMTR-MCNC: 299 MG/DL (ref 70–105)
GLUCOSE SERPL-MCNC: 231 MG/DL (ref 65–99)
HCT VFR BLD AUTO: 35.9 % (ref 34–46.6)
HGB BLD-MCNC: 11.6 G/DL (ref 12–15.9)
IMM GRANULOCYTES # BLD AUTO: 0.02 10*3/MM3 (ref 0–0.05)
IMM GRANULOCYTES NFR BLD AUTO: 0.3 % (ref 0–0.5)
IRON 24H UR-MRATE: 99 MCG/DL (ref 37–145)
IRON SATN MFR SERPL: 23 % (ref 20–50)
LYMPHOCYTES # BLD AUTO: 2.31 10*3/MM3 (ref 0.7–3.1)
LYMPHOCYTES NFR BLD AUTO: 34.3 % (ref 19.6–45.3)
MAGNESIUM SERPL-MCNC: 1.8 MG/DL (ref 1.6–2.4)
MCH RBC QN AUTO: 28.7 PG (ref 26.6–33)
MCHC RBC AUTO-ENTMCNC: 32.3 G/DL (ref 31.5–35.7)
MCV RBC AUTO: 88.9 FL (ref 79–97)
MONOCYTES # BLD AUTO: 0.6 10*3/MM3 (ref 0.1–0.9)
MONOCYTES NFR BLD AUTO: 8.9 % (ref 5–12)
NEUTROPHILS NFR BLD AUTO: 3.6 10*3/MM3 (ref 1.7–7)
NEUTROPHILS NFR BLD AUTO: 53.4 % (ref 42.7–76)
NRBC BLD AUTO-RTO: 0 /100 WBC (ref 0–0.2)
PHOSPHATE SERPL-MCNC: 2.4 MG/DL (ref 2.5–4.5)
PLATELET # BLD AUTO: 124 10*3/MM3 (ref 140–450)
PMV BLD AUTO: 12.6 FL (ref 6–12)
POTASSIUM SERPL-SCNC: 4.3 MMOL/L (ref 3.5–5.2)
RBC # BLD AUTO: 4.04 10*6/MM3 (ref 3.77–5.28)
SODIUM SERPL-SCNC: 138 MMOL/L (ref 136–145)
T4 FREE SERPL-MCNC: 1.29 NG/DL (ref 0.93–1.7)
TIBC SERPL-MCNC: 422 MCG/DL (ref 298–536)
TRANSFERRIN SERPL-MCNC: 283 MG/DL (ref 200–360)
TSH SERPL DL<=0.05 MIU/L-ACNC: 2.53 UIU/ML (ref 0.27–4.2)
WBC NRBC COR # BLD AUTO: 6.74 10*3/MM3 (ref 3.4–10.8)

## 2024-07-06 PROCEDURE — 84443 ASSAY THYROID STIM HORMONE: CPT | Performed by: HOSPITALIST

## 2024-07-06 PROCEDURE — 25010000002 ONDANSETRON PER 1 MG: Performed by: INTERNAL MEDICINE

## 2024-07-06 PROCEDURE — 82948 REAGENT STRIP/BLOOD GLUCOSE: CPT

## 2024-07-06 PROCEDURE — 83540 ASSAY OF IRON: CPT | Performed by: HOSPITALIST

## 2024-07-06 PROCEDURE — 84439 ASSAY OF FREE THYROXINE: CPT | Performed by: HOSPITALIST

## 2024-07-06 PROCEDURE — 97162 PT EVAL MOD COMPLEX 30 MIN: CPT

## 2024-07-06 PROCEDURE — 99222 1ST HOSP IP/OBS MODERATE 55: CPT | Performed by: INTERNAL MEDICINE

## 2024-07-06 PROCEDURE — 84100 ASSAY OF PHOSPHORUS: CPT | Performed by: INTERNAL MEDICINE

## 2024-07-06 PROCEDURE — 83735 ASSAY OF MAGNESIUM: CPT | Performed by: INTERNAL MEDICINE

## 2024-07-06 PROCEDURE — 84466 ASSAY OF TRANSFERRIN: CPT | Performed by: HOSPITALIST

## 2024-07-06 PROCEDURE — 63710000001 INSULIN GLARGINE PER 5 UNITS: Performed by: INTERNAL MEDICINE

## 2024-07-06 PROCEDURE — 85025 COMPLETE CBC W/AUTO DIFF WBC: CPT | Performed by: INTERNAL MEDICINE

## 2024-07-06 PROCEDURE — 63710000001 INSULIN LISPRO (HUMAN) PER 5 UNITS: Performed by: INTERNAL MEDICINE

## 2024-07-06 PROCEDURE — 80048 BASIC METABOLIC PNL TOTAL CA: CPT | Performed by: INTERNAL MEDICINE

## 2024-07-06 RX ORDER — ZOLPIDEM TARTRATE 5 MG/1
5 TABLET ORAL NIGHTLY PRN
Status: DISCONTINUED | OUTPATIENT
Start: 2024-07-06 | End: 2024-07-08 | Stop reason: HOSPADM

## 2024-07-06 RX ORDER — BACLOFEN 10 MG/1
20 TABLET ORAL 3 TIMES DAILY PRN
Status: DISCONTINUED | OUTPATIENT
Start: 2024-07-06 | End: 2024-07-08 | Stop reason: HOSPADM

## 2024-07-06 RX ORDER — METOPROLOL SUCCINATE 50 MG/1
50 TABLET, EXTENDED RELEASE ORAL DAILY
Status: DISCONTINUED | OUTPATIENT
Start: 2024-07-07 | End: 2024-07-08 | Stop reason: HOSPADM

## 2024-07-06 RX ADMIN — INSULIN LISPRO 5 UNITS: 100 INJECTION, SOLUTION INTRAVENOUS; SUBCUTANEOUS at 11:45

## 2024-07-06 RX ADMIN — INSULIN LISPRO 6 UNITS: 100 INJECTION, SOLUTION INTRAVENOUS; SUBCUTANEOUS at 11:45

## 2024-07-06 RX ADMIN — PREGABALIN 75 MG: 75 CAPSULE ORAL at 08:10

## 2024-07-06 RX ADMIN — INSULIN LISPRO 4 UNITS: 100 INJECTION, SOLUTION INTRAVENOUS; SUBCUTANEOUS at 08:11

## 2024-07-06 RX ADMIN — Medication 5 MG/HR: at 08:45

## 2024-07-06 RX ADMIN — INSULIN LISPRO 5 UNITS: 100 INJECTION, SOLUTION INTRAVENOUS; SUBCUTANEOUS at 08:12

## 2024-07-06 RX ADMIN — DRONEDARONE 400 MG: 400 TABLET, FILM COATED ORAL at 16:33

## 2024-07-06 RX ADMIN — APIXABAN 5 MG: 5 TABLET, FILM COATED ORAL at 08:10

## 2024-07-06 RX ADMIN — SERTRALINE 100 MG: 100 TABLET, FILM COATED ORAL at 08:11

## 2024-07-06 RX ADMIN — INSULIN GLARGINE 40 UNITS: 100 INJECTION, SOLUTION SUBCUTANEOUS at 20:32

## 2024-07-06 RX ADMIN — DILTIAZEM HYDROCHLORIDE 240 MG: 240 CAPSULE, COATED, EXTENDED RELEASE ORAL at 08:10

## 2024-07-06 RX ADMIN — Medication 1 PATCH: at 08:11

## 2024-07-06 RX ADMIN — PREGABALIN 75 MG: 75 CAPSULE ORAL at 20:31

## 2024-07-06 RX ADMIN — BACLOFEN 20 MG: 10 TABLET ORAL at 14:15

## 2024-07-06 RX ADMIN — Medication 10 ML: at 08:12

## 2024-07-06 RX ADMIN — ACETAMINOPHEN 650 MG: 325 TABLET, FILM COATED ORAL at 14:15

## 2024-07-06 RX ADMIN — INSULIN LISPRO 6 UNITS: 100 INJECTION, SOLUTION INTRAVENOUS; SUBCUTANEOUS at 20:32

## 2024-07-06 RX ADMIN — APIXABAN 5 MG: 5 TABLET, FILM COATED ORAL at 20:32

## 2024-07-06 RX ADMIN — ZOLPIDEM TARTRATE 5 MG: 5 TABLET, FILM COATED ORAL at 20:35

## 2024-07-06 RX ADMIN — LISINOPRIL 20 MG: 20 TABLET ORAL at 08:11

## 2024-07-06 RX ADMIN — Medication 10 ML: at 20:33

## 2024-07-06 RX ADMIN — BACLOFEN 20 MG: 10 TABLET ORAL at 08:11

## 2024-07-06 RX ADMIN — INSULIN LISPRO 6 UNITS: 100 INJECTION, SOLUTION INTRAVENOUS; SUBCUTANEOUS at 16:33

## 2024-07-06 RX ADMIN — INSULIN LISPRO 5 UNITS: 100 INJECTION, SOLUTION INTRAVENOUS; SUBCUTANEOUS at 16:33

## 2024-07-06 RX ADMIN — ZOLPIDEM TARTRATE 5 MG: 5 TABLET, FILM COATED ORAL at 01:07

## 2024-07-06 RX ADMIN — ONDANSETRON 4 MG: 2 INJECTION INTRAMUSCULAR; INTRAVENOUS at 18:28

## 2024-07-06 RX ADMIN — METOPROLOL SUCCINATE 25 MG: 25 TABLET, EXTENDED RELEASE ORAL at 08:10

## 2024-07-06 NOTE — PLAN OF CARE
Goal Outcome Evaluation:  Plan of Care Reviewed With: patient           Outcome Evaluation: Pt is a 69 YO F admitted with generalized weakness, fatigue, UTI, Afib with RVR. Pt rpeorts living home alone, but has a son that checks in and provides transportation. Pt typically is independent with all ADLs, ambulation without AD generally, occasionally using cane, and has RWx at home as well if needed. Pt this date demonstrates good mobitliy, requiring CGA for transfers and ambuation. Pt fatigues with mobility, but ambulated safely with RWx. Pt educated on potential need for RWx when d/c and verbalizes understanding. PT to follow while admitted and recommendation is return home with family assist as needed.      Anticipated Discharge Disposition (PT): home with assist

## 2024-07-06 NOTE — CONSULTS
CC--AF    Sub  68[facility] pleasant [facility] with rapid [facility] rate.  Patient started have recurrent AF with symptoms of fatigue tiredness lightheadedness without syncope.  Denies any chest pain or shortness of breath.  Patient was started on intravenous Cardizem and further transferred to our hospital and a consultation requested.  Patient continues to be in atrial fibrillation rates are fairly controlled at this point.  Stress test in the past did not show ischemia with normal EF and echocardiography in the past showed normal EF with a small pericardial effusion.  She has prior history of atrial flutter and right bundle branch block  Previous cardiac catheterization revealed EF of 60% with a mid RCA 50% stenosis which was performed in 2020.  Patient history of diabetes, hypertension, COPD and dyslipidemia.    Past Medical History:   Diagnosis Date    COPD (chronic obstructive pulmonary disease)     Depression     Diabetes mellitus     Hyperlipidemia     Hypertension      Past Surgical History:   Procedure Laterality Date    CARDIAC CATHETERIZATION Left 10/30/2020    Procedure: Left Heart Cath with Coronary Angiography;  Surgeon: Donaldo Archer MD;  Location: [facility] CATH INVASIVE LOCATION;  Service: Cardiovascular;  Laterality: Left;    CARPAL TUNNEL RELEASE Bilateral 2017    CHOLECYSTECTOMY  1980    HYSTERECTOMY  1980     Family History   Problem Relation Age of Onset    Heart disease Mother     Hypertension Mother     Diabetes Mother     Stroke Mother      Social History     Tobacco Use    Smoking status: Every Day     Current packs/day: 1.00     Average packs/day: 1 pack/day for 41.5 years (41.5 ttl pk-yrs)     Types: Cigarettes     Start date: 1983     Passive exposure: Current    Smokeless tobacco: Never   Vaping Use    Vaping status: Some Days    Substances: Nicotine, Flavoring    Devices: Refillable tank   Substance Use Topics    Alcohol use: No     Drug use: Never     Medications Prior to Admission   Medication Sig Dispense Refill Last Dose    Accu-Chek Guide test strip 1 each by Other route 4 (Four) Times a Day. Use to test blood sugar 4 times daily 120 each 11     albuterol sulfate  (90 Base) MCG/ACT inhaler INHALE 2 PUFFS BY MOUTH EVERY 4 HOURS AS NEEDED FOR WHEEZING OR SHORTNESS OF AIR 6.7 g 2     baclofen (LIORESAL) 20 MG tablet Take 1 tablet by mouth 3 (Three) Times a Day. 90 tablet 2     busPIRone (BUSPAR) 7.5 MG tablet TAKE 1 TABLET BY MOUTH AT NIGHT AS NEEDED (ANXIETY). 90 tablet 0     butalbital-acetaminophen  MG tablet tablet Take 1 tablet by mouth Every 6 (Six) Hours As Needed (headache). 30 tablet 0     Continuous Blood Gluc  (FreeStyle Willie 2 [place]) device Use 1 each Daily. To be used daily to monitor continuous blood sugar reading for type 2 diabetic patient on insulin regimen. 1 each 0     Continuous Blood Gluc Sensor (FreeStyle Willie 2 Sensor) misc Use 1 each Every 14 (Fourteen) Days. Dispense 2 sensor pack. To be used daily with freestyle willie device to monitor continuous blood sugar reading for type 2 diabetic patient on insulin regimen. 2 each 12     dilTIAZem CD (CARDIZEM CD) 240 MG 24 hr capsule Take 1 capsule by mouth Daily. 90 capsule 1     Eliquis 5 MG tablet tablet Take 1 tablet by mouth Every 12 (Twelve) Hours. 180 tablet 1     Incontinence Supply Disposable (Briefs Overnight Large) misc Use 1 each At Night As Needed (incontinence). 90 each 3     Insulin Glargine (LANTUS SOLOSTAR) 100 UNIT/ML injection pen Inject 60 Units under the skin into the appropriate area as directed Every Night. 54 mL 3     insulin glulisine (Apidra) 100 UNIT/ML injection Inject 5 Units under the skin into the appropriate area as directed 3 (Three) Times a Day Before Meals. (Patient taking differently: Inject 10 Units under the skin into the appropriate area as directed 3 (Three) Times a Day Before Meals.) 10 mL 3     lisinopril  (PRINIVIL,ZESTRIL) 20 MG tablet Take 1 tablet by mouth Daily. 90 tablet 0     metFORMIN (GLUCOPHAGE) 1000 MG tablet Take 1 tablet by mouth 2 (Two) Times a Day With Meals. 180 tablet 3     metoprolol succinate XL (TOPROL-XL) 25 MG 24 hr tablet Take 1 tablet by mouth Daily. 90 tablet 3     pregabalin (LYRICA) 75 MG capsule TAKE 1 CAPSULE BY MOUTH TWICE A DAY 60 capsule 0     Rexulti 3 MG tablet TAKE 1 TABLET BY MOUTH EVERY DAY 90 tablet 1     Semaglutide, 1 MG/DOSE, (Ozempic, 1 MG/DOSE,) 4 MG/3ML solution pen-injector Inject 1 mg under the skin into the appropriate area as directed Every 7 (Seven) Days. 3 mL 1     sertraline (ZOLOFT) 100 MG tablet Take 1 tablet by mouth Daily. 90 tablet 3     zolpidem (AMBIEN) 10 MG tablet TAKE 1 TABLET BY MOUTH AT NIGHT AS NEEDED FOR SLEEP. 28 tablet 0        Physical Exam    General:      well developed, well nourished, in no acute distress.    Head:      normocephalic and atraumatic.    Eyes:      PERRL/EOM intact, conjunctivae and sclerae clear without nystagmus.    Neck:      no  thyromegaly, trachea central with normal respiratory effort  Lungs:      clear bilaterally to auscultation.    Heart:       irregular rate and rhythm, S1, S2 without murmurs, rubs, or gallops  Skin:      intact without lesions or rashes.    Psych:      alert and cooperative; normal mood and affect; normal attention span and concentration.          CBC    Results from last 7 days   Lab Units 07/06/24  0249   WBC 10*3/mm3 6.74   HEMOGLOBIN g/dL 11.6*   PLATELETS 10*3/mm3 124*     BMP   Results from last 7 days   Lab Units 07/06/24  0249   SODIUM mmol/L 138   POTASSIUM mmol/L 4.3   CHLORIDE mmol/L 107   CO2 mmol/L 22.9   BUN mg/dL 11   CREATININE mg/dL 0.60   GLUCOSE mg/dL 231*   MAGNESIUM mg/dL 1.8   PHOSPHORUS mg/dL 2.4*       Assessment and plan    Intermittent atrial fibrillation with symptoms and history of atrial flutter in the past  Patient currently anticoagulated  Start dronedarone for  pharmacological conversion symptomatic relief  Patient educated about catheter ablation which is class I indication for AF therapy particularly paroxysmal atrial arrhythmia with symptoms compatible drug therapy which is class II  History of diabetes being managed by hospitalist  Hypertension controlled with current medical treatment including metoprolol  History of nonobstructive coronary artery disease without angina     Electronically signed by Shay Santos MD, 07/06/24, 1:02 PM EDT.

## 2024-07-06 NOTE — THERAPY EVALUATION
Patient Name: Arianna Blanco  : 1955    MRN: [id number]                              Today's Date: 2024       Admit Date: 2024    Visit Dx: No diagnosis found.  Patient Active Problem List   Diagnosis    Essential hypertension    Dyslipidemia    Simple chronic bronchitis    Unstable angina    Other chest pain    Atrial fibrillation    Type 2 diabetes mellitus with hyperglycemia    Anxiety associated with depression    Anxiety    Cataracts, bilateral    Cervico-occipital neuralgia    Chronic obstructive pulmonary disease    Degenerative disc disease, lumbar    Headaches, cluster    Lesion of ulnar nerve, bilateral upper limbs    Degenerative disc disease, cervical    Median nerve neuropathy    Migraine    Nuclear senile cataract    Open angle with borderline findings and high glaucoma risk in left eye    Open-angle glaucoma of right eye    Presbyopia    Asthma    Tobacco dependence    Hypertriglyceridemia    Hypercholesteremia    Restless legs syndrome (RLS)    Sleep apnea syndrome    Defect of endplate of vertebra    Sacroiliac joint dysfunction    Lumbar spondylosis    Lumbar facet joint pain    Osteopenia of neck of left femur    Right renal mass    Splenomegaly    Right adrenal mass    Hepatic steatosis    Hepatomegaly    Nephrolithiasis    Mammogram declined    Tremor of right hand    Depression    Cervical stenosis of spinal canal    Numbness and tingling in right hand    Lumbar radiculopathy    Incontinence of feces    Lymphadenopathy, submental    Insomnia    Atrial fibrillation with RVR     Past Medical History:   Diagnosis Date    COPD (chronic obstructive pulmonary disease)     Depression     Diabetes mellitus     Hyperlipidemia     Hypertension      Past Surgical History:   Procedure Laterality Date    CARDIAC CATHETERIZATION Left 10/30/2020    Procedure: Left Heart Cath with Coronary Angiography;  Surgeon: Donaldo Archer MD;  Location: [facility] CATH INVASIVE LOCATION;  Service:  Cardiovascular;  Laterality: Left;    CARPAL TUNNEL RELEASE Bilateral 2017    CHOLECYSTECTOMY  1980    HYSTERECTOMY  1980      General Information       Row Name 07/06/24 1632          Physical Therapy Time and Intention    Document Type evaluation  -EL     Mode of Treatment individual therapy;physical therapy  -EL       Row Name 07/06/24 1632          General Information    Prior Level of Function independent:;all household mobility;ADL's  -EL       Row Name 07/06/24 1632          Living Environment    People in Home alone  -       Row Name 07/06/24 1632          Cognition    Orientation Status (Cognition) oriented x 4  -EL       Row Name 07/06/24 1632          Safety Issues, Functional Mobility    Impairments Affecting Function (Mobility) endurance/activity tolerance;shortness of breath  -EL               User Key  (r) = Recorded By, (t) = Taken By, (c) = Cosigned By      Initials Name Provider Type    EL Gael Robles, PT Physical Therapist                   Mobility       Row Name 07/06/24 1635          Bed Mobility    Bed Mobility bed mobility (all) activities  -EL     All Activities, [place] (Bed Mobility) modified independence  -EL     Assistive Device (Bed Mobility) bed rails  -EL       Row Name 07/06/24 1635          Bed-Chair Transfer    Bed-Chair [place] (Transfers) contact guard  -EL       Row Name 07/06/24 1635          Sit-Stand Transfer    Sit-Stand [place] (Transfers) contact guard  -EL     Assistive Device (Sit-Stand Transfers) walker, front-wheeled  -EL       Row Name 07/06/24 1635          Gait/Stairs (Locomotion)    [place] Level (Gait) contact guard  -EL     Assistive Device (Gait) walker, front-wheeled  -EL     Distance in Feet (Gait) 75  -EL     Deviations/Abnormal Patterns (Gait) gait speed decreased  -EL     Bilateral Gait Deviations forward flexed posture  -EL     Comment, (Gait/Stairs) No significant LOB  -EL               User Key  (r) = Recorded By, (t) = Taken By, (c)  = Cosigned By      Initials Name Provider Type    Gael Gong, PT Physical Therapist                   Obj/Interventions       Row Name 07/06/24 1636          Range of Motion Comprehensive    General Range of Motion bilateral lower extremity ROM WFL  -EL       Row Name 07/06/24 1636          Strength Comprehensive (MMT)    General Manual Muscle Testing (MMT) Assessment lower extremity strength deficits identified  -EL     Comment, General Manual Muscle Testing (MMT) Assessment BLE 4-/5 gross  -EL       Row Name 07/06/24 1636          Balance    Balance Assessment sitting static balance;standing static balance;standing dynamic balance  -EL     Static Sitting Balance independent  -EL     Static Standing Balance contact guard  -EL     Dynamic Standing Balance contact guard  -EL       Row Name 07/06/24 1636          Sensory Assessment (Somatosensory)    Sensory Assessment (Somatosensory) sensation intact  -EL               User Key  (r) = Recorded By, (t) = Taken By, (c) = Cosigned By      Initials Name Provider Type    Gael Gong, PT Physical Therapist                   Goals/Plan       Row Name 07/06/24 1643          Bed Mobility Goal 1 (PT)    Activity/Assistive Device (Bed Mobility Goal 1, PT) bed mobility activities, all  -EL     [place] Level/Cues Needed (Bed Mobility Goal 1, PT) independent  -EL     Time Frame (Bed Mobility Goal 1, PT) long term goal (LTG);2 weeks  -EL       Row Name 07/06/24 1643          Transfer Goal 1 (PT)    Activity/Assistive Device (Transfer Goal 1, PT) transfers, all  -EL     [place] Level/Cues Needed (Transfer Goal 1, PT) independent  -EL     Time Frame (Transfer Goal 1, PT) long term goal (LTG);2 weeks  -EL       Row Name 07/06/24 1643          Gait Training Goal 1 (PT)    Activity/Assistive Device (Gait Training Goal 1, PT) gait (walking locomotion)  -EL     [place] Level (Gait Training Goal 1, PT) independent  -EL     Distance (Gait Training Goal 1, PT) 150  -EL      Time Frame (Gait Training Goal 1, PT) long term goal (LTG);2 weeks  -       Row Name 07/06/24 1643          Therapy Assessment/Plan (PT)    Planned Therapy Interventions (PT) balance training;bed mobility training;gait training;transfer training;neuromuscular re-education  -               User Key  (r) = Recorded By, (t) = Taken By, (c) = Cosigned By      Initials Name Provider Type    EL Gael Robles, PT Physical Therapist                   Clinical Impression       Row Name 07/06/24 1636          Pain    Additional Documentation Pain Scale: FACES Pre/Post-Treatment (Group)  -[facility] Name 07/06/24 1636          Pain Scale: FACES Pre/Post-Treatment    Pain: FACES Scale, Pretreatment 4-->hurts little more  -EL     Posttreatment Pain Rating 4-->hurts little more  -EL       [facility] Name 07/06/24 1636          Plan of Care Review    Plan of Care Reviewed With patient  -EL     Outcome Evaluation Pt is a 67 YO F admitted with generalized weakness, fatigue, UTI, Afib with RVR. Pt rpeorts living home alone, but has a son that checks in and provides transportation. Pt typically is independent with all ADLs, ambulation without AD generally, occasionally using cane, and has RWx at home as well if needed. Pt this date demonstrates good mobitliy, requiring CGA for transfers and ambuation. Pt fatigues with mobility, but ambulated safely with RWx. Pt educated on potential need for RWx when d/c and verbalizes understanding. PT to follow while admitted and recommendation is return home with family assist as needed.  -       Row Name 07/06/24 1636          Therapy Assessment/Plan (PT)    Rehab Potential (PT) good, to achieve stated therapy goals  -EL     Criteria for Skilled Interventions Met (PT) yes  -EL     Therapy Frequency (PT) 3 times/wk  -EL       Row Name 07/06/24 1636          Vital Signs    O2 Delivery Pre Treatment room air  -EL     O2 Delivery Intra Treatment room air  -EL     O2 Delivery Post Treatment room air   -EL     Pre Patient Position Supine  -EL     Intra Patient Position Standing  -EL     Post Patient Position Sitting  -EL       Row Name 07/06/24 1636          Positioning and Restraints    Pre-Treatment Position in bed  -EL     Post Treatment Position chair  -EL     In Chair notified nsg;reclined;call light within reach;encouraged to call for assist  -EL               User Key  (r) = Recorded By, (t) = Taken By, (c) = Cosigned By      Initials Name Provider Type    Gael Gong, PT Physical Therapist                   Outcome Measures       Row Name 07/06/24 1644 07/06/24 0815       How much help from another person do you currently need...    Turning from your back to your side while in flat bed without using bedrails? 4  -EL 4  -KS    Moving from lying on back to sitting on the side of a flat bed without bedrails? 3  -EL 4  -KS    Moving to and from a bed to a chair (including a wheelchair)? 3  -EL 4  -KS    Standing up from a chair using your arms (e.g., wheelchair, bedside chair)? 3  -EL 4  -KS    Climbing 3-5 steps with a railing? 3  -EL 4  -KS    To walk in hospital room? 3  -EL 4  -KS    AM-PAC 6 Clicks Score (PT) 19  -EL 24  -KS    Highest Level of Mobility Goal 6 --> Walk 10 steps or more  -EL 8 --> Walked 250 feet or more  -KS      Row Name 07/06/24 1644          Functional Assessment    Outcome Measure Options AM-PAC 6 Clicks Basic Mobility (PT)  -EL               User Key  (r) = Recorded By, (t) = Taken By, (c) = Cosigned By      Initials Name Provider Type    Gael Gong, PT Physical Therapist    Trina Duran, RN Registered Nurse                                 Physical Therapy Education       Title: PT OT SLP Therapies (Done)       Topic: Physical Therapy (Done)       Point: Mobility training (Done)       Learning Progress Summary             Patient Acceptance, E,TB, VU by SOTERO at 7/6/2024 1644                         Point: Precautions (Done)       Learning Progress Summary              Patient Acceptance, E,TB, VU by  at 7/6/2024 1644                                         User Key       Initials Effective Dates Name Provider Type Discipline     06/23/20 -  Gael Robles, PT Physical Therapist PT                  PT Recommendation and Plan  Planned Therapy Interventions (PT): balance training, bed mobility training, gait training, transfer training, neuromuscular re-education  Plan of Care Reviewed With: patient  Outcome Evaluation: Pt is a 69 YO F admitted with generalized weakness, fatigue, UTI, Afib with RVR. Pt rpeorts living home alone, but has a son that checks in and provides transportation. Pt typically is independent with all ADLs, ambulation without AD generally, occasionally using cane, and has RWx at home as well if needed. Pt this date demonstrates good mobitliy, requiring CGA for transfers and ambuation. Pt fatigues with mobility, but ambulated safely with RWx. Pt educated on potential need for RWx when d/c and verbalizes understanding. PT to follow while admitted and recommendation is return home with family assist as needed.     Time Calculation:   PT Evaluation Complexity  History, PT Evaluation Complexity: 1-2 personal factors and/or comorbidities  Examination of Body Systems (PT Eval Complexity): total of 3 or more elements  Clinical Presentation (PT Evaluation Complexity): evolving  Clinical Decision Making (PT Evaluation Complexity): moderate complexity  Overall Complexity (PT Evaluation Complexity): moderate complexity     PT Charges       Row Name 07/06/24 1645             Time Calculation    Start Time 1501  -EL      Stop Time 1518  -EL      Time Calculation (min) 17 min  -EL      PT Received On 07/06/24  -EL      PT - Next Appointment 07/08/24  -      PT Goal Re-Cert Due Date 07/20/24  -                User Key  (r) = Recorded By, (t) = Taken By, (c) = Cosigned By      Initials Name Provider Type    Gael Gong, PT Physical Therapist                  Therapy  Charges for Today       Code Description Service Date Service Provider Modifiers Qty    80086435727 HC PT EVAL MOD COMPLEXITY 3 7/6/2024 Gael Robles, PT GP 1            PT G-Codes  Outcome Measure Options: AM-PAC 6 Clicks Basic Mobility (PT)  AM-PAC 6 Clicks Score (PT): 19  PT Discharge Summary  Anticipated Discharge Disposition (PT): home with assist    Gael Robles, MOHIT  7/6/2024

## 2024-07-06 NOTE — PROGRESS NOTES
[facility] MEDICINE SERVICE  DAILY PROGRESS NOTE    NAME: Arianna Blanco  : 1955  MRN: [id number]      LOS: 1 day     PROVIDER OF SERVICE: Timothy Duane Brammell, MD    Chief Complaint: Atrial fibrillation with RVR    Subjective:     Interval History:  History taken from: patient  Patient Complaints: Still generally weak.  She denies any chest pain.  Has dyspnea on exertion.  Denies any nausea vomiting.  Denies any sensation of palpitations or tachycardia.  Nurses state that rated been improved into the 80s and drip it been turned off earlier in the morning.  No other additional acute issues.    Review of Systems:   Review of Systems   All other systems reviewed and are negative.      Objective:     Vital Signs  Temp:  [97.5 °F (36.4 °C)-98.2 °F (36.8 °C)] 98.2 °F (36.8 °C)  Heart Rate:  [] 96  Resp:  [20-25] 24  BP: (107-148)/(49-86) 147/60   Body mass index is 26.08 kg/m².    Physical Exam  Physical Exam  Vitals reviewed.   Constitutional:       Appearance: Normal appearance.   HENT:      Head: Normocephalic.   Cardiovascular:      Rate and Rhythm: Tachycardia present. Rhythm irregular.   Pulmonary:      Effort: Pulmonary effort is normal.      Breath sounds: Normal breath sounds.   Abdominal:      General: Bowel sounds are normal.      Palpations: Abdomen is soft.      Tenderness: There is no abdominal tenderness.   Musculoskeletal:         General: No swelling.   Neurological:      Mental Status: She is alert. Mental status is at baseline.   Psychiatric:         Behavior: Behavior normal.         Thought Content: Thought content normal.         Scheduled Meds   apixaban, 5 mg, Oral, Q12H  baclofen, 20 mg, Oral, TID  dilTIAZem CD, 240 mg, Oral, Daily  insulin glargine, 40 Units, Subcutaneous, Nightly  insulin lispro, 2-9 Units, Subcutaneous, 4x Daily AC & at Bedtime  insulin lispro, 5 Units, Subcutaneous, TID With Meals  [Held by provider] lisinopril, 20 mg, Oral, Daily  [START ON  7/7/2024] metoprolol succinate XL, 50 mg, Oral, Daily  nicotine, 1 patch, Transdermal, Q24H  pregabalin, 75 mg, Oral, BID  sertraline, 100 mg, Oral, Daily  sodium chloride, 10 mL, Intravenous, Q12H       PRN Meds     acetaminophen **OR** acetaminophen **OR** acetaminophen    senna-docusate sodium **AND** polyethylene glycol **AND** bisacodyl **AND** bisacodyl    busPIRone    Calcium Replacement - Follow Nurse / BPA Driven Protocol    dextrose    dextrose    glucagon (human recombinant)    Magnesium Standard Dose Replacement - Follow Nurse / BPA Driven Protocol    nitroglycerin    ondansetron ODT **OR** ondansetron    Phosphorus Replacement - Follow Nurse / BPA Driven Protocol    Potassium Replacement - Follow Nurse / BPA Driven Protocol    sodium chloride    sodium chloride    zolpidem    zolpidem   Infusions  dilTIAZem, 5-15 mg/hr, Last Rate: 5 mg/hr (07/06/24 0845)          Diagnostic Data    Results from last 7 days   Lab Units 07/06/24  0249   WBC 10*3/mm3 6.74   HEMOGLOBIN g/dL 11.6*   HEMATOCRIT % 35.9   PLATELETS 10*3/mm3 124*   GLUCOSE mg/dL 231*   CREATININE mg/dL 0.60   BUN mg/dL 11   SODIUM mmol/L 138   POTASSIUM mmol/L 4.3   ANION GAP mmol/L 8.1       No radiology results for the last day          Assessment:    A-fib with RVR  Type 2  diabetes insulin requiring  hypeertension  History of anxiety and depression  Anemia  Thrombocytopenia  Anticoagulation     Plan: Echocardiogram.  Cardiology to review.  Resume diltiazem drip.  Increase dose of beta-blocker.  Follow-up labs.      Active and Resolved Problems  Active Hospital Problems    Diagnosis  POA    **Atrial fibrillation with RVR [I48.91]  Yes      Resolved Hospital Problems   No resolved problems to display.           VTE Prophylaxis:  Pharmacologic & mechanical VTE prophylaxis orders are present.         Code status is   Code Status and Medical Interventions:   Ordered at: 07/05/24 2054     Code Status (Patient has no pulse and is not breathing):     CPR (Attempt to Resuscitate)     Medical Interventions (Patient has pulse or is breathing):    Full Support       Plan for disposition:home in 2 days    Time: 30 minutes    Signature: Electronically signed by Timothy Duane Brammell, MD, 07/06/24, 08:57 EDT.  [facility] Hospitalist Team

## 2024-07-06 NOTE — H&P
Hospitalist Service  History and Physical      Patient Name: Arianna Blanco  : 1955  MRN: [id number]  Primary Care Physician:  Maddie Cornejo APRN  Date of admission: 2024      Subjective      Chief Complaint: Palpitations    History of Present Illness: Arianna Blanco is a 68 y.o. female with past medical history of A-fib, hypertension, hyperlipidemia, DM2, COPD who presented to [facility] on 2024 as a transfer from [facility] with complaints of feeling generalized weak, tired, short of breath for about 1 week.  Denies any recent travel or sick contacts.  She has not been able to ambulate much due to the weakness.  She was seen by her PCP and was noted to have UTI and was prescribed cefdinir without much improvement.  Patient denies any urinary symptoms currently and was not having any urinary symptoms at the time of diagnosis either.  She was noted to be in A-fib RVR, received adenosine and digoxin and was transferred here for further management.  Patient has known history of A-fib and is on Eliquis for anticoagulation at home.    Labs from the outside hospital essentially stable except for mild thrombocytopenia.  D-dimer and troponin negative.  Chest x-ray and CT head negative for acute findings.      ROS: Pertinent positives as noted in HPI/subjective.  All other systems were reviewed and are negative.      Personal History     Past Medical History:   Diagnosis Date    COPD (chronic obstructive pulmonary disease)     Depression     Diabetes mellitus     Hyperlipidemia     Hypertension        Past Surgical History:   Procedure Laterality Date    CARDIAC CATHETERIZATION Left 10/30/2020    Procedure: Left Heart Cath with Coronary Angiography;  Surgeon: Donaldo Archer MD;  Location: [facility] INVASIVE LOCATION;  Service: Cardiovascular;  Laterality: Left;    CARPAL TUNNEL RELEASE Bilateral 2017    CHOLECYSTECTOMY      HYSTERECTOMY         Family History:  family history includes Diabetes in her mother; Heart disease in her mother; Hypertension in her mother; Stroke in her mother. Otherwise pertinent FHx was reviewed and not pertinent to current issue.    Social History:  reports that she has been smoking cigarettes. She started smoking about 41 years ago. She has a 41.5 pack-year smoking history. She has been exposed to tobacco smoke. She has never used smokeless tobacco. She reports that she does not drink alcohol.    Home Medications:  Prior to Admission Medications       Prescriptions Last Dose Informant Patient Reported? Taking?    Accu-Chek Guide test strip   No No    1 each by Other route 4 (Four) Times a Day. Use to test blood sugar 4 times daily    albuterol sulfate  (90 Base) MCG/ACT inhaler   No No    INHALE 2 PUFFS BY MOUTH EVERY 4 HOURS AS NEEDED FOR WHEEZING OR SHORTNESS OF AIR    baclofen (LIORESAL) 20 MG tablet   No No    Take 1 tablet by mouth 3 (Three) Times a Day.    busPIRone (BUSPAR) 7.5 MG tablet   No No    TAKE 1 TABLET BY MOUTH AT NIGHT AS NEEDED (ANXIETY).    butalbital-acetaminophen  MG tablet tablet   No No    Take 1 tablet by mouth Every 6 (Six) Hours As Needed (headache).    Continuous Blood Gluc  (FreeStyle Willie 2 [place]) device   No No    Use 1 each Daily. To be used daily to monitor continuous blood sugar reading for type 2 diabetic patient on insulin regimen.    Continuous Blood Gluc Sensor (FreeStyle Willie 2 Sensor) misc   No No    Use 1 each Every 14 (Fourteen) Days. Dispense 2 sensor pack. To be used daily with freestyle willie device to monitor continuous blood sugar reading for type 2 diabetic patient on insulin regimen.    dilTIAZem CD (CARDIZEM CD) 240 MG 24 hr capsule   No No    Take 1 capsule by mouth Daily.    Eliquis 5 MG tablet tablet   No No    Take 1 tablet by mouth Every 12 (Twelve) Hours.    Incontinence Supply Disposable (Briefs Overnight Large) misc   No No    Use 1 each At Night As Needed  (incontinence).    Insulin Glargine (LANTUS SOLOSTAR) 100 UNIT/ML injection pen   No No    Inject 60 Units under the skin into the appropriate area as directed Every Night.    insulin glulisine (Apidra) 100 UNIT/ML injection   No No    Inject 5 Units under the skin into the appropriate area as directed 3 (Three) Times a Day Before Meals.    Patient taking differently:  Inject 10 Units under the skin into the appropriate area as directed 3 (Three) Times a Day Before Meals.    lisinopril (PRINIVIL,ZESTRIL) 20 MG tablet   No No    Take 1 tablet by mouth Daily.    metFORMIN (GLUCOPHAGE) 1000 MG tablet   No No    Take 1 tablet by mouth 2 (Two) Times a Day With Meals.    metoprolol succinate XL (TOPROL-XL) 25 MG 24 hr tablet   No No    Take 1 tablet by mouth Daily.    pregabalin (LYRICA) 75 MG capsule   No No    TAKE 1 CAPSULE BY MOUTH TWICE A DAY    Rexulti 3 MG tablet   No No    TAKE 1 TABLET BY MOUTH EVERY DAY    Semaglutide, 1 MG/DOSE, (Ozempic, 1 MG/DOSE,) 4 MG/3ML solution pen-injector   No No    Inject 1 mg under the skin into the appropriate area as directed Every 7 (Seven) Days.    sertraline (ZOLOFT) 100 MG tablet   No No    Take 1 tablet by mouth Daily.    zolpidem (AMBIEN) 10 MG tablet   No No    TAKE 1 TABLET BY MOUTH AT NIGHT AS NEEDED FOR SLEEP.              I have utilized all available, immediate resources to obtain, update, or review the patient's current medications including all prescriptions, over-the-counter products, herbals, cannabis/cannabidiol products, and vitamin.mineral/dietary (nutritional) supplements.    Allergies:  Allergies   Allergen Reactions    Codeine GI Intolerance       Objective      Vitals:   Temp:  [97.5 °F (36.4 °C)] 97.5 °F (36.4 °C)  Heart Rate:  [119] 119  BP: (148)/(81) 148/81    Physical Exam:    General: Awake, alert, NAD  HEENT: NC/AT, PERRL, EOMI, conjunctivae are clear, mucous membrane moist, oropharynx clear, Trachea midline   Cardiovascular: Regular rate and rhythm,  no murmurs  Respiratory: Clear to auscultation bilaterally, no wheezing or rales, unlabored breathing  Abdomen: Soft, nontender, positive bowel sounds, no guarding  Neurologic: A&O, CN grossly intact, moves all extremities spontaneously  Musculoskeletal: Normal range of motion, no other gross deformities  Skin: Warm, dry      Result Review    Result Review:  I have personally reviewed the results from the time of this admission to 7/5/2024 21:01 EDT and agree with these findings:  [x]  Laboratory  [x]  Microbiology  [x]  Radiology  [x]  EKG/Telemetry   [x]  Cardiology/Vascular   []  Pathology  [x]  Old records  []  Other:      Assessment & Plan        Active Hospital Problems:  Active Hospital Problems    Diagnosis     **Atrial fibrillation with RVR        Assessment/Plan:     A-fib RVR  -Known history of A-fib, received adenosine and digoxin at the outside ED  -Heart rate remains in 120s currently, start Cardizem drip  -Resume home p.o. Cardizem and metoprolol from tomorrow, continue Eliquis for anticoagulation  -TSH and electrolytes stable on labs done today  -Cardiology consult    DM2  -Recent A1c of 7.2  -Patient says she has been not eating much over the past few days, did not take her insulin last night either  -Start basal and mealtime insulin at half dose for now, continue SSI  -Monitor blood glucose, adjust as needed    Hypertension  Hyperlipidemia  -Continue home meds as tolerated, monitor    Generalized anxiety/depression  -Stable, continue home meds and monitor    Generalized weakness/deconditioning  -PT/OT  -Supportive care    VTE Prophylaxis:  Mechanical VTE prophylaxis orders are present.        CODE STATUS:    Code Status (Patient has no pulse and is not breathing): CPR (Attempt to Resuscitate)  Medical Interventions (Patient has pulse or is breathing): Full Support      Admission Status:  I believe this patient meets inpt status.    Signature:   Electronically signed by Cee Doran DO, 07/05/24,  9:01 PM EDT.    Part of this note may be an electronic transcription/translation of spoken language to printed text using the Dragon Dictation System.

## 2024-07-06 NOTE — PLAN OF CARE
Goal Outcome Evaluation:  Plan of Care Reviewed With: patient        Progress: no change  Outcome Evaluation: Cardizem gtt stopped and patient started on oral. Also started on multaq. Will continue to monitor.

## 2024-07-07 ENCOUNTER — APPOINTMENT (OUTPATIENT)
Dept: CARDIOLOGY | Facility: HOSPITAL | Age: 69
End: 2024-07-07
Payer: MEDICAID

## 2024-07-07 LAB
BH CV ECHO MEAS - AO MAX PG: 14 MMHG
BH CV ECHO MEAS - AO MEAN PG: 8 MMHG
BH CV ECHO MEAS - AO ROOT DIAM: 3.1 CM
BH CV ECHO MEAS - AO V2 MAX: 187 CM/SEC
BH CV ECHO MEAS - AO V2 VTI: 35.7 CM
BH CV ECHO MEAS - AVA(I,D): 1.29 CM2
BH CV ECHO MEAS - LA DIMENSION: 3.9 CM
BH CV ECHO MEAS - LAT PEAK E' VEL: 8.5 CM/SEC
BH CV ECHO MEAS - LV MAX PG: 2.4 MMHG
BH CV ECHO MEAS - LV MEAN PG: 1 MMHG
BH CV ECHO MEAS - LV V1 MAX: 77.4 CM/SEC
BH CV ECHO MEAS - LV V1 VTI: 16.3 CM
BH CV ECHO MEAS - LVOT AREA: 2.8 CM2
BH CV ECHO MEAS - LVOT DIAM: 1.9 CM
BH CV ECHO MEAS - MED PEAK E' VEL: 7.8 CM/SEC
BH CV ECHO MEAS - MV A MAX VEL: 55.1 CM/SEC
BH CV ECHO MEAS - MV DEC SLOPE: 381 CM/SEC2
BH CV ECHO MEAS - MV DEC TIME: 0.22 SEC
BH CV ECHO MEAS - MV E MAX VEL: 76.8 CM/SEC
BH CV ECHO MEAS - MV E/A: 1.39
BH CV ECHO MEAS - MV MAX PG: 4.1 MMHG
BH CV ECHO MEAS - MV MEAN PG: 2 MMHG
BH CV ECHO MEAS - MV P1/2T: 75.9 MSEC
BH CV ECHO MEAS - MV V2 VTI: 29.9 CM
BH CV ECHO MEAS - MVA(P1/2T): 2.9 CM2
BH CV ECHO MEAS - MVA(VTI): 1.55 CM2
BH CV ECHO MEAS - PA V2 MAX: 92.8 CM/SEC
BH CV ECHO MEAS - RAP SYSTOLE: 15 MMHG
BH CV ECHO MEAS - RV MAX PG: 1.99 MMHG
BH CV ECHO MEAS - RV V1 MAX: 70.6 CM/SEC
BH CV ECHO MEAS - RV V1 VTI: 14 CM
BH CV ECHO MEAS - SV(LVOT): 46.2 ML
BH CV ECHO MEAS - TAPSE (>1.6): 2.06 CM
BH CV ECHO MEASUREMENTS AVERAGE E/E' RATIO: 9.42
BH CV XLRA - TDI S': 10.4 CM/SEC
GLUCOSE BLDC GLUCOMTR-MCNC: 136 MG/DL (ref 70–105)
GLUCOSE BLDC GLUCOMTR-MCNC: 153 MG/DL (ref 70–105)
GLUCOSE BLDC GLUCOMTR-MCNC: 187 MG/DL (ref 70–105)
GLUCOSE BLDC GLUCOMTR-MCNC: 325 MG/DL (ref 70–105)
LEFT ATRIUM VOLUME INDEX: 18.3 ML/M2
LV EF 2D ECHO EST: 60 %
QT INTERVAL: 392 MS
QTC INTERVAL: 438 MS
SINUS: 2.8 CM

## 2024-07-07 PROCEDURE — 82948 REAGENT STRIP/BLOOD GLUCOSE: CPT | Performed by: INTERNAL MEDICINE

## 2024-07-07 PROCEDURE — 25010000002 SULFUR HEXAFLUORIDE MICROSPH 60.7-25 MG RECONSTITUTED SUSPENSION: Performed by: STUDENT IN AN ORGANIZED HEALTH CARE EDUCATION/TRAINING PROGRAM

## 2024-07-07 PROCEDURE — 93005 ELECTROCARDIOGRAM TRACING: CPT | Performed by: INTERNAL MEDICINE

## 2024-07-07 PROCEDURE — 63710000001 INSULIN LISPRO (HUMAN) PER 5 UNITS: Performed by: INTERNAL MEDICINE

## 2024-07-07 PROCEDURE — 82948 REAGENT STRIP/BLOOD GLUCOSE: CPT

## 2024-07-07 PROCEDURE — 99232 SBSQ HOSP IP/OBS MODERATE 35: CPT | Performed by: INTERNAL MEDICINE

## 2024-07-07 PROCEDURE — 93010 ELECTROCARDIOGRAM REPORT: CPT | Performed by: INTERNAL MEDICINE

## 2024-07-07 PROCEDURE — 93306 TTE W/DOPPLER COMPLETE: CPT

## 2024-07-07 PROCEDURE — 93306 TTE W/DOPPLER COMPLETE: CPT | Performed by: INTERNAL MEDICINE

## 2024-07-07 PROCEDURE — 97166 OT EVAL MOD COMPLEX 45 MIN: CPT | Performed by: OCCUPATIONAL THERAPIST

## 2024-07-07 PROCEDURE — 63710000001 INSULIN GLARGINE PER 5 UNITS: Performed by: INTERNAL MEDICINE

## 2024-07-07 RX ADMIN — APIXABAN 5 MG: 5 TABLET, FILM COATED ORAL at 08:33

## 2024-07-07 RX ADMIN — Medication 1 PATCH: at 08:34

## 2024-07-07 RX ADMIN — DILTIAZEM HYDROCHLORIDE 240 MG: 240 CAPSULE, COATED, EXTENDED RELEASE ORAL at 08:33

## 2024-07-07 RX ADMIN — INSULIN LISPRO 2 UNITS: 100 INJECTION, SOLUTION INTRAVENOUS; SUBCUTANEOUS at 20:39

## 2024-07-07 RX ADMIN — ACETAMINOPHEN 650 MG: 325 TABLET, FILM COATED ORAL at 20:46

## 2024-07-07 RX ADMIN — DRONEDARONE 400 MG: 400 TABLET, FILM COATED ORAL at 10:18

## 2024-07-07 RX ADMIN — SENNOSIDES AND DOCUSATE SODIUM 2 TABLET: 50; 8.6 TABLET ORAL at 20:46

## 2024-07-07 RX ADMIN — INSULIN LISPRO 5 UNITS: 100 INJECTION, SOLUTION INTRAVENOUS; SUBCUTANEOUS at 17:45

## 2024-07-07 RX ADMIN — INSULIN GLARGINE 40 UNITS: 100 INJECTION, SOLUTION SUBCUTANEOUS at 20:39

## 2024-07-07 RX ADMIN — DRONEDARONE 400 MG: 400 TABLET, FILM COATED ORAL at 17:45

## 2024-07-07 RX ADMIN — SULFUR HEXAFLUORIDE 2 ML: KIT at 08:00

## 2024-07-07 RX ADMIN — PREGABALIN 75 MG: 75 CAPSULE ORAL at 08:33

## 2024-07-07 RX ADMIN — INSULIN LISPRO 5 UNITS: 100 INJECTION, SOLUTION INTRAVENOUS; SUBCUTANEOUS at 08:33

## 2024-07-07 RX ADMIN — Medication 10 ML: at 08:33

## 2024-07-07 RX ADMIN — METOPROLOL SUCCINATE 50 MG: 50 TABLET, EXTENDED RELEASE ORAL at 08:33

## 2024-07-07 RX ADMIN — Medication 10 ML: at 20:39

## 2024-07-07 RX ADMIN — PREGABALIN 75 MG: 75 CAPSULE ORAL at 20:40

## 2024-07-07 RX ADMIN — APIXABAN 5 MG: 5 TABLET, FILM COATED ORAL at 20:40

## 2024-07-07 RX ADMIN — INSULIN LISPRO 2 UNITS: 100 INJECTION, SOLUTION INTRAVENOUS; SUBCUTANEOUS at 17:45

## 2024-07-07 RX ADMIN — INSULIN LISPRO 5 UNITS: 100 INJECTION, SOLUTION INTRAVENOUS; SUBCUTANEOUS at 11:30

## 2024-07-07 RX ADMIN — ZOLPIDEM TARTRATE 5 MG: 5 TABLET, FILM COATED ORAL at 20:40

## 2024-07-07 RX ADMIN — INSULIN LISPRO 7 UNITS: 100 INJECTION, SOLUTION INTRAVENOUS; SUBCUTANEOUS at 11:31

## 2024-07-07 RX ADMIN — SERTRALINE 100 MG: 100 TABLET, FILM COATED ORAL at 08:33

## 2024-07-07 NOTE — PROGRESS NOTES
[facility] MEDICINE SERVICE  DAILY PROGRESS NOTE    NAME: Arianna Blanco  : 1955  MRN: [id number]      LOS: 2 days     PROVIDER OF SERVICE: Kassidy Cardona MD    Chief Complaint: Atrial fibrillation with RVR    Subjective:     Interval History:  History taken from: patient  Patient Complaints: Still generally weak.  Denies any chest pain or shortness of breath, denies any nausea or vomiting.  Heart rate is in the 80s this morning.    Review of Systems:   Review of Systems   All other systems reviewed and are negative.      Objective:     Vital Signs  Temp:  [97.5 °F (36.4 °C)-98.5 °F (36.9 °C)] 97.7 °F (36.5 °C)  Heart Rate:  [78-87] 84  Resp:  [15-25] 24  BP: (116-144)/(53-78) 139/69   Body mass index is 25.84 kg/m².    Physical Exam  General: Alert and oriented, no acute distress.  HENT: Normocephalic, moist oral mucosa, no scleral icterus.  Neck: Supple, non-tender, no carotid bruits, no JVD, no LAD.  Lungs: Clear to auscultation, non-labored respiration.  Heart: Irregular rate and rhythm, no murmur, gallop or edema.  Abdomen: Soft, nontender, non-distended, + bowel sounds.  Musculoskeletal: Normal range of motion and strength, no tenderness or swelling.  Neuro: alert and awake, moving all 4 extremities   Skin: Skin is warm, dry and pink, no rashes or lesions.  Psychiatric: Cooperative, appropriate mood and affect.        Scheduled Meds   apixaban, 5 mg, Oral, Q12H  dronedarone, 400 mg, Oral, BID With Meals  insulin glargine, 40 Units, Subcutaneous, Nightly  insulin lispro, 2-9 Units, Subcutaneous, 4x Daily AC & at Bedtime  insulin lispro, 5 Units, Subcutaneous, TID With Meals  [Held by provider] lisinopril, 20 mg, Oral, Daily  metoprolol succinate XL, 50 mg, Oral, Daily  nicotine, 1 patch, Transdermal, Q24H  pregabalin, 75 mg, Oral, BID  sertraline, 100 mg, Oral, Daily  sodium chloride, 10 mL, Intravenous, Q12H       PRN Meds     acetaminophen **OR** acetaminophen **OR**  acetaminophen    baclofen    senna-docusate sodium **AND** polyethylene glycol **AND** bisacodyl **AND** bisacodyl    busPIRone    Calcium Replacement - Follow Nurse / BPA Driven Protocol    dextrose    dextrose    glucagon (human recombinant)    Magnesium Standard Dose Replacement - Follow Nurse / BPA Driven Protocol    nitroglycerin    ondansetron ODT **OR** ondansetron    Phosphorus Replacement - Follow Nurse / BPA Driven Protocol    Potassium Replacement - Follow Nurse / BPA Driven Protocol    sodium chloride    sodium chloride    zolpidem    zolpidem   Infusions           Diagnostic Data    Results from last 7 days   Lab Units 07/06/24  0249   WBC 10*3/mm3 6.74   HEMOGLOBIN g/dL 11.6*   HEMATOCRIT % 35.9   PLATELETS 10*3/mm3 124*   GLUCOSE mg/dL 231*   CREATININE mg/dL 0.60   BUN mg/dL 11   SODIUM mmol/L 138   POTASSIUM mmol/L 4.3   ANION GAP mmol/L 8.1       No radiology results for the last day          Assessment:    A-fib with RVR  Type 2  diabetes insulin requiring  hypeertension  History of anxiety and depression  Anemia  Thrombocytopenia  Anticoagulation     Plan: Continue current management plan.  Echocardiogram.  Cardiology to review.  On beta-blocker and apixaban for atrial fibrillation.  Bleeding and fall precautions.    Active and Resolved Problems  Active Hospital Problems    Diagnosis  POA    **Atrial fibrillation with RVR [I48.91]  Yes      Resolved Hospital Problems   No resolved problems to display.           VTE Prophylaxis:  Pharmacologic & mechanical VTE prophylaxis orders are present.         Code status is   Code Status and Medical Interventions:   Ordered at: 07/05/24 2054     Code Status (Patient has no pulse and is not breathing):    CPR (Attempt to Resuscitate)     Medical Interventions (Patient has pulse or is breathing):    Full Support       Plan for disposition:home in 2 days    Time: 30 minutes    Signature: Electronically signed by Kassidy Cardona MD, 07/07/24, 11:27  EDT.  [facility] Hospitalist Team

## 2024-07-07 NOTE — PROGRESS NOTES
CC--AF     Sub  Patient converted pharmacologically with initiation of dronedarone and complains of fatigue and denies palpitations    Previous history attached below  68-year-old pleasant patient transferred from outlying facility atrial fibrillation with rapid ventricular rate.  Patient started have recurrent AF with symptoms of fatigue tiredness lightheadedness without syncope.  Denies any chest pain or shortness of breath.  Patient was started on intravenous Cardizem and further transferred to our hospital and a consultation requested.  Patient continues to be in atrial fibrillation rates are fairly controlled at this point.  Stress test in the past did not show ischemia with normal EF and echocardiography in the past showed normal EF with a small pericardial effusion.  She has prior history of atrial flutter and right bundle branch block  Previous cardiac catheterization revealed EF of 60% with a mid RCA 50% stenosis which was performed in 2020.  Patient history of diabetes, hypertension, COPD and dyslipidemia.     Medical History        Past Medical History:   Diagnosis Date    COPD (chronic obstructive pulmonary disease)      Depression      Diabetes mellitus      Hyperlipidemia      Hypertension           Surgical History         Past Surgical History:   Procedure Laterality Date    CARDIAC CATHETERIZATION Left 10/30/2020     Procedure: Left Heart Cath with Coronary Angiography;  Surgeon: Donaldo Archer MD;  Location: [facility] CATH INVASIVE LOC[facility];  Service: Cardiovascular;  Laterality: Left;    CARPAL TUNNEL RELEASE Bilateral 2017    CHOLECYSTECTOMY   1980    HYSTERECTOMY   1980             Physical Exam    General:      well developed, well nourished, in no acute distress.    Head:      normocephalic and atraumatic.    Eyes:      PERRL/EOM intact, conjunctivae and sclerae clear without nystagmus.    Neck:      no  thyromegaly, trachea central with normal respiratory effort  Lungs:      clear bilaterally  to auscultation.    Heart:       regular rate and rhythm, S1, S2 without murmurs, rubs, or gallops  Skin:      intact without lesions or rashes.    Psych:      alert and cooperative; normal mood and affect; normal attention span and concentration.              CBC         Results from last 7 days   Lab Units 07/06/24  0249   WBC 10*3/mm3 6.74   HEMOGLOBIN g/dL 11.6*   PLATELETS 10*3/mm3 124*      BMP        Results from last 7 days   Lab Units 07/06/24  0249   SODIUM mmol/L 138   POTASSIUM mmol/L 4.3   CHLORIDE mmol/L 107   CO2 mmol/L 22.9   BUN mg/dL 11   CREATININE mg/dL 0.60   GLUCOSE mg/dL 231*   MAGNESIUM mg/dL 1.8   PHOSPHORUS mg/dL 2.4*         Assessment and plan     Intermittent atrial fibrillation with symptoms and history of atrial flutter in the past--- started on dronedarone to sinus rhythm--- stop Cardizem  Observe for another day  Therapeutic options for recurrent atrial arrhythmias educated including catheter ablation and patient wants to strongly consider ablation treatment.  Patient currently anticoagulated  History of diabetes being managed by hospitalist  Hypertension controlled with current medical treatment including metoprolol  History of nonobstructive coronary artery disease without angina       Electronically signed by Shay Santos MD, 07/07/24, 3:34 PM EDT.

## 2024-07-07 NOTE — PLAN OF CARE
Goal Outcome Evaluation:  Plan of Care Reviewed With: patient           Outcome Evaluation: Pt is a 68 y.o. F adm to [facility] on 07/05/24 with c/o general weakness, fatigue, UTI & a-fib with RVR. Pt has converted back to normal sinus rhythm. At baseline, pt lives alone in apt with 0 PB. She is ind with all ADLs & mobility. She uses a tub chair for safety & has a RW & cane PRN. Her son provides for transportation needs. Upon eval, pt is A&O X4.She reports she primarily feels fatigued. She is ind with bed mobility, ind with dressing & toileting ADLs although she does have dec standing tolerance & generalized dec activity tolerance requiring frequent rest breaks. Recommended pt use RW when returning home for safety due to dec standing tolerance. Instructed pt on UE/LE ex & provided her with min resistance theraband for HEP. Pt returned demo of ex. Pt is near her baseline status & seems to be safe to return home once medically cleared. Pt is not interested in HH or outpt therapy at this time. No further OT tx recommended at this time. OT will sign off.      Anticipated Discharge Disposition (OT): home

## 2024-07-07 NOTE — THERAPY EVALUATION
Patient Name: Arianna Blanco  : 1955    MRN: [id number]                              Today's Date: 2024       Admit Date: 2024    Visit Dx: No diagnosis found.  Patient Active Problem List   Diagnosis    Essential hypertension    Dyslipidemia    Simple chronic bronchitis    Unstable angina    Other chest pain    Atrial fibrillation    Type 2 diabetes mellitus with hyperglycemia    Anxiety associated with depression    Anxiety    Cataracts, bilateral    Cervico-occipital neuralgia    Chronic obstructive pulmonary disease    Degenerative disc disease, lumbar    Headaches, cluster    Lesion of ulnar nerve, bilateral upper limbs    Degenerative disc disease, cervical    Median nerve neuropathy    Migraine    Nuclear senile cataract    Open angle with borderline findings and high glaucoma risk in left eye    Open-angle glaucoma of right eye    Presbyopia    Asthma    Tobacco dependence    Hypertriglyceridemia    Hypercholesteremia    Restless legs syndrome (RLS)    Sleep apnea syndrome    Defect of endplate of vertebra    Sacroiliac joint dysfunction    Lumbar spondylosis    Lumbar facet joint pain    Osteopenia of neck of left femur    Right renal mass    Splenomegaly    Right adrenal mass    Hepatic steatosis    Hepatomegaly    Nephrolithiasis    Mammogram declined    Tremor of right hand    Depression    Cervical stenosis of spinal canal    Numbness and tingling in right hand    Lumbar radiculopathy    Incontinence of feces    Lymphadenopathy, submental    Insomnia    Atrial fibrillation with RVR     Past Medical History:   Diagnosis Date    COPD (chronic obstructive pulmonary disease)     Depression     Diabetes mellitus     Hyperlipidemia     Hypertension      Past Surgical History:   Procedure Laterality Date    CARDIAC CATHETERIZATION Left 10/30/2020    Procedure: Left Heart Cath with Coronary Angiography;  Surgeon: Donaldo Archer MD;  Location: [facility] CATH INVASIVE LOCATION;  Service:  Cardiovascular;  Laterality: Left;    CARPAL TUNNEL RELEASE Bilateral 2017    CHOLECYSTECTOMY  1980    HYSTERECTOMY  1980      General Information       Row Name 07/07/24 1523          OT Time and Intention    Document Type evaluation  -DT     Mode of Treatment occupational therapy  -DT       Row Name 07/07/24 1523          General Information    Patient Profile Reviewed yes  -DT     Prior Level of Function all household mobility;ADL's;home management;independent:  son provides transportation. Pt uses tub chair in home. Also has RW & cane PRN.  -DT     Barriers to Rehab none identified  -DT       Row Name 07/07/24 1523          Living Environment    People in Home alone  -DT       Row Name 07/07/24 1523          Home Main Entrance    Number of Stairs, Main Entrance none  -DT       Row Name 07/07/24 1523          Stairs Within Home, Primary    Number of Stairs, Within Home, Primary none  -DT       Row Name 07/07/24 1523          Cognition    Orientation Status (Cognition) oriented x 4  -DT       Row Name 07/07/24 1523          Safety Issues, Functional Mobility    Impairments Affecting Function (Mobility) endurance/activity tolerance  -DT               User Key  (r) = Recorded By, (t) = Taken By, (c) = Cosigned By      Initials Name Provider Type    DT Renee Saha, OT Occupational Therapist                     Mobility/ADL's       Row Name 07/07/24 1525          Bed Mobility    Bed Mobility bed mobility (all) activities  -DT     All Activities, [place] (Bed Mobility) independent  -DT       Row Name 07/07/24 1525          Transfers    Transfers sit-stand transfer;stand-sit transfer;bed-chair transfer  -DT       Row Name 07/07/24 1525          Bed-Chair Transfer    Bed-Chair [place] (Transfers) standby assist  -DT       Row Name 07/07/24 1525          Sit-Stand Transfer    Sit-Stand [place] (Transfers) standby assist  -DT       Row Name 07/07/24 1525          Stand-Sit Transfer    Stand-Sit  [place] (Transfers) standby assist  -DT       Row Name 07/07/24 1525          Activities of Daily Living    BADL Assessment/Intervention lower body dressing;feeding;toileting  -DT       Row Name 07/07/24 1525          Lower Body Dressing Assessment/Training    [place] Level (Lower Body Dressing) lower body dressing skills;modified independence  -DT     Position (Lower Body Dressing) edge of bed sitting  -DT       Row Name 07/07/24 1525          Self-Feeding Assessment/Training    [place] Level (Feeding) feeding skills;independent  -DT       Row Name 07/07/24 1525          Toileting Assessment/Training    Comment, (Toileting) Pt states she has been ambulating to BR ind in room  -DT               User Key  (r) = Recorded By, (t) = Taken By, (c) = Cosigned By      Initials Name Provider Type    Renee Moore OT Occupational Therapist                   Obj/Interventions       Row Name 07/07/24 1527          Sensory Assessment (Somatosensory)    Sensory Assessment (Somatosensory) sensation intact  -DT       Row Name 07/07/24 1527          Range of Motion Comprehensive    General Range of Motion bilateral upper extremity ROM WNL  -DT       Row Name 07/07/24 1527          Strength Comprehensive (MMT)    Comment, General Manual Muscle Testing (MMT) Assessment BUE= 4/5  -DT       Row Name 07/07/24 1527          Balance    Balance Assessment sitting static balance;sitting dynamic balance;standing static balance;standing dynamic balance  -DT     Dynamic Sitting Balance independent  -DT     Position, Sitting Balance sitting edge of bed  -DT     Static Standing Balance standby assist  -DT     Dynamic Standing Balance contact guard;other (see comments)  without RW.  Recommended pt use RW at home  -DT               User Key  (r) = Recorded By, (t) = Taken By, (c) = Cosigned By      Initials Name Provider Type    Renee Moore OT Occupational Therapist                   Goals/Plan    No  documentation.                  Clinical Impression       Row Name 07/07/24 1528          Pain Assessment    Pretreatment Pain Rating 0/10 - no pain  -DT     Posttreatment Pain Rating 0/10 - no pain  -DT       Row Name 07/07/24 1528          Plan of Care Review    Plan of Care Reviewed With patient  -DT     Outcome Evaluation Pt is a 68 y.o. F adm to [facility] on 07/[facility]24 with c/o general weakness, fatigue, UTI & a-fib with RVR. Pt has converted back to normal sinus rhythm. At baseline, pt lives alone in apt with 0 PB. She is ind with all ADLs & mobility. She uses a tub chair for safety & has a RW & cane PRN. Her son provides for transportation needs. Upon eval, pt is A&O X4.She reports she primarily feels fatigued. She is ind with bed mobility, ind with dressing & toileting ADLs although she does have dec standing tolerance & generalized dec activity tolerance requiring frequent rest breaks. Recommended pt use RW when returning home for safety due to dec standing tolerance. Instructed pt on UE/LE ex & provided her with min resistance theraband for HEP. Pt returned demo of ex. Pt is near her baseline status & seems to be safe to return home once medically cleared. Pt is not interested in HH or outpt therapy at this time. No further OT tx recommended at this time. OT will sign off.  -DT       Row Name 07/07/24 1528          Therapy Assessment/Plan (OT)    Criteria for Skilled Therapeutic Interventions Met (OT) no;no problems identified which require skilled intervention  -DT     Therapy Frequency (OT) evaluation only  -DT       Row Name 07/07/24 1528          Therapy Plan Review/Discharge Plan (OT)    Anticipated Discharge Disposition (OT) home  -DT       Row Name 07/07/24 1528          Positioning and Restraints    Pre-Treatment Position other (comment)  sitting EOB  -DT     Post Treatment Position bed  -DT     In Bed notified nsg;supine;call light within reach;encouraged to call for assist;side rails up x2  -DT                User Key  (r) = Recorded By, (t) = Taken By, (c) = Cosigned By      Initials Name Provider Type    Renee Moore OT Occupational Therapist                   Outcome Measures       Row Name 07/07/24 0815          How much help from another person do you currently need...    Turning from your back to your side while in flat bed without using bedrails? 4  -SC     Moving from lying on back to sitting on the side of a flat bed without bedrails? 4  -SC     Moving to and from a bed to a chair (including a wheelchair)? 4  -SC     Standing up from a chair using your arms (e.g., wheelchair, bedside chair)? 4  -SC     Climbing 3-5 steps with a railing? 3  -SC     To walk in hospital room? 4  -SC     AM-PAC 6 Clicks Score (PT) 23  -SC     Highest Level of Mobility Goal 7 --> Walk 25 feet or more  -SC               User Key  (r) = Recorded By, (t) = Taken By, (c) = Cosigned By      Initials Name Provider Type    Becca Galo, RN Registered Nurse                    Occupational Therapy Education       Title: PT OT SLP Therapies (Done)       Topic: Occupational Therapy (Done)       Point: ADL training (Done)       Description:   Instruct learner(s) on proper safety adaptation and remediation techniques during self care or transfers.   Instruct in proper use of assistive devices.                  Learning Progress Summary             Patient Acceptance, E,TB, VU by DT at 7/7/2024 1538    Comment: Role of OT, safety prec, HEP with theraband                         Point: Home exercise program (Done)       Description:   Instruct learner(s) on appropriate technique for monitoring, assisting and/or progressing therapeutic exercises/activities.                  Learning Progress Summary             Patient Acceptance, E,TB, VU by DT at 7/7/2024 1538    Comment: Role of OT, safety prec, HEP with theraband                         Point: Precautions (Done)       Description:   Instruct learner(s) on  prescribed precautions during self-care and functional transfers.                  Learning Progress Summary             Patient Acceptance, E,TB, VU by DT at 7/7/2024 1180    Comment: Role of OT, safety prec, HEP with theraband                                         User Key       Initials Effective Dates Name Provider Type Discipline    DT 07/11/23 -  Renee Saha OT Occupational Therapist OT                  OT Recommendation and Plan  Therapy Frequency (OT): evaluation only  Plan of Care Review  Plan of Care Reviewed With: patient  Outcome Evaluation: Pt is a 68 y.o. F adm to [facility] on 07/[facility]/24 with c/o general weakness, fatigue, UTI & a-fib with RVR. Pt has converted back to normal sinus rhythm. At baseline, pt lives alone in apt with 0 PB. She is ind with all ADLs & mobility. She uses a tub chair for safety & has a RW & cane PRN. Her son provides for transportation needs. Upon eval, pt is A&O X4.She reports she primarily feels fatigued. She is ind with bed mobility, ind with dressing & toileting ADLs although she does have dec standing tolerance & generalized dec activity tolerance requiring frequent rest breaks. Recommended pt use RW when returning home for safety due to dec standing tolerance. Instructed pt on UE/LE ex & provided her with min resistance theraband for HEP. Pt returned demo of ex. Pt is near her baseline status & seems to be safe to return home once medically cleared. Pt is not interested in HH or outpt therapy at this time. No further OT tx recommended at this time. OT will sign off.     Time Calculation:         Time Calculation- OT       Row Name 07/07/24 9849             Time Calculation- OT    OT Start Time 1343  -DT      OT Stop Time 1402  -DT      OT Time Calculation (min) 19 min  -DT      OT Received On 07/07/24  -DT                User Key  (r) = Recorded By, (t) = Taken By, (c) = Cosigned By      Initials Name Provider Type    DT Renee Saha OT Occupational  Therapist                           Renee Saha, OT  7/7/2024

## 2024-07-07 NOTE — PLAN OF CARE
Goal Outcome Evaluation:      Pt resting this shift. C/o feeling weak and not having any energy. HR remains NSR. Per Cards, keep another day and monitor. BG better controlled later part of shift. No complaints of pain. VSS.          Problem: Adult Inpatient Plan of Care  Goal: Plan of Care Review  Outcome: Ongoing, Progressing  Goal: Patient-Specific Goal (Individualized)  Outcome: Ongoing, Progressing  Goal: Absence of Hospital-Acquired Illness or Injury  Outcome: Ongoing, Progressing  Intervention: Identify and Manage Fall Risk  Recent Flowsheet Documentation  Taken 7/7/2024 1800 by Becca Haney RN  Safety Promotion/Fall Prevention:   room organization consistent   safety round/check completed   nonskid shoes/slippers when out of bed   fall prevention program maintained   assistive device/personal items within reach   clutter free environment maintained  Taken 7/7/2024 1645 by Becca Haney, RN  Safety Promotion/Fall Prevention:   safety round/check completed   room organization consistent   nonskid shoes/slippers when out of bed   fall prevention program maintained   clutter free environment maintained   assistive device/personal items within reach  Taken 7/7/2024 1445 by Becca Haney RN  Safety Promotion/Fall Prevention:   safety round/check completed   room organization consistent   nonskid shoes/slippers when out of bed   fall prevention program maintained   clutter free environment maintained   assistive device/personal items within reach  Taken 7/7/2024 1230 by Becca Haney, RN  Safety Promotion/Fall Prevention:   safety round/check completed   room organization consistent   nonskid shoes/slippers when out of bed   fall prevention program maintained   clutter free environment maintained   assistive device/personal items within reach  Taken 7/7/2024 1030 by Becca Haney, RN  Safety Promotion/Fall Prevention:   safety round/check completed   room organization  consistent   nonskid shoes/slippers when out of bed   fall prevention program maintained   clutter free environment maintained   assistive device/personal items within reach  Taken 7/7/2024 0815 by Becca Haney RN  Safety Promotion/Fall Prevention:   safety round/check completed   room organization consistent   nonskid shoes/slippers when out of bed   fall prevention program maintained   clutter free environment maintained   assistive device/personal items within reach  Intervention: Prevent Skin Injury  Recent Flowsheet Documentation  Taken 7/7/2024 1800 by Becca Haney RN  Body Position:   sitting up in bed   position changed independently  Taken 7/7/2024 1645 by Becca Haney RN  Body Position:   sitting up in bed   position changed independently  Skin Protection:   adhesive use limited   tubing/devices free from skin contact  Taken 7/7/2024 1445 by Becca Haney RN  Body Position: position changed independently  Taken 7/7/2024 1230 by Becca Haney RN  Body Position:   position changed independently   side-lying   left  Skin Protection: adhesive use limited  Taken 7/7/2024 1030 by Becca Haney RN  Body Position: position changed independently  Taken 7/7/2024 0815 by Becca Haney RN  Body Position: position changed independently  Skin Protection:   adhesive use limited   tubing/devices free from skin contact  Intervention: Prevent and Manage VTE (Venous Thromboembolism) Risk  Recent Flowsheet Documentation  Taken 7/7/2024 1800 by Becca Haney RN  Activity Management: activity encouraged  Taken 7/7/2024 1645 by Becca Haney RN  Activity Management: activity encouraged  Taken 7/7/2024 1445 by Becca Haney RN  Activity Management:   activity encouraged   sitting, edge of bed  Taken 7/7/2024 1230 by Becca Haney RN  Activity Management: activity encouraged  Taken 7/7/2024 1030 by Becca Haney RN  Activity  Management: activity encouraged  Taken 7/7/2024 0815 by Becca Haney RN  Activity Management:   activity encouraged   ambulated in room   ambulated to bathroom   up in chair  VTE Prevention/Management: (eliquis)   bilateral   sequential compression devices off  Range of Motion: active ROM (range of motion) encouraged  Intervention: Prevent Infection  Recent Flowsheet Documentation  Taken 7/7/2024 1800 by Becca Haney RN  Infection Prevention:   single patient room provided   rest/sleep promoted   personal protective equipment utilized   hand hygiene promoted   equipment surfaces disinfected  Taken 7/7/2024 1645 by Becca Haney RN  Infection Prevention:   single patient room provided   rest/sleep promoted   personal protective equipment utilized   hand hygiene promoted   equipment surfaces disinfected  Taken 7/7/2024 1445 by Becca Haney RN  Infection Prevention:   single patient room provided   rest/sleep promoted   personal protective equipment utilized   hand hygiene promoted   equipment surfaces disinfected  Taken 7/7/2024 1230 by Becca Haney RN  Infection Prevention:   single patient room provided   rest/sleep promoted   personal protective equipment utilized   hand hygiene promoted   equipment surfaces disinfected  Taken 7/7/2024 1030 by Becca Haney RN  Infection Prevention:   single patient room provided   rest/sleep promoted   personal protective equipment utilized   hand hygiene promoted  Taken 7/7/2024 0815 by Becca Haney RN  Infection Prevention:   single patient room provided   rest/sleep promoted   personal protective equipment utilized   hand hygiene promoted   equipment surfaces disinfected  Goal: Optimal Comfort and Wellbeing  Outcome: Ongoing, Progressing  Intervention: Monitor Pain and Promote Comfort  Recent Flowsheet Documentation  Taken 7/7/2024 1645 by Becca Haney RN  Pain Management Interventions:   care clustered    pain management plan reviewed with patient/caregiver   pillow support provided   position adjusted   quiet environment facilitated   relaxation techniques promoted  Taken 7/7/2024 1230 by Becca Haney RN  Pain Management Interventions:   care clustered   pain management plan reviewed with patient/caregiver   pillow support provided   position adjusted   quiet environment facilitated   relaxation techniques promoted  Taken 7/7/2024 0815 by Becca Haney RN  Pain Management Interventions:   care clustered   pain management plan reviewed with patient/caregiver   pillow support provided   position adjusted   quiet environment facilitated   relaxation techniques promoted  Intervention: Provide Person-Centered Care  Recent Flowsheet Documentation  Taken 7/7/2024 1645 by Becca Haney, RN  Trust Relationship/Rapport:   care explained   questions answered   questions encouraged  Taken 7/7/2024 1230 by Becca Haney RN  Trust Relationship/Rapport:   care explained   questions answered   questions encouraged  Taken 7/7/2024 0815 by Becca Haney RN  Trust Relationship/Rapport:   care explained   questions answered   questions encouraged  Goal: Readiness for Transition of Care  Outcome: Ongoing, Progressing

## 2024-07-07 NOTE — PLAN OF CARE
Goal Outcome Evaluation:  Plan of Care Reviewed With: patient  Pt resting in bed throughout the shift, no c/o's of pain or discomfort, pt converted back to normal sinus rhythm this shift from Afib, resp remain even and unlabored, plan of care ongoing

## 2024-07-08 ENCOUNTER — READMISSION MANAGEMENT (OUTPATIENT)
Dept: CALL CENTER | Facility: HOSPITAL | Age: 69
End: 2024-07-08
Payer: MEDICAID

## 2024-07-08 ENCOUNTER — PREP FOR SURGERY (OUTPATIENT)
Dept: OTHER | Facility: HOSPITAL | Age: 69
End: 2024-07-08
Payer: MEDICAID

## 2024-07-08 VITALS
OXYGEN SATURATION: 98 % | BODY MASS INDEX: 26.5 KG/M2 | TEMPERATURE: 98.6 F | HEIGHT: 68 IN | HEART RATE: 70 BPM | DIASTOLIC BLOOD PRESSURE: 71 MMHG | WEIGHT: 174.82 LBS | RESPIRATION RATE: 18 BRPM | SYSTOLIC BLOOD PRESSURE: 145 MMHG

## 2024-07-08 DIAGNOSIS — I48.0 PAF (PAROXYSMAL ATRIAL FIBRILLATION): ICD-10-CM

## 2024-07-08 DIAGNOSIS — I10 ESSENTIAL HYPERTENSION: Primary | ICD-10-CM

## 2024-07-08 DIAGNOSIS — I48.92 ATRIAL FLUTTER WITH CONTROLLED RESPONSE: ICD-10-CM

## 2024-07-08 DIAGNOSIS — F41.9 ANXIETY: ICD-10-CM

## 2024-07-08 LAB
GLUCOSE BLDC GLUCOMTR-MCNC: 153 MG/DL (ref 70–105)
GLUCOSE BLDC GLUCOMTR-MCNC: 317 MG/DL (ref 70–105)

## 2024-07-08 PROCEDURE — 99232 SBSQ HOSP IP/OBS MODERATE 35: CPT | Performed by: INTERNAL MEDICINE

## 2024-07-08 PROCEDURE — 63710000001 ONDANSETRON ODT 4 MG TABLET DISPERSIBLE: Performed by: INTERNAL MEDICINE

## 2024-07-08 PROCEDURE — 63710000001 INSULIN LISPRO (HUMAN) PER 5 UNITS: Performed by: INTERNAL MEDICINE

## 2024-07-08 PROCEDURE — 82948 REAGENT STRIP/BLOOD GLUCOSE: CPT | Performed by: INTERNAL MEDICINE

## 2024-07-08 PROCEDURE — 82948 REAGENT STRIP/BLOOD GLUCOSE: CPT

## 2024-07-08 PROCEDURE — 99233 SBSQ HOSP IP/OBS HIGH 50: CPT | Performed by: INTERNAL MEDICINE

## 2024-07-08 RX ORDER — SODIUM CHLORIDE 0.9 % (FLUSH) 0.9 %
3 SYRINGE (ML) INJECTION EVERY 12 HOURS SCHEDULED
OUTPATIENT
Start: 2024-07-08

## 2024-07-08 RX ORDER — SODIUM CHLORIDE 0.9 % (FLUSH) 0.9 %
3-10 SYRINGE (ML) INJECTION AS NEEDED
OUTPATIENT
Start: 2024-07-08

## 2024-07-08 RX ORDER — SODIUM CHLORIDE 9 MG/ML
80 INJECTION, SOLUTION INTRAVENOUS CONTINUOUS
OUTPATIENT
Start: 2024-07-08

## 2024-07-08 RX ORDER — LISINOPRIL 10 MG/1
10 TABLET ORAL DAILY
Qty: 90 TABLET | Refills: 1 | Status: SHIPPED | OUTPATIENT
Start: 2024-07-08

## 2024-07-08 RX ORDER — SODIUM CHLORIDE 9 MG/ML
40 INJECTION, SOLUTION INTRAVENOUS AS NEEDED
OUTPATIENT
Start: 2024-07-08

## 2024-07-08 RX ORDER — BREXPIPRAZOLE 3 MG/1
1 TABLET ORAL DAILY
Qty: 90 TABLET | Refills: 1 | OUTPATIENT
Start: 2024-07-08

## 2024-07-08 RX ORDER — BUSPIRONE HYDROCHLORIDE 7.5 MG/1
7.5 TABLET ORAL NIGHTLY PRN
Qty: 90 TABLET | Refills: 0 | OUTPATIENT
Start: 2024-07-08

## 2024-07-08 RX ORDER — ZOLPIDEM TARTRATE 10 MG/1
10 TABLET ORAL NIGHTLY PRN
Qty: 28 TABLET | Refills: 0 | OUTPATIENT
Start: 2024-07-08

## 2024-07-08 RX ORDER — METOPROLOL SUCCINATE 50 MG/1
50 TABLET, EXTENDED RELEASE ORAL DAILY
Qty: 90 TABLET | Refills: 1 | Status: SHIPPED | OUTPATIENT
Start: 2024-07-09

## 2024-07-08 RX ORDER — BUSPIRONE HYDROCHLORIDE 7.5 MG/1
7.5 TABLET ORAL NIGHTLY PRN
Qty: 90 TABLET | Refills: 0 | Status: SHIPPED | OUTPATIENT
Start: 2024-07-08 | End: 2024-07-11

## 2024-07-08 RX ADMIN — METOPROLOL SUCCINATE 50 MG: 50 TABLET, EXTENDED RELEASE ORAL at 09:24

## 2024-07-08 RX ADMIN — INSULIN LISPRO 5 UNITS: 100 INJECTION, SOLUTION INTRAVENOUS; SUBCUTANEOUS at 13:25

## 2024-07-08 RX ADMIN — Medication 10 ML: at 09:29

## 2024-07-08 RX ADMIN — ONDANSETRON 4 MG: 4 TABLET, ORALLY DISINTEGRATING ORAL at 09:24

## 2024-07-08 RX ADMIN — PREGABALIN 75 MG: 75 CAPSULE ORAL at 09:25

## 2024-07-08 RX ADMIN — INSULIN LISPRO 5 UNITS: 100 INJECTION, SOLUTION INTRAVENOUS; SUBCUTANEOUS at 09:28

## 2024-07-08 RX ADMIN — ACETAMINOPHEN 650 MG: 325 TABLET, FILM COATED ORAL at 09:24

## 2024-07-08 RX ADMIN — Medication 1 PATCH: at 09:28

## 2024-07-08 RX ADMIN — INSULIN LISPRO 2 UNITS: 100 INJECTION, SOLUTION INTRAVENOUS; SUBCUTANEOUS at 09:23

## 2024-07-08 RX ADMIN — BACLOFEN 20 MG: 10 TABLET ORAL at 02:57

## 2024-07-08 RX ADMIN — DRONEDARONE 400 MG: 400 TABLET, FILM COATED ORAL at 09:24

## 2024-07-08 RX ADMIN — APIXABAN 5 MG: 5 TABLET, FILM COATED ORAL at 09:24

## 2024-07-08 RX ADMIN — INSULIN LISPRO 7 UNITS: 100 INJECTION, SOLUTION INTRAVENOUS; SUBCUTANEOUS at 13:24

## 2024-07-08 RX ADMIN — SERTRALINE 100 MG: 100 TABLET, FILM COATED ORAL at 09:24

## 2024-07-08 NOTE — PLAN OF CARE
Goal Outcome Evaluation:         Pt kept to observe on new medication.  Pt has no complaints. NAD. MAURICIO.    MD to discharge home.  Pt eager.

## 2024-07-08 NOTE — DISCHARGE SUMMARY
[facility] Medicine Services  Discharge Summary    Date of Service: 2024  Patient Name: Arianna Blanco  : 1955  MRN: [id number]    Date of Admission: 2024  Discharge Diagnosis:   A-fib with RVR  DM type II, controlled  Hypertension  Anxiety with depression  Anemia of chronic disease  Thrombocytopenia, chronic    Date of Discharge: 2024  Primary Care Physician: Maddie Cornejo APRN      Presenting Problem:   Atrial fibrillation with RVR [I48.91]    Active and Resolved Hospital Problems:  Active Hospital Problems    Diagnosis POA    **Atrial fibrillation with RVR [I48.91] Yes      Resolved Hospital Problems   No resolved problems to display.         Hospital Course     HPI:  Patient is a 68-year-old female who presented to the hospital complaints of palpitations.  Please see H&P for details.    Hospital Course:  The patient was admitted to the hospital for A-fib with RVR.  She was initiated on continuous diltiazem infusion.  She was seen by cardiology and had also received adenosine and digoxin at the outside ED although she remained tachycardic.  Cardiology initiated the patient on Toprol-XL at increased dose as well as Multaq.  She was weaned off of Cardizem drip.  She remains in normal sinus rhythm currently.  She will continue on Toprol-XL and Multaq on discharge.  I will decrease her home lisinopril dose to allow for up titration of beta-blocker therapy.  She will need to follow-up with cardiology in 2 to 4 weeks.  She will continue her anticoagulation with Eliquis.  She is stable for discharge.        DISCHARGE Follow Up Recommendations for labs and diagnostics: Follow-up with cardiology in 2 to 4 weeks.      Reasons For Change In Medications and Indications for New Medications:      Day of Discharge     Vital Signs:  Temp:  [96.6 °F (35.9 °C)-98.9 °F (37.2 °C)] 98.6 °F (37 °C)  Heart Rate:  [68-82] 70  Resp:  [14-20] 18  BP: (127-154)/(68-81) 145/71    Physical  Exam:  Physical Exam   General Appearance:  Alert, cooperative, no distress, appears stated age  Head:  Normocephalic, without obvious abnormality, atraumatic  Eyes:  PERRL, conjunctiva/corneas clear, EOM's intact, fundi benign, both eyes  Ears:  Normal TM's and external ear canals, both ears  Nose: Nares normal, septum midline, mucosa normal, no drainage or sinus tenderness  Throat: Lips, mucosa, and tongue normal; teeth and gums normal  Neck: Supple, symmetrical, trachea midline, no adenopathy, thyroid: not enlarged, symmetric, no tenderness/mass/nodules, no carotid bruit or JVD  Lungs:   Clear to auscultation bilaterally, respirations unlabored  Heart:  Regular rate and rhythm, S1, S2 normal, no murmur, rub or gallop  Abdomen:  Soft, non-tender, bowel sounds active all four quadrants,  no masses, no organomegaly  Extremities: Extremities normal, atraumatic, no cyanosis or edema  Pulses: 2+ and symmetric  Skin: Skin color, texture, turgor normal, no rashes or lesions  Neurologic: Normal        Pertinent  and/or Most Recent Results     LAB RESULTS:      Lab 07/06/24  0249   WBC 6.74   HEMOGLOBIN 11.6*   HEMATOCRIT 35.9   PLATELETS 124*   NEUTROS ABS 3.60   IMMATURE GRANS (ABS) 0.02   LYMPHS ABS 2.31   MONOS ABS 0.60   EOS ABS 0.17   MCV 88.9         Lab 07/06/24  0249   SODIUM 138   POTASSIUM 4.3   CHLORIDE 107   CO2 22.9   ANION GAP 8.1   BUN 11   CREATININE 0.60   EGFR 98.5   GLUCOSE 231*   CALCIUM 8.8   MAGNESIUM 1.8   PHOSPHORUS 2.4*   TSH 2.530                     Lab 07/06/24  0249   IRON 99   IRON SATURATION (TSAT) 23   TIBC 422   TRANSFERRIN 283         Brief Urine Lab Results  (Last result in the past 365 days)        Color   Clarity   Blood   Leuk Est   Nitrite   Protein   CREAT   Urine HCG        08/14/23 1559             64.3               Microbiology Results (last 10 days)       ** No results found for the last 240 hours. **                         Results for orders placed during the hospital  encounter of 07/05/24    Adult Transthoracic Echo Complete W/ Cont if Necessary Per Protocol    Interpretation Summary    Left ventricular systolic function is normal. Estimated left ventricular EF = 60%    Left ventricular wall thickness is consistent with mild concentric hypertrophy.    Left ventricular diastolic function is consistent with (grade I) impaired relaxation.    The left atrial cavity is mild to moderately dilated.    There is mild calcification of the aortic valve.    Transthoracic echocardiography reveals LVEF of 60% with mild LVH.  Mild to moderate left atrial enlargement .  No effusion      Electronically signed by Shay Santos MD, 07/07/24, 5:17 PM EDT.      Labs Pending at Discharge:      Procedures Performed           Consults:   Consults       Date and Time Order Name Status Description    7/5/2024  8:54 PM Inpatient Cardiology Consult                Discharge Details        Discharge Medications        New Medications        Instructions Start Date   dronedarone 400 MG tablet  Commonly known as: MULTAQ   400 mg, Oral, 2 Times Daily With Meals             Changes to Medications        Instructions Start Date   Apidra 100 UNIT/ML injection  Generic drug: insulin glulisine  What changed: how much to take   5 Units, Subcutaneous, 3 Times Daily Before Meals      lisinopril 10 MG tablet  Commonly known as: PRINIVIL,ZESTRIL  What changed:   medication strength  how much to take   10 mg, Oral, Daily      metoprolol succinate XL 50 MG 24 hr tablet  Commonly known as: TOPROL-XL  What changed:   medication strength  how much to take   50 mg, Oral, Daily   Start Date: July 9, 2024            Continue These Medications        Instructions Start Date   Accu-Chek Guide test strip  Generic drug: glucose blood   1 each, Other, 4 Times Daily, Use to test blood sugar 4 times daily      albuterol sulfate  (90 Base) MCG/ACT inhaler  Commonly known as: PROVENTIL HFA;VENTOLIN HFA;PROAIR HFA   INHALE 2  PUFFS BY MOUTH EVERY 4 HOURS AS NEEDED FOR WHEEZING OR SHORTNESS OF AIR      baclofen 20 MG tablet  Commonly known as: LIORESAL   20 mg, Oral, 3 Times Daily      Briefs Overnight Large misc   1 each, Does not apply, Nightly PRN      busPIRone 7.5 MG tablet  Commonly known as: BUSPAR   7.5 mg, Oral, Nightly PRN      butalbital-acetaminophen  MG tablet tablet   1 tablet, Oral, Every 6 Hours PRN      Eliquis 5 MG tablet tablet  Generic drug: apixaban   5 mg, Oral, Every 12 Hours Scheduled      FreeStyle Willie 2 [place] device   1 each, Does not apply, Daily, To be used daily to monitor continuous blood sugar reading for type 2 diabetic patient on insulin regimen.      FreeStyle Willie 2 Sensor misc   1 each, Does not apply, Every 14 Days, Dispense 2 sensor pack. To be used daily with freestyle willie device to monitor continuous blood sugar reading for type 2 diabetic patient on insulin regimen.      Insulin Glargine 100 UNIT/ML injection pen  Commonly known as: LANTUS SOLOSTAR   60 Units, Subcutaneous, Nightly      metFORMIN 1000 MG tablet  Commonly known as: GLUCOPHAGE   1,000 mg, Oral, 2 Times Daily With Meals      Ozempic (1 MG/DOSE) 4 MG/3ML solution pen-injector  Generic drug: Semaglutide (1 MG/DOSE)   1 mg, Subcutaneous, Every 7 Days      pregabalin 75 MG capsule  Commonly known as: LYRICA   75 mg, Oral, 2 Times Daily      Rexulti 3 MG tablet  Generic drug: Brexpiprazole   3 mg, Oral, Daily      sertraline 100 MG tablet  Commonly known as: ZOLOFT   100 mg, Oral, Daily      zolpidem 10 MG tablet  Commonly known as: AMBIEN   10 mg, Oral, Nightly PRN             Stop These Medications      dilTIAZem  MG 24 hr capsule  Commonly known as: CARDIZEM CD              Allergies   Allergen Reactions    Codeine GI Intolerance         Discharge Disposition:     Home or Self Care    Diet:  Hospital:  Diet Order   Procedures    Diet: Cardiac, Diabetic; Healthy Heart (2-3 Na+); Consistent Carbohydrate; Fluid  Consistency: Thin (IDDSI 0)         Discharge Activity:         CODE STATUS:  Code Status and Medical Interventions:   Ordered at: 07/05/24 2054     Code Status (Patient has no pulse and is not breathing):    CPR (Attempt to Resuscitate)     Medical Interventions (Patient has pulse or is breathing):    Full Support         Future Appointments   Date Time Provider Department Center   7/8/2024  3:15 PM Maddie Cornejo APRN [facility]   7/22/2024  8:15 AM Maddie Cornejo APRN [facility]   7/26/2024  3:30 PM Jamari Lane MD [facility]       Additional Instructions for the Follow-ups that You Need to Schedule       Discharge Follow-up with Specified Provider: Follow-up with cardiology in 2 to 4 weeks.; 2 Weeks   As directed      To: Follow-up with cardiology in 2 to 4 weeks.   Follow Up: 2 Weeks                Time spent on Discharge including face to face service:  >30 minutes    Signature: Electronically signed by Bossman Evans MD, 07/08/24, 13:04 EDT.  [facility] Hospitalist Team

## 2024-07-08 NOTE — PAYOR COMM NOTE
"Clinical for case# [id number]    Inpatient order 7/5/24  ER admit - MD notes attached.     ==============  AUTHORIZATION PENDING:   PLEASE FAX OR CALL DETERMINATION TO CONTACT BELOW:       THANK YOU,    KORI Rodriguez, RN  Utilization Review  [facility]  Phone: [phone]  Fax: [phone]      NPI: [id number]  Tax ID: [id number]        Arianna Blanco (68 y.o. Female)       Date of Birth   1955    Social Security Number       Address   [address] [place] [address] [address] [facility] [place] [address]    Home Phone   [phone]    MRN   [id number]       Latter day   M[employer]on    Marital Status                               Admission Date   7/5/24    Admission Type   Urgent    Admitting Provider   Kath Forte MD    Attending Provider   Bossman Evans MD    Department, Room/Bed   [facility] PROGRESS CARE, 2116/1       Discharge Date       Discharge Disposition   Home or Self Care    Discharge Destination                                 Attending Provider: Bossman Evans MD    Allergies: Codeine    Isolation: None   Infection: None   Code Status: CPR    Ht: 172.7 cm (68\")   Wt: 79.3 kg (174 lb 13.2 oz)    Admission Cmt: None   Principal Problem: Atrial fibrillation with RVR [I48.91]                   Active Insurance as of 7/5/2024       Primary Coverage       Payor Plan Insurance Group Employer/Plan Group    ANTHEM MEDICAID ANTHEM MEDICAID [id number]       Payor Plan Address Payor Plan Phone Number Payor Plan Fax Number Effective Dates    [address] [address] [phone]  7/1/2024 - None Entered    [facility] [address]         Subscriber Name Subscriber Birth Date Member ID       ARIANNA BLANCO 1955 [id number]                     Emergency Contacts        (Rel.) Home Phone Work Phone Mobile Phone    JENNIFER MARIEE (Son) -- -- [phone]                 History & Physical        Cee Doran DO at 07/05/24 2101              Hospitalist " Service  History and Physical      Patient Name: Arianna Blanco  : 1955  MRN: [id number]  Primary Care Physician:  Maddie Cornejo APRN  Date of admission: 2024      Subjective      Chief Complaint: Palpitations    History of Present Illness: Arianna Blanco is a 68 y.o. female with past medical history of A-fib, hypertension, hyperlipidemia, DM2, COPD who presented to [facility] on 2024 as a transfer from [facility] with complaints of feeling generalized weak, tired, short of breath for about 1 week.  Denies any recent travel or sick contacts.  She has not been able to ambulate much due to the weakness.  She was seen by her PCP and was noted to have UTI and was prescribed cefdinir without much improvement.  Patient denies any urinary symptoms currently and was not having any urinary symptoms at the time of diagnosis either.  She was noted to be in A-fib RVR, received adenosine and digoxin and was transferred here for further management.  Patient has known history of A-fib and is on Eliquis for anticoagulation at home.    Labs from the outside hospital essentially stable except for mild thrombocytopenia.  D-dimer and troponin negative.  Chest x-ray and CT head negative for acute findings.      ROS: Pertinent positives as noted in HPI/subjective.  All other systems were reviewed and are negative.      Personal History     Past Medical History:   Diagnosis Date    COPD (chronic obstructive pulmonary disease)     Depression     Diabetes mellitus     Hyperlipidemia     Hypertension        Past Surgical History:   Procedure Laterality Date    CARDIAC CATHETERIZATION Left 10/30/2020    Procedure: Left Heart Cath with Coronary Angiography;  Surgeon: Donaldo Archer MD;  Location: [facility] INVASIVE LOCATION;  Service: Cardiovascular;  Laterality: Left;    CARPAL TUNNEL RELEASE Bilateral 2017    CHOLECYSTECTOMY      HYSTERECTOMY         Family History: family history  includes Diabetes in her mother; Heart disease in her mother; Hypertension in her mother; Stroke in her mother. Otherwise pertinent FHx was reviewed and not pertinent to current issue.    Social History:  reports that she has been smoking cigarettes. She started smoking about 41 years ago. She has a 41.5 pack-year smoking history. She has been exposed to tobacco smoke. She has never used smokeless tobacco. She reports that she does not drink alcohol.    Home Medications:  Prior to Admission Medications       Prescriptions Last Dose Informant Patient Reported? Taking?    Accu-Chek Guide test strip   No No    1 each by Other route 4 (Four) Times a Day. Use to test blood sugar 4 times daily    albuterol sulfate  (90 Base) MCG/ACT inhaler   No No    INHALE 2 PUFFS BY MOUTH EVERY 4 HOURS AS NEEDED FOR WHEEZING OR SHORTNESS OF AIR    baclofen (LIORESAL) 20 MG tablet   No No    Take 1 tablet by mouth 3 (Three) Times a Day.    busPIRone (BUSPAR) 7.5 MG tablet   No No    TAKE 1 TABLET BY MOUTH AT NIGHT AS NEEDED (ANXIETY).    butalbital-acetaminophen  MG tablet tablet   No No    Take 1 tablet by mouth Every 6 (Six) Hours As Needed (headache).    Continuous Blood Gluc  (FreeStyle Willie 2 Chromo) device   No No    Use 1 each Daily. To be used daily to monitor continuous blood sugar reading for type 2 diabetic patient on insulin regimen.    Continuous Blood Gluc Sensor (FreeStyle Willie 2 Sensor) misc   No No    Use 1 each Every 14 (Fourteen) Days. Dispense 2 sensor pack. To be used daily with freestyle willie device to monitor continuous blood sugar reading for type 2 diabetic patient on insulin regimen.    dilTIAZem CD (CARDIZEM CD) 240 MG 24 hr capsule   No No    Take 1 capsule by mouth Daily.    Eliquis 5 MG tablet tablet   No No    Take 1 tablet by mouth Every 12 (Twelve) Hours.    Incontinence Supply Disposable (Briefs Overnight Large) misc   No No    Use 1 each At Night As Needed (incontinence).     Insulin Glargine (LANTUS SOLOSTAR) 100 UNIT/ML injection pen   No No    Inject 60 Units under the skin into the appropriate area as directed Every Night.    insulin glulisine (Apidra) 100 UNIT/ML injection   No No    Inject 5 Units under the skin into the appropriate area as directed 3 (Three) Times a Day Before Meals.    Patient taking differently:  Inject 10 Units under the skin into the appropriate area as directed 3 (Three) Times a Day Before Meals.    lisinopril (PRINIVIL,ZESTRIL) 20 MG tablet   No No    Take 1 tablet by mouth Daily.    metFORMIN (GLUCOPHAGE) 1000 MG tablet   No No    Take 1 tablet by mouth 2 (Two) Times a Day With Meals.    metoprolol succinate XL (TOPROL-XL) 25 MG 24 hr tablet   No No    Take 1 tablet by mouth Daily.    pregabalin (LYRICA) 75 MG capsule   No No    TAKE 1 CAPSULE BY MOUTH TWICE A DAY    Rexulti 3 MG tablet   No No    TAKE 1 TABLET BY MOUTH EVERY DAY    Semaglutide, 1 MG/DOSE, (Ozempic, 1 MG/DOSE,) 4 MG/3ML solution pen-injector   No No    Inject 1 mg under the skin into the appropriate area as directed Every 7 (Seven) Days.    sertraline (ZOLOFT) 100 MG tablet   No No    Take 1 tablet by mouth Daily.    zolpidem (AMBIEN) 10 MG tablet   No No    TAKE 1 TABLET BY MOUTH AT NIGHT AS NEEDED FOR SLEEP.              I have utilized all available, immediate resources to obtain, update, or review the patient's current medications including all prescriptions, over-the-counter products, herbals, cannabis/cannabidiol products, and vitamin.mineral/dietary (nutritional) supplements.    Allergies:  Allergies   Allergen Reactions    Codeine GI Intolerance       Objective      Vitals:   Temp:  [97.5 °F (36.4 °C)] 97.5 °F (36.4 °C)  Heart Rate:  [119] 119  BP: (148)/(81) 148/81    Physical Exam:    General: Awake, alert, NAD  HEENT: NC/AT, PERRL, EOMI, conjunctivae are clear, mucous membrane moist, oropharynx clear, Trachea midline   Cardiovascular: Regular rate and rhythm, no  murmurs  Respiratory: Clear to auscultation bilaterally, no wheezing or rales, unlabored breathing  Abdomen: Soft, nontender, positive bowel sounds, no guarding  Neurologic: A&O, CN grossly intact, moves all extremities spontaneously  Musculoskeletal: Normal range of motion, no other gross deformities  Skin: Warm, dry      Result Review   Result Review:  I have personally reviewed the results from the time of this admission to 7/5/2024 21:01 EDT and agree with these findings:  [x]  Laboratory  [x]  Microbiology  [x]  Radiology  [x]  EKG/Telemetry   [x]  Cardiology/Vascular   []  Pathology  [x]  Old records  []  Other:      Assessment & Plan        Active Hospital Problems:  Active Hospital Problems    Diagnosis     **Atrial fibrillation with RVR        Assessment/Plan:     A-fib RVR  -Known history of A-fib, received adenosine and digoxin at the outside ED  -Heart rate remains in 120s currently, start Cardizem drip  -Resume home p.o. Cardizem and metoprolol from tomorrow, continue Eliquis for anticoagulation  -TSH and electrolytes stable on labs done today  -Cardiology consult    DM2  -Recent A1c of 7.2  -Patient says she has been not eating much over the past few days, did not take her insulin last night either  -Start basal and mealtime insulin at half dose for now, continue SSI  -Monitor blood glucose, adjust as needed    Hypertension  Hyperlipidemia  -Continue home meds as tolerated, monitor    Generalized anxiety/depression  -Stable, continue home meds and monitor    Generalized weakness/deconditioning  -PT/OT  -Supportive care    VTE Prophylaxis:  Mechanical VTE prophylaxis orders are present.        CODE STATUS:    Code Status (Patient has no pulse and is not breathing): CPR (Attempt to Resuscitate)  Medical Interventions (Patient has pulse or is breathing): Full Support      Admission Status:  I believe this patient meets inpt status.    Signature:   Electronically signed by Cee Doran DO, 07/05/24, 9:01  PM EDT.    Part of this note may be an electronic transcription/translation of spoken language to printed text using the Dragon Dictation System.      Electronically signed by Cee Doran DO at 07/05/24 2209       Emergency Department Notes    No notes of this type exist for this encounter.       Vital Signs (last day)       Date/Time Temp Temp src Pulse Resp BP Patient Position SpO2    07/08/24 1248 98.6 (37) Oral 70 18 145/71 Sitting 98    07/08/24 0859 98.9 (37.2) Oral 71 14 143/70 Lying 96    07/08/24 0514 97.3 (36.3) Oral 69 16 139/71 Lying 99    07/08/24 0116 97.3 (36.3) Oral 73 14 154/81 Lying 98    07/07/24 2136 97.5 (36.4) Oral 82 20 136/68 Lying 94    07/07/24 1745 -- -- 76 -- -- -- --    07/07/24 1648 98.1 (36.7) Oral 69 15 127/68 -- 100    07/07/24 1307 96.6 (35.9) Axillary 68 15 132/71 -- 96    07/07/24 0846 97.7 (36.5) Oral 84 24 139/69 Lying 95    07/07/24 0618 97.9 (36.6) Oral 78 16 141/78 Lying 96    07/07/24 0128 97.5 (36.4) Oral 81 15 120/78 Sitting 93          Oxygen Therapy (last 2 days)       Date/Time SpO2 Device (Oxygen Therapy) Flow (L/min) Oxygen Concentration (%) ETCO2 (mmHg)    07/08/24 1248 98 -- -- -- --    07/08/24 0859 96 room air -- -- --    07/08/24 0800 -- room air -- -- --    07/08/24 0514 99 room air -- -- --    07/08/24 0400 -- room air -- -- --    07/08/24 0116 98 room air -- -- --    07/08/24 0000 -- room air -- -- --    07/07/24 2136 94 room air -- -- --    07/07/24 2000 -- room air -- -- --    07/07/24 1648 100 room air -- -- --    07/07/24 1645 -- room air -- -- --    07/07/24 1307 96 -- -- -- --    07/07/24 1230 -- room air -- -- --    07/07/24 0846 95 room air -- -- --    07/07/24 0815 -- room air -- -- --    07/07/24 0618 96 room air -- -- --    07/07/24 0400 -- room air -- -- --    07/07/24 0128 93 room air -- -- --    07/07/24 0000 -- room air -- -- --    07/06/24 2026 90 room air -- -- --    07/06/24 2000 -- room air -- -- --    07/06/24 1717 100 room air -- -- --     07/06/24 1621 98 room air -- -- --    07/06/24 1600 -- room air -- -- --    07/06/24 1330 95 -- -- -- --    07/06/24 1230 99 -- -- -- --    07/06/24 1200 98 room air -- -- --    07/06/24 1137 98 room air -- -- --    07/06/24 1032 94 -- -- -- --    07/06/24 0953 95 -- -- -- --    07/06/24 0755 97 -- -- -- --    07/06/24 0751 -- room air -- -- --    07/06/24 0600 92 -- -- -- --    07/06/24 0530 92 -- -- -- --    07/06/24 0405 92 -- -- -- --    07/06/24 0400 93 -- -- -- --    07/06/24 0300 97 room air -- -- --    07/06/24 0230 98 -- -- -- --    07/06/24 0200 92 -- -- -- --    07/06/24 0130 96 -- -- -- --    07/06/24 0100 96 -- -- -- --    07/06/24 0030 93 -- -- -- --    07/06/24 0000 98 -- -- -- --          Facility-Administered Medications as of 7/8/2024   Medication Dose Route Frequency Provider Last Rate Last Admin    acetaminophen (TYLENOL) tablet 650 mg  650 mg Oral Q4H PRN Cee Doran DO   650 mg at 07/08/24 0924    Or    acetaminophen (TYLENOL) 160 MG/5ML oral solution 650 mg  650 mg Oral Q4H PRN Cee Doran DO        Or    acetaminophen (TYLENOL) suppository 650 mg  650 mg Rectal Q4H PRN Cee Doran DO        apixaban (ELIQUIS) tablet 5 mg  5 mg Oral Q12H Cee Doran DO   5 mg at 07/08/24 0924    baclofen (LIORESAL) tablet 20 mg  20 mg Oral TID PRN Brammell, Timothy Duane, MD   20 mg at 07/08/24 0257    sennosides-docusate (PERICOLACE) 8.6-50 MG per tablet 2 tablet  2 tablet Oral BID PRN Cee Doran DO   2 tablet at 07/07/24 2046    And    polyethylene glycol (MIRALAX) packet 17 g  17 g Oral Daily PRN Cee Doran DO        And    bisacodyl (DULCOLAX) EC tablet 5 mg  5 mg Oral Daily PRN Cee Doran DO        And    bisacodyl (DULCOLAX) suppository 10 mg  10 mg Rectal Daily PRN Cee Doran DO        busPIRone (BUSPAR) tablet 7.5 mg  7.5 mg Oral Nightly PRN Cee Doran DO        Calcium Replacement - Follow Nurse / BPA Driven Protocol   Does not apply PRN Cee Doran DO         dextrose (D50W) (25 g/50 mL) IV injection 25 g  25 g Intravenous Q15 Min PRN Cee Doran DO        dextrose (GLUTOSE) oral gel 15 g  15 g Oral Q15 Min PRN Cee Doran DO        dronedarone (MULTAQ) tablet 400 mg  400 mg Oral BID With Meals Shay Santos MD   400 mg at 07/08/24 0924    glucagon (GLUCAGEN) injection 1 mg  1 mg Intramuscular Q15 Min PRN Cee Doran DO        insulin glargine (LANTUS, SEMGLEE) injection 40 Units  40 Units Subcutaneous Nightly Cee Doran DO   40 Units at 07/07/24 2039    insulin lispro (HUMALOG/ADMELOG) injection 2-9 Units  2-9 Units Subcutaneous 4x Daily AC & at Bedtime Cee Doran DO   7 Units at 07/08/24 1324    insulin lispro (HUMALOG/ADMELOG) injection 5 Units  5 Units Subcutaneous TID With Meals Cee Doran DO   5 Units at 07/08/24 1325    [Held by provider] lisinopril (PRINIVIL,ZESTRIL) tablet 20 mg  20 mg Oral Daily Cee Doran DO   20 mg at 07/06/24 0811    Magnesium Standard Dose Replacement - Follow Nurse / BPA Driven Protocol   Does not apply PRN Cee Doran DO        metoprolol succinate XL (TOPROL-XL) 24 hr tablet 50 mg  50 mg Oral Daily Brammell, Timothy Duane, MD   50 mg at 07/08/24 0924    nicotine (NICODERM CQ) 21 MG/24HR patch 1 patch  1 patch Transdermal Q24H Cee Doran DO   1 patch at 07/08/24 0928    nitroglycerin (NITROSTAT) SL tablet 0.4 mg  0.4 mg Sublingual Q5 Min PRN Cee Doran DO        ondansetron ODT (ZOFRAN-ODT) disintegrating tablet 4 mg  4 mg Oral Q6H PRN Cee Doran DO   4 mg at 07/08/24 0924    Or    ondansetron (ZOFRAN) injection 4 mg  4 mg Intravenous Q6H PRN Cee Doran DO   4 mg at 07/06/24 1828    Phosphorus Replacement - Follow Nurse / BPA Driven Protocol   Does not apply PRN Cee Doran DO        Potassium Replacement - Follow Nurse / BPA Driven Protocol   Does not apply PRN Cee Doran DO        pregabalin (LYRICA) capsule 75 mg  75 mg Oral BID Cee Doran DO   75 mg at  07/08/24 0925    sertraline (ZOLOFT) tablet 100 mg  100 mg Oral Daily Cee Doran, DO   100 mg at 07/08/24 0924    sodium chloride 0.9 % flush 10 mL  10 mL Intravenous Q12H Geovanna Doranram, DO   10 mL at 07/08/24 0929    sodium chloride 0.9 % flush 10 mL  10 mL Intravenous PRN Geovanna Doranram, DO        sodium chloride 0.9 % infusion 40 mL  40 mL Intravenous PRN Cee Doran, DO        [COMPLETED] Sulfur Hexafluoride Microsph (LUMASON) 60.7-25 MG IV reconstituted suspension reconstituted suspension 2 mL  2 mL Intravenous Once in imaging Kassidy Cardona MD   2 mL at 07/07/24 0800    zolpidem (AMBIEN) tablet 5 mg  5 mg Oral Nightly PRN Espinoza, Geovannaram, DO   5 mg at 07/07/24 2040    zolpidem (AMBIEN) tablet 5 mg  5 mg Oral Nightly PRN Geovanna Doranram, DO         Lab Results (last 48 hours)       Procedure Component Value Units Date/Time    POC Glucose 4x Daily Before Meals & at Bedtime [[id number]]  (Abnormal) Collected: 07/08/24 1248    Specimen: Blood Updated: 07/08/24 1250     Glucose 317 mg/dL      Comment: Serial Number: [id number]:  [id number]       POC Glucose Once [[id number]]  (Abnormal) Collected: 07/08/24 0658    Specimen: Blood Updated: 07/08/24 0700     Glucose 153 mg/dL      Comment: Serial Number: [id number]:  [id number]       POC Glucose Once [[id number]]  (Abnormal) Collected: 07/07/24 2000    Specimen: Blood Updated: 07/07/24 2002     Glucose 187 mg/dL      Comment: Serial Number: [id number]:  [id number]       POC Glucose Once [[id number]]  (Abnormal) Collected: 07/07/24 1544    Specimen: Blood Updated: 07/07/24 1546     Glucose 153 mg/dL      Comment: Serial Number: [id number]:  [id number]       POC Glucose 4x Daily Before Meals & at Bedtime [[id number]]  (Abnormal) Collected: 07/07/24 1108    Specimen: Blood Updated: 07/07/24 1109     Glucose 325 mg/dL      Comment: Serial Number: [id number]:  [id number]       POC Glucose 4x Daily Before Meals & at Bedtime  [[id number]]  (Abnormal) Collected: 07/07/24 0709    Specimen: Blood Updated: 07/07/24 0710     Glucose 136 mg/dL      Comment: Serial Number: [id number]:  [id number]       POC Glucose Once [[id number]]  (Abnormal) Collected: 07/06/24 2023    Specimen: Blood Updated: 07/06/24 2025     Glucose 255 mg/dL      Comment: Serial Number: [id number]:  [id number]       POC Glucose Once [[id number]]  (Abnormal) Collected: 07/06/24 1618    Specimen: Blood Updated: 07/06/24 1619     Glucose 299 mg/dL      Comment: Serial Number: [id number]:  [id number]             ECG/EMG Results (all)       Procedure Component Value Units Date/Time    Telemetry Scan [[id number]] Resulted: 07/05/24     Updated: 07/05/24 2325    Telemetry Scan [[id number]] Resulted: 07/05/24     Updated: 07/06/24 1141    Telemetry Scan [[id number]] Resulted: 07/05/24     Updated: 07/06/24 1641    ECG 12 Lead QT Measurement [[id number]] Collected: 07/07/24 0607     Updated: 07/07/24 1029     QT Interval 392 ms      QTC Interval 438 ms     Narrative:      HEART RATE=75  bpm  RR Zgvelvaa=180  ms  PA Pjfzcmwn=036  ms  P Horizontal Axis=24  deg  P Front Axis=83  deg  QRSD Jvfpgwdz=328  ms  QT Gumereds=358  ms  TZfV=945  ms  QRS Axis=103  deg  T Wave Axis=-49  deg  - ABNORMAL ECG -  Sinus rhythm  Right bundle branch block  Nonspecific  T abnormalities, lateral leads  When compared with ECG of 18-Oct-2022 05:38:47,  Significant repolarization change  Electronically Signed By: Shay Santos (ANTHONY) 2024-07-07 10:29:37  Date and Time of Study:2024-07-07 06:07:52    Adult Transthoracic Echo Complete W/ Cont if Necessary Per Protocol [[id number]] Resulted: 07/07/24 1714     Updated: 07/07/24 1717     LVOT area 2.8 cm2      LVOT diam 1.90 cm      MV E max verna 76.8 cm/sec      MV A max verna 55.1 cm/sec      MV dec time 0.22 sec      MV E/A 1.39     LA ESV Index (BP) 18.3 ml/m2      Med Peak E' Verna 7.8 cm/sec      Lat Peak E' Verna 8.5 cm/sec      Avg E/e' ratio 9.42      SV(LVOT) 46.2 ml      TAPSE (>1.6) 2.06 cm      RV S' 10.4 cm/sec      LA dimension (2D)  3.9 cm      LV V1 max 77.4 cm/sec      LV V1 max PG 2.40 mmHg      LV V1 mean PG 1.00 mmHg      LV V1 VTI 16.3 cm      Ao pk verna 187.0 cm/sec      Ao max PG 14.0 mmHg      Ao mean PG 8.0 mmHg      Ao V2 VTI 35.7 cm      DYLAN(I,D) 1.29 cm2      MV max PG 4.1 mmHg      MV mean PG 2.00 mmHg      MV V2 VTI 29.9 cm      MV P1/2t 75.9 msec      MVA(P1/2t) 2.9 cm2      MVA(VTI) 1.55 cm2      MV dec slope 381.0 cm/sec2      RAP systole 15.0 mmHg      RV V1 max PG 1.99 mmHg      RV V1 max 70.6 cm/sec      RV V1 VTI 14.0 cm      PA V2 max 92.8 cm/sec      Ao root diam 3.1 cm      Sinus 2.8 cm      Echo EF Estimated 60.0 %     Narrative:        Left ventricular systolic function is normal. Estimated left   ventricular EF = 60%    Left ventricular wall thickness is consistent with mild concentric   hypertrophy.    Left ventricular diastolic function is consistent with (grade I)   impaired relaxation.    The left atrial cavity is mild to moderately dilated.    There is mild calcification of the aortic valve.    Transthoracic echocardiography reveals LVEF of 60% with mild LVH.  Mild to   moderate left atrial enlargement .  No effusion      Electronically signed by Shay Santos MD, 07/07/24, 5:17 PM EDT.        Telemetry Scan [[id number]] Resulted: 07/05/24     Updated: 07/08/24 0429    Telemetry Scan [[id number]] Resulted: 07/05/24     Updated: 07/08/24 0431    Telemetry Scan [[id number]] Resulted: 07/05/24     Updated: 07/08/24 0500    Telemetry Scan [[id number]] Resulted: 07/05/24     Updated: 07/08/24 0518    Telemetry Scan [[id number]] Resulted: 07/05/24     Updated: 07/08/24 0642    Telemetry Scan [[id number]] Resulted: 07/05/24     Updated: 07/08/24 0726    Telemetry Scan [[id number]] Resulted: 07/05/24     Updated: 07/08/24 0923             Physician Progress Notes (last 48 hours)        Shay Santos MD at 07/08/24 1411           CC--AF     Sub  Patient converted pharmacologically with initiation of dronedarone and complains of mild nausea this morning which has resolved     Previous history attached below  68-year-old pleasant patient transferred from outlying facility atrial fibrillation with rapid ventricular rate.  Patient started have recurrent AF with symptoms of fatigue tiredness lightheadedness without syncope.  Denies any chest pain or shortness of breath.  Patient was started on intravenous Cardizem and further transferred to our hospital and a consultation requested.  Patient continues to be in atrial fibrillation rates are fairly controlled at this point.  Stress test in the past did not show ischemia with normal EF and echocardiography in the past showed normal EF with a small pericardial effusion.  She has prior history of atrial flutter and right bundle branch block  Previous cardiac catheterization revealed EF of 60% with a mid RCA 50% stenosis which was performed in 2020.  Patient history of diabetes, hypertension, COPD and dyslipidemia.     Medical History        Past Medical History:   Diagnosis Date    COPD (chronic obstructive pulmonary disease)      Depression      Diabetes mellitus      Hyperlipidemia      Hypertension           Surgical History         Past Surgical History:   Procedure Laterality Date    CARDIAC CATHETERIZATION Left 10/30/2020     Procedure: Left Heart Cath with Coronary Angiography;  Surgeon: Donaldo Archer MD;  Location: [facility] CATH INVASIVE LOCATION;  Service: Cardiovascular;  Laterality: Left;    CARPAL TUNNEL RELEASE Bilateral 2017    CHOLECYSTECTOMY   1980    HYSTERECTOMY   1980             Physical Exam    General:      well developed, well nourished, in no acute distress.    Head:      normocephalic and atraumatic.    Eyes:      PERRL/EOM intact, conjunctivae and sclerae clear without nystagmus.    Neck:      no  thyromegaly, trachea central with normal respiratory effort  Lungs:       clear bilaterally to auscultation.    Heart:       regular rate and rhythm, S1, S2 without murmurs, rubs, or gallops  Skin:      intact without lesions or rashes.    Psych:      alert and cooperative; normal mood and affect; normal attention span and concentration.      Physical exam unchanged compared to above        CBC         Results from last 7 days   Lab Units 07/06/24  0249   WBC 10*3/mm3 6.74   HEMOGLOBIN g/dL 11.6*   PLATELETS 10*3/mm3 124*      BMP        Results from last 7 days   Lab Units 07/06/24  0249   SODIUM mmol/L 138   POTASSIUM mmol/L 4.3   CHLORIDE mmol/L 107   CO2 mmol/L 22.9   BUN mg/dL 11   CREATININE mg/dL 0.60   GLUCOSE mg/dL 231*   MAGNESIUM mg/dL 1.8   PHOSPHORUS mg/dL 2.4*         Assessment and plan     Intermittent atrial fibrillation with symptoms and history of atrial flutter in the past--- started on dronedarone to sinus rhythm--- off Cardizem--patient can be discharged home and scheduled for AF and flutter ablation on outpatient  Discussed with the patient and the family and orders placed  Discussed with Dr. Wilson  Patient currently anticoagulated  History of diabetes being managed by hospitalist  Hypertension controlled with current medical treatment including metoprolol  History of nonobstructive coronary artery disease without angina         Electronically signed by Shay Santos MD, 07/08/24, 2:12 PM EDT.       Electronically signed by Shay Santos MD at 07/08/24 1412       Kaya Frankel APRN at 07/08/24 1410          CC--AF     Sub  Patient reports being very nauseated this morning.  She denies abdominal pain, vomiting, diarrhea.    Obj  Patient converted pharmacologically with initiation of dronedarone.  Maintaining sinus rhythm at 71 bpm.  Blood pressure 143/70.    Previous history attached below  68-year-old pleasant patient transferred from outlying facility atrial fibrillation with rapid ventricular rate.  Patient started have recurrent AF with  symptoms of fatigue tiredness lightheadedness without syncope.  Denies any chest pain or shortness of breath.  Patient was started on intravenous Cardizem and further transferred to our hospital and a consultation requested.  Patient continues to be in atrial fibrillation rates are fairly controlled at this point.  Stress test in the past did not show ischemia with normal EF and echocardiography in the past showed normal EF with a small pericardial effusion.  She has prior history of atrial flutter and right bundle branch block  Previous cardiac catheterization revealed EF of 60% with a mid RCA 50% stenosis which was performed in 2020.  Patient history of diabetes, hypertension, COPD and dyslipidemia.     Medical History        Past Medical History:   Diagnosis Date    COPD (chronic obstructive pulmonary disease)      Depression      Diabetes mellitus      Hyperlipidemia      Hypertension           Surgical History         Past Surgical History:   Procedure Laterality Date    CARDIAC CATHETERIZATION Left 10/30/2020     Procedure: Left Heart Cath with Coronary Angiography;  Surgeon: Donaldo Archer MD;  Location: [facility] CATH INVASIVE LOCATION;  Service: Cardiovascular;  Laterality: Left;    CARPAL TUNNEL RELEASE Bilateral 2017    CHOLECYSTECTOMY   1980    HYSTERECTOMY   1980             Physical Exam    General:      well developed, well nourished, in no acute distress.    Head:      normocephalic and atraumatic.    Eyes:      PERRL/EOM intact, conjunctivae and sclerae clear without nystagmus.    Neck:      no  thyromegaly, trachea central with normal respiratory effort  Lungs:      clear bilaterally to auscultation.    Heart:       regular rate and rhythm, S1, S2 without murmurs, rubs, or gallops  Skin:      intact without lesions or rashes.    Psych:      alert and cooperative; normal mood and affect; normal attention span and concentration.              CBC         Results from last 7 days   Lab Units  07/06/24  0249   WBC 10*3/mm3 6.74   HEMOGLOBIN g/dL 11.6*   PLATELETS 10*3/mm3 124*      BMP        Results from last 7 days   Lab Units 07/06/24  0249   SODIUM mmol/L 138   POTASSIUM mmol/L 4.3   CHLORIDE mmol/L 107   CO2 mmol/L 22.9   BUN mg/dL 11   CREATININE mg/dL 0.60   GLUCOSE mg/dL 231*   MAGNESIUM mg/dL 1.8   PHOSPHORUS mg/dL 2.4*         Assessment and plan     Intermittent atrial fibrillation with symptoms and history of atrial flutter in the past--- started on dronedarone this admission with conversion to sinus rhythm.  Therapeutic options for recurrent atrial arrhythmias educated including catheter ablation.  Patient considering outpatient ablation treatment.  Continue anticoagulation  History of diabetes being managed by hospitalist  Hypertension controlled with current medical treatment including metoprolol  History of nonobstructive coronary artery disease without angina       Electronically signed by THOMAS Menendez, 07/08/24, 2:12 PM EDT.           Electronically signed by Kaya Frankel APRN at 07/08/24 1415       Donaldo Archer MD at 07/08/24 0941          Referring Provider: Bossman Evans MD    Reason for follow-up:  Atrial fibrillation flutter.  Anticoagulation management     Patient Care Team:  Maddie Cornejo APRN as PCP - General (Nurse Practitioner)  Donaldo Archer MD as Consulting Physician (Cardiology)    Subjective .      ROS    Today, the patient has been without any chest discomfort ,shortness of breath, palpitations, dizziness or syncope.  Denies having any headache ,abdominal pain ,nausea, vomiting , diarrhea constipation, loss of weight or loss of appetite.  Denies having any excessive bruising ,hematuria or blood in the stool.    Review of all systems negative except as indicated    History  Past Medical History:   Diagnosis Date    COPD (chronic obstructive pulmonary disease)     Depression     Diabetes mellitus     Hyperlipidemia     Hypertension         Past Surgical History:   Procedure Laterality Date    CARDIAC CATHETERIZATION Left 10/30/2020    Procedure: Left Heart Cath with Coronary Angiography;  Surgeon: Donaldo Archer MD;  Location: [facility] CATH INVASIVE LOCATION;  Service: Cardiovascular;  Laterality: Left;    CARPAL TUNNEL RELEASE Bilateral 2017    CHOLECYSTECTOMY  1980    HYSTERECTOMY  1980       Family History   Problem Relation Age of Onset    Heart disease Mother     Hypertension Mother     Diabetes Mother     Stroke Mother        Social History     Tobacco Use    Smoking status: Every Day     Current packs/day: 1.00     Average packs/day: 1 pack/day for 41.5 years (41.5 ttl pk-yrs)     Types: Cigarettes     Start date: 1983     Passive exposure: Current    Smokeless tobacco: Never   Vaping Use    Vaping status: Some Days    Substances: Nicotine, Flavoring    Devices: Swiftype   Substance Use Topics    Alcohol use: No    Drug use: Never        Medications Prior to Admission   Medication Sig Dispense Refill Last Dose    Accu-Chek Guide test strip 1 each by Other route 4 (Four) Times a Day. Use to test blood sugar 4 times daily 120 each 11     albuterol sulfate  (90 Base) MCG/ACT inhaler INHALE 2 PUFFS BY MOUTH EVERY 4 HOURS AS NEEDED FOR WHEEZING OR SHORTNESS OF AIR 6.7 g 2     baclofen (LIORESAL) 20 MG tablet Take 1 tablet by mouth 3 (Three) Times a Day. 90 tablet 2     butalbital-acetaminophen  MG tablet tablet Take 1 tablet by mouth Every 6 (Six) Hours As Needed (headache). 30 tablet 0     Continuous Blood Gluc  (FreeStyle Willie 2 [employer]) device Use 1 each Daily. To be used daily to monitor continuous blood sugar reading for type 2 diabetic patient on insulin regimen. 1 each 0     Continuous Blood Gluc Sensor (FreeStyle Willie 2 Sensor) misc Use 1 each Every 14 (Fourteen) Days. Dispense 2 sensor pack. To be used daily with freestyle willie device to monitor continuous blood sugar reading for type 2 diabetic patient  on insulin regimen. 2 each 12     dilTIAZem CD (CARDIZEM CD) 240 MG 24 hr capsule Take 1 capsule by mouth Daily. 90 capsule 1     Eliquis 5 MG tablet tablet Take 1 tablet by mouth Every 12 (Twelve) Hours. 180 tablet 1     Incontinence Supply Disposable (Briefs Overnight Large) misc Use 1 each At Night As Needed (incontinence). 90 each 3     Insulin Glargine (LANTUS SOLOSTAR) 100 UNIT/ML injection pen Inject 60 Units under the skin into the appropriate area as directed Every Night. 54 mL 3     insulin glulisine (Apidra) 100 UNIT/ML injection Inject 5 Units under the skin into the appropriate area as directed 3 (Three) Times a Day Before Meals. (Patient taking differently: Inject 10 Units under the skin into the appropriate area as directed 3 (Three) Times a Day Before Meals.) 10 mL 3     lisinopril (PRINIVIL,ZESTRIL) 20 MG tablet Take 1 tablet by mouth Daily. 90 tablet 0     metFORMIN (GLUCOPHAGE) 1000 MG tablet Take 1 tablet by mouth 2 (Two) Times a Day With Meals. 180 tablet 3     metoprolol succinate XL (TOPROL-XL) 25 MG 24 hr tablet Take 1 tablet by mouth Daily. 90 tablet 3     pregabalin (LYRICA) 75 MG capsule TAKE 1 CAPSULE BY MOUTH TWICE A DAY 60 capsule 0     Rexulti 3 MG tablet TAKE 1 TABLET BY MOUTH EVERY DAY 90 tablet 1     Semaglutide, 1 MG/DOSE, (Ozempic, 1 MG/DOSE,) 4 MG/3ML solution pen-injector Inject 1 mg under the skin into the appropriate area as directed Every 7 (Seven) Days. 3 mL 1     sertraline (ZOLOFT) 100 MG tablet Take 1 tablet by mouth Daily. 90 tablet 3     zolpidem (AMBIEN) 10 MG tablet TAKE 1 TABLET BY MOUTH AT NIGHT AS NEEDED FOR SLEEP. 28 tablet 0        Allergies  Codeine    Scheduled Meds:apixaban, 5 mg, Oral, Q12H  dronedarone, 400 mg, Oral, BID With Meals  insulin glargine, 40 Units, Subcutaneous, Nightly  insulin lispro, 2-9 Units, Subcutaneous, 4x Daily AC & at Bedtime  insulin lispro, 5 Units, Subcutaneous, TID With Meals  [Held by provider] lisinopril, 20 mg, Oral,  "Daily  metoprolol succinate XL, 50 mg, Oral, Daily  nicotine, 1 patch, Transdermal, Q24H  pregabalin, 75 mg, Oral, BID  sertraline, 100 mg, Oral, Daily  sodium chloride, 10 mL, Intravenous, Q12H      Continuous Infusions:   PRN Meds:.  acetaminophen **OR** acetaminophen **OR** acetaminophen    baclofen    senna-docusate sodium **AND** polyethylene glycol **AND** bisacodyl **AND** bisacodyl    busPIRone    Calcium Replacement - Follow Nurse / BPA Driven Protocol    dextrose    dextrose    glucagon (human recombinant)    Magnesium Standard Dose Replacement - Follow Nurse / BPA Driven Protocol    nitroglycerin    ondansetron ODT **OR** ondansetron    Phosphorus Replacement - Follow Nurse / BPA Driven Protocol    Potassium Replacement - Follow Nurse / BPA Driven Protocol    sodium chloride    sodium chloride    zolpidem    zolpidem    Objective     VITAL SIGNS  Vitals:    07/07/24 2136 07/08/24 0116 07/08/24 0514 07/08/24 0859   BP: 136/68 154/81 139/71 143/70   BP Location: Left arm Left arm Left arm Left arm   Patient Position: Lying Lying Lying Lying   Pulse: 82 73 69 71   Resp: 20 14 16 14   Temp: 97.5 °F (36.4 °C) 97.3 °F (36.3 °C) 97.3 °F (36.3 °C) 98.9 °F (37.2 °C)   TempSrc: Oral Oral Oral Oral   SpO2: 94% 98% 99% 96%   Weight:   79.3 kg (174 lb 13.2 oz)    Height:           Flowsheet Rows      Flowsheet Row First Filed Value   Admission Height 172.7 cm (68\") Documented at 07/05/2024 2043   Admission Weight 77.8 kg (171 lb 8.3 oz) Documented at 07/05/2024 2043              Intake/Output Summary (Last 24 hours) at 7/8/2024 0941  Last data filed at 7/8/2024 0859  Gross per 24 hour   Intake 960 ml   Output --   Net 960 ml        TELEMETRY: Sinus rhythm    Physical Exam:  The patient is alert, oriented and in no distress.  Vital signs as noted above.  Head and neck revealed no carotid bruits or jugular venous distention.  No thyromegaly or lymphadenopathy is present  Lungs clear.  No wheezing.  Breath sounds are " normal bilaterally.  Heart normal first and second heart sounds.  No murmur. No precordial rub is present.  No gallop is present.  Abdomen soft and nontender.  No organomegaly is present.  Extremities with good peripheral pulses without any pedal edema.  Skin warm and dry.  Musculoskeletal system is grossly normal  CNS grossly normal    Reviewed and updated.    Results Review:   I reviewed the patient's new clinical results.  Lab Results (last 24 hours)       Procedure Component Value Units Date/Time    POC Glucose Once [[id number]]  (Abnormal) Collected: 07/08/24 0658    Specimen: Blood Updated: 07/08/24 0700     Glucose 153 mg/dL      Comment: Serial Number: [id number]:  [id number]       POC Glucose Once [[id number]]  (Abnormal) Collected: 07/07/24 2000    Specimen: Blood Updated: 07/07/24 2002     Glucose 187 mg/dL      Comment: Serial Number: [id number]:  [id number]       POC Glucose Once [[id number]]  (Abnormal) Collected: 07/07/24 1544    Specimen: Blood Updated: 07/07/24 1546     Glucose 153 mg/dL      Comment: Serial Number: [id number]:  [id number]       POC Glucose 4x Daily Before Meals & at Bedtime [[id number]]  (Abnormal) Collected: 07/07/24 1108    Specimen: Blood Updated: 07/07/24 1109     Glucose 325 mg/dL      Comment: Serial Number: [id number]:  [id number]               Imaging Results (Last 24 Hours)       ** No results found for the last 24 hours. **        LAB RESULTS (LAST 7 DAYS)    CBC  Results from last 7 days   Lab Units 07/06/24  0249   WBC 10*3/mm3 6.74   RBC 10*6/mm3 4.04   HEMOGLOBIN g/dL 11.6*   HEMATOCRIT % 35.9   MCV fL 88.9   PLATELETS 10*3/mm3 124*       BMP  Results from last 7 days   Lab Units 07/06/24  0249   SODIUM mmol/L 138   POTASSIUM mmol/L 4.3   CHLORIDE mmol/L 107   CO2 mmol/L 22.9   BUN mg/dL 11   CREATININE mg/dL 0.60   GLUCOSE mg/dL 231*   MAGNESIUM mg/dL 1.8   PHOSPHORUS mg/dL 2.4*       CMP   Results from last 7 days   Lab Units 07/06/24  0249    SODIUM mmol/L 138   POTASSIUM mmol/L 4.3   CHLORIDE mmol/L 107   CO2 mmol/L 22.9   BUN mg/dL 11   CREATININE mg/dL 0.60   GLUCOSE mg/dL 231*         BNP        TROPONIN        CoAg        Creatinine Clearance  Estimated Creatinine Clearance: 100.7 mL/min (by C-G formula based on SCr of 0.6 mg/dL).    ABG        Radiology  No radiology results for the last day        EKG      I personally viewed and interpreted the patient's EKG/Telemetry data: Sinus rhythm right bundle branch block left posterior fascicular block.    ECHOCARDIOGRAM:    Results for orders placed during the hospital encounter of 07/05/24    Adult Transthoracic Echo Complete W/ Cont if Necessary Per Protocol    Interpretation Summary    Left ventricular systolic function is normal. Estimated left ventricular EF = 60%    Left ventricular wall thickness is consistent with mild concentric hypertrophy.    Left ventricular diastolic function is consistent with (grade I) impaired relaxation.    The left atrial cavity is mild to moderately dilated.    There is mild calcification of the aortic valve.    Transthoracic echocardiography reveals LVEF of 60% with mild LVH.  Mild to moderate left atrial enlargement .  No effusion      Electronically signed by Shay Santos MD, 07/07/24, 5:17 PM EDT.          STRESS TEST  Results for orders placed during the hospital encounter of 10/14/22    Stress Test With Myocardial Perfusion One Day    Interpretation Summary  Indications indications  Chest pain  Arrhythmia    This study was performed under direct supervision Radha Christensen RN.    Resting ECG  Atrial flutter    The patient was injected with Lexiscan intravenously while constantly monitoring electrocardiogram and vital signs.  Patient did not have any chest discomfort ST abnormalities or ectopy with injection of Lexiscan.    Cardiolite was used as an imaging agent.    Cardiolite images showed uniform distribution of radionuclide without any evidence for  myocardial ischemia.    Gated SPECT images revealed normal left ventricle size and contractility with ejection fraction of 62%.    Impression  ========  Lexiscan Cardiolite test is negative for myocardial ischemia.    Gated SPECT images revealed normal left ventricular size and contractility with ejection fraction of 62%.        Cardiolite (Tc-99m sestamibi) stress test    CARDIAC CATHETERIZATION  Results for orders placed during the hospital encounter of 10/30/20    Cardiac Catheterization/Vascular Study    Narrative  CARDIAC CATHETERIZATION REPORT    DATE OF PROCEDURE:  10/30/2020    INDICATION FOR PROCEDURE:  Unstable angina    PROCEDURE PERFORMED:  Left heart catheterization, coronary angiography, left ventriculography    @@  Moderate conscious sedation was utilized with intravenous Versed and fentanyl administered by registered nurse with continuous ECG, pulse oximetry and hemodynamic monitoring supervised by myself throughout the entire procedure.  Conscious sedation time   30 minutes    I was present with the patient for the duration of moderate sedation and supervised staff who had no other duties and monitored the patient for the entire procedure.    @@    PROCEDURE COMMENTS:    Under usual sterile precautions and 1% Xylocaine filtration right femoral artery was percutaneously punctured and a 5 Italian vascular sheath was introduced into the right femoral artery.  Left and right coronary arteriography was performed in varying degrees of obliquity followed by left ventricular angiogram.  Hemostasis was obtained and patient was returned to the room with intact pulses.  No complications were noted.  Patient tolerated the procedure well.    FINDINGS:    1. HEMODYNAMICS:  Left ventricle end-diastolic pressure is normal.  No gradient was noted across aortic valve.    2. LEFT VENTRICULOGRAPHY:  Left ventricle size and contractility is normal with ejection fraction of 60%.    3. CORONARY ANGIOGRAPHY:  Left main  coronary artery is normal.  Left anterior descending artery is normal.  Circumflex coronary artery is normal.  Right coronary artery has mid segment 50% disease    SUMMARY:  Left ventricle size and contractility is normal with ejection fraction of 60%.    Left main coronary artery is normal.  Left anterior descending artery is normal.  Circumflex coronary artery is normal.  Right coronary artery has mid segment 50% disease    RECOMMENDATIONS:  Medical therapy.  Patient was started on baby aspirin and Imdur 60 mg a day.  Follow-up in the office in 2 weeks.                OTHER:         Assessment & Plan     Principal Problem:    Atrial fibrillation with RVR    ]]]]]]]]]]]]]]]]]]]  History  =========  - Atrial fibrillation with RVR.-Converted to sinus rhythm.    Has history of atrial flutter with RVR.  Has converted to sinus rhythm 10/17/2022.    Echocardiogram 7/5/2024-Dr. Santos    Left ventricular systolic function is normal. Estimated left ventricular EF = 60%    Left ventricular wall thickness is consistent with mild concentric hypertrophy.    Left ventricular diastolic function is consistent with (grade I) impaired relaxation.    The left atrial cavity is mild to moderately dilated.    There is mild calcification of the aortic valve.  Transthoracic echocardiography reveals LVEF of 60% with mild LVH.  Mild to moderate left atrial enlargement .  No effusion    Stress Cardiolite test 10/15/2022  Lexiscan Cardiolite test is negative for myocardial ischemia.  Gated SPECT images revealed normal left ventricular size and contractility with ejection fraction of 62%.     Echocardiogram 10/15/2022  Structurally and functionally normal cardiac valves.  Left ventricular size and contractility is normal with ejection fraction of 60%.  Small pericardial effusion.     - Atrial flutter with rapid ventricular response.  Has converted to sinus rhythm 10/17/2022     -Chronic RBBB     - Anticoagulation-patient is on Eliquis      Cardiac cath 10/30/2020 revealed  Left ventricle size and contractility is normal with ejection fraction of 60%.  Left main coronary artery is normal.  Left anterior descending artery is normal.  Circumflex coronary artery is normal.  Right coronary artery has mid segment 50% disease      - Diabetes hypertension COPD dyslipidemia     -Status post hysterectomy cholecystectomy and carpal tunnel surgery.     -Family history is positive for coronary artery disease.     -Smoker- abstinence from smoking was advised.     - No known allergies  ============  Plan  ========   Atrial fibrillation with RVR.-Converted to sinus rhythm.    Has history of atrial flutter with RVR.  Has converted to sinus rhythm 10/17/2022.    Echocardiogram 7/5/2024-Dr. aSntos    Left ventricular systolic function is normal. Estimated left ventricular EF = 60%    Left ventricular wall thickness is consistent with mild concentric hypertrophy.    Left ventricular diastolic function is consistent with (grade I) impaired relaxation.    The left atrial cavity is mild to moderately dilated.    There is mild calcification of the aortic valve.  Transthoracic echocardiography reveals LVEF of 60% with mild LVH.  Mild to moderate left atrial enlargement .  No effusion    EKG 12/21/2023-sinus rhythm right bundle branch block     Chronic right bundle branch block-stable     Anticoagulation  Continue Eliquis 5 mg twice a day.  Observe for toxic effects.     Medications were reviewed and updated.  Patient is on Eliquis Multaq 4 mg twice daily insulin metoprolol XL 50 mg a day.  Patient is off Cardizem and lisinopril.     Abstinence from smoking.    Patient is strongly considering ablation for atrial dysrhythmia.  Have discussed with Dr. Santos regarding this.     Further plan will depend on patient's progress.     Reviewed and updated 7/8/2024.  ]]]]]]]]]]]]]]              Donaldo Archer MD  07/08/24  09:41 EDT                  Electronically signed by  Donaldo Archer MD at 07/08/24 1142       Shay Santos MD at 07/07/24 1533          CC--AF     Sub  Patient converted pharmacologically with initiation of dronedarone and complains of fatigue and denies palpitations    Previous history attached below  68-year-old pleasant patient transferred from outlying facility atrial fibrillation with rapid ventricular rate.  Patient started have recurrent AF with symptoms of fatigue tiredness lightheadedness without syncope.  Denies any chest pain or shortness of breath.  Patient was started on intravenous Cardizem and further transferred to our hospital and a consultation requested.  Patient continues to be in atrial fibrillation rates are fairly controlled at this point.  Stress test in the past did not show ischemia with normal EF and echocardiography in the past showed normal EF with a small pericardial effusion.  She has prior history of atrial flutter and right bundle branch block  Previous cardiac catheterization revealed EF of 60% with a mid RCA 50% stenosis which was performed in 2020.  Patient history of diabetes, hypertension, COPD and dyslipidemia.     Medical History        Past Medical History:   Diagnosis Date    COPD (chronic obstructive pulmonary disease)      Depression      Diabetes mellitus      Hyperlipidemia      Hypertension           Surgical History         Past Surgical History:   Procedure Laterality Date    CARDIAC CATHETERIZATION Left 10/30/2020     Procedure: Left Heart Cath with Coronary Angiography;  Surgeon: Donaldo Archer MD;  Location: [facility] CATH INVASIVE LOCATION;  Service: Cardiovascular;  Laterality: Left;    CARPAL TUNNEL RELEASE Bilateral 2017    CHOLECYSTECTOMY   1980    HYSTERECTOMY   1980             Physical Exam    General:      well developed, well nourished, in no acute distress.    Head:      normocephalic and atraumatic.    Eyes:      PERRL/EOM intact, conjunctivae and sclerae clear without nystagmus.    Neck:       no  thyromegaly, trachea central with normal respiratory effort  Lungs:      clear bilaterally to auscultation.    Heart:       regular rate and rhythm, S1, S2 without murmurs, rubs, or gallops  Skin:      intact without lesions or rashes.    Psych:      alert and cooperative; normal mood and affect; normal attention span and concentration.              CBC         Results from last 7 days   Lab Units 24  0249   WBC 10*3/mm3 6.74   HEMOGLOBIN g/dL 11.6*   PLATELETS 10*3/mm3 124*      BMP        Results from last 7 days   Lab Units 24  0249   SODIUM mmol/L 138   POTASSIUM mmol/L 4.3   CHLORIDE mmol/L 107   CO2 mmol/L 22.9   BUN mg/dL 11   CREATININE mg/dL 0.60   GLUCOSE mg/dL 231*   MAGNESIUM mg/dL 1.8   PHOSPHORUS mg/dL 2.4*         Assessment and plan     Intermittent atrial fibrillation with symptoms and history of atrial flutter in the past--- started on dronedarone to sinus rhythm--- stop Cardizem  Observe for another day  Therapeutic options for recurrent atrial arrhythmias educated including catheter ablation and patient wants to strongly consider ablation treatment.  Patient currently anticoagulated  History of diabetes being managed by hospitalist  Hypertension controlled with current medical treatment including metoprolol  History of nonobstructive coronary artery disease without angina       Electronically signed by Shay Santos MD, 24, 3:34 PM EDT.         Electronically signed by Shay Santos MD at 24 1534       Kassidy Cardona MD at 24 1127              [facility] MEDICINE SERVICE  DAILY PROGRESS NOTE    NAME: Arianna Blanco  : 1955  MRN: [id number]      LOS: 2 days     PROVIDER OF SERVICE: Kassidy Cardona MD    Chief Complaint: Atrial fibrillation with RVR    Subjective:     Interval History:  History taken from: patient  Patient Complaints: Still generally weak.  Denies any chest pain or shortness of breath, denies any nausea  or vomiting.  Heart rate is in the 80s this morning.    Review of Systems:   Review of Systems   All other systems reviewed and are negative.      Objective:     Vital Signs  Temp:  [97.5 °F (36.4 °C)-98.5 °F (36.9 °C)] 97.7 °F (36.5 °C)  Heart Rate:  [78-87] 84  Resp:  [15-25] 24  BP: (116-144)/(53-78) 139/69   Body mass index is 25.84 kg/m².    Physical Exam  General: Alert and oriented, no acute distress.  HENT: Normocephalic, moist oral mucosa, no scleral icterus.  Neck: Supple, non-tender, no carotid bruits, no JVD, no LAD.  Lungs: Clear to auscultation, non-labored respiration.  Heart: Irregular rate and rhythm, no murmur, gallop or edema.  Abdomen: Soft, nontender, non-distended, + bowel sounds.  Musculoskeletal: Normal range of motion and strength, no tenderness or swelling.  Neuro: alert and awake, moving all 4 extremities   Skin: Skin is warm, dry and pink, no rashes or lesions.  Psychiatric: Cooperative, appropriate mood and affect.        Scheduled Meds   apixaban, 5 mg, Oral, Q12H  dronedarone, 400 mg, Oral, BID With Meals  insulin glargine, 40 Units, Subcutaneous, Nightly  insulin lispro, 2-9 Units, Subcutaneous, 4x Daily AC & at Bedtime  insulin lispro, 5 Units, Subcutaneous, TID With Meals  [Held by provider] lisinopril, 20 mg, Oral, Daily  metoprolol succinate XL, 50 mg, Oral, Daily  nicotine, 1 patch, Transdermal, Q24H  pregabalin, 75 mg, Oral, BID  sertraline, 100 mg, Oral, Daily  sodium chloride, 10 mL, Intravenous, Q12H       PRN Meds     acetaminophen **OR** acetaminophen **OR** acetaminophen    baclofen    senna-docusate sodium **AND** polyethylene glycol **AND** bisacodyl **AND** bisacodyl    busPIRone    Calcium Replacement - Follow Nurse / BPA Driven Protocol    dextrose    dextrose    glucagon (human recombinant)    Magnesium Standard Dose Replacement - Follow Nurse / BPA Driven Protocol    nitroglycerin    ondansetron ODT **OR** ondansetron    Phosphorus Replacement - Follow Nurse / BPA  Driven Protocol    Potassium Replacement - Follow Nurse / BPA Driven Protocol    sodium chloride    sodium chloride    zolpidem    zolpidem   Infusions           Diagnostic Data    Results from last 7 days   Lab Units 07/06/24  0249   WBC 10*3/mm3 6.74   HEMOGLOBIN g/dL 11.6*   HEMATOCRIT % 35.9   PLATELETS 10*3/mm3 124*   GLUCOSE mg/dL 231*   CREATININE mg/dL 0.60   BUN mg/dL 11   SODIUM mmol/L 138   POTASSIUM mmol/L 4.3   ANION GAP mmol/L 8.1       No radiology results for the last day          Assessment:    A-fib with RVR  Type 2  diabetes insulin requiring  hypeertension  History of anxiety and depression  Anemia  Thrombocytopenia  Anticoagulation     Plan: Continue current management plan.  Echocardiogram.  Cardiology to review.  On beta-blocker and apixaban for atrial fibrillation.  Bleeding and fall precautions.    Active and Resolved Problems  Active Hospital Problems    Diagnosis  POA    **Atrial fibrillation with RVR [I48.91]  Yes      Resolved Hospital Problems   No resolved problems to display.           VTE Prophylaxis:  Pharmacologic & mechanical VTE prophylaxis orders are present.         Code status is   Code Status and Medical Interventions:   Ordered at: 07/05/24 2054     Code Status (Patient has no pulse and is not breathing):    CPR (Attempt to Resuscitate)     Medical Interventions (Patient has pulse or is breathing):    Full Support       Plan for disposition:home in 2 days    Time: 30 minutes    Signature: Electronically signed by Kassidy Cardona MD, 07/07/24, 11:27 EDT.  [facility] Hospitalist Team     Electronically signed by Kassidy Cardona MD at 07/07/24 5830

## 2024-07-08 NOTE — PROGRESS NOTES
Referring Provider: Bossman Evans MD    Reason for follow-up:  Atrial fibrillation flutter.  Anticoagulation management     Patient Care Team:  Maddie Cornejo APRN as PCP - General (Nurse Practitioner)  Donaldo Archer MD as Consulting Physician (Cardiology)    Subjective .      ROS    Today, the patient has been without any chest discomfort ,shortness of breath, palpitations, dizziness or syncope.  Denies having any headache ,abdominal pain ,nausea, vomiting , diarrhea constipation, loss of weight or loss of appetite.  Denies having any excessive bruising ,hematuria or blood in the stool.    Review of all systems negative except as indicated    History  Past Medical History:   Diagnosis Date    COPD (chronic obstructive pulmonary disease)     Depression     Diabetes mellitus     Hyperlipidemia     Hypertension        Past Surgical History:   Procedure Laterality Date    CARDIAC CATHETERIZATION Left 10/30/2020    Procedure: Left Heart Cath with Coronary Angiography;  Surgeon: Donaldo Archer MD;  Location: [facility] CATH INVASIVE LOC[facility];  Service: Cardiovascular;  Laterality: Left;    CARPAL TUNNEL RELEASE Bilateral 2017    CHOLECYSTECTOMY  1980    HYSTERECTOMY  1980       Family History   Problem Relation Age of Onset    Heart disease Mother     Hypertension Mother     Diabetes Mother     Stroke Mother        Social History     Tobacco Use    Smoking status: Every Day     Current packs/day: 1.00     Average packs/day: 1 pack/day for 41.5 years (41.5 ttl pk-yrs)     Types: Cigarettes     Start date: 1983     Passive exposure: Current    Smokeless tobacco: Never   Vaping Use    Vaping status: Some Days    Substances: Nicotine, Flavoring    Devices: Refillable tank   Substance Use Topics    Alcohol use: No    Drug use: Never        Medications Prior to Admission   Medication Sig Dispense Refill Last Dose    Accu-Chek Guide test strip 1 each by Other route 4 (Four) Times a Day. Use to test blood sugar 4 times  daily 120 each 11     albuterol sulfate  (90 Base) MCG/ACT inhaler INHALE 2 PUFFS BY MOUTH EVERY 4 HOURS AS NEEDED FOR WHEEZING OR SHORTNESS OF AIR 6.7 g 2     baclofen (LIORESAL) 20 MG tablet Take 1 tablet by mouth 3 (Three) Times a Day. 90 tablet 2     butalbital-acetaminophen  MG tablet tablet Take 1 tablet by mouth Every 6 (Six) Hours As Needed (headache). 30 tablet 0     Continuous Blood Gluc  (FreeStyle Willie 2 [place]) device Use 1 each Daily. To be used daily to monitor continuous blood sugar reading for type 2 diabetic patient on insulin regimen. 1 each 0     Continuous Blood Gluc Sensor (FreeStyle Willie 2 Sensor) misc Use 1 each Every 14 (Fourteen) Days. Dispense 2 sensor pack. To be used daily with freestyle willie device to monitor continuous blood sugar reading for type 2 diabetic patient on insulin regimen. 2 each 12     dilTIAZem CD (CARDIZEM CD) 240 MG 24 hr capsule Take 1 capsule by mouth Daily. 90 capsule 1     Eliquis 5 MG tablet tablet Take 1 tablet by mouth Every 12 (Twelve) Hours. 180 tablet 1     Incontinence Supply Disposable (Briefs Overnight Large) misc Use 1 each At Night As Needed (incontinence). 90 each 3     Insulin Glargine (LANTUS SOLOSTAR) 100 UNIT/ML injection pen Inject 60 Units under the skin into the appropriate area as directed Every Night. 54 mL 3     insulin glulisine (Apidra) 100 UNIT/ML injection Inject 5 Units under the skin into the appropriate area as directed 3 (Three) Times a Day Before Meals. (Patient taking differently: Inject 10 Units under the skin into the appropriate area as directed 3 (Three) Times a Day Before Meals.) 10 mL 3     lisinopril (PRINIVIL,ZESTRIL) 20 MG tablet Take 1 tablet by mouth Daily. 90 tablet 0     metFORMIN (GLUCOPHAGE) 1000 MG tablet Take 1 tablet by mouth 2 (Two) Times a Day With Meals. 180 tablet 3     metoprolol succinate XL (TOPROL-XL) 25 MG 24 hr tablet Take 1 tablet by mouth Daily. 90 tablet 3     pregabalin (LYRICA)  75 MG capsule TAKE 1 CAPSULE BY MOUTH TWICE A DAY 60 capsule 0     Rexulti 3 MG tablet TAKE 1 TABLET BY MOUTH EVERY DAY 90 tablet 1     Semaglutide, 1 MG/DOSE, (Ozempic, 1 MG/DOSE,) 4 MG/3ML solution pen-injector Inject 1 mg under the skin into the appropriate area as directed Every 7 (Seven) Days. 3 mL 1     sertraline (ZOLOFT) 100 MG tablet Take 1 tablet by mouth Daily. 90 tablet 3     zolpidem (AMBIEN) 10 MG tablet TAKE 1 TABLET BY MOUTH AT NIGHT AS NEEDED FOR SLEEP. 28 tablet 0        Allergies  Codeine    Scheduled Meds:apixaban, 5 mg, Oral, Q12H  dronedarone, 400 mg, Oral, BID With Meals  insulin glargine, 40 Units, Subcutaneous, Nightly  insulin lispro, 2-9 Units, Subcutaneous, 4x Daily AC & at Bedtime  insulin lispro, 5 Units, Subcutaneous, TID With Meals  [Held by provider] lisinopril, 20 mg, Oral, Daily  metoprolol succinate XL, 50 mg, Oral, Daily  nicotine, 1 patch, Transdermal, Q24H  pregabalin, 75 mg, Oral, BID  sertraline, 100 mg, Oral, Daily  sodium chloride, 10 mL, Intravenous, Q12H      Continuous Infusions:   PRN Meds:.  acetaminophen **OR** acetaminophen **OR** acetaminophen    baclofen    senna-docusate sodium **AND** polyethylene glycol **AND** bisacodyl **AND** bisacodyl    busPIRone    Calcium Replacement - Follow Nurse / BPA Driven Protocol    dextrose    dextrose    glucagon (human recombinant)    Magnesium Standard Dose Replacement - Follow Nurse / BPA Driven Protocol    nitroglycerin    ondansetron ODT **OR** ondansetron    Phosphorus Replacement - Follow Nurse / BPA Driven Protocol    Potassium Replacement - Follow Nurse / BPA Driven Protocol    sodium chloride    sodium chloride    zolpidem    zolpidem    Objective     VITAL SIGNS  Vitals:    07/07/24 2136 07/08/24 0116 07/08/24 0514 07/08/24 0859   BP: 136/68 154/81 139/71 143/70   BP Location: Left arm Left arm Left arm Left arm   Patient Position: Lying Lying Lying Lying   Pulse: 82 73 69 71   Resp: 20 14 16 14   Temp: 97.5 °F (36.4  "°C) 97.3 °F (36.3 °C) 97.3 °F (36.3 °C) 98.9 °F (37.2 °C)   TempSrc: Oral Oral Oral Oral   SpO2: 94% 98% 99% 96%   Weight:   79.3 kg (174 lb 13.2 oz)    Height:           Flowsheet Rows      Flowsheet Row First Filed Value   Admission Height 172.7 cm (68\") Documented at 07/05/2024 2043   Admission Weight 77.8 kg (171 lb 8.3 oz) Documented at 07/05/2024 2043              Intake/Output Summary (Last 24 hours) at 7/8/2024 0941  Last data filed at 7/8/2024 0859  Gross per 24 hour   Intake 960 ml   Output --   Net 960 ml        TELEMETRY: Sinus rhythm    Physical Exam:  The patient is alert, oriented and in no distress.  Vital signs as noted above.  Head and neck revealed no carotid bruits or jugular venous distention.  No thyromegaly or lymphadenopathy is present  Lungs clear.  No wheezing.  Breath sounds are normal bilaterally.  Heart normal first and second heart sounds.  No murmur. No precordial rub is present.  No gallop is present.  Abdomen soft and nontender.  No organomegaly is present.  Extremities with good peripheral pulses without any pedal edema.  Skin warm and dry.  Musculoskeletal system is grossly normal  CNS grossly normal    Reviewed and updated.    Results Review:   I reviewed the patient's new clinical results.  Lab Results (last 24 hours)       Procedure Component Value Units Date/Time    POC Glucose Once [[id number]]  (Abnormal) Collected: 07/08/24 0658    Specimen: Blood Updated: 07/08/24 0700     Glucose 153 mg/dL      Comment: Serial Number: [id number]:  [id number]       POC Glucose Once [[id number]]  (Abnormal) Collected: 07/07/24 2000    Specimen: Blood Updated: 07/07/24 2002     Glucose 187 mg/dL      Comment: Serial Number: [id number]:  [id number]       POC Glucose Once [[id number]]  (Abnormal) Collected: 07/07/24 1544    Specimen: Blood Updated: 07/07/24 1546     Glucose 153 mg/dL      Comment: Serial Number: [id number]:  [id number]       POC Glucose 4x Daily Before Meals & at " Bedtime [[id number]]  (Abnormal) Collected: 07/07/24 1108    Specimen: Blood Updated: 07/07/24 1109     Glucose 325 mg/dL      Comment: Serial Number: [id number]:  [id number]               Imaging Results (Last 24 Hours)       ** No results found for the last 24 hours. **        LAB RESULTS (LAST 7 DAYS)    CBC  Results from last 7 days   Lab Units 07/06/24  0249   WBC 10*3/mm3 6.74   RBC 10*6/mm3 4.04   HEMOGLOBIN g/dL 11.6*   HEMATOCRIT % 35.9   MCV fL 88.9   PLATELETS 10*3/mm3 124*       BMP  Results from last 7 days   Lab Units 07/06/24  0249   SODIUM mmol/L 138   POTASSIUM mmol/L 4.3   CHLORIDE mmol/L 107   CO2 mmol/L 22.9   BUN mg/dL 11   CREATININE mg/dL 0.60   GLUCOSE mg/dL 231*   MAGNESIUM mg/dL 1.8   PHOSPHORUS mg/dL 2.4*       CMP   Results from last 7 days   Lab Units 07/06/24  0249   SODIUM mmol/L 138   POTASSIUM mmol/L 4.3   CHLORIDE mmol/L 107   CO2 mmol/L 22.9   BUN mg/dL 11   CREATININE mg/dL 0.60   GLUCOSE mg/dL 231*         BNP        TROPONIN        CoAg        Creatinine Clearance  Estimated Creatinine Clearance: 100.7 mL/min (by C-G formula based on SCr of 0.6 mg/dL).    ABG        Radiology  No radiology results for the last day        EKG      I personally viewed and interpreted the patient's EKG/Telemetry data: Sinus rhythm right bundle branch block left posterior fascicular block.    ECHOCARDIOGRAM:    Results for orders placed during the hospital encounter of 07/05/24    Adult Transthoracic Echo Complete W/ Cont if Necessary Per Protocol    Interpretation Summary    Left ventricular systolic function is normal. Estimated left ventricular EF = 60%    Left ventricular wall thickness is consistent with mild concentric hypertrophy.    Left ventricular diastolic function is consistent with (grade I) impaired relaxation.    The left atrial cavity is mild to moderately dilated.    There is mild calcification of the aortic valve.    Transthoracic echocardiography reveals LVEF of 60% with mild  LVH.  Mild to moderate left atrial enlargement .  No effusion      Electronically signed by Shay Santos MD, 07/07/24, 5:17 PM EDT.          STRESS TEST  Results for orders placed during the hospital encounter of 10/14/22    Stress Test With Myocardial Perfusion One Day    Interpretation Summary  Indications indications  Chest pain  Arrhythmia    This study was performed under direct supervision Radha Christensen RN.    Resting ECG  Atrial flutter    The patient was injected with Lexiscan intravenously while constantly monitoring electrocardiogram and vital signs.  Patient did not have any chest discomfort ST abnormalities or ectopy with injection of Lexiscan.    Cardiolite was used as an imaging agent.    Cardiolite images showed uniform distribution of radionuclide without any evidence for myocardial ischemia.    Gated SPECT images revealed normal left ventricle size and contractility with ejection fraction of 62%.    Impression  ========  Lexiscan Cardiolite test is negative for myocardial ischemia.    Gated SPECT images revealed normal left ventricular size and contractility with ejection fraction of 62%.        Cardiolite (Tc-99m sestamibi) stress test    CARDIAC CATHETERIZATION  Results for orders placed during the hospital encounter of 10/30/20    Cardiac Catheterization/Vascular Study    Narrative  CARDIAC CATHETERIZATION REPORT    DATE OF PROCEDURE:  10/30/2020    INDICATION FOR PROCEDURE:  Unstable angina    PROCEDURE PERFORMED:  Left heart catheterization, coronary angiography, left ventriculography    @@  Moderate conscious sedation was utilized with intravenous Versed and fentanyl administered by registered nurse with continuous ECG, pulse oximetry and hemodynamic monitoring supervised by myself throughout the entire procedure.  Conscious sedation time   30 minutes    I was present with the patient for the duration of moderate sedation and supervised staff who had no other duties and monitored the  patient for the entire procedure.    @@    PROCEDURE COMMENTS:    Under usual sterile precautions and 1% Xylocaine filtration right femoral artery was percutaneously punctured and a 5 Citizen of [place] vascular sheath was introduced into the right femoral artery.  Left and right coronary arteriography was performed in varying degrees of obliquity followed by left ventricular angiogram.  Hemostasis was obtained and patient was returned to the room with intact pulses.  No complications were noted.  Patient tolerated the procedure well.    FINDINGS:    1. HEMODYNAMICS:  Left ventricle end-diastolic pressure is normal.  No gradient was noted across aortic valve.    2. LEFT VENTRICULOGRAPHY:  Left ventricle size and contractility is normal with ejection fraction of 60%.    3. CORONARY ANGIOGRAPHY:  Left main coronary artery is normal.  Left anterior descending artery is normal.  Circumflex coronary artery is normal.  Right coronary artery has mid segment 50% disease    SUMMARY:  Left ventricle size and contractility is normal with ejection fraction of 60%.    Left main coronary artery is normal.  Left anterior descending artery is normal.  Circumflex coronary artery is normal.  Right coronary artery has mid segment 50% disease    RECOMMENDATIONS:  Medical therapy.  Patient was started on baby aspirin and Imdur 60 mg a day.  Follow-up in the office in 2 weeks.                OTHER:         Assessment & Plan     Principal Problem:    Atrial fibrillation with RVR    ]]]]]]]]]]]]]]]]]]]  History  =========  - Atrial fibrillation with RVR.-Converted to sinus rhythm.    Has history of atrial flutter with RVR.  Has converted to sinus rhythm 10/17/2022.    Echocardiogram 7/5/2024-Dr. Santos    Left ventricular systolic function is normal. Estimated left ventricular EF = 60%    Left ventricular wall thickness is consistent with mild concentric hypertrophy.    Left ventricular diastolic function is consistent with (grade I) impaired  relaxation.    The left atrial cavity is mild to moderately dilated.    There is mild calcification of the aortic valve.  Transthoracic echocardiography reveals LVEF of 60% with mild LVH.  Mild to moderate left atrial enlargement .  No effusion    Stress Cardiolite test 10/15/2022  Lexiscan Cardiolite test is negative for myocardial ischemia.  Gated SPECT images revealed normal left ventricular size and contractility with ejection fraction of 62%.     Echocardiogram 10/15/2022  Structurally and functionally normal cardiac valves.  Left ventricular size and contractility is normal with ejection fraction of 60%.  Small pericardial effusion.     - Atrial flutter with rapid ventricular response.  Has converted to sinus rhythm 10/17/2022     -Chronic RBBB     - Anticoagulation-patient is on Eliquis     Cardiac cath 10/30/2020 revealed  Left ventricle size and contractility is normal with ejection fraction of 60%.  Left main coronary artery is normal.  Left anterior descending artery is normal.  Circumflex coronary artery is normal.  Right coronary artery has mid segment 50% disease      - Diabetes hypertension COPD dyslipidemia     -Status post hysterectomy cholecystectomy and carpal tunnel surgery.     -Family history is positive for coronary artery disease.     -Smoker- abstinence from smoking was advised.     - No known allergies  ============  Plan  ========   Atrial fibrillation with RVR.-Converted to sinus rhythm.    Has history of atrial flutter with RVR.  Has converted to sinus rhythm 10/17/2022.    Echocardiogram 7/5/2024-Dr. Santos    Left ventricular systolic function is normal. Estimated left ventricular EF = 60%    Left ventricular wall thickness is consistent with mild concentric hypertrophy.    Left ventricular diastolic function is consistent with (grade I) impaired relaxation.    The left atrial cavity is mild to moderately dilated.    There is mild calcification of the aortic valve.  Transthoracic  echocardiography reveals LVEF of 60% with mild LVH.  Mild to moderate left atrial enlargement .  No effusion    EKG 12/21/2023-sinus rhythm right bundle branch block     Chronic right bundle branch block-stable     Anticoagulation  Continue Eliquis 5 mg twice a day.  Observe for toxic effects.     Medications were reviewed and updated.  Patient is on Eliquis Multaq 4 mg twice daily insulin metoprolol XL 50 mg a day.  Patient is off Cardizem and lisinopril.     Abstinence from smoking.    Patient is strongly considering ablation for atrial dysrhythmia.  Have discussed with Dr. Santos regarding this.     Further plan will depend on patient's progress.     Reviewed and updated 7/8/2024.  ]]]]]]]]]]]]]]              Donaldo Archer MD  07/08/24  09:41 EDT

## 2024-07-08 NOTE — CASE MANAGEMENT/SOCIAL WORK
Case Management Discharge Note             Selected Continued Care - Discharged on 7/8/2024 Admission date: 7/5/2024 - Discharge disposition: Home or Self Care          Transportation Services  Private: Car    Final Discharge Disposition Code: 01 - home or self-care

## 2024-07-08 NOTE — CASE MANAGEMENT/SOCIAL WORK
Discharge Planning Assessment   [facility]     Patient Name: Arianna Blanco  MRN: [id number]  Today's Date: 7/8/2024    Admit Date: 7/5/2024    Plan: Anticipate routine home alone.   Discharge Needs Assessment       Row Name 07/08/24 1220       Living Environment    People in Home alone    Current Living Arrangements apartment    Potentially Unsafe Housing Conditions none    In the past 12 months has the electric, gas, oil, or water company threatened to shut off services in your home? No    Primary Care Provided by self    Provides Primary Care For no one    Family Caregiver if Needed child(dilia), adult    Family Caregiver Names Son- Drake    Quality of Family Relationships helpful;involved;supportive    Able to Return to Prior Arrangements yes       Resource/Environmental Concerns    Resource/Environmental Concerns none    Transportation Concerns none       Transportation Needs    In the past 12 months, has lack of transportation kept you from medical appointments or from getting medications? no    In the past 12 months, has lack of transportation kept you from meetings, work, or from getting things needed for daily living? No       Food Insecurity    Within the past 12 months, you worried that your food would run out before you got the money to buy more. Sometimes    Within the past 12 months, the food you bought just didn't last and you didn't have money to get more. Never true       Transition Planning    Patient/Family Anticipates Transition to home    Patient/Family Anticipated Services at Transition none    Transportation Anticipated family or friend will provide       Discharge Needs Assessment    Readmission Within the Last 30 Days no previous admission in last 30 days    Equipment Currently Used at Home cane, straight    Concerns to be Addressed care coordination/care conferences;discharge planning    Anticipated Changes Related to Illness none    Equipment Needed After Discharge none    Provided Post Acute  Provider List? N/A                   Discharge Plan       Row Name 07/08/24 1221       Plan    Plan Anticipate routine home alone.    Patient/Family in Agreement with Plan yes    Plan Comments CM met with patient at bedside to discuss dc planning and social determinants of health screening. PCP and pharmacy confirmed, reported no trouble affording medications, and declined needs at this time for any DME/HH/PT services. Patient reported that she lives alone in an apartment and her son, Drake provides her transportation. Reported that she used to get meal delivery services when she lived in [place] but services stopped when she moved to [place] the beginning of May. Patient inquired about meal services available in [facility]. CM sent request to [facility].                   Demographic Summary       Row Name 07/08/24 1220       General Information    Admission Type inpatient    Arrived From emergency department    Referral Source admission list    Reason for Consult discharge planning    Preferred Language English       Contact Information    Permission Granted to Share Info With                    Functional Status       Row Name 07/08/24 1220       Functional Status    Usual Activity Tolerance moderate    Current Activity Tolerance moderate       Functional Status, IADL    Medications independent    Meal Preparation independent    Housekeeping independent    Laundry independent    Shopping assistive equipment and person                   07/08/24 1222   Living Situation   Current Living Arrangements apartment   Potentially Unsafe Housing Conditions none   Food Insecurity   Within the past 12 months, you worried that your food would run out before you got the money to buy more. Sometimes   Within the past 12 months, the food you bought just didn't last and you didn't have money to get more. Never true   Transportation Needs   In the past 12 months, has lack of transportation kept you from medical  appointments or from getting medications? no   In the past 12 months, has lack of transportation kept you from meetings, work, or from getting things needed for daily living? No   Utilities   In the past 12 months has the electric, gas, oil, or water company threatened to shut off services in your home? No   Abuse Screen (yes response referral indicated)   Feels Unsafe at Home or Work/School no   Feels Threatened by Someone no   Does Anyone Try to Keep You From Having Contact with Others or Doing Things Outside Your Home? no   Physical Signs of Abuse Present no   Financial Resource Strain   How hard is it for you to pay for the very basics like food, housing, medical care, and heating? Somewhat   Employment   Do you want help finding or keeping work or a job? I do not need or want help   Family and Community Support   If for any reason you need help with day-to-day activities such as bathing, preparing meals, shopping, managing finances, etc., do you get the help you need? I get all the help I need   How often do you feel lonely or isolated from those around you? Never   Education   Preferred Language English   Do you want help with school or training? For example, starting or completing job training or getting a high school diploma, GED or equivalent No   Physical Activity   On average, how many days per week do you engage in moderate to strenuous exercise (like a brisk walk)? 0 days   On average, how many minutes do you engage in exercise at this level? 0 min   Number of minutes of exercise per week (!) 0   Alcohol Use   Q1: How often do you have a drink containing alcohol? Never   Q2: How many drinks containing alcohol do you have on a typical day when you are drinking? None   Q3: How often do you have six or more drinks on one occasion? Never   Stress   Do you feel stress - tense, restless, nervous, or anxious, or unable to sleep at night because your mind is troubled all the time - these days? Only a littl    Mental Health   Little Interest or Pleasure in Doing Things 0-->not at all   Feeling Down, Depressed or Hopeless 3-->nearly every day   Disabilities   Concentrating, Remembering or Making Decisions Difficulty no   Doing Errands Independently Difficulty (such as shopping) no   Toshia Iglesias RN     Office Phone: [phone]  Office Cell: [phone]

## 2024-07-08 NOTE — PROGRESS NOTES
CC--AF     Sub  Patient reports being very nauseated this morning.  She denies abdominal pain, vomiting, diarrhea.    Obj  Patient converted pharmacologically with initiation of dronedarone.  Maintaining sinus rhythm at 71 bpm.  Blood pressure 143/70.    Previous history attached below  68-year-old pleasant patient transferred from outlying facility atrial fibrillation with rapid ventricular rate.  Patient started have recurrent AF with symptoms of fatigue tiredness lightheadedness without syncope.  Denies any chest pain or shortness of breath.  Patient was started on intravenous Cardizem and further transferred to our hospital and a consultation requested.  Patient continues to be in atrial fibrillation rates are fairly controlled at this point.  Stress test in the past did not show ischemia with normal EF and echocardiography in the past showed normal EF with a small pericardial effusion.  She has prior history of atrial flutter and right bundle branch block  Previous cardiac catheterization revealed EF of 60% with a mid RCA 50% stenosis which was performed in 2020.  Patient history of diabetes, hypertension, COPD and dyslipidemia.     Medical History        Past Medical History:   Diagnosis Date    COPD (chronic obstructive pulmonary disease)      Depression      Diabetes mellitus      Hyperlipidemia      Hypertension           Surgical History         Past Surgical History:   Procedure Laterality Date    CARDIAC CATHETERIZATION Left 10/30/2020     Procedure: Left Heart Cath with Coronary Angiography;  Surgeon: Donaldo Archer MD;  Location: [facility] CATH INVASIVE LOCATION;  Service: Cardiovascular;  Laterality: Left;    CARPAL TUNNEL RELEASE Bilateral 2017    CHOLECYSTECTOMY   1980    HYSTERECTOMY   1980             Physical Exam    General:      well developed, well nourished, in no acute distress.    Head:      normocephalic and atraumatic.    Eyes:      PERRL/EOM intact, conjunctivae and sclerae clear without  nystagmus.    Neck:      no  thyromegaly, trachea central with normal respiratory effort  Lungs:      clear bilaterally to auscultation.    Heart:       regular rate and rhythm, S1, S2 without murmurs, rubs, or gallops  Skin:      intact without lesions or rashes.    Psych:      alert and cooperative; normal mood and affect; normal attention span and concentration.              CBC         Results from last 7 days   Lab Units 07/06/24  0249   WBC 10*3/mm3 6.74   HEMOGLOBIN g/dL 11.6*   PLATELETS 10*3/mm3 124*      BMP        Results from last 7 days   Lab Units 07/06/24  0249   SODIUM mmol/L 138   POTASSIUM mmol/L 4.3   CHLORIDE mmol/L 107   CO2 mmol/L 22.9   BUN mg/dL 11   CREATININE mg/dL 0.60   GLUCOSE mg/dL 231*   MAGNESIUM mg/dL 1.8   PHOSPHORUS mg/dL 2.4*         Assessment and plan     Intermittent atrial fibrillation with symptoms and history of atrial flutter in the past--- started on dronedarone this admission with conversion to sinus rhythm.  Therapeutic options for recurrent atrial arrhythmias educated including catheter ablation.  Patient considering outpatient ablation treatment.  Continue anticoagulation  History of diabetes being managed by hospitalist  Hypertension controlled with current medical treatment including metoprolol  History of nonobstructive coronary artery disease without angina       Electronically signed by THOMAS Menendez, 07/08/24, 2:12 PM EDT.    See my full note yesterday    Electronically signed by Shay Santos MD, 07/09/24, 8:40 AM EDT.

## 2024-07-08 NOTE — PROGRESS NOTES
CC--AF     Sub  Patient converted pharmacologically with initiation of dronedarone and complains of mild nausea this morning which has resolved     Previous history attached below  68-year-old pleasant patient transferred from outlying facility atrial fibrillation with rapid ventricular rate.  Patient started have recurrent AF with symptoms of fatigue tiredness lightheadedness without syncope.  Denies any chest pain or shortness of breath.  Patient was started on intravenous Cardizem and further transferred to our hospital and a consultation requested.  Patient continues to be in atrial fibrillation rates are fairly controlled at this point.  Stress test in the past did not show ischemia with normal EF and echocardiography in the past showed normal EF with a small pericardial effusion.  She has prior history of atrial flutter and right bundle branch block  Previous cardiac catheterization revealed EF of 60% with a mid RCA 50% stenosis which was performed in 2020.  Patient history of diabetes, hypertension, COPD and dyslipidemia.     Medical History        Past Medical History:   Diagnosis Date    COPD (chronic obstructive pulmonary disease)      Depression      Diabetes mellitus      Hyperlipidemia      Hypertension           Surgical History         Past Surgical History:   Procedure Laterality Date    CARDIAC CATHETERIZATION Left 10/30/2020     Procedure: Left Heart Cath with Coronary Angiography;  Surgeon: Donaldo Archer MD;  Location: [facility] CATH INVASIVE LOCATION;  Service: Cardiovascular;  Laterality: Left;    CARPAL TUNNEL RELEASE Bilateral 2017    CHOLECYSTECTOMY   1980    HYSTERECTOMY   1980             Physical Exam    General:      well developed, well nourished, in no acute distress.    Head:      normocephalic and atraumatic.    Eyes:      PERRL/EOM intact, conjunctivae and sclerae clear without nystagmus.    Neck:      no  thyromegaly, trachea central with normal respiratory effort  Lungs:       clear bilaterally to auscultation.    Heart:       regular rate and rhythm, S1, S2 without murmurs, rubs, or gallops  Skin:      intact without lesions or rashes.    Psych:      alert and cooperative; normal mood and affect; normal attention span and concentration.      Physical exam unchanged compared to above        CBC         Results from last 7 days   Lab Units 07/06/24  0249   WBC 10*3/mm3 6.74   HEMOGLOBIN g/dL 11.6*   PLATELETS 10*3/mm3 124*      BMP        Results from last 7 days   Lab Units 07/06/24  0249   SODIUM mmol/L 138   POTASSIUM mmol/L 4.3   CHLORIDE mmol/L 107   CO2 mmol/L 22.9   BUN mg/dL 11   CREATININE mg/dL 0.60   GLUCOSE mg/dL 231*   MAGNESIUM mg/dL 1.8   PHOSPHORUS mg/dL 2.4*         Assessment and plan     Intermittent atrial fibrillation with symptoms and history of atrial flutter in the past--- started on dronedarone to sinus rhythm--- off Cardizem--patient can be discharged home and scheduled for AF and flutter ablation on outpatient  Discussed with the patient and the family and orders placed  Discussed with Dr. Wilson  Patient currently anticoagulated  History of diabetes being managed by hospitalist  Hypertension controlled with current medical treatment including metoprolol  History of nonobstructive coronary artery disease without angina         Electronically signed by Shay Santos MD, 07/08/24, 2:12 PM EDT.

## 2024-07-08 NOTE — PLAN OF CARE
Goal Outcome Evaluation:  Plan of Care Reviewed With: patient  Pt resting in bed throughout shift, up to chair at times, c/o's of back pain early in shift, treated with prn tylenol with positive effect voiced per pt, remains in normal sinus rhythm, possible discharge home today, resp remain even and unlabored, plan of care ongoing

## 2024-07-08 NOTE — CONSULTS
"Diabetes Education  Assessment/Teaching    Patient Name:  Arianna Blanco  YOB: 1955  MRN: [id number]  Admit Date:  7/5/2024      Assessment Date:  7/8/2024  Flowsheet Row Most Recent Value   General Information     Referral From: A1c, Blood glucose, MD order  [MD consult for blood glucose >200, admission blood sugar 254, and on 4/22/2024 A1c was 7.2%.]   Height 172.7 cm (68\")   Height Method Stated   Weight 79.3 kg (174 lb 13.2 oz)   Weight Method Bed scale   Pregnancy Assessment    Diabetes History    What type of diabetes do you have? Type 2   Length of Diabetes Diagnosis 10 + years  [Patient stated that she was diagnosed about 20 years ago.]   Current DM knowledge fair   Have you had diabetes education/teaching in the past? yes   When and where was your diabetes education? Inpatient hospitalzation at [facility] on 10/17/2022   Do you test your blood sugar at home? yes   Frequency of checks 2X a day   Meter type Accu-chek 2 years   Who performs the test? patient   Typical readings    Have you had high blood sugar? (>140mg/dl) yes   How often do you have high blood sugar? unknown   When was your last high blood sugar? Admission blood sugar 254   Education Preferences    What areas of diabetes would you like to learn about? avoiding high blood sugar, diabetes complications   Nutrition Information    Assessment Topics    Problem Solving - Assessment Needs education   Reducing Risk - Assessment Needs education   DM Goals    Problem Solving - Goal Today   Reducing Risk - Goal Today            Flowsheet Row Most Recent Value   DM Education Needs    Meter Has own   Meter Type Accuchek   Frequency of Testing 2 times a day   Medication Oral, Insulin, Other injectables, Pen  [At home patient stated that she takes Lantus 60 units at bedtime, Apidral 10 units before meals, Metformin 1000 mg BID, and Ozempic 1 mg weekly on Mondays.]   Problem Solving Hyperglycemia, Signs, Symptoms, Treatment   Reducing Risks " A1C testing  [On 4/22/2024 A1c was 7.2%.]   Discharge Plan Home   Motivation Moderate   Teaching Method Discussion, Handouts   Patient Response Verbalized understanding              Other Comments:  A1c info sheet given with discussion on A1c target and healthy blood sugar range. Patient stated she drinks lots of coffee, occasionally water, and 2 regular Cokes a day. Discussed with patient how sugared beverages affect the blood sugar and blood flow. Recommended to patient to decrease soda intake and to minimally alternate with water when drinking soda. Patient has no further questions or concerns related to diabetes at this time.        Electronically signed by:  Annabelle Davies RN  07/08/24 13:02 EDT

## 2024-07-08 NOTE — CASE MANAGEMENT/SOCIAL WORK
Case Management/Social Work    Patient Name:  Arianna Blanco  YOB: 1955  MRN: [id number]  Admit Date:  7/5/2024          SW submitted a referral to [employer] via their website and placed information on Pt's AVS encouraging Pt to follow-up on request for meals.       SEVERINO Mi, LCSW, APHSW-C  Medical Social Worker  [facility] Office:[phone]  [facility] Office: [phone]   [email or web address]

## 2024-07-08 NOTE — OUTREACH NOTE
Prep Survey      Flowsheet Row Responses   [facility] patient discharged from? [place]   Is LACE score < 7 ? No   Eligibility [facility]   Date of Admission 07/05/24   Date of Discharge 07/08/24   Discharge diagnosis Atrial fibrillation with RVR   Does the patient have one of the following disease processes/diagnoses(primary or secondary)? Other   Prep survey completed? Yes            Kathy CALIX - Registered Nurse

## 2024-07-09 ENCOUNTER — TRANSITIONAL CARE MANAGEMENT TELEPHONE ENCOUNTER (OUTPATIENT)
Dept: CALL CENTER | Facility: HOSPITAL | Age: 69
End: 2024-07-09
Payer: MEDICAID

## 2024-07-09 NOTE — TELEPHONE ENCOUNTER
Ambien recently filled.  Buspirone filled today.  Rexulti filled in April with 90 days and 1 refill.

## 2024-07-09 NOTE — OUTREACH NOTE
Call Center TCM Note      Flowsheet Row Responses   [facility] patient discharged from? [facility]   Does the patient have one of the following disease processes/diagnoses(primary or secondary)? Other   TCM attempt successful? Yes   Call start time 1051   Call end time 1052   Discharge diagnosis Atrial fibrillation with RVR   Meds reviewed with patient/caregiver? Yes   Is the patient having any side effects they believe may be caused by any medication additions or changes? No   Does the patient have all medications ordered at discharge? Yes   Is the patient taking all medications as directed (includes completed medication regime)? Yes   Comments 7/22/2024  8:15 AM   Does the patient have an appointment with their PCP within 7-14 days of discharge? Yes   Has home health visited the patient within 72 hours of discharge? N/A   Psychosocial issues? No   Did the patient receive a copy of their discharge instructions? Yes   Nursing interventions Reviewed instructions with patient   What is the patient's perception of their health status since discharge? Same   TCM call completed? Yes   Call end time 1052            Myra Ross RN    7/9/2024, 10:52 EDT

## 2024-07-10 ENCOUNTER — TELEPHONE (OUTPATIENT)
Dept: FAMILY MEDICINE CLINIC | Facility: CLINIC | Age: 69
End: 2024-07-10
Payer: MEDICAID

## 2024-07-10 RX ORDER — BREXPIPRAZOLE 3 MG/1
1 TABLET ORAL DAILY
Qty: 90 TABLET | Refills: 1 | Status: SHIPPED | OUTPATIENT
Start: 2024-07-10

## 2024-07-10 NOTE — TELEPHONE ENCOUNTER
"  Caller: Arianna Blanco    Relationship: Self    Best call back number: [phone]     What medication are you requesting: BUSPAR    If a prescription is needed, what is your preferred pharmacy and phone number:  [facility]/pharmacy #[id number] - [place], [place] - [address] [place] - [phone]  - [phone]      Additional notes: PATIENT STATES THAT SHE WOULD LIKE DOSAGE INCREASED     PATIENT STATES THAT THE DOSAGE THAT SHE IS ON IS NOT WORKING         "

## 2024-07-10 NOTE — TELEPHONE ENCOUNTER
"  Caller: Arianna Blanco    Relationship: Self    Best call back number: [phone]     Requested Prescriptions:   Requested Prescriptions     Pending Prescriptions Disp Refills    Brexpiprazole (Rexulti) 3 MG tablet 90 tablet 1     Sig: Take 1 tablet by mouth Daily.        Pharmacy where request should be sent: [facility]/PHARMACY #[id number] - [place], [place] - [address] [facility] [phone] [facility] [phone]      Last office visit with prescribing clinician: 4/22/2024   Last telemedicine visit with prescribing clinician: Visit date not found   Next office visit with prescribing clinician: 7/22/2024       Does the patient have less than a 3 day supply:  [] Yes  [x] No    "

## 2024-07-10 NOTE — PAYOR COMM NOTE
"D/c notification for case# [id number]     ===========    THANK YOU,    KORI Rodriguez, RN  Utilization Review  [facility]  Phone: [phone]  Fax: [phone]      NPI: [id number]  Tax ID: [id number]        Arianna Blanco (68 y.o. Female)       Date of Birth   1955    Social Security Number       Address   [address]  [place] [address]    Home Phone   [phone]    MRN   [id number]       [place]   [facility]    Marital Status                               Admission Date   24    Admission Type   Urgent    Admitting Provider   Kath Forte MD    Attending Provider       Department, Room/Bed   [facility] PROGRESS CARE,        Discharge Date   2024    Discharge Disposition   Home or Self Care    Discharge Destination   Home                              Attending Provider: (none)   Allergies: Codeine    Isolation: None   Infection: None   Code Status: Prior    Ht: 172.7 cm (68\")   Wt: 79.3 kg (174 lb 13.2 oz)    Admission Cmt: None   Principal Problem: Atrial fibrillation with RVR [I48.91]                   Active Insurance as of 2024       Primary Coverage       Payor Plan Insurance Group Employer/Plan Group    [id number]       Payor Plan Address Payor Plan Phone Number Payor Plan Fax Number Effective Dates    MAIL STOP:   2024 - None Entered    [address] [address]       [facility] [id number]         Subscriber Name Subscriber Birth Date Member ID       ARIANNA BLANCO 1955 [id number]                     Emergency Contacts        (Rel.) Home Phone Work Phone Mobile Phone    JENNIFER MARIEE (Son) -- -- [phone]                 Discharge Summary        Bossman Evans MD at [address] [place] [address] Medicine Services  Discharge Summary    Date of Service: 2024  Patient Name: Arianna Blanco  : 1955  MRN: " [id number]    Date of Admission: 7/5/2024  Discharge Diagnosis:   A-fib with RVR  DM type II, controlled  Hypertension  Anxiety with depression  Anemia of chronic disease  Thrombocytopenia, chronic    Date of Discharge: 7/8/2024  Primary Care Physician: Maddie Cornejo APRN      Presenting Problem:   Atrial fibrillation with RVR [I48.91]    Active and Resolved Hospital Problems:  Active Hospital Problems    Diagnosis POA    **Atrial fibrillation with RVR [I48.91] Yes      Resolved Hospital Problems   No resolved problems to display.         Hospital Course     HPI:  Patient is a 68-year-old female who presented to the hospital complaints of palpitations.  Please see H&P for details.    Hospital Course:  The patient was admitted to the hospital for A-fib with RVR.  She was initiated on continuous diltiazem infusion.  She was seen by cardiology and had also received adenosine and digoxin at the outside ED although she remained tachycardic.  Cardiology initiated the patient on Toprol-XL at increased dose as well as Multaq.  She was weaned off of Cardizem drip.  She remains in normal sinus rhythm currently.  She will continue on Toprol-XL and Multaq on discharge.  I will decrease her home lisinopril dose to allow for up titration of beta-blocker therapy.  She will need to follow-up with cardiology in 2 to 4 weeks.  She will continue her anticoagulation with Eliquis.  She is stable for discharge.        DISCHARGE Follow Up Recommendations for labs and diagnostics: Follow-up with cardiology in 2 to 4 weeks.      Reasons For Change In Medications and Indications for New Medications:      Day of Discharge     Vital Signs:  Temp:  [96.6 °F (35.9 °C)-98.9 °F (37.2 °C)] 98.6 °F (37 °C)  Heart Rate:  [68-82] 70  Resp:  [14-20] 18  BP: (127-154)/(68-81) 145/71    Physical Exam:  Physical Exam   General Appearance:  Alert, cooperative, no distress, appears stated age  Head:  Normocephalic, without obvious abnormality,  atraumatic  Eyes:  PERRL, conjunctiva/corneas clear, EOM's intact, fundi benign, both eyes  Ears:  Normal TM's and external ear canals, both ears  Nose: Nares normal, septum midline, mucosa normal, no drainage or sinus tenderness  Throat: Lips, mucosa, and tongue normal; teeth and gums normal  Neck: Supple, symmetrical, trachea midline, no adenopathy, thyroid: not enlarged, symmetric, no tenderness/mass/nodules, no carotid bruit or JVD  Lungs:   Clear to auscultation bilaterally, respirations unlabored  Heart:  Regular rate and rhythm, S1, S2 normal, no murmur, rub or gallop  Abdomen:  Soft, non-tender, bowel sounds active all four quadrants,  no masses, no organomegaly  Extremities: Extremities normal, atraumatic, no cyanosis or edema  Pulses: 2+ and symmetric  Skin: Skin color, texture, turgor normal, no rashes or lesions  Neurologic: Normal        Pertinent  and/or Most Recent Results     LAB RESULTS:      Lab 07/06/24  0249   WBC 6.74   HEMOGLOBIN 11.6*   HEMATOCRIT 35.9   PLATELETS 124*   NEUTROS ABS 3.60   IMMATURE GRANS (ABS) 0.02   LYMPHS ABS 2.31   MONOS ABS 0.60   EOS ABS 0.17   MCV 88.9         Lab 07/06/24  0249   SODIUM 138   POTASSIUM 4.3   CHLORIDE 107   CO2 22.9   ANION GAP 8.1   BUN 11   CREATININE 0.60   EGFR 98.5   GLUCOSE 231*   CALCIUM 8.8   MAGNESIUM 1.8   PHOSPHORUS 2.4*   TSH 2.530                     Lab 07/06/24  0249   IRON 99   IRON SATURATION (TSAT) 23   TIBC 422   TRANSFERRIN 283         Brief Urine Lab Results  (Last result in the past 365 days)        Color   Clarity   Blood   Leuk Est   Nitrite   Protein   CREAT   Urine HCG        08/14/23 1559             64.3               Microbiology Results (last 10 days)       ** No results found for the last 240 hours. **                         Results for orders placed during the hospital encounter of 07/05/24    Adult Transthoracic Echo Complete W/ Cont if Necessary Per Protocol    Interpretation Summary    Left ventricular systolic  function is normal. Estimated left ventricular EF = 60%    Left ventricular wall thickness is consistent with mild concentric hypertrophy.    Left ventricular diastolic function is consistent with (grade I) impaired relaxation.    The left atrial cavity is mild to moderately dilated.    There is mild calcification of the aortic valve.    Transthoracic echocardiography reveals LVEF of 60% with mild LVH.  Mild to moderate left atrial enlargement .  No effusion      Electronically signed by Shay Santos MD, 07/07/24, 5:17 PM EDT.      Labs Pending at Discharge:      Procedures Performed           Consults:   Consults       Date and Time Order Name Status Description    7/5/2024  8:54 PM Inpatient Cardiology Consult                Discharge Details        Discharge Medications        New Medications        Instructions Start Date   dronedarone 400 MG tablet  Commonly known as: MULTAQ   400 mg, Oral, 2 Times Daily With Meals             Changes to Medications        Instructions Start Date   Apidra 100 UNIT/ML injection  Generic drug: insulin glulisine  What changed: how much to take   5 Units, Subcutaneous, 3 Times Daily Before Meals      lisinopril 10 MG tablet  Commonly known as: PRINIVILZESTRIL  What changed:   medication strength  how much to take   10 mg, Oral, Daily      metoprolol succinate XL 50 MG 24 hr tablet  Commonly known as: TOPROL-XL  What changed:   medication strength  how much to take   50 mg, Oral, Daily   Start Date: July 9, 2024            Continue These Medications        Instructions Start Date   Accu-Chek Guide test strip  Generic drug: glucose blood   1 each, Other, 4 Times Daily, Use to test blood sugar 4 times daily      albuterol sulfate  (90 Base) MCG/ACT inhaler  Commonly known as: PROVENTIL HFA;VENTOLIN HFA;PROAIR HFA   INHALE 2 PUFFS BY MOUTH EVERY 4 HOURS AS NEEDED FOR WHEEZING OR SHORTNESS OF AIR      baclofen 20 MG tablet  Commonly known as: LIORESAL   20 mg, Oral, 3  Times Daily      Briefs Overnight Large misc   1 each, Does not apply, Nightly PRN      busPIRone 7.5 MG tablet  Commonly known as: BUSPAR   7.5 mg, Oral, Nightly PRN      butalbital-acetaminophen  MG tablet tablet   1 tablet, Oral, Every 6 Hours PRN      Eliquis 5 MG tablet tablet  Generic drug: apixaban   5 mg, Oral, Every 12 Hours Scheduled      FreeStyle Willie 2 Empire device   1 each, Does not apply, Daily, To be used daily to monitor continuous blood sugar reading for type 2 diabetic patient on insulin regimen.      FreeStyle Willie 2 Sensor misc   1 each, Does not apply, Every 14 Days, Dispense 2 sensor pack. To be used daily with freestyle willie device to monitor continuous blood sugar reading for type 2 diabetic patient on insulin regimen.      Insulin Glargine 100 UNIT/ML injection pen  Commonly known as: LANTUS SOLOSTAR   60 Units, Subcutaneous, Nightly      metFORMIN 1000 MG tablet  Commonly known as: GLUCOPHAGE   1,000 mg, Oral, 2 Times Daily With Meals      Ozempic (1 MG/DOSE) 4 MG/3ML solution pen-injector  Generic drug: Semaglutide (1 MG/DOSE)   1 mg, Subcutaneous, Every 7 Days      pregabalin 75 MG capsule  Commonly known as: LYRICA   75 mg, Oral, 2 Times Daily      Rexulti 3 MG tablet  Generic drug: Brexpiprazole   3 mg, Oral, Daily      sertraline 100 MG tablet  Commonly known as: ZOLOFT   100 mg, Oral, Daily      zolpidem 10 MG tablet  Commonly known as: AMBIEN   10 mg, Oral, Nightly PRN             Stop These Medications      dilTIAZem  MG 24 hr capsule  Commonly known as: CARDIZEM CD              Allergies   Allergen Reactions    Codeine GI Intolerance         Discharge Disposition:     Home or Self Care    Diet:  Hospital:  Diet Order   Procedures    Diet: Cardiac, Diabetic; Healthy Heart (2-3 Na+); Consistent Carbohydrate; Fluid Consistency: Thin (IDDSI 0)         Discharge Activity:         CODE STATUS:  Code Status and Medical Interventions:   Ordered at: 07/05/24 2054     Code  Status (Patient has no pulse and is not breathing):    CPR (Attempt to Resuscitate)     Medical Interventions (Patient has pulse or is breathing):    Full Support         Future Appointments   Date Time Provider Department Center   7/8/2024  3:15 PM Maddie Cornejo APRN [facility]   7/22/2024  8:15 AM Maddie Cornejo APRN [facility]   7/26/2024  3:30 PM Jamari Lane MD [facility]       Additional Instructions for the Follow-ups that You Need to Schedule       Discharge Follow-up with Specified Provider: Follow-up with cardiology in 2 to 4 weeks.; 2 Weeks   As directed      To: Follow-up with cardiology in 2 to 4 weeks.   Follow Up: 2 Weeks                Time spent on Discharge including face to face service:  >30 minutes    Signature: Electronically signed by Bossman Evans MD, 07/08/24, 13:04 EDT.  [facility] Hospitalist Team    Electronically signed by Bossman Evans MD at 07/08/24 1306       Discharge Order (From admission, onward)       Start     Ordered    07/08/24 1301  Discharge patient  Once        Expected Discharge Date: 07/08/24   Discharge Disposition: Home or Self Care   Physician of Record for Attribution - Please select from Treatment Team: BOSSMAN EVANS [[id number]]   Review needed by CMO to determine Physician of Record: No      Question Answer Comment   Physician of Record for Attribution - Please select from Treatment Team BOSSMAN EVANS    Review needed by CMO to determine Physician of Record No        07/08/24 1305

## 2024-07-11 ENCOUNTER — HOSPITAL ENCOUNTER (OUTPATIENT)
Facility: HOSPITAL | Age: 69
Setting detail: OBSERVATION
Discharge: HOME | End: 2024-07-12
Attending: EMERGENCY MEDICINE | Admitting: EMERGENCY MEDICINE
Payer: MEDICAID

## 2024-07-11 ENCOUNTER — READMISSION MANAGEMENT (OUTPATIENT)
Dept: CALL CENTER | Facility: HOSPITAL | Age: 69
End: 2024-07-11
Payer: MEDICAID

## 2024-07-11 ENCOUNTER — APPOINTMENT (OUTPATIENT)
Dept: GENERAL RADIOLOGY | Facility: HOSPITAL | Age: 69
End: 2024-07-11
Payer: MEDICAID

## 2024-07-11 DIAGNOSIS — R07.9 CHEST PAIN, UNSPECIFIED TYPE: Primary | ICD-10-CM

## 2024-07-11 DIAGNOSIS — F41.9 ANXIETY: ICD-10-CM

## 2024-07-11 DIAGNOSIS — R53.83 FATIGUE, UNSPECIFIED TYPE: ICD-10-CM

## 2024-07-11 LAB
ALBUMIN SERPL-MCNC: 3.8 G/DL (ref 3.5–5.2)
ALBUMIN/GLOB SERPL: 1.5 G/DL
ALP SERPL-CCNC: 106 U/L (ref 39–117)
ALT SERPL W P-5'-P-CCNC: 30 U/L (ref 1–33)
ANION GAP SERPL CALCULATED.3IONS-SCNC: 10.9 MMOL/L (ref 5–15)
APTT PPP: 40.1 SECONDS (ref 61–76.5)
AST SERPL-CCNC: 26 U/L (ref 1–32)
BASOPHILS # BLD AUTO: 0.04 10*3/MM3 (ref 0–0.2)
BASOPHILS NFR BLD AUTO: 0.4 % (ref 0–1.5)
BILIRUB SERPL-MCNC: <0.2 MG/DL (ref 0–1.2)
BUN SERPL-MCNC: 9 MG/DL (ref 8–23)
BUN/CREAT SERPL: 11.7 (ref 7–25)
CALCIUM SPEC-SCNC: 9.1 MG/DL (ref 8.6–10.5)
CHLORIDE SERPL-SCNC: 103 MMOL/L (ref 98–107)
CO2 SERPL-SCNC: 23.1 MMOL/L (ref 22–29)
CREAT SERPL-MCNC: 0.77 MG/DL (ref 0.57–1)
DEPRECATED RDW RBC AUTO: 44.9 FL (ref 37–54)
EGFRCR SERPLBLD CKD-EPI 2021: 84.7 ML/MIN/1.73
EOSINOPHIL # BLD AUTO: 0.23 10*3/MM3 (ref 0–0.4)
EOSINOPHIL NFR BLD AUTO: 2.5 % (ref 0.3–6.2)
ERYTHROCYTE [DISTWIDTH] IN BLOOD BY AUTOMATED COUNT: 13.9 % (ref 12.3–15.4)
GEN 5 2HR TROPONIN T REFLEX: 21 NG/L
GLOBULIN UR ELPH-MCNC: 2.5 GM/DL
GLUCOSE BLDC GLUCOMTR-MCNC: 157 MG/DL (ref 70–105)
GLUCOSE SERPL-MCNC: 173 MG/DL (ref 65–99)
HCT VFR BLD AUTO: 33.1 % (ref 34–46.6)
HGB BLD-MCNC: 11 G/DL (ref 12–15.9)
IMM GRANULOCYTES # BLD AUTO: 0.03 10*3/MM3 (ref 0–0.05)
IMM GRANULOCYTES NFR BLD AUTO: 0.3 % (ref 0–0.5)
INR PPP: 0.99 (ref 0.93–1.1)
LYMPHOCYTES # BLD AUTO: 2.52 10*3/MM3 (ref 0.7–3.1)
LYMPHOCYTES NFR BLD AUTO: 27.5 % (ref 19.6–45.3)
MAGNESIUM SERPL-MCNC: 1.6 MG/DL (ref 1.6–2.4)
MCH RBC QN AUTO: 29.4 PG (ref 26.6–33)
MCHC RBC AUTO-ENTMCNC: 33.2 G/DL (ref 31.5–35.7)
MCV RBC AUTO: 88.5 FL (ref 79–97)
MONOCYTES # BLD AUTO: 0.57 10*3/MM3 (ref 0.1–0.9)
MONOCYTES NFR BLD AUTO: 6.2 % (ref 5–12)
NEUTROPHILS NFR BLD AUTO: 5.76 10*3/MM3 (ref 1.7–7)
NEUTROPHILS NFR BLD AUTO: 63.1 % (ref 42.7–76)
NRBC BLD AUTO-RTO: 0 /100 WBC (ref 0–0.2)
NT-PROBNP SERPL-MCNC: 249 PG/ML (ref 0–900)
PLATELET # BLD AUTO: 140 10*3/MM3 (ref 140–450)
PMV BLD AUTO: 12.5 FL (ref 6–12)
POTASSIUM SERPL-SCNC: 3.9 MMOL/L (ref 3.5–5.2)
PROT SERPL-MCNC: 6.3 G/DL (ref 6–8.5)
PROTHROMBIN TIME: 10.8 SECONDS (ref 9.6–11.7)
RBC # BLD AUTO: 3.74 10*6/MM3 (ref 3.77–5.28)
SODIUM SERPL-SCNC: 137 MMOL/L (ref 136–145)
TROPONIN T DELTA: -2 NG/L
TROPONIN T SERPL HS-MCNC: 23 NG/L
WBC NRBC COR # BLD AUTO: 9.15 10*3/MM3 (ref 3.4–10.8)
WHOLE BLOOD HOLD COAG: NORMAL
WHOLE BLOOD HOLD SPECIMEN: NORMAL

## 2024-07-11 PROCEDURE — 36415 COLL VENOUS BLD VENIPUNCTURE: CPT

## 2024-07-11 PROCEDURE — G0378 HOSPITAL OBSERVATION PER HR: HCPCS

## 2024-07-11 PROCEDURE — 93005 ELECTROCARDIOGRAM TRACING: CPT

## 2024-07-11 PROCEDURE — 83735 ASSAY OF MAGNESIUM: CPT | Performed by: EMERGENCY MEDICINE

## 2024-07-11 PROCEDURE — 71045 X-RAY EXAM CHEST 1 VIEW: CPT

## 2024-07-11 PROCEDURE — 82948 REAGENT STRIP/BLOOD GLUCOSE: CPT

## 2024-07-11 PROCEDURE — 99285 EMERGENCY DEPT VISIT HI MDM: CPT

## 2024-07-11 PROCEDURE — 85025 COMPLETE CBC W/AUTO DIFF WBC: CPT | Performed by: EMERGENCY MEDICINE

## 2024-07-11 PROCEDURE — 85610 PROTHROMBIN TIME: CPT | Performed by: EMERGENCY MEDICINE

## 2024-07-11 PROCEDURE — 83880 ASSAY OF NATRIURETIC PEPTIDE: CPT | Performed by: EMERGENCY MEDICINE

## 2024-07-11 PROCEDURE — 80053 COMPREHEN METABOLIC PANEL: CPT | Performed by: EMERGENCY MEDICINE

## 2024-07-11 PROCEDURE — 84484 ASSAY OF TROPONIN QUANT: CPT

## 2024-07-11 PROCEDURE — 85730 THROMBOPLASTIN TIME PARTIAL: CPT | Performed by: EMERGENCY MEDICINE

## 2024-07-11 RX ORDER — SODIUM CHLORIDE 0.9 % (FLUSH) 0.9 %
10 SYRINGE (ML) INJECTION AS NEEDED
Status: DISCONTINUED | OUTPATIENT
Start: 2024-07-11 | End: 2024-07-12 | Stop reason: HOSPADM

## 2024-07-11 RX ORDER — BACLOFEN 10 MG/1
20 TABLET ORAL ONCE
Status: COMPLETED | OUTPATIENT
Start: 2024-07-11 | End: 2024-07-11

## 2024-07-11 RX ORDER — POLYETHYLENE GLYCOL 3350 17 G/17G
17 POWDER, FOR SOLUTION ORAL DAILY PRN
Status: DISCONTINUED | OUTPATIENT
Start: 2024-07-11 | End: 2024-07-12 | Stop reason: HOSPADM

## 2024-07-11 RX ORDER — MORPHINE SULFATE 2 MG/ML
1 INJECTION, SOLUTION INTRAMUSCULAR; INTRAVENOUS EVERY 4 HOURS PRN
Status: DISCONTINUED | OUTPATIENT
Start: 2024-07-11 | End: 2024-07-12 | Stop reason: HOSPADM

## 2024-07-11 RX ORDER — ALBUTEROL SULFATE 2.5 MG/3ML
2.5 SOLUTION RESPIRATORY (INHALATION) EVERY 6 HOURS PRN
Status: DISCONTINUED | OUTPATIENT
Start: 2024-07-11 | End: 2024-07-12 | Stop reason: HOSPADM

## 2024-07-11 RX ORDER — NITROGLYCERIN 0.4 MG/1
0.4 TABLET SUBLINGUAL
Status: DISCONTINUED | OUTPATIENT
Start: 2024-07-11 | End: 2024-07-12 | Stop reason: HOSPADM

## 2024-07-11 RX ORDER — PREGABALIN 75 MG/1
75 CAPSULE ORAL ONCE
Status: COMPLETED | OUTPATIENT
Start: 2024-07-11 | End: 2024-07-11

## 2024-07-11 RX ORDER — ZOLPIDEM TARTRATE 5 MG/1
10 TABLET ORAL ONCE
Status: COMPLETED | OUTPATIENT
Start: 2024-07-11 | End: 2024-07-11

## 2024-07-11 RX ORDER — BISACODYL 10 MG
10 SUPPOSITORY, RECTAL RECTAL DAILY PRN
Status: DISCONTINUED | OUTPATIENT
Start: 2024-07-11 | End: 2024-07-12 | Stop reason: HOSPADM

## 2024-07-11 RX ORDER — ONDANSETRON 2 MG/ML
4 INJECTION INTRAMUSCULAR; INTRAVENOUS EVERY 6 HOURS PRN
Status: DISCONTINUED | OUTPATIENT
Start: 2024-07-11 | End: 2024-07-12 | Stop reason: HOSPADM

## 2024-07-11 RX ORDER — UREA 10 %
5 LOTION (ML) TOPICAL NIGHTLY PRN
Status: DISCONTINUED | OUTPATIENT
Start: 2024-07-11 | End: 2024-07-12 | Stop reason: HOSPADM

## 2024-07-11 RX ORDER — NALOXONE HCL 0.4 MG/ML
0.4 VIAL (ML) INJECTION
Status: DISCONTINUED | OUTPATIENT
Start: 2024-07-11 | End: 2024-07-12 | Stop reason: HOSPADM

## 2024-07-11 RX ORDER — BUSPIRONE HYDROCHLORIDE 7.5 MG/1
15 TABLET ORAL 3 TIMES DAILY
Qty: 90 TABLET | Refills: 0 | Status: SHIPPED | OUTPATIENT
Start: 2024-07-11

## 2024-07-11 RX ORDER — BISACODYL 5 MG/1
5 TABLET, DELAYED RELEASE ORAL DAILY PRN
Status: DISCONTINUED | OUTPATIENT
Start: 2024-07-11 | End: 2024-07-12 | Stop reason: HOSPADM

## 2024-07-11 RX ORDER — AMOXICILLIN 250 MG
2 CAPSULE ORAL 2 TIMES DAILY PRN
Status: DISCONTINUED | OUTPATIENT
Start: 2024-07-11 | End: 2024-07-12 | Stop reason: HOSPADM

## 2024-07-11 RX ORDER — BUSPIRONE HYDROCHLORIDE 15 MG/1
15 TABLET ORAL ONCE
Status: COMPLETED | OUTPATIENT
Start: 2024-07-11 | End: 2024-07-11

## 2024-07-11 RX ADMIN — BUSPIRONE HYDROCHLORIDE 15 MG: 15 TABLET ORAL at 22:15

## 2024-07-11 RX ADMIN — BACLOFEN 20 MG: 10 TABLET ORAL at 22:15

## 2024-07-11 RX ADMIN — ZOLPIDEM TARTRATE 10 MG: 5 TABLET ORAL at 22:15

## 2024-07-11 RX ADMIN — PREGABALIN 75 MG: 75 CAPSULE ORAL at 22:15

## 2024-07-11 RX ADMIN — APIXABAN 5 MG: 5 TABLET, FILM COATED ORAL at 22:15

## 2024-07-11 NOTE — ED NOTES
Nursing report ED to floor  Arianna Blanco  68 y.o.  female    HPI:   Chief Complaint   Patient presents with    Chest Pain       Admitting doctor:   Scott Wynn MD    Admitting diagnosis:   The primary encounter diagnosis was Chest pain, unspecified type. A diagnosis of Fatigue, unspecified type was also pertinent to this visit.    Code status:   Current Code Status       Date Active Code Status Order ID Comments User Context       Prior            Allergies:   Codeine    Isolation:  No active isolations     Fall Risk:  Fall Risk Assessment was completed, and patient is at moderate risk for falls.   Predictive Model Details         5 (Low) Factor Value    Calculated 7/11/2024 18:12 Age 68    Risk of Fall Model Active Peripheral IV Present     Imaging order in this encounter Present     Magnesium 1.6 mg/dL     Respiratory Rate 11     Diastolic BP 70     Tobacco Use Current     Rikki Scale not on file     Albumin 3.8 g/dL     Calcium 9.1 mg/dL     Cardiac Assessment X     ALT 30 U/L     Total Bilirubin <0.2 mg/dL     Chloride 103 mmol/L     Days after Admission 0.102     Potassium 3.9 mmol/L     Creatinine 0.77 mg/dL         Weight:       07/11/24  1541   Weight: 78.9 kg (174 lb)       Intake and Output  No intake or output data in the 24 hours ending 07/11/24 1812    Diet:   Dietary Orders (From admission, onward)       Start     Ordered    07/12/24 0001  NPO Diet NPO Type: Strict NPO  Diet Effective Midnight        Question:  NPO Type  Answer:  Strict NPO    07/11/24 1730    07/12/24 0001  NPO Diet NPO Type: Sips with Meds  (Procedure Panel)  Diet Effective Midnight        Question:  NPO Type  Answer:  Sips with Meds    07/11/24 1730                     Most recent vitals:   Vitals:    07/11/24 1541 07/11/24 1755   BP: 113/82 130/70   BP Location: Right arm    Patient Position: Sitting    Pulse: 62 68   Resp: 20 11   Temp: 98.9 °F (37.2 °C)    TempSrc: Oral    SpO2: 94% 95%   Weight: 78.9 kg (174 lb)   "  Height: 172.7 cm (68\")        Active LDAs/IV Access:   Lines, Drains & Airways       Active LDAs       Name Placement date Placement time Site Days    Peripheral IV 07/11/24 1628 Right Antecubital 07/11/24 1628  Antecubital  less than 1                    Skin Condition:   Skin Assessments (last day)       None             Labs (abnormal labs have a star):   Labs Reviewed   TROPONIN - Abnormal; Notable for the following components:       Result Value    HS Troponin T 23 (*)     All other components within normal limits    Narrative:     High Sensitive Troponin T Reference Range:  <14.0 ng/L- Negative Female for AMI  <22.0 ng/L- Negative Male for AMI  >=14 - Abnormal Female indicating possible myocardial injury.  >=22 - Abnormal Male indicating possible myocardial injury.   Clinicians would have to utilize clinical acumen, EKG, Troponin, and serial changes to determine if it is an Acute Myocardial Infarction or myocardial injury due to an underlying chronic condition.        COMPREHENSIVE METABOLIC PANEL - Abnormal; Notable for the following components:    Glucose 173 (*)     All other components within normal limits    Narrative:     GFR Normal >60  Chronic Kidney Disease <60  Kidney Failure <15     APTT - Abnormal; Notable for the following components:    PTT 40.1 (*)     All other components within normal limits   CBC WITH AUTO DIFFERENTIAL - Abnormal; Notable for the following components:    RBC 3.74 (*)     Hemoglobin 11.0 (*)     Hematocrit 33.1 (*)     MPV 12.5 (*)     All other components within normal limits   PROTIME-INR - Normal   BNP (IN-HOUSE) - Normal    Narrative:     This assay is used as an aid in the diagnosis of individuals suspected of having heart failure. It can be used as an aid in the diagnosis of acute decompensated heart failure (ADHF) in patients presenting with signs and symptoms of ADHF to the emergency department (ED). In addition, NT-proBNP of <300 pg/mL indicates ADHF is not " likely.    Age Range Result Interpretation  NT-proBNP Concentration (pg/mL:      <50             Positive            >450                   Gray                 300-450                    Negative             <300    50-75           Positive            >900                  Gray                300-900                  Negative            <300      >75             Positive            >1800                  Gray                300-1800                  Negative            <300   MAGNESIUM - Normal   RAINBOW DRAW    Narrative:     The following orders were created for panel order [place] Draw.  Procedure                               Abnormality         Status                     ---------                               -----------         ------                     Lavender Top[[id number]]                                     Final result               Light Blue Top[[id number]]                                   Final result                 Please view results for these tests on the individual orders.   HIGH SENSITIVITIY TROPONIN T 2HR   LAVENDER TOP   LIGHT BLUE TOP   CBC AND DIFFERENTIAL    Narrative:     The following orders were created for panel order CBC & Differential.  Procedure                               Abnormality         Status                     ---------                               -----------         ------                     CBC Auto Differential[[id number]]        Abnormal            Final result                 Please view results for these tests on the individual orders.       LOC: Person, Place, Time, and Situation    Telemetry:  Observation Unit    Cardiac Monitoring Ordered: yes    EKG:   ECG 12 Lead Chest Pain   Preliminary Result   HEART RATE=66  bpm   RR Hchdqypi=178  ms   HI Pmdpojlm=900  ms   P Horizontal Axis=-5  deg   P Front Axis=68  deg   QRSD Tgugwjww=860  ms   QT Ixbkjfls=998  ms   RGqZ=832  ms   QRS Axis=83  deg   T Wave Axis=44  deg   - ABNORMAL ECG -   Sinus rhythm   Probable left  atrial enlargement   Right bundle branch block   Consider anteroseptal infarct   Date and Time of Study:2024-07-11 16:02:52          Medications Given in the ED:   Medications   sodium chloride 0.9 % flush 10 mL (has no administration in time range)   ondansetron (ZOFRAN) injection 4 mg (has no administration in time range)   melatonin tablet 5 mg (has no administration in time range)   nitroglycerin (NITROSTAT) SL tablet 0.4 mg (has no administration in time range)   nitroglycerin (NITROSTAT) SL tablet 0.4 mg (has no administration in time range)   morphine injection 1 mg (has no administration in time range)     And   naloxone (NARCAN) injection 0.4 mg (has no administration in time range)   sennosides-docusate (PERICOLACE) 8.6-50 MG per tablet 2 tablet (has no administration in time range)     And   polyethylene glycol (MIRALAX) packet 17 g (has no administration in time range)     And   bisacodyl (DULCOLAX) EC tablet 5 mg (has no administration in time range)     And   bisacodyl (DULCOLAX) suppository 10 mg (has no administration in time range)       Imaging results:  XR Chest 1 View    Result Date: 7/11/2024  Impression: No acute process. Electronically Signed: Chasity Pickard MD  7/11/2024 4:28 PM EDT  Workstation ID: [id number]     Social issues:   Social History     Socioeconomic History    Marital status:    Tobacco Use    Smoking status: Every Day     Current packs/day: 1.00     Average packs/day: 1 pack/day for 41.5 years (41.5 ttl pk-yrs)     Types: Cigarettes     Start date: 1983     Passive exposure: Current    Smokeless tobacco: Never   Vaping Use    Vaping status: Some Days    Substances: Nicotine, Flavoring    Devices: Refillable tank   Substance and Sexual Activity    Alcohol use: No    Drug use: Never    Sexual activity: Defer       NIH Stroke Scale:  Interval: (not recorded)  1a. Level of Consciousness: (not recorded)  1b. LOC Questions: (not recorded)  1c. LOC Commands: (not recorded)  2.  Best Gaze: (not recorded)  3. Visual: (not recorded)  4. Facial Palsy: (not recorded)  5a. Motor Arm, Left: (not recorded)  5b. Motor Arm, Right: (not recorded)  6a. Motor Leg, Left: (not recorded)  6b. Motor Leg, Right: (not recorded)  7. Limb Ataxia: (not recorded)  8. Sensory: (not recorded)  9. Best Language: (not recorded)  10. Dysarthria: (not recorded)  11. Extinction and Inattention (formerly Neglect): (not recorded)    Total (NIH Stroke Scale): (not recorded)     Additional notable assessment information:     Nursing report ED to floor:  Marcia Herbert RN   07/11/24 18:12 EDT

## 2024-07-11 NOTE — ED PROVIDER NOTES
Subjective   History of Present Illness  Chief complaint: Chest pain    68-year-old female presents with chest pain.  Patient states pain actually occurred yesterday.  She describes it as a pressure sensation on her chest.  She also feels very weak and fatigued in general.  She denies any other specific complaints.  She was recently in the hospital with atrial fibrillation with RVR.  She states she is scheduled to have an ablation.  The cardiology office called her today to reschedule the ablation procedure and she told them how she was feeling and they recommended she come back to the hospital.  She states she is not actually having chest pain currently.    History provided by:  Patient      Review of Systems   Constitutional:  Positive for fatigue. Negative for fever.   HENT:  Negative for congestion.    Respiratory:  Negative for cough and shortness of breath.    Cardiovascular:  Positive for chest pain.   Gastrointestinal:  Negative for abdominal pain and vomiting.   Musculoskeletal:  Negative for back pain.   Neurological:  Positive for weakness. Negative for headaches.   Psychiatric/Behavioral:  Negative for confusion.        Past Medical History:   Diagnosis Date    COPD (chronic obstructive pulmonary disease)     Depression     Diabetes mellitus     Hyperlipidemia     Hypertension        Allergies   Allergen Reactions    Codeine GI Intolerance       Past Surgical History:   Procedure Laterality Date    CARDIAC CATHETERIZATION Left 10/30/2020    Procedure: Left Heart Cath with Coronary Angiography;  Surgeon: Donaldo Archer MD;  Location: [facility] CATH INVASIVE LOCATION;  Service: Cardiovascular;  Laterality: Left;    CARPAL TUNNEL RELEASE Bilateral 2017    CHOLECYSTECTOMY  1980    HYSTERECTOMY  1980       Family History   Problem Relation Age of Onset    Heart disease Mother     Hypertension Mother     Diabetes Mother     Stroke Mother        Social History     Socioeconomic History    Marital status:  "   Tobacco Use    Smoking status: Every Day     Current packs/day: 1.00     Average packs/day: 1 pack/day for 41.5 years (41.5 ttl pk-yrs)     Types: Cigarettes     Start date: 1983     Passive exposure: Current    Smokeless tobacco: Never   Vaping Use    Vaping status: Some Days    Substances: Nicotine, Flavoring    Devices: Refillable tank   Substance and Sexual Activity    Alcohol use: No    Drug use: Never    Sexual activity: Defer       /82 (BP Location: Right arm, Patient Position: Sitting)   Pulse 62   Temp 98.9 °F (37.2 °C) (Oral)   Resp 20   Ht 172.7 cm (68\")   Wt 78.9 kg (174 lb)   SpO2 94%   BMI 26.46 kg/m²       Objective   Physical Exam  Vitals and nursing note reviewed.   Constitutional:       Appearance: She is well-developed.   HENT:      Head: Normocephalic and atraumatic.   Cardiovascular:      Rate and Rhythm: Normal rate and regular rhythm.      Heart sounds: Normal heart sounds.   Pulmonary:      Effort: Pulmonary effort is normal. No respiratory distress.      Breath sounds: Normal breath sounds.   Abdominal:      Palpations: Abdomen is soft.      Tenderness: There is no abdominal tenderness.   Musculoskeletal:      Right lower leg: No tenderness. No edema.      Left lower leg: No tenderness. No edema.   Skin:     General: Skin is warm and dry.   Neurological:      Mental Status: She is alert and oriented to person, place, and time.         Procedures           ED Course      My interpretation of EKG shows sinus rhythm, rate of 66, right bundle branch block, no ST elevation          HEART Score: 6                  Results for orders placed or performed during the hospital encounter of 07/11/24   High Sensitivity Troponin T    Specimen: Blood   Result Value Ref Range    HS Troponin T 23 (H) <14 ng/L   Comprehensive Metabolic Panel    Specimen: Blood   Result Value Ref Range    Glucose 173 (H) 65 - 99 mg/dL    BUN 9 8 - 23 mg/dL    Creatinine 0.77 0.57 - 1.00 mg/dL    Sodium " 137 136 - 145 mmol/L    Potassium 3.9 3.5 - 5.2 mmol/L    Chloride 103 98 - 107 mmol/L    CO2 23.1 22.0 - 29.0 mmol/L    Calcium 9.1 8.6 - 10.5 mg/dL    Total Protein 6.3 6.0 - 8.5 g/dL    Albumin 3.8 3.5 - 5.2 g/dL    ALT (SGPT) 30 1 - 33 U/L    AST (SGOT) 26 1 - 32 U/L    Alkaline Phosphatase 106 39 - 117 U/L    Total Bilirubin <0.2 0.0 - 1.2 mg/dL    Globulin 2.5 gm/dL    A/G Ratio 1.5 g/dL    BUN/Creatinine Ratio 11.7 7.0 - 25.0    Anion Gap 10.9 5.0 - 15.0 mmol/L    eGFR 84.7 >60.0 mL/min/1.73   Protime-INR    Specimen: Blood   Result Value Ref Range    Protime 10.8 9.6 - 11.7 Seconds    INR 0.99 0.93 - 1.10   aPTT    Specimen: Blood   Result Value Ref Range    PTT 40.1 (L) 61.0 - 76.5 seconds   BNP    Specimen: Blood   Result Value Ref Range    proBNP 249.0 0.0 - 900.0 pg/mL   Magnesium    Specimen: Blood   Result Value Ref Range    Magnesium 1.6 1.6 - 2.4 mg/dL   CBC Auto Differential    Specimen: Blood   Result Value Ref Range    WBC 9.15 3.40 - 10.80 10*3/mm3    RBC 3.74 (L) 3.77 - 5.28 10*6/mm3    Hemoglobin 11.0 (L) 12.0 - 15.9 g/dL    Hematocrit 33.1 (L) 34.0 - 46.6 %    MCV 88.5 79.0 - 97.0 fL    MCH 29.4 26.6 - 33.0 pg    MCHC 33.2 31.5 - 35.7 g/dL    RDW 13.9 12.3 - 15.4 %    RDW-SD 44.9 37.0 - 54.0 fl    MPV 12.5 (H) 6.0 - 12.0 fL    Platelets 140 140 - 450 10*3/mm3    Neutrophil % 63.1 42.7 - 76.0 %    Lymphocyte % 27.5 19.6 - 45.3 %    Monocyte % 6.2 5.0 - 12.0 %    Eosinophil % 2.5 0.3 - 6.2 %    Basophil % 0.4 0.0 - 1.5 %    Immature Grans % 0.3 0.0 - 0.5 %    Neutrophils, Absolute 5.76 1.70 - 7.00 10*3/mm3    Lymphocytes, Absolute 2.52 0.70 - 3.10 10*3/mm3    Monocytes, Absolute 0.57 0.10 - 0.90 10*3/mm3    Eosinophils, Absolute 0.23 0.00 - 0.40 10*3/mm3    Basophils, Absolute 0.04 0.00 - 0.20 10*3/mm3    Immature Grans, Absolute 0.03 0.00 - 0.05 10*3/mm3    nRBC 0.0 0.0 - 0.2 /100 WBC   ECG 12 Lead Chest Pain   Result Value Ref Range    QT Interval 418 ms    QTC Interval 439 ms   [facility]    Result Value Ref Range    Extra Tube hold for add-on    Light Blue Top   Result Value Ref Range    Extra Tube Hold for add-ons.      XR Chest 1 View    Result Date: 7/11/2024  Impression: No acute process. Electronically Signed: Chasity Pickard MD  7/11/2024 4:28 PM EDT  Workstation ID: [id number]               Medical Decision Making  Problems Addressed:  Chest pain, unspecified type: complicated acute illness or injury  Fatigue, unspecified type: complicated acute illness or injury    Amount and/or Complexity of Data Reviewed  Labs: ordered.  Radiology: ordered.  ECG/medicine tests: ordered.    Risk  OTC drugs.  Prescription drug management.  Decision regarding hospitalization.      Patient had the above evaluation.  Results were discussed with the patient.  My interpretation of chest x-ray shows no infiltrate or effusion.  EKG shows no acute ischemia.  Troponin is borderline elevated at 23.  BNP is normal.  White blood cell count is normal.  CMP is unremarkable.  Patient has remained well-appearing in the emergency room.  She will be placed in observation for further cardiac monitoring, serial troponins, stress test in the morning.  Patient is agreeable with this plan.    Final diagnoses:   Chest pain, unspecified type   Fatigue, unspecified type       ED Disposition  ED Disposition       ED Disposition   Decision to Admit    Condition   --    Comment   --               No follow-up provider specified.       Medication List      No changes were made to your prescriptions during this visit.            Scott Wynn MD  07/11/24 3516     25-Dec-2021 00:04

## 2024-07-12 ENCOUNTER — READMISSION MANAGEMENT (OUTPATIENT)
Dept: CALL CENTER | Facility: HOSPITAL | Age: 69
End: 2024-07-12
Payer: MEDICAID

## 2024-07-12 VITALS
HEIGHT: 68 IN | TEMPERATURE: 97.5 F | WEIGHT: 169 LBS | HEART RATE: 66 BPM | SYSTOLIC BLOOD PRESSURE: 155 MMHG | DIASTOLIC BLOOD PRESSURE: 62 MMHG | OXYGEN SATURATION: 96 % | RESPIRATION RATE: 18 BRPM | BODY MASS INDEX: 25.61 KG/M2

## 2024-07-12 LAB
ANION GAP SERPL CALCULATED.3IONS-SCNC: 11.5 MMOL/L (ref 5–15)
BASOPHILS # BLD AUTO: 0.06 10*3/MM3 (ref 0–0.2)
BASOPHILS NFR BLD AUTO: 0.8 % (ref 0–1.5)
BUN SERPL-MCNC: 7 MG/DL (ref 8–23)
BUN/CREAT SERPL: 10.6 (ref 7–25)
CALCIUM SPEC-SCNC: 9.3 MG/DL (ref 8.6–10.5)
CHLORIDE SERPL-SCNC: 107 MMOL/L (ref 98–107)
CO2 SERPL-SCNC: 22.5 MMOL/L (ref 22–29)
CREAT SERPL-MCNC: 0.66 MG/DL (ref 0.57–1)
DEPRECATED RDW RBC AUTO: 47.1 FL (ref 37–54)
EGFRCR SERPLBLD CKD-EPI 2021: 96.3 ML/MIN/1.73
EOSINOPHIL # BLD AUTO: 0.21 10*3/MM3 (ref 0–0.4)
EOSINOPHIL NFR BLD AUTO: 2.9 % (ref 0.3–6.2)
ERYTHROCYTE [DISTWIDTH] IN BLOOD BY AUTOMATED COUNT: 13.9 % (ref 12.3–15.4)
GLUCOSE BLDC GLUCOMTR-MCNC: 149 MG/DL (ref 70–105)
GLUCOSE SERPL-MCNC: 126 MG/DL (ref 65–99)
HCT VFR BLD AUTO: 38.3 % (ref 34–46.6)
HGB BLD-MCNC: 11.9 G/DL (ref 12–15.9)
IMM GRANULOCYTES # BLD AUTO: 0.01 10*3/MM3 (ref 0–0.05)
IMM GRANULOCYTES NFR BLD AUTO: 0.1 % (ref 0–0.5)
LYMPHOCYTES # BLD AUTO: 2.44 10*3/MM3 (ref 0.7–3.1)
LYMPHOCYTES NFR BLD AUTO: 34.1 % (ref 19.6–45.3)
MCH RBC QN AUTO: 28.9 PG (ref 26.6–33)
MCHC RBC AUTO-ENTMCNC: 31.1 G/DL (ref 31.5–35.7)
MCV RBC AUTO: 93 FL (ref 79–97)
MONOCYTES # BLD AUTO: 0.5 10*3/MM3 (ref 0.1–0.9)
MONOCYTES NFR BLD AUTO: 7 % (ref 5–12)
NEUTROPHILS NFR BLD AUTO: 3.94 10*3/MM3 (ref 1.7–7)
NEUTROPHILS NFR BLD AUTO: 55.1 % (ref 42.7–76)
NRBC BLD AUTO-RTO: 0 /100 WBC (ref 0–0.2)
PLATELET # BLD AUTO: 130 10*3/MM3 (ref 140–450)
PMV BLD AUTO: 12.7 FL (ref 6–12)
POTASSIUM SERPL-SCNC: 4 MMOL/L (ref 3.5–5.2)
QT INTERVAL: 418 MS
QTC INTERVAL: 439 MS
RBC # BLD AUTO: 4.12 10*6/MM3 (ref 3.77–5.28)
SODIUM SERPL-SCNC: 141 MMOL/L (ref 136–145)
WBC NRBC COR # BLD AUTO: 7.16 10*3/MM3 (ref 3.4–10.8)

## 2024-07-12 PROCEDURE — G0378 HOSPITAL OBSERVATION PER HR: HCPCS

## 2024-07-12 PROCEDURE — 99214 OFFICE O/P EST MOD 30 MIN: CPT | Performed by: INTERNAL MEDICINE

## 2024-07-12 PROCEDURE — 85025 COMPLETE CBC W/AUTO DIFF WBC: CPT | Performed by: EMERGENCY MEDICINE

## 2024-07-12 PROCEDURE — 82948 REAGENT STRIP/BLOOD GLUCOSE: CPT | Performed by: EMERGENCY MEDICINE

## 2024-07-12 PROCEDURE — 80048 BASIC METABOLIC PNL TOTAL CA: CPT | Performed by: EMERGENCY MEDICINE

## 2024-07-12 RX ORDER — LISINOPRIL 5 MG/1
10 TABLET ORAL DAILY
Status: DISCONTINUED | OUTPATIENT
Start: 2024-07-12 | End: 2024-07-12 | Stop reason: HOSPADM

## 2024-07-12 RX ORDER — METOPROLOL SUCCINATE 50 MG/1
50 TABLET, EXTENDED RELEASE ORAL DAILY
Status: DISCONTINUED | OUTPATIENT
Start: 2024-07-12 | End: 2024-07-12 | Stop reason: HOSPADM

## 2024-07-12 RX ORDER — ZOLPIDEM TARTRATE 5 MG/1
10 TABLET ORAL NIGHTLY PRN
Status: DISCONTINUED | OUTPATIENT
Start: 2024-07-12 | End: 2024-07-12 | Stop reason: HOSPADM

## 2024-07-12 RX ORDER — BACLOFEN 10 MG/1
20 TABLET ORAL 3 TIMES DAILY
Status: DISCONTINUED | OUTPATIENT
Start: 2024-07-12 | End: 2024-07-12 | Stop reason: HOSPADM

## 2024-07-12 RX ORDER — SERTRALINE HYDROCHLORIDE 100 MG/1
100 TABLET, FILM COATED ORAL DAILY
Status: DISCONTINUED | OUTPATIENT
Start: 2024-07-12 | End: 2024-07-12 | Stop reason: HOSPADM

## 2024-07-12 RX ORDER — PREGABALIN 75 MG/1
75 CAPSULE ORAL 2 TIMES DAILY
Status: DISCONTINUED | OUTPATIENT
Start: 2024-07-12 | End: 2024-07-12 | Stop reason: HOSPADM

## 2024-07-12 RX ORDER — BUSPIRONE HYDROCHLORIDE 15 MG/1
15 TABLET ORAL 3 TIMES DAILY
Status: DISCONTINUED | OUTPATIENT
Start: 2024-07-12 | End: 2024-07-12 | Stop reason: HOSPADM

## 2024-07-12 RX ORDER — ACETAMINOPHEN 325 MG/1
650 TABLET ORAL EVERY 6 HOURS PRN
Status: DISCONTINUED | OUTPATIENT
Start: 2024-07-12 | End: 2024-07-12 | Stop reason: HOSPADM

## 2024-07-12 RX ADMIN — PREGABALIN 75 MG: 75 CAPSULE ORAL at 09:05

## 2024-07-12 RX ADMIN — ACETAMINOPHEN 650 MG: 325 TABLET, FILM COATED ORAL at 13:03

## 2024-07-12 RX ADMIN — BUSPIRONE HYDROCHLORIDE 15 MG: 15 TABLET ORAL at 09:05

## 2024-07-12 RX ADMIN — APIXABAN 5 MG: 5 TABLET, FILM COATED ORAL at 09:05

## 2024-07-12 RX ADMIN — SERTRALINE 100 MG: 100 TABLET, FILM COATED ORAL at 09:05

## 2024-07-12 RX ADMIN — LISINOPRIL 10 MG: 5 TABLET ORAL at 09:05

## 2024-07-12 RX ADMIN — BACLOFEN 20 MG: 10 TABLET ORAL at 09:05

## 2024-07-12 NOTE — OUTREACH NOTE
Prep Survey      Flowsheet Row Responses   [facility] patient discharged from? [facility]   Is LACE score < 7 ? Yes   Eligibility [facility]   Date of Admission 07/11/24   Date of Discharge 07/12/24   Discharge Disposition Home or Self Care   Discharge diagnosis Chest pain   Does the patient have one of the following disease processes/diagnoses(primary or secondary)? Other   Does the patient have Home health ordered? No   Is there a DME ordered? No   Prep survey completed? Yes            Tayler PUGA - Registered Nurse

## 2024-07-12 NOTE — PLAN OF CARE
Problem: Adult Inpatient Plan of Care  Goal: Plan of Care Review  Outcome: Ongoing, Progressing  Flowsheets (Taken 7/12/2024 1325)  Progress: improving  Plan of Care Reviewed With: patient  Goal: Patient-Specific Goal (Individualized)  Outcome: Ongoing, Progressing  Goal: Absence of Hospital-Acquired Illness or Injury  Outcome: Ongoing, Progressing  Intervention: Identify and Manage Fall Risk  Recent Flowsheet Documentation  Taken 7/12/2024 1200 by Heather Washington RN  Safety Promotion/Fall Prevention: safety round/check completed  Taken 7/12/2024 1000 by Heather Washington RN  Safety Promotion/Fall Prevention: safety round/check completed  Taken 7/12/2024 0800 by Heather Washington RN  Safety Promotion/Fall Prevention:   safety round/check completed   room organization consistent   nonskid shoes/slippers when out of bed   clutter free environment maintained   assistive device/personal items within reach  Intervention: Prevent and Manage VTE (Venous Thromboembolism) Risk  Recent Flowsheet Documentation  Taken 7/12/2024 0800 by Heather Washington RN  Activity Management: up ad ame  VTE Prevention/Management: (eliquis) other (see comments)  Intervention: Prevent Infection  Recent Flowsheet Documentation  Taken 7/12/2024 0800 by Heather Washington RN  Infection Prevention:   environmental surveillance performed   single patient room provided   rest/sleep promoted  Goal: Optimal Comfort and Wellbeing  Outcome: Ongoing, Progressing  Intervention: Monitor Pain and Promote Comfort  Recent Flowsheet Documentation  Taken 7/12/2024 0800 by Heather Washington RN  Pain Management Interventions:   care clustered   quiet environment facilitated  Intervention: Provide Person-Centered Care  Recent Flowsheet Documentation  Taken 7/12/2024 0800 by Heather Washington RN  Trust Relationship/Rapport:   care explained   thoughts/feelings acknowledged   reassurance provided   questions answered  Goal: Readiness for Transition of Care  Outcome: Ongoing, Progressing   Goal Outcome  Evaluation:  Plan of Care Reviewed With: patient        Progress: improving

## 2024-07-12 NOTE — PLAN OF CARE
Problem: Adult Inpatient Plan of Care  Goal: Plan of Care Review  7/12/2024 1513 by Heather Washington RN  Outcome: Met  Flowsheets (Taken 7/12/2024 1513)  Progress: improving  Plan of Care Reviewed With: patient  7/12/2024 1325 by Heather Washington RN  Outcome: Ongoing, Progressing  Flowsheets (Taken 7/12/2024 1325)  Progress: improving  Plan of Care Reviewed With: patient  Goal: Patient-Specific Goal (Individualized)  7/12/2024 1513 by Heather Washington RN  Outcome: Met  7/12/2024 1325 by Heather Washington RN  Outcome: Ongoing, Progressing  Goal: Absence of Hospital-Acquired Illness or Injury  7/12/2024 1513 by Heather Washington RN  Outcome: Met  7/12/2024 1325 by Heather Washington RN  Outcome: Ongoing, Progressing  Intervention: Identify and Manage Fall Risk  Recent Flowsheet Documentation  Taken 7/12/2024 1400 by Heather Washington RN  Safety Promotion/Fall Prevention: safety round/check completed  Taken 7/12/2024 1200 by Heather Washington RN  Safety Promotion/Fall Prevention: safety round/check completed  Taken 7/12/2024 1000 by Heather Washington RN  Safety Promotion/Fall Prevention: safety round/check completed  Taken 7/12/2024 0800 by Heather Washington RN  Safety Promotion/Fall Prevention:   safety round/check completed   room organization consistent   nonskid shoes/slippers when out of bed   clutter free environment maintained   assistive device/personal items within reach  Intervention: Prevent and Manage VTE (Venous Thromboembolism) Risk  Recent Flowsheet Documentation  Taken 7/12/2024 0800 by Heather Washington RN  Activity Management: up ad ame  VTE Prevention/Management: (eliquis) other (see comments)  Intervention: Prevent Infection  Recent Flowsheet Documentation  Taken 7/12/2024 0800 by Heather Washington RN  Infection Prevention:   environmental surveillance performed   single patient room provided   rest/sleep promoted  Goal: Optimal Comfort and Wellbeing  7/12/2024 1513 by Heather Washington RN  Outcome: Met  7/12/2024 1325 by Heather Washington RN  Outcome: Ongoing,  Progressing  Intervention: Monitor Pain and Promote Comfort  Recent Flowsheet Documentation  Taken 7/12/2024 0800 by Heather Washington, RN  Pain Management Interventions:   care clustered   quiet environment facilitated  Intervention: Provide Person-Centered Care  Recent Flowsheet Documentation  Taken 7/12/2024 0800 by Heather Washington, RN  Trust Relationship/Rapport:   care explained   thoughts/feelings acknowledged   reassurance provided   questions answered  Goal: Readiness for Transition of Care  7/12/2024 1513 by Heather Washington, RN  Outcome: Met  7/12/2024 1325 by Heather Washington, RN  Outcome: Ongoing, Progressing   Goal Outcome Evaluation:  Plan of Care Reviewed With: patient        Progress: improving

## 2024-07-12 NOTE — CASE MANAGEMENT/SOCIAL WORK
"Social Work Assessment  [facility]     Patient Name: Arianna Blanco  MRN: [id number]  Today's Date: 7/12/2024    Admit Date: 7/11/2024    ADDEN:  Patient is seen by Pain Mgt for back and leg pain.   Discharge Plan       Row Name 07/12/24 1017       Plan    Plan From home alone.    Plan Comments SW met with pt to discuss d/c planning needs. Patient alone in an apartment.  [place] be closer to a close friend.  Patient not feeling well but is being discharged today.  Stated her ablation was canceled because \"her doctor had to leave the country\".  Her 46 y/o son assists when able.  Patient not able to always get her medications / or go to medical appointments if son or friend cannot assist her.  SW reminded her of free [employer] and educated on how to access these rides.  Patient was also given [employer] resources at last hospitalization.  Patient smokes 1 pack of cigarettes daily.  No alcohol or illicit substances used.  Patient reported anxiety/depression and her PCP prescribed medication.  Patient denies need to see hospital Psych.  PCP and Pharmacy verified.  Patient agreeable to M2B.  DME used in the home includes a walker, cane and nebulizer.  Son will provide transportation home.               YVONNE Spears MSW    Phone: [phone]  Cell: [phone]  Fax: [phone]  [email or web address]          "

## 2024-07-12 NOTE — OUTREACH NOTE
Medical Week 2 Survey      Flowsheet Row Responses   [facility] facility patient discharged from? [facility]   Does the patient have one of the following disease processes/diagnoses(primary or secondary)? Other   Week 2 attempt successful? No   Unsuccessful attempts Attempt 1   Revoke Readmitted            DON WESLEY - Registered Nurse

## 2024-07-12 NOTE — PLAN OF CARE
Goal Outcome Evaluation:      Pt admitted with chest pain which has since lessened in intensity. Pt's medications reviewed, admission completed, pt oriented to surroundings and the use of the call light.  Pt to be NPO after midnight for possible myoview in the morning.  Patient is stable. Will continue to monitor.

## 2024-07-12 NOTE — DISCHARGE SUMMARY
[facility]    Maddie Cornejo APRN    CHIEF COMPLAINT:     Chest pain    HISTORY OF PRESENT ILLNESS:    Chest Pain   Associated symptoms include weakness. Pertinent negatives include no cough, fever or palpitations.       ED  68-year-old female presents with chest pain. Patient states pain actually occurred yesterday. She describes it as a pressure sensation on her chest. She also feels very weak and fatigued in general. She denies any other specific complaints. She was recently in the hospital with atrial fibrillation with RVR. She states she is scheduled to have an ablation. The cardiology office called her today to reschedule the ablation procedure and she told them how she was feeling and they recommended she come back to the hospital. She states she is not actually having chest pain currently.     Past Medical History:   Diagnosis Date    COPD (chronic obstructive pulmonary disease)     Depression     Diabetes mellitus     Hyperlipidemia     Hypertension      Past Surgical History:   Procedure Laterality Date    CARDIAC CATHETERIZATION Left 10/30/2020    Procedure: Left Heart Cath with Coronary Angiography;  Surgeon: Donaldo Archer MD;  Location: [facility] INVASIVE LOCATION;  Service: Cardiovascular;  Laterality: Left;    CARPAL TUNNEL RELEASE Bilateral 2017    CHOLECYSTECTOMY  1980    HYSTERECTOMY  1980     Family History   Problem Relation Age of Onset    Heart disease Mother     Hypertension Mother     Diabetes Mother     Stroke Mother      Social History     Tobacco Use    Smoking status: Every Day     Current packs/day: 1.00     Average packs/day: 1 pack/day for 41.5 years (41.5 ttl pk-yrs)     Types: Cigarettes     Start date: 1983     Passive exposure: Current    Smokeless tobacco: Never   Vaping Use    Vaping status: Some Days    Substances: Nicotine, Flavoring    Devices: [employer] tank   Substance Use Topics    Alcohol use: No    Drug use: Never     Medications Prior to  Admission   Medication Sig Dispense Refill Last Dose    albuterol sulfate  (90 Base) MCG/ACT inhaler INHALE 2 PUFFS BY MOUTH EVERY 4 HOURS AS NEEDED FOR WHEEZING OR SHORTNESS OF AIR 6.7 g 2 7/10/2024 at 2000    baclofen (LIORESAL) 20 MG tablet Take 1 tablet by mouth 3 (Three) Times a Day. 90 tablet 2 7/11/2024 at 0700    Brexpiprazole (Rexulti) 3 MG tablet Take 1 tablet by mouth Daily. 90 tablet 1 7/10/2024 at 2000    butalbital-acetaminophen  MG tablet tablet Take 1 tablet by mouth Every 6 (Six) Hours As Needed (headache). 30 tablet 0 7/11/2024 at 0700    dronedarone (MULTAQ) 400 MG tablet Take 1 tablet by mouth 2 (Two) Times a Day With Meals. 180 tablet 1 7/11/2024 at 0700    Eliquis 5 MG tablet tablet Take 1 tablet by mouth Every 12 (Twelve) Hours. 180 tablet 1 7/11/2024 at 0700    insulin glulisine (Apidra) 100 UNIT/ML injection Inject 5 Units under the skin into the appropriate area as directed 3 (Three) Times a Day Before Meals. (Patient taking differently: Inject 10 Units under the skin into the appropriate area as directed 3 (Three) Times a Day Before Meals.) 10 mL 3 7/10/2024 at 2000    lisinopril (PRINIVIL,ZESTRIL) 10 MG tablet Take 1 tablet by mouth Daily. 90 tablet 1 7/11/2024 at 0700    metFORMIN (GLUCOPHAGE) 1000 MG tablet Take 1 tablet by mouth 2 (Two) Times a Day With Meals. 180 tablet 3 7/11/2024 at 0700    metoprolol succinate XL (TOPROL-XL) 50 MG 24 hr tablet Take 1 tablet by mouth Daily. 90 tablet 1 7/11/2024 at 0700    pregabalin (LYRICA) 75 MG capsule TAKE 1 CAPSULE BY MOUTH TWICE A DAY 60 capsule 0 7/11/2024 at 0700    Semaglutide, 1 MG/DOSE, (Ozempic, 1 MG/DOSE,) 4 MG/3ML solution pen-injector Inject 1 mg under the skin into the appropriate area as directed Every 7 (Seven) Days. 3 mL 1 7/10/2024 at 0700    sertraline (ZOLOFT) 100 MG tablet Take 1 tablet by mouth Daily. 90 tablet 3 7/11/2024 at 0700    zolpidem (AMBIEN) 10 MG tablet TAKE 1 TABLET BY MOUTH AT NIGHT AS NEEDED FOR  SLEEP. 28 tablet 0 7/10/2024 at 2000    Accu-Chek Guide test strip 1 each by Other route 4 (Four) Times a Day. Use to test blood sugar 4 times daily 120 each 11     busPIRone (BUSPAR) 7.5 MG tablet Take 2 tablets by mouth 3 (Three) Times a Day. 90 tablet 0 Unknown    Continuous Blood Gluc  (FreeStyle Willie 2 [place]) device Use 1 each Daily. To be used daily to monitor continuous blood sugar reading for type 2 diabetic patient on insulin regimen. 1 each 0     Continuous Blood Gluc Sensor (FreeStyle Willie 2 Sensor) misc Use 1 each Every 14 (Fourteen) Days. Dispense 2 sensor pack. To be used daily with freestyle willie device to monitor continuous blood sugar reading for type 2 diabetic patient on insulin regimen. 2 each 12     Incontinence Supply Disposable (Briefs Overnight Large) misc Use 1 each At Night As Needed (incontinence). 90 each 3     Insulin Glargine (LANTUS SOLOSTAR) 100 UNIT/ML injection pen Inject 60 Units under the skin into the appropriate area as directed Every Night. 54 mL 3      Allergies:  Codeine    Immunization History   Administered Date(s) Administered    COVID-19 ([employer] [place] [employer]) 05/14/2021    Flu Vaccine Quad PF 6-35MO 08/01/2017    Fluzone (or Fluarix & Flulaval for VFC) >6mos 10/23/2014, 08/01/2017, 12/02/2018, 08/22/2019    Fluzone High-Dose 65+yrs 01/09/2024    Influenza Injectable Mdck Pf Quad 12/02/2018    Influenza Seasonal Injectable 10/23/2014    Pneumococcal Polysaccharide (PPSV23) 08/22/2019    Shingrix 08/22/2019    Tdap 01/09/2024           REVIEW OF SYSTEMS:    Review of Systems   Constitutional: Negative for fever.   Cardiovascular:  Positive for chest pain. Negative for palpitations.   Respiratory:  Negative for cough.    Neurological:  Positive for weakness.           Vital Signs  Temp:  [97.8 °F (36.6 °C)-98.9 °F (37.2 °C)] 97.8 °F (36.6 °C)  Heart Rate:  [62-69] 66  Resp:  [11-20] 15  BP: (113-164)/(63-87) 144/78          Physical Exam:  Physical Exam  Constitutional:        Appearance: Normal appearance.   Cardiovascular:      Rate and Rhythm: Normal rate and regular rhythm.      Pulses: Normal pulses.      Heart sounds: Normal heart sounds.   Pulmonary:      Effort: Pulmonary effort is normal.      Breath sounds: Normal breath sounds.   Skin:     General: Skin is warm and dry.   Neurological:      General: No focal deficit present.      Mental Status: She is alert and oriented to person, place, and time.   Psychiatric:         Mood and Affect: Mood normal.         Behavior: Behavior normal.       Emotional Behavior:    wnl   Debilities:   None    Results Review:    I reviewed the patient's new clinical results.  Lab Results (most recent)       Procedure Component Value Units Date/Time    POC Glucose Finger 4x Daily Before Meals & at Bedtime [[id number]]  (Abnormal) Collected: 07/12/24 0710    Specimen: Blood from Finger Updated: 07/12/24 0713     Glucose 149 mg/dL      Comment: Serial Number: [id number]:  [id number]       Basic Metabolic Panel [[id number]]  (Abnormal) Collected: 07/12/24 0418    Specimen: Blood from Hand, Left Updated: 07/12/24 0623     Glucose 126 mg/dL      BUN 7 mg/dL      Creatinine 0.66 mg/dL      Sodium 141 mmol/L      Potassium 4.0 mmol/L      Comment: Specimen hemolyzed.  Result may be falsely elevated.        Chloride 107 mmol/L      CO2 22.5 mmol/L      Calcium 9.3 mg/dL      BUN/Creatinine Ratio 10.6     Anion Gap 11.5 mmol/L      eGFR 96.3 mL/min/1.73     Narrative:      GFR Normal >60  Chronic Kidney Disease <60  Kidney Failure <15      CBC & Differential [[id number]]  (Abnormal) Collected: 07/12/24 0418    Specimen: Blood from Hand, Left Updated: 07/12/24 0549    Narrative:      The following orders were created for panel order CBC & Differential.  Procedure                               Abnormality         Status                     ---------                               -----------         ------                     CBC Auto  Differential[[id number]]        Abnormal            Final result                 Please view results for these tests on the individual orders.    CBC Auto Differential [[id number]]  (Abnormal) Collected: 07/12/24 0418    Specimen: Blood from Hand, Left Updated: 07/12/24 0549     WBC 7.16 10*3/mm3      RBC 4.12 10*6/mm3      Hemoglobin 11.9 g/dL      Hematocrit 38.3 %      MCV 93.0 fL      Comment: Result checked          MCH 28.9 pg      MCHC 31.1 g/dL      RDW 13.9 %      RDW-SD 47.1 fl      MPV 12.7 fL      Platelets 130 10*3/mm3      Neutrophil % 55.1 %      Lymphocyte % 34.1 %      Monocyte % 7.0 %      Eosinophil % 2.9 %      Basophil % 0.8 %      Immature Grans % 0.1 %      Neutrophils, Absolute 3.94 10*3/mm3      Lymphocytes, Absolute 2.44 10*3/mm3      Monocytes, Absolute 0.50 10*3/mm3      Eosinophils, Absolute 0.21 10*3/mm3      Basophils, Absolute 0.06 10*3/mm3      Immature Grans, Absolute 0.01 10*3/mm3      nRBC 0.0 /100 WBC     POC Glucose Once [[id number]]  (Abnormal) Collected: 07/11/24 2217    Specimen: Blood Updated: 07/11/24 2219     Glucose 157 mg/dL      Comment: Serial Number: [id number]:  [id number]       High Sensitivity Troponin T 2Hr [[id number]]  (Abnormal) Collected: 07/11/24 1748    Specimen: Blood from Arm, Right Updated: 07/11/24 1816     HS Troponin T 21 ng/L      Troponin T Delta -2 ng/L     Narrative:      High Sensitive Troponin T Reference Range:  <14.0 ng/L- Negative Female for AMI  <22.0 ng/L- Negative Male for AMI  >=14 - Abnormal Female indicating possible myocardial injury.  >=22 - Abnormal Male indicating possible myocardial injury.   Clinicians would have to utilize clinical acumen, EKG, Troponin, and serial changes to determine if it is an Acute Myocardial Infarction or myocardial injury due to an underlying chronic condition.         Magnesium [[id number]]  (Normal) Collected: 07/11/24 1601    Specimen: Blood Updated: 07/11/24 1657     Magnesium 1.6 mg/dL      Comprehensive Metabolic Panel [[id number]]  (Abnormal) Collected: 07/11/24 1601    Specimen: Blood Updated: 07/11/24 1657     Glucose 173 mg/dL      BUN 9 mg/dL      Creatinine 0.77 mg/dL      Sodium 137 mmol/L      Potassium 3.9 mmol/L      Comment: Slight hemolysis detected by analyzer. Result may be falsely elevated.        Chloride 103 mmol/L      CO2 23.1 mmol/L      Calcium 9.1 mg/dL      Total Protein 6.3 g/dL      Albumin 3.8 g/dL      ALT (SGPT) 30 U/L      AST (SGOT) 26 U/L      Comment: Slight hemolysis detected by analyzer. Result may be falsely elevated.        Alkaline Phosphatase 106 U/L      Total Bilirubin <0.2 mg/dL      Globulin 2.5 gm/dL      A/G Ratio 1.5 g/dL      BUN/Creatinine Ratio 11.7     Anion Gap 10.9 mmol/L      eGFR 84.7 mL/min/1.73     Narrative:      GFR Normal >60  Chronic Kidney Disease <60  Kidney Failure <15      BNP [[id number]]  (Normal) Collected: 07/11/24 1601    Specimen: Blood Updated: 07/11/24 1652     proBNP 249.0 pg/mL     Narrative:      This assay is used as an aid in the diagnosis of individuals suspected of having heart failure. It can be used as an aid in the diagnosis of acute decompensated heart failure (ADHF) in patients presenting with signs and symptoms of ADHF to the emergency department (ED). In addition, NT-proBNP of <300 pg/mL indicates ADHF is not likely.    Age Range Result Interpretation  NT-proBNP Concentration (pg/mL:      <50             Positive            >450                   Gray                 300-450                    Negative             <300    50-75           Positive            >900                  Gray                300-900                  Negative            <300      >75             Positive            >1800                  Gray                300-1800                  Negative            <300    High Sensitivity Troponin T [[id number]]  (Abnormal) Collected: 07/11/24 1601    Specimen: Blood Updated: 07/11/24 1639     HS Troponin T 23  ng/L     Narrative:      High Sensitive Troponin T Reference Range:  <14.0 ng/L- Negative Female for AMI  <22.0 ng/L- Negative Male for AMI  >=14 - Abnormal Female indicating possible myocardial injury.  >=22 - Abnormal Male indicating possible myocardial injury.   Clinicians would have to utilize clinical acumen, EKG, Troponin, and serial changes to determine if it is an Acute Myocardial Infarction or myocardial injury due to an underlying chronic condition.         Protime-INR [[id number]]  (Normal) Collected: 07/11/24 1602    Specimen: Blood Updated: 07/11/24 1635     Protime 10.8 Seconds      INR 0.99    aPTT [[id number]]  (Abnormal) Collected: 07/11/24 1602    Specimen: Blood Updated: 07/11/24 1635     PTT 40.1 seconds     CBC & Differential [[id number]]  (Abnormal) Collected: 07/11/24 1602    Specimen: Blood Updated: 07/11/24 1624    Narrative:      The following orders were created for panel order CBC & Differential.  Procedure                               Abnormality         Status                     ---------                               -----------         ------                     CBC Auto Differential[[id number]]        Abnormal            Final result                 Please view results for these tests on the individual orders.    CBC Auto Differential [[id number]]  (Abnormal) Collected: 07/11/24 1602    Specimen: Blood Updated: 07/11/24 1624     WBC 9.15 10*3/mm3      RBC 3.74 10*6/mm3      Hemoglobin 11.0 g/dL      Hematocrit 33.1 %      MCV 88.5 fL      MCH 29.4 pg      MCHC 33.2 g/dL      RDW 13.9 %      RDW-SD 44.9 fl      MPV 12.5 fL      Platelets 140 10*3/mm3      Neutrophil % 63.1 %      Lymphocyte % 27.5 %      Monocyte % 6.2 %      Eosinophil % 2.5 %      Basophil % 0.4 %      Immature Grans % 0.3 %      Neutrophils, Absolute 5.76 10*3/mm3      Lymphocytes, Absolute 2.52 10*3/mm3      Monocytes, Absolute 0.57 10*3/mm3      Eosinophils, Absolute 0.23 10*3/mm3      Basophils, Absolute 0.04  10*3/mm3      Immature Grans, Absolute 0.03 10*3/mm3      nRBC 0.0 /100 WBC     [place] Draw [[id number]] Collected: 07/11/24 1602    Specimen: Blood Updated: 07/11/24 1615    Narrative:      The following orders were created for panel order [place] Draw.  Procedure                               Abnormality         Status                     ---------                               -----------         ------                     Lavender Top[[id number]]                                     Final result               Light Blue Top[[id number]]                                   Final result                 Please view results for these tests on the individual orders.    Lavender Top [[id number]] Collected: 07/11/24 1602    Specimen: Blood Updated: 07/11/24 1615     Extra Tube hold for add-on     Comment: Auto resulted       Light Blue Top [[id number]] Collected: 07/11/24 1602    Specimen: Blood Updated: 07/11/24 1615     Extra Tube Hold for add-ons.     Comment: Auto resulted               Imaging Results (Most Recent)       Procedure Component Value Units Date/Time    XR Chest 1 View [[id number]] Collected: 07/11/24 1627     Updated: 07/11/24 1630    Narrative:      XR CHEST 1 VW    Date of Exam: 7/11/2024 4:20 PM EDT    Indication: chest pain    Comparison: None available.    Findings:  Heart and pulmonary vessels and mediastinal contours appear within normal limits. Lung fields are clear of acute infiltrates or effusions. There is no pneumothorax.      Impression:      Impression:  No acute process.      Electronically Signed: Chasity Pickard MD    7/11/2024 4:28 PM EDT    Workstation ID: [id number]          reviewed    ECG/EMG Results (most recent)       Procedure Component Value Units Date/Time    Telemetry Scan [[id number]] Resulted: 07/11/24     Updated: 07/11/24 2120    Telemetry Scan [[id number]] Resulted: 07/11/24     Updated: 07/11/24 6003    Telemetry Scan [[id number]] Resulted: 07/11/24     Updated: 07/12/24 4088     ECG 12 Lead Chest Pain [[id number]] Collected: 07/11/24 1602     Updated: 07/12/24 0612     QT Interval 418 ms      QTC Interval 439 ms     Narrative:      HEART RATE=66  bpm  RR Ndbdgukh=840  ms  VT Fvfczmqm=952  ms  P Horizontal Axis=-5  deg  P Front Axis=68  deg  QRSD Itiactns=889  ms  QT Wddqugqg=718  ms  VNmY=653  ms  QRS Axis=83  deg  T Wave Axis=44  deg  - ABNORMAL ECG -  Sinus rhythm  Probable  left atrial enlargement  Right bundle branch block  Consider  anteroseptal infarct  Electronically Signed By: Scott Wynn (Gab) 2024-07-12 06:12:24  Date and Time of Study:2024-07-11 16:02:52    Telemetry Scan [[id number]] Resulted: 07/11/24     Updated: 07/12/24 0759          reviewed        Results for orders placed during the hospital encounter of 07/05/24    Adult Transthoracic Echo Complete W/ Cont if Necessary Per Protocol    Interpretation Summary    Left ventricular systolic function is normal. Estimated left ventricular EF = 60%    Left ventricular wall thickness is consistent with mild concentric hypertrophy.    Left ventricular diastolic function is consistent with (grade I) impaired relaxation.    The left atrial cavity is mild to moderately dilated.    There is mild calcification of the aortic valve.    Transthoracic echocardiography reveals LVEF of 60% with mild LVH.  Mild to moderate left atrial enlargement .  No effusion      Electronically signed by Shay Santos MD, 07/07/24, 5:17 PM EDT.      Microbiology Results (last 10 days)       ** No results found for the last 240 hours. **            Assessment & Plan     Chest pain    Fatigue     Chest pain  Lab Results   Component Value Date    TROPONINT 21 (H) 07/11/2024    TROPONINT 23 (H) 07/11/2024    TROPONINT <0.010 10/15/2022     -pt has outpt ablation scheduled for next week with Dr Santos  -cbc, bnp, mag and cmp are unremarkable  -Chest X-ray:reviewed and showing no acute cardiopulmonary process  -EKG:sinus RBBB rate 66  - cardiology  consulted and recommends follow up for ablation as previously scheduled  -Telemetry    Hypertension  -moderately Controlled   BP Readings from Last 1 Encounters:   07/12/24 144/78     - Continue lisinopril  - Monitor while admitted     Hx of afib  - cont eliquis, multaq bid, metoprolol    Diabetes mellitus  -moderately controlled   Lab Results   Component Value Date    GLUCOSE 126 (H) 07/12/2024    GLUCOSE 173 (H) 07/11/2024    GLUCOSE 231 (H) 07/06/2024    GLUCOSE 178 (H) 01/09/2024       -cont metformin, glargine, apidra  -Diabetic diet, ssi here  -Monitor before meals and at bedtime       I discussed the patients findings and my recommendations with patient and nursing staff.     Discharge Diagnosis:      Chest pain    Fatigue      Hospital Course  Patient is a 68 y.o. female presented with chest pain. Er evaluated and admitted to observation. Labs including cbc, bnp, mag, cmp are normal. Chest xray is normal. Troponin is stable. Cardiology consulted and recommends pt follow up with previous appt already scheduled. Pt was offered evaluation by PT but declined pt will follow up with primary care on Monday. Discharge discussed with pt and she is agreeable to plan. Instructed pt to return to er if symptoms reoccur or worsen.      Past Medical History:     Past Medical History:   Diagnosis Date    COPD (chronic obstructive pulmonary disease)     Depression     Diabetes mellitus     Hyperlipidemia     Hypertension        Past Surgical History:     Past Surgical History:   Procedure Laterality Date    CARDIAC CATHETERIZATION Left 10/30/2020    Procedure: Left Heart Cath with Coronary Angiography;  Surgeon: Donaldo Archer MD;  Location: [facility] CATH INVASIVE LOCATION;  Service: Cardiovascular;  Laterality: Left;    CARPAL TUNNEL RELEASE Bilateral 2017    CHOLECYSTECTOMY  1980    HYSTERECTOMY  1980       Social History:   Social History     Socioeconomic History    Marital status:    Tobacco Use    Smoking  status: Every Day     Current packs/day: 1.00     Average packs/day: 1 pack/day for 41.5 years (41.5 ttl pk-yrs)     Types: Cigarettes     Start date: 1983     Passive exposure: Current    Smokeless tobacco: Never   Vaping Use    Vaping status: Some Days    Substances: Nicotine, Flavoring    Devices: Refillable tank   Substance and Sexual Activity    Alcohol use: No    Drug use: Never    Sexual activity: Defer       Procedures Performed         Consults:   Consults       Date and Time Order Name Status Description    7/12/2024  7:46 AM Inpatient Cardiology Consult Completed             Condition on Discharge:     Stable    Discharge Disposition      Discharge Medications     Discharge Medications        Continue These Medications        Instructions Start Date   Briefs Overnight Large misc   1 each, Does not apply, Nightly PRN      FreeStyle Willie 2 [place] device   1 each, Does not apply, Daily, To be used daily to monitor continuous blood sugar reading for type 2 diabetic patient on insulin regimen.      FreeStyle Willie 2 Sensor misc   1 each, Does not apply, Every 14 Days, Dispense 2 sensor pack. To be used daily with freestyle willie device to monitor continuous blood sugar reading for type 2 diabetic patient on insulin regimen.             ASK your doctor about these medications        Instructions Start Date   Accu-Chek Guide test strip  Generic drug: glucose blood   1 each, Other, 4 Times Daily, Use to test blood sugar 4 times daily      albuterol sulfate  (90 Base) MCG/ACT inhaler  Commonly known as: PROVENTIL HFA;VENTOLIN HFA;PROAIR HFA   INHALE 2 PUFFS BY MOUTH EVERY 4 HOURS AS NEEDED FOR WHEEZING OR SHORTNESS OF AIR      Apidra 100 UNIT/ML injection  Generic drug: insulin glulisine   5 Units, Subcutaneous, 3 Times Daily Before Meals      baclofen 20 MG tablet  Commonly known as: LIORESAL   20 mg, Oral, 3 Times Daily      busPIRone 7.5 MG tablet  Commonly known as: BUSPAR   15 mg, Oral, 3 Times  Daily      butalbital-acetaminophen  MG tablet tablet   1 tablet, Oral, Every 6 Hours PRN      Eliquis 5 MG tablet tablet  Generic drug: apixaban   5 mg, Oral, Every 12 Hours Scheduled      Insulin Glargine 100 UNIT/ML injection pen  Commonly known as: LANTUS SOLOSTAR   60 Units, Subcutaneous, Nightly      lisinopril 10 MG tablet  Commonly known as: PRINIVIL,ZESTRIL   10 mg, Oral, Daily      metFORMIN 1000 MG tablet  Commonly known as: GLUCOPHAGE   1,000 mg, Oral, 2 Times Daily With Meals      metoprolol succinate XL 50 MG 24 hr tablet  Commonly known as: TOPROL-XL   50 mg, Oral, Daily      Multaq 400 MG tablet  Generic drug: dronedarone   400 mg, Oral, 2 Times Daily With Meals      Ozempic (1 MG/DOSE) 4 MG/3ML solution pen-injector  Generic drug: Semaglutide (1 MG/DOSE)   1 mg, Subcutaneous, Every 7 Days      pregabalin 75 MG capsule  Commonly known as: LYRICA   75 mg, Oral, 2 Times Daily      Rexulti 3 MG tablet  Generic drug: Brexpiprazole   3 mg, Oral, Daily      sertraline 100 MG tablet  Commonly known as: ZOLOFT   100 mg, Oral, Daily      zolpidem 10 MG tablet  Commonly known as: AMBIEN   10 mg, Oral, Nightly PRN               Discharge Diet:     Activity at Discharge:     Follow-up Appointments  Future Appointments   Date Time Provider Department Center   7/22/2024  8:15 AM Maddie Cornejo APRN [facility]   7/26/2024  3:30 PM Jaamri aLne MD [facility]   8/13/2024 11:00 AM  [facility]  [facility]         Test Results Pending at Discharge       Risk for Readmission (LACE) Score: 6 (7/12/2024  6:00 AM)      Less Than 30 minutes spent in discharge activities for this patient    Signature:Electronically signed by Erica Dias PA-C, 07/12/24, 12:25 PM EDT.

## 2024-07-12 NOTE — CONSULTS
Cardiology Consult Note      REQUESTING PHYSICIAN    Scott Wynn MD    PATIENT IDENTIFICATION  Name: Arianna Blanco  Age: 68 y.o.  Sex: female  :  1955  MRN: [id number]             REASON FOR CONSULTATION:  68-year-old female known to Dr. Santos with past medical history of symptomatic paroxysmal atrial fibrillation, COPD, diabetes mellitus 2, dyslipidemia, hypertension.  The patient was evaluated at this facility 2024 after transfer from outlying facility with A-fib RVR.  She converted to sinus rhythm and was started on dronedarone.  She was scheduled for outpatient fib/flutter ablation.      CC:  Chest discomfort  Weakness  Fatigue    HISTORY OF PRESENT ILLNESS:   Patient presented to the emergency department at [facility] 2024 with recurrent symptoms of weakness, fatigue, chest pressure.  She states the symptoms began soon after discharge on 2024.  Our office had called her today to schedule for ablation and she reported feeling poorly.  The office directed her to the emergency department for further evaluation.  Upon arrival, she reported her chest pressure had subsided.  She continues to feel very tired and weak.  Upon my evaluation, the patient is sitting in bedside chair and does not appear in any acute distress.  She denies any shortness of breath, edema, dizziness or lightheadedness, no near syncopal or syncopal episodes.      REVIEW OF SYSTEMS:  Pertinent items are noted in HPI, all other systems reviewed and negative    OBJECTIVE   Diagnostics reviewed and unremarkable  EKG on presentation Shows normal sinus rhythm.    ASSESSMENT  Symptomatic paroxysmal atrial fibrillation  History of atrial fibrillation/flutter  Elevated chads vascular score  Consumptive coagulopathy  Diabetes mellitus 2  Primary hypertension  History of nonobstructive coronary artery disease    PLAN  The patient is not in atrial fibrillation at the present time.  She reports ongoing symptoms of  "tiredness, weakness and fatigue.  Cancel nuclear stress testing  Patient quite upset and frustrated because she will not be able to have her procedure today as she hoped.  Continue apixaban 5 mg every 12 for stroke prevention, continue Multaq 400 mg twice daily, Toprol XL 50 mg daily, lisinopril 10 mg daily.  Further recommendations per Dr. Hobson  Please see cardiologist's full recommendation at bottom of note.        CHF Guideline Directed Medical Therapy  Beta Blocker:   ARNI/ACE/ARB:   SGLT 2 inhibitors:   Diuretics:   Aldosterone Antagonist:   Vasodilators & Nitrates:     Vital Signs  Visit Vitals  /78 (BP Location: Left arm, Patient Position: Lying)   Pulse 66   Temp 97.8 °F (36.6 °C) (Oral)   Resp 15   Ht 172.7 cm (68\")   Wt 76.7 kg (169 lb)   SpO2 92%   BMI 25.70 kg/m²     Oxygen Therapy  SpO2: 92 %  Pulse Oximetry Type: Continuous  Device (Oxygen Therapy): room air  Flowsheet Rows      Flowsheet Row First Filed Value   Admission Height 172.7 cm (68\") Documented at 07/11/2024 1541   Admission Weight 78.9 kg (174 lb) Documented at 07/11/2024 1541          Intake & Output (last 3 days)         07/09 0701  07/10 0700 07/10 0701  07/11 0700 07/11 0701  07/12 0700 07/12 0701  07/13 0700            Urine Unmeasured Occurrence   4 x           Lines, Drains & Airways       Active LDAs       Name Placement date Placement time Site Days    Peripheral IV 07/11/24 1628 Right Antecubital 07/11/24  1628  Antecubital  less than 1                    MEDICAL HISTORY    Past Medical History:   Diagnosis Date    COPD (chronic obstructive pulmonary disease)     Depression     Diabetes mellitus     Hyperlipidemia     Hypertension         SURGICAL HISTORY    Past Surgical History:   Procedure Laterality Date    CARDIAC CATHETERIZATION Left 10/30/2020    Procedure: Left Heart Cath with Coronary Angiography;  Surgeon: Donaldo Archer MD;  Location: [facility] CATH INVASIVE LOCATION;  Service: Cardiovascular;  Laterality: " "Left;    CARPAL TUNNEL RELEASE Bilateral 2017    CHOLECYSTECTOMY  1980    HYSTERECTOMY  1980        FAMILY HISTORY    Family History   Problem Relation Age of Onset    Heart disease Mother     Hypertension Mother     Diabetes Mother     Stroke Mother        SOCIAL HISTORY    Social History     Tobacco Use    Smoking status: Every Day     Current packs/day: 1.00     Average packs/day: 1 pack/day for 41.5 years (41.5 ttl pk-yrs)     Types: Cigarettes     Start date: 1983     Passive exposure: Current    Smokeless tobacco: Never   Substance Use Topics    Alcohol use: No        ALLERGIES    Allergies   Allergen Reactions    Codeine GI Intolerance              /78 (BP Location: Left arm, Patient Position: Lying)   Pulse 66   Temp 97.8 °F (36.6 °C) (Oral)   Resp 15   Ht 172.7 cm (68\")   Wt 76.7 kg (169 lb)   SpO2 92%   BMI 25.70 kg/m²   Intake/Output last 3 shifts:  No intake/output data recorded.  Intake/Output this shift:  No intake/output data recorded.    PHYSICAL EXAM:    General: Alert, cooperative, no distress, appears stated age  Head:  Normocephalic, atraumatic, mucous membranes moist  Eyes:  Conjunctivae/corneas clear, EOM's intact     Neck:  Supple,  no adenopathy; no JVD or bruit  Lungs: Clear to auscultation bilaterally, no wheezes, rhonchi or rales are noted  Chest wall: No tenderness  Heart::  Regular rate and rhythm, S1 and S2 normal, no murmur, rub or gallop  Abdomen: Soft, nontender, nondistended, bowel sounds active  Extremities: No cyanosis, clubbing, or edema   Pulses: 2+ and symmetric all extremities  Skin:  No rashes or lesions  Neuro/psych: A&O x3. CN II through XII are grossly intact with appropriate affect      Scheduled Meds:      apixaban, 5 mg, Oral, Q12H  baclofen, 20 mg, Oral, TID  busPIRone, 15 mg, Oral, TID  dronedarone, 400 mg, Oral, BID With Meals  insulin glargine, 60 Units, Subcutaneous, Nightly  lisinopril, 10 mg, Oral, Daily  [Held by provider] metFORMIN, 1,000 mg, Oral, " "BID With Meals  [Held by provider] metoprolol succinate XL, 50 mg, Oral, Daily  pregabalin, 75 mg, Oral, BID  sertraline, 100 mg, Oral, Daily        Continuous Infusions:         PRN Meds:      albuterol    senna-docusate sodium **AND** polyethylene glycol **AND** bisacodyl **AND** bisacodyl    melatonin    Morphine **AND** naloxone    nitroglycerin    nitroglycerin    ondansetron    [COMPLETED] Insert Peripheral IV **AND** sodium chloride    zolpidem        Results Review:     I reviewed the patient's new clinical results.    CBC    Results from last 7 days   Lab Units 07/12/24  0418 07/11/24  1602 07/06/24  0249   WBC 10*3/mm3 7.16 9.15 6.74   HEMOGLOBIN g/dL 11.9* 11.0* 11.6*   PLATELETS 10*3/mm3 130* 140 124*     Cr Clearance Estimated Creatinine Clearance: 90.1 mL/min (by C-G formula based on SCr of 0.66 mg/dL).  Coag   Results from last 7 days   Lab Units 07/11/24  1602   INR  0.99   APTT seconds 40.1*     HbA1C   Lab Results   Component Value Date    HGBA1C 7.2 (A) 04/22/2024    HGBA1C 8.7 (A) 01/09/2024    HGBA1C 9.5 08/14/2023     Blood Glucose   Glucose   Date/Time Value Ref Range Status   07/12/2024 0710 149 (H) 70 - 105 mg/dL Final     Comment:     Serial Number: [id number]:  [id number]   07/11/2024 2217 157 (H) 70 - 105 mg/dL Final     Comment:     Serial Number: [id number]:  [id number]     Infection     CMP   Results from last 7 days   Lab Units 07/12/24  0418 07/11/24  1601 07/06/24  0249   SODIUM mmol/L 141 137 138   POTASSIUM mmol/L 4.0 3.9 4.3   CHLORIDE mmol/L 107 103 107   CO2 mmol/L 22.5 23.1 22.9   BUN mg/dL 7* 9 11   CREATININE mg/dL 0.66 0.77 0.60   GLUCOSE mg/dL 126* 173* 231*   ALBUMIN g/dL  --  3.8  --    BILIRUBIN mg/dL  --  <0.2  --    ALK PHOS U/L  --  106  --    AST (SGOT) U/L  --  26  --    ALT (SGPT) U/L  --  30  --      ABG      UA      TALIA  No results found for: \"POCMETH\", \"POCAMPHET\", \"POCBARBITUR\", \"POCBENZO\", \"POCCOCAINE\", \"POCOPIATES\", \"POCOXYCODO\", \"POCPHENCYC\", " "\"POCPROPOXY\", \"POCTHC\", \"POCTRICYC\"  Lysis Labs   Results from last 7 days   Lab Units 07/12/24  0418 07/11/24  1602 07/11/24  1601 07/06/24  0249   INR   --  0.99  --   --    APTT seconds  --  40.1*  --   --    HEMOGLOBIN g/dL 11.9* 11.0*  --  11.6*   PLATELETS 10*3/mm3 130* 140  --  124*   CREATININE mg/dL 0.66  --  0.77 0.60     Radiology(recent) XR Chest 1 View    Result Date: 7/11/2024  Impression: No acute process. Electronically Signed: Chasity Pickard MD  7/11/2024 4:28 PM EDT  Workstation ID: [id number]       Results from last 7 days   Lab Units 07/11/24  1748   HSTROP T ng/L 21*       X-rays, labs reviewed personally by physician.    ECG/EMG Results (most recent)       Procedure Component Value Units Date/Time    Telemetry Scan [[id number]] Resulted: 07/11/24     Updated: 07/11/24 2120    Telemetry Scan [[id number]] Resulted: 07/11/24     Updated: 07/11/24 2323    Telemetry Scan [[id number]] Resulted: 07/11/24     Updated: 07/12/24 0423    ECG 12 Lead Chest Pain [[id number]] Collected: 07/11/24 1602     Updated: 07/12/24 0612     QT Interval 418 ms      QTC Interval 439 ms     Narrative:      HEART RATE=66  bpm  RR Kraqlzrp=890  ms  WI Tdcqlldl=342  ms  P Horizontal Axis=-5  deg  P Front Axis=68  deg  QRSD Diuwxzoj=694  ms  QT Wqitylpt=088  ms  YZvB=545  ms  QRS Axis=83  deg  T Wave Axis=44  deg  - ABNORMAL ECG -  Sinus rhythm  Probable  left atrial enlargement  Right bundle branch block  Consider  anteroseptal infarct  Electronically Signed By: Scott Wynn ([facility]) 2024-07-12 06:12:24  Date and Time of Study:2024-07-11 16:02:52    Telemetry Scan [[id number]] Resulted: 07/11/24     Updated: 07/12/24 0759              Medication Review:   I have reviewed the patient's current medication list  Scheduled Meds:apixaban, 5 mg, Oral, Q12H  baclofen, 20 mg, Oral, TID  busPIRone, 15 mg, Oral, TID  dronedarone, 400 mg, Oral, BID With Meals  insulin glargine, 60 Units, Subcutaneous, Nightly  lisinopril, 10 mg, Oral, " "Daily  [Held by provider] metFORMIN, 1,000 mg, Oral, BID With Meals  [Held by provider] metoprolol succinate XL, 50 mg, Oral, Daily  pregabalin, 75 mg, Oral, BID  sertraline, 100 mg, Oral, Daily      Continuous Infusions:   PRN Meds:.  albuterol    senna-docusate sodium **AND** polyethylene glycol **AND** bisacodyl **AND** bisacodyl    melatonin    Morphine **AND** naloxone    nitroglycerin    nitroglycerin    ondansetron    [COMPLETED] Insert Peripheral IV **AND** sodium chloride    zolpidem    Imaging:  Imaging Results (Last 72 Hours)       Procedure Component Value Units Date/Time    XR Chest 1 View [[id number]] Collected: 07/11/24 1627     Updated: 07/11/24 1630    Narrative:      XR CHEST 1 VW    Date of Exam: 7/11/2024 4:20 PM EDT    Indication: chest pain    Comparison: None available.    Findings:  Heart and pulmonary vessels and mediastinal contours appear within normal limits. Lung fields are clear of acute infiltrates or effusions. There is no pneumothorax.      Impression:      Impression:  No acute process.      Electronically Signed: Chasity Pickard MD    7/11/2024 4:28 PM EDT    Workstation ID: [id number]              THOMAS Menendez  07/12/24  12:04 EDT       EMR Dragon/Transcription:   \"Dictated utilizing Dragon dictation\".                 Electronically signed by THOMAS Menendez, 07/12/24, 12:04 PM EDT.  Copied text in this note has been reviewed by me and is accurate as of 07/12/24.    Cardiology attending:  Seen and examined.  Chart and labs reviewed. Independent interpretations of cardiac testing was performed. History and exam findings are verified with above changes noted.  Assessment and plan notated by APC after being formulated by attending consultant.  Note that greater than 50% of the time spent in care of the patient was provided by attending consultant.    Patient denies any chest pain pressure heaviness or tightness.  Reports significant fatigue and weakness particularly " in her legs.  Frustrated that her procedure is going to be delayed.  She is in sinus rhythm.  Discussed options with patient including rehab versus home health.  She would like to do home health.  Procedure will be rescheduled.  Cardiology status is otherwise appropriate for discharge when okay with other services.  Follow-up in 2 to 3 weeks.    Physical Exam:    General: Alert, cooperative, no distress, appears stated age  Head:  Normocephalic, atraumatic, mucous membranes moist  Eyes:  Conjunctivae/corneas clear, EOM's intact     Neck:  Supple,  no bruit     Lungs:            Clear to auscultation bilaterally, no wheezes rhonchi rales are noted  Chest wall: No tenderness  Heart::  Regular rate and rhythm, S1 and S2 normal, 1/6 holosystolic murmur.  No, rub or gallop  Abdomen: Soft, nontender, nondistended bowel sounds active  Extremities: No cyanosis, clubbing, or edema  Pulses:            2+ and symmetric all extremities  Skin:  No rashes or lesions  Neuro/psych: A&O x3. CN II through XII are grossly intact with appropriate affect

## 2024-07-15 ENCOUNTER — TRANSITIONAL CARE MANAGEMENT TELEPHONE ENCOUNTER (OUTPATIENT)
Dept: CALL CENTER | Facility: HOSPITAL | Age: 69
End: 2024-07-15
Payer: MEDICAID

## 2024-07-15 NOTE — OUTREACH NOTE
"Call Center TCM Note      Flowsheet Row Responses   [facility] patient discharged from? [facility]   Does the patient have one of the following disease processes/diagnoses(primary or secondary)? Other   TCM attempt successful? Yes   Call start time 0926   Call end time 0928   Discharge diagnosis Chest pain   Meds reviewed with patient/caregiver? Yes   Does the patient have all medications ordered at discharge? N/A   Prescription comments No changes to pt current medications at this hospital discharge   Is the patient taking all medications as directed (includes completed medication regime)? Yes   Comments TCM APPT with PCP THOMAS Cornejo is 07/19/2024. Pt would like anything sooner this week if possible, but she cannot come on 07/16/2024. Thank you.   Does the patient have an appointment with their PCP within 7-14 days of discharge? Yes   Has home health visited the patient within 72 hours of discharge? N/A   Psychosocial issues? No   Did the patient receive a copy of their discharge instructions? Yes   Nursing interventions Reviewed instructions with patient   What is the patient's perception of their health status since discharge? Same   Is the patient/caregiver able to teach back signs and symptoms related to disease process for when to call PCP? Yes   Is the patient/caregiver able to teach back signs and symptoms related to disease process for when to call 911? Yes   Is the patient/caregiver able to teach back the hierarchy of who to call/visit for symptoms/problems? PCP, Specialist, Home health nurse, Urgent Care, ED, 911 Yes   TCM call completed? Yes   Wrap up additional comments D/C DX: chest pain/pressure,  weakness,  fatigue - Pt states chest pressure and other symptoms the same as in hosptital. No SOB, no actual chest \"pain\". No questions at this time. TCM APPT with PCP THOMAS Cornejo is 07/19/2024. Pt would like anything sooner this week if possible, but she cannot come on 07/16/2024. Thank " you.   Call end time 0928            Annabelle Porter MA    7/15/2024, 09:32 EDT

## 2024-07-17 NOTE — PROGRESS NOTES
Subjective   Arianna Blanco is a 68 y.o. female. Presents to [facility]    Arianna was seen at [facility] . She was admitted on [id number]  for chest pain. She was discharged on [id number]. Discharge diagnosis was chest pain, fatigue. Labs that were performed during the encounter included: CBC, BMP, troponin, magnesium, CMP, APTT, PT/INR.. Diagnostic studies that were performed included: Chest x-ray-no acute process and EKG-probable left atrial enlargement, right bundle branch block, sinus rhythm, abnormal. . Currently Arianna receives care at home. Complications from the hospital stay include none. The patient stated that they do not need help with their daily life and activities. The patient stated that they do not have emotional support at home.  Current outpatient and discharge medications have been reconciled for the patient.  Reviewed by: THOMAS Scanlon    Transitional Care Management Certification  I certify that the following are true:  Communication was made within 2 business days of discharge.  Complexity of Medical Decision Making is low.  Face to face visit occurred within 7 days.    *Note: 07234 is for high complexity patients with a face to face visit within 7 days of discharge.  05279 is for high complexity patients with a face to face on days 8-14 post discharge or medium complexity with face to face visit within 14 days post discharge.          Chief Complaint   Patient presents with    Hospital Follow Up Visit     Heart Complication       History of Present Illness   History of Present Illness  The patient is a 68-year-old female who is here for hospital discharge follow-up.    The patient was recently hospitalized due to atrial fibrillation with rapid ventricular response (RVR) and was scheduled for an ablation procedure by Dr. Santos.    She reports she was diagnosed with A-fib, fatigue, weakness.  She was recommended to go to rehab but patient declined and wanted  to do home health instead.      I personally reviewed and updated the patient's allergies, medications, problem list, and past medical, surgical, social, and family history. I have reviewed and confirmed the accuracy of the History of Present Illness and Review of Symptoms as documented by the MA/LPN/RN. THOMAS You    Allergies:  Allergies   Allergen Reactions    Codeine GI Intolerance       Social History:  Social History     Socioeconomic History    Marital status:    Tobacco Use    Smoking status: Every Day     Current packs/day: 1.00     Average packs/day: 1 pack/day for 41.6 years (41.6 ttl pk-yrs)     Types: Cigarettes     Start date: 1983     Passive exposure: Current    Smokeless tobacco: Never   Vaping Use    Vaping status: Some Days    Substances: Nicotine, Flavoring    Devices: Refillable tank   Substance and Sexual Activity    Alcohol use: No    Drug use: Never    Sexual activity: Defer       Family History:  Family History   Problem Relation Age of Onset    Heart disease Mother     Hypertension Mother     Diabetes Mother     Stroke Mother        Past Medical History :  Patient Active Problem List   Diagnosis    Essential hypertension    Dyslipidemia    Simple chronic bronchitis    Unstable angina    Chest pain    Atrial fibrillation    Type 2 diabetes mellitus with hyperglycemia    Anxiety associated with depression    Anxiety    Cataracts, bilateral    Cervico-occipital neuralgia    Chronic obstructive pulmonary disease    Degenerative disc disease, lumbar    Headaches, cluster    Lesion of ulnar nerve, bilateral upper limbs    Degenerative disc disease, cervical    Median nerve neuropathy    Migraine    Nuclear senile cataract    Open angle with borderline findings and high glaucoma risk in left eye    Open-angle glaucoma of right eye    Presbyopia    Asthma    Tobacco dependence    Hypertriglyceridemia    Hypercholesteremia    Restless legs syndrome (RLS)    Sleep apnea syndrome     Defect of endplate of vertebra    Sacroiliac joint dysfunction    Lumbar spondylosis    Lumbar facet joint pain    Osteopenia of neck of left femur    Right renal mass    Splenomegaly    Right adrenal mass    Hepatic steatosis    Hepatomegaly    Nephrolithiasis    Mammogram declined    Tremor of right hand    Depression    Cervical stenosis of spinal canal    Numbness and tingling in right hand    Lumbar radiculopathy    Incontinence of feces    Lymphadenopathy, submental    Insomnia    Atrial fibrillation with RVR    Atrial flutter with controlled response    PAF (paroxysmal atrial fibrillation)    Fatigue       Medication List:    Current Outpatient Medications:     Accu-Chek Guide test strip, 1 each by Other route 4 (Four) Times a Day. Use to test blood sugar 4 times daily, Disp: 120 each, Rfl: 11    albuterol sulfate  (90 Base) MCG/ACT inhaler, INHALE 2 PUFFS BY MOUTH EVERY 4 HOURS AS NEEDED FOR WHEEZING OR SHORTNESS OF AIR, Disp: 6.7 g, Rfl: 2    baclofen (LIORESAL) 20 MG tablet, Take 1 tablet by mouth 3 (Three) Times a Day., Disp: 90 tablet, Rfl: 2    Brexpiprazole (Rexulti) 3 MG tablet, Take 1 tablet by mouth Daily., Disp: 90 tablet, Rfl: 1    busPIRone (BUSPAR) 7.5 MG tablet, Take 2 tablets by mouth 3 (Three) Times a Day., Disp: 90 tablet, Rfl: 0    butalbital-acetaminophen  MG tablet tablet, Take 1 tablet by mouth Every 6 (Six) Hours As Needed (headache)., Disp: 30 tablet, Rfl: 0    Continuous Blood Gluc  (FreeStyle Willie 2 [place]) device, Use 1 each Daily. To be used daily to monitor continuous blood sugar reading for type 2 diabetic patient on insulin regimen., Disp: 1 each, Rfl: 0    Continuous Blood Gluc Sensor (FreeStyle Willie 2 Sensor) misc, Use 1 each Every 14 (Fourteen) Days. Dispense 2 sensor pack. To be used daily with freestyle willie device to monitor continuous blood sugar reading for type 2 diabetic patient on insulin regimen., Disp: 2 each, Rfl: 12    dronedarone  (MULTAQ) 400 MG tablet, Take 1 tablet by mouth 2 (Two) Times a Day With Meals., Disp: 180 tablet, Rfl: 1    Eliquis 5 MG tablet tablet, Take 1 tablet by mouth Every 12 (Twelve) Hours., Disp: 180 tablet, Rfl: 1    Incontinence Supply Disposable (Briefs Overnight Large) misc, Use 1 each At Night As Needed (incontinence)., Disp: 90 each, Rfl: 3    Insulin Glargine (LANTUS SOLOSTAR) 100 UNIT/ML injection pen, Inject 60 Units under the skin into the appropriate area as directed Every Night., Disp: 54 mL, Rfl: 3    insulin glulisine (Apidra) 100 UNIT/ML injection, Inject 5 Units under the skin into the appropriate area as directed 3 (Three) Times a Day Before Meals. (Patient taking differently: Inject 10 Units under the skin into the appropriate area as directed 3 (Three) Times a Day Before Meals.), Disp: 10 mL, Rfl: 3    lisinopril (PRINIVIL,ZESTRIL) 10 MG tablet, Take 1 tablet by mouth Daily., Disp: 90 tablet, Rfl: 1    metFORMIN (GLUCOPHAGE) 1000 MG tablet, Take 1 tablet by mouth 2 (Two) Times a Day With Meals., Disp: 180 tablet, Rfl: 3    metoprolol succinate XL (TOPROL-XL) 50 MG 24 hr tablet, Take 1 tablet by mouth Daily., Disp: 90 tablet, Rfl: 1    pregabalin (LYRICA) 75 MG capsule, TAKE 1 CAPSULE BY MOUTH TWICE A DAY, Disp: 60 capsule, Rfl: 0    Semaglutide, 1 MG/DOSE, (Ozempic, 1 MG/DOSE,) 4 MG/3ML solution pen-injector, INJECT 1 MG UNDER THE SKIN INTO THE APPROPRIATE AREA AS DIRECTED EVERY 7 (SEVEN) DAYS., Disp: 3 mL, Rfl: 2    sertraline (ZOLOFT) 100 MG tablet, Take 1 tablet by mouth Daily., Disp: 90 tablet, Rfl: 3    zolpidem (AMBIEN) 10 MG tablet, TAKE 1 TABLET BY MOUTH AT NIGHT AS NEEDED FOR SLEEP., Disp: 28 tablet, Rfl: 0    Past Surgical History:  Past Surgical History:   Procedure Laterality Date    CARDIAC CATHETERIZATION Left 10/30/2020    Procedure: Left Heart Cath with Coronary Angiography;  Surgeon: Donaldo Archer MD;  Location: [facility] CATH INVASIVE LOCATION;  Service: Cardiovascular;  Laterality:  "Left;    CARPAL TUNNEL RELEASE Bilateral 2017    CHOLECYSTECTOMY  1980    HYSTERECTOMY  1980       Review of Systems:  Review of Systems   Constitutional:  Positive for fatigue and unexpected weight loss.   Respiratory:  Negative for cough, shortness of breath and wheezing.    Cardiovascular:  Negative for chest pain, palpitations and leg swelling.   Neurological:  Positive for weakness.       Physical Exam:  Vital Signs:  Vital Signs:   /78 (BP Location: Right arm, Patient Position: Sitting, Cuff Size: Large Adult)   Pulse 81   Temp 97.2 °F (36.2 °C) (Temporal)   Resp 18   Ht 172.7 cm (67.99\")   Wt 74.5 kg (164 lb 3.2 oz)   SpO2 100%   BMI 24.97 kg/m²     Result Review :              Results  Laboratory Studies  High-sensitivity troponin elevated at 21, repeat 23. ProBNP 249. Glucose elevated at 173. Red blood cell low at 3.74, hemoglobin 11, hematocrit 33.1, MPV 12.5, PTT 40.1. Repeat CBC on 7/12/2023, hemoglobin 11.9, platelets 130, MCHC 31.1, MPV 12.7, glucose 126, BUN 7.    Imaging  Chest x-ray negative for acute process.    Testing  EKG shows probable left atrial enlargement, right bundle branch block, consider anteroseptal infarct.      Physical Exam  Vitals and nursing note reviewed.   Constitutional:       General: She is not in acute distress.     Appearance: Normal appearance. She is well-groomed. She is not ill-appearing.   HENT:      Head: Normocephalic and atraumatic.   Eyes:      General: Lids are normal. Vision grossly intact.   Neck:      Vascular: No carotid bruit.   Cardiovascular:      Rate and Rhythm: Normal rate and regular rhythm.      Heart sounds: Normal heart sounds.   Pulmonary:      Effort: Pulmonary effort is normal.      Breath sounds: Normal breath sounds and air entry.   Musculoskeletal:      Right lower leg: No edema.      Left lower leg: No edema.   Skin:     General: Skin is warm and dry.   Neurological:      Mental Status: She is alert and oriented to person, place, " and time. Mental status is at baseline.   Psychiatric:         Mood and Affect: Mood normal. Affect is flat.         Behavior: Behavior normal. Behavior is cooperative.       Physical Exam      Assessment and Plan:  Problems Addressed this Visit          High    Atrial fibrillation    Relevant Orders    Ambulatory Referral to Home Health       Unprioritized    Fatigue    Relevant Orders    Ambulatory Referral to Home Health     Other Visit Diagnoses       Hospital discharge follow-up    -  Primary    Weakness        Relevant Orders    Ambulatory Referral to Home Health    Weight loss        Relevant Orders    Ambulatory Referral to Home Health          Diagnoses         Codes Comments    Hospital discharge follow-up    -  Primary ICD-10-CM: Z09  ICD-9-CM: V67.59     Fatigue, unspecified type     ICD-10-CM: R53.83  ICD-9-CM: 780.79     Weakness     ICD-10-CM: R53.1  ICD-9-CM: 780.79     Weight loss     ICD-10-CM: R63.4  ICD-9-CM: 783.21     Atrial fibrillation, unspecified type     ICD-10-CM: I48.91  ICD-9-CM: 427.31 Controlled           Diagnoses and all orders for this visit:    1. Hospital discharge follow-up (Primary)    2. Fatigue, unspecified type  -     Ambulatory Referral to Home Health    3. Weakness  -     Ambulatory Referral to Home Health    4. Weight loss  -     Ambulatory Referral to Home Health    5. Atrial fibrillation, unspecified type  Comments:  Controlled  Orders:  -     Ambulatory Referral to Home Health      Assessment & Plan  1. Hospital discharge follow-up.  The patient is advised to persist with the current regimen of Eliquis, Multaq twice daily, and metoprolol. A follow-up appointment with Dr. Butler is scheduled to arrange for the ablation procedure.  Diagnoses and all orders for this visit:    1. Hospital discharge follow-up (Primary)    2. Fatigue, unspecified type  -     Ambulatory Referral to Home Health    3. Weakness  -     Ambulatory Referral to Home Health    4. Weight loss  -      Ambulatory Referral to Home Health    5. Atrial fibrillation, unspecified type  Comments:  Controlled  Orders:  -     Ambulatory Referral to Home Health        Risk for Readmission (LACE) Score: 6 (7/12/2024  6:00 AM)      An After Visit Summary and PPPS were given to the patient.       I wore protective equipment throughout this patient encounter to include . Hand hygiene was performed before donning protective equipment and after removal when leaving the room.    Follow Up   No follow-ups on file.  Patient was given instructions and counseling regarding her condition or for health maintenance advice. Please see specific information pulled into the AVS if appropriate.    There are no Patient Instructions on file for this visit.     This document is intended for medical expert use only. Reading of this document by patients and/or patient's family without participating medical staff guidance may result in misinterpretation and unintended morbidity. Any interpretation of such data is the responsibility of the patient and/or family member responsible for the patient in concert with their primary or specialist providers, not to be left for sources of online searches such as [employer], [employer] or similar queries. Relying on these approaches to knowledge may result in misinterpretation, misguided goals of care and even death should patients or family members try recommendations outside of the realm of professional medical care.      Patient or patient representative verbalized consent for the use of Ambient Listening during the visit with  THOMAS Scanlon for chart documentation. 7/25/2024  20:20 EDT

## 2024-07-19 ENCOUNTER — OFFICE VISIT (OUTPATIENT)
Dept: FAMILY MEDICINE CLINIC | Facility: CLINIC | Age: 69
End: 2024-07-19
Payer: MEDICAID

## 2024-07-19 DIAGNOSIS — R53.83 FATIGUE, UNSPECIFIED TYPE: ICD-10-CM

## 2024-07-19 DIAGNOSIS — R63.4 WEIGHT LOSS: ICD-10-CM

## 2024-07-19 DIAGNOSIS — R53.1 WEAKNESS: ICD-10-CM

## 2024-07-19 DIAGNOSIS — Z09 HOSPITAL DISCHARGE FOLLOW-UP: Primary | ICD-10-CM

## 2024-07-19 DIAGNOSIS — I48.91 ATRIAL FIBRILLATION, UNSPECIFIED TYPE: ICD-10-CM

## 2024-07-19 PROCEDURE — 1125F AMNT PAIN NOTED PAIN PRSNT: CPT

## 2024-07-19 PROCEDURE — 99214 OFFICE O/P EST MOD 30 MIN: CPT

## 2024-07-19 PROCEDURE — 3051F HG A1C>EQUAL 7.0%<8.0%: CPT

## 2024-07-22 ENCOUNTER — TELEPHONE (OUTPATIENT)
Dept: CARDIOLOGY | Facility: CLINIC | Age: 69
End: 2024-07-22

## 2024-07-22 NOTE — TELEPHONE ENCOUNTER
Caller: Arianna Blanco    Relationship: Self    Best call back number: [phone]    What is the best time to reach you: ANY    Who are you requesting to speak with (clinical staff, provider,  specific staff member): ANY      What was the call regarding: PATIENT HAS A MONTY PROCEDURE SCHEDULED WITH DR. MONTENEGRO ON 08.13.24 AND WOULD LIKE TO SEE IF SHE CAN GET THAT DONE SOONER. PLEASE CONTACT PATIENT TO ADVISE. THANK YOU.     Is it okay if the provider responds through [email or web address]: CALL

## 2024-07-23 ENCOUNTER — TELEPHONE (OUTPATIENT)
Dept: FAMILY MEDICINE CLINIC | Facility: CLINIC | Age: 69
End: 2024-07-23
Payer: MEDICAID

## 2024-07-23 ENCOUNTER — APPOINTMENT (OUTPATIENT)
Dept: CARDIOLOGY | Facility: HOSPITAL | Age: 69
End: 2024-07-23
Payer: MEDICAID

## 2024-07-23 VITALS
TEMPERATURE: 97.2 F | DIASTOLIC BLOOD PRESSURE: 78 MMHG | RESPIRATION RATE: 18 BRPM | HEART RATE: 81 BPM | HEIGHT: 68 IN | BODY MASS INDEX: 24.89 KG/M2 | OXYGEN SATURATION: 100 % | WEIGHT: 164.2 LBS | SYSTOLIC BLOOD PRESSURE: 162 MMHG

## 2024-07-23 NOTE — TELEPHONE ENCOUNTER
Patient needs a referral to a different Home Health the one she was referred to does not take medicaid

## 2024-07-24 ENCOUNTER — HOME HEALTH ADMISSION (OUTPATIENT)
Dept: HOME HEALTH SERVICES | Facility: HOME HEALTHCARE | Age: 69
End: 2024-07-24
Payer: MEDICAID

## 2024-07-24 DIAGNOSIS — E11.65 TYPE 2 DIABETES MELLITUS WITH HYPERGLYCEMIA, WITHOUT LONG-TERM CURRENT USE OF INSULIN: ICD-10-CM

## 2024-07-25 RX ORDER — SEMAGLUTIDE 1.34 MG/ML
1 INJECTION, SOLUTION SUBCUTANEOUS
Qty: 3 ML | Refills: 2 | Status: SHIPPED | OUTPATIENT
Start: 2024-07-25

## 2024-07-26 ENCOUNTER — OFFICE VISIT (OUTPATIENT)
Dept: PAIN MEDICINE | Facility: CLINIC | Age: 69
End: 2024-07-26
Payer: MEDICAID

## 2024-07-26 VITALS
HEART RATE: 86 BPM | RESPIRATION RATE: 16 BRPM | OXYGEN SATURATION: 96 % | DIASTOLIC BLOOD PRESSURE: 76 MMHG | SYSTOLIC BLOOD PRESSURE: 126 MMHG

## 2024-07-26 DIAGNOSIS — M54.16 LUMBAR RADICULOPATHY: Primary | ICD-10-CM

## 2024-07-26 DIAGNOSIS — M47.816 SPONDYLOSIS OF LUMBAR REGION WITHOUT MYELOPATHY OR RADICULOPATHY: ICD-10-CM

## 2024-07-26 PROCEDURE — 3078F DIAST BP <80 MM HG: CPT | Performed by: STUDENT IN AN ORGANIZED HEALTH CARE EDUCATION/TRAINING PROGRAM

## 2024-07-26 PROCEDURE — 1160F RVW MEDS BY RX/DR IN RCRD: CPT | Performed by: STUDENT IN AN ORGANIZED HEALTH CARE EDUCATION/TRAINING PROGRAM

## 2024-07-26 PROCEDURE — 1125F AMNT PAIN NOTED PAIN PRSNT: CPT | Performed by: STUDENT IN AN ORGANIZED HEALTH CARE EDUCATION/TRAINING PROGRAM

## 2024-07-26 PROCEDURE — 3074F SYST BP LT 130 MM HG: CPT | Performed by: STUDENT IN AN ORGANIZED HEALTH CARE EDUCATION/TRAINING PROGRAM

## 2024-07-26 PROCEDURE — 1159F MED LIST DOCD IN RCRD: CPT | Performed by: STUDENT IN AN ORGANIZED HEALTH CARE EDUCATION/TRAINING PROGRAM

## 2024-07-26 PROCEDURE — 99214 OFFICE O/P EST MOD 30 MIN: CPT | Performed by: STUDENT IN AN ORGANIZED HEALTH CARE EDUCATION/TRAINING PROGRAM

## 2024-07-26 PROCEDURE — G0463 HOSPITAL OUTPT CLINIC VISIT: HCPCS | Performed by: STUDENT IN AN ORGANIZED HEALTH CARE EDUCATION/TRAINING PROGRAM

## 2024-07-26 RX ORDER — PREGABALIN 150 MG/1
150 CAPSULE ORAL 2 TIMES DAILY
Qty: 60 CAPSULE | Refills: 1 | Status: SHIPPED | OUTPATIENT
Start: 2024-07-26

## 2024-07-26 NOTE — PROGRESS NOTES
CHIEF COMPLAINT  Chief Complaint   Patient presents with    Pain     Rescheduled [facility] Follow-up appt- No Narcotics       Primary Care  Maddie Cornejo APRN Subjective Victim Blanco is a 68 y.o. female  who presents for chronic low back pain and right-sided SI joint dysfunction.  She was initially evaluated by the neurosurgery nurse practitioner who recommended right-sided SI joint injection.  For somewhat unknown reasons, she was never scheduled for this procedure.  She reports that prior to being seen back by the neurosurgeon, she needs to complete the SI joint injection.  She describes significant pain across the right buttock and extending into the right posterior thigh.  She has significant pain especially with prolonged walking and prolonged sitting.  She reports that changing positions slightly improves her pain.  She has tried over-the-counter medications without any significant improvement.  She has done approximately 6 months of chiropractic without any significant benefit.  She denies any red flag symptoms such as loss of bowel or bladder or significant numbness or weakness.  She also denies any significant radicular pain.    History of Present Illness     Location: Right buttock  Onset: Approximately 6 months ago  Duration: Progressively worsening  Timing: Constant throughout the day  Quality: Sharp, stabbing  Severity: Today: 8       Last Week: 10       Worst: 10  Modifying Factors: The pain is worse with any type of movement and physical activity and slightly improved with rest  Functional Deficit: The pain makes it difficult for her to perform her activities of daily living    Physical Therapy: yes    Interval Update 07/26/2024: Requesting repeat medial branch block.  She reports that she got significant, 80% benefit for approximately 1 week with return of symptoms as before.  Also getting some benefit with Lyrica.  She has had multiple admissions to the hospital with heart issues.  She is no  longer on baclofen as she was having some sedation    The following portions of the patient's history were reviewed and updated as appropriate: allergies, current medications, past family history, past medical history, past social history, past surgical history, and problem list.    Procedures:  11/16/2023: Right SI: No benefit  12/28/2023: LESI: 90% benefit      Current Outpatient Medications:     Accu-Chek Guide test strip, 1 each by Other route 4 (Four) Times a Day. Use to test blood sugar 4 times daily, Disp: 120 each, Rfl: 11    albuterol sulfate  (90 Base) MCG/ACT inhaler, INHALE 2 PUFFS BY MOUTH EVERY 4 HOURS AS NEEDED FOR WHEEZING OR SHORTNESS OF AIR, Disp: 6.7 g, Rfl: 2    Brexpiprazole (Rexulti) 3 MG tablet, Take 1 tablet by mouth Daily., Disp: 90 tablet, Rfl: 1    busPIRone (BUSPAR) 7.5 MG tablet, Take 2 tablets by mouth 3 (Three) Times a Day., Disp: 90 tablet, Rfl: 0    butalbital-acetaminophen  MG tablet tablet, Take 1 tablet by mouth Every 6 (Six) Hours As Needed (headache)., Disp: 30 tablet, Rfl: 0    Continuous Blood Gluc  (FreeStyle Willie 2 [place]) device, Use 1 each Daily. To be used daily to monitor continuous blood sugar reading for type 2 diabetic patient on insulin regimen., Disp: 1 each, Rfl: 0    Continuous Blood Gluc Sensor (FreeStyle Willie 2 Sensor) misc, Use 1 each Every 14 (Fourteen) Days. Dispense 2 sensor pack. To be used daily with freestyle willie device to monitor continuous blood sugar reading for type 2 diabetic patient on insulin regimen., Disp: 2 each, Rfl: 12    dronedarone (MULTAQ) 400 MG tablet, Take 1 tablet by mouth 2 (Two) Times a Day With Meals., Disp: 180 tablet, Rfl: 1    Eliquis 5 MG tablet tablet, Take 1 tablet by mouth Every 12 (Twelve) Hours., Disp: 180 tablet, Rfl: 1    Incontinence Supply Disposable (Briefs Overnight Large) misc, Use 1 each At Night As Needed (incontinence)., Disp: 90 each, Rfl: 3    Insulin Glargine (LANTUS SOLOSTAR) 100  UNIT/ML injection pen, Inject 60 Units under the skin into the appropriate area as directed Every Night., Disp: 54 mL, Rfl: 3    insulin glulisine (Apidra) 100 UNIT/ML injection, Inject 5 Units under the skin into the appropriate area as directed 3 (Three) Times a Day Before Meals. (Patient taking differently: Inject 10 Units under the skin into the appropriate area as directed 3 (Three) Times a Day Before Meals.), Disp: 10 mL, Rfl: 3    lisinopril (PRINIVIL,ZESTRIL) 10 MG tablet, Take 1 tablet by mouth Daily., Disp: 90 tablet, Rfl: 1    metFORMIN (GLUCOPHAGE) 1000 MG tablet, Take 1 tablet by mouth 2 (Two) Times a Day With Meals., Disp: 180 tablet, Rfl: 3    metoprolol succinate XL (TOPROL-XL) 50 MG 24 hr tablet, Take 1 tablet by mouth Daily., Disp: 90 tablet, Rfl: 1    pregabalin (LYRICA) 150 MG capsule, Take 1 capsule by mouth 2 (Two) Times a Day., Disp: 60 capsule, Rfl: 1    Semaglutide, 1 MG/DOSE, (Ozempic, 1 MG/DOSE,) 4 MG/3ML solution pen-injector, INJECT 1 MG UNDER THE SKIN INTO THE APPROPRIATE AREA AS DIRECTED EVERY 7 (SEVEN) DAYS., Disp: 3 mL, Rfl: 2    sertraline (ZOLOFT) 100 MG tablet, Take 1 tablet by mouth Daily., Disp: 90 tablet, Rfl: 3    zolpidem (AMBIEN) 10 MG tablet, TAKE 1 TABLET BY MOUTH AT NIGHT AS NEEDED FOR SLEEP., Disp: 28 tablet, Rfl: 0    Review of Systems   Musculoskeletal:  Positive for arthralgias, back pain, gait problem and joint swelling. Negative for myalgias, neck pain and neck stiffness.   Neurological:  Negative for weakness and numbness.       Vitals:    07/26/24 1531   BP: 126/76   Pulse: 86   Resp: 16   SpO2: 96%   PainSc:   9       Urine Drug Screen: Pending  Appropriate: Pending    Objective   Physical Exam  Vitals and nursing note reviewed.   Constitutional:       General: She is not in acute distress.     Appearance: Normal appearance. She is well-developed and normal weight.   Neck:      Trachea: No tracheal deviation.   Musculoskeletal:      Comments: Lumbar Spine  Exam:  Tender to palpation over the lumbar paraspinal musculature Yes  Limited range of motion secondary to pain yes  Facet loading positive: Left  Facets tender to palpation: Left  Straight leg raise test positive: Negative    SI Joint Exam:  Blake positive: right  PSIS tender: right   SI joint palpation: right  SI joint compression: right     Neurological:      General: No focal deficit present.      Mental Status: She is alert.      Sensory: No sensory deficit.      Motor: No weakness.           Assessment & Plan   Problems Addressed this Visit       Lumbar radiculopathy - Primary     Other Visit Diagnoses       Spondylosis of lumbar region without myelopathy or radiculopathy        Relevant Medications    pregabalin (LYRICA) 150 MG capsule    Other Relevant Orders    Facet          Diagnoses         Codes Comments    Lumbar radiculopathy    -  Primary ICD-10-CM: M54.16  ICD-9-CM: 724.4     Spondylosis of lumbar region without myelopathy or radiculopathy     ICD-10-CM: M47.816  ICD-9-CM: 721.3             Plan:  Plan to repeat her left-sided L4-5 and L5-S1 medial branch blocks as she reports significant diagnostic benefit  No longer on baclofen  She is wanting to start back on ibuprofen however she is currently anticoagulated pending cardiac ablation.  May discuss starting this after she is back in sinus rhythm  For the meantime I can increase her Lyrica to 150 mg twice daily    --- Follow-up next notable for left-sided L4-5 and L5-S1 medial branch blocks           INSPECT REPORT    As part of the patient's treatment plan, I may be prescribing controlled substances. The patient has been made aware of appropriate use of such medications, including potential risk of somnolence, limited ability to drive and/or work safely, and the potential for dependence or overdose. It has also bee made clear that these medications are for use by this patient only, without concomitant use of alcohol or other substances unless  prescribed.     Patient has completed prescribing agreement detailing terms of continued prescribing of controlled substances, including monitoring NILDA reports, urine drug screening, and pill counts if necessary. The patient is aware that inappropriate use will results in cessation of prescribing such medications.    INSPECT report has been reviewed and scanned into the patient's chart.    As the clinician, I personally reviewed the INSPECT from 7/24/2024.    History and physical exam exhibit continued safe and appropriate use of controlled substances.      EMR Dragon/Transcription disclaimer:   Much of this encounter note is an electronic transcription/translation of spoken language to printed text. The electronic translation of spoken language may permit erroneous, or at times, nonsensical words or phrases to be inadvertently transcribed; Although I have reviewed the note for such errors, some may still exist.

## 2024-07-31 DIAGNOSIS — G47.00 INSOMNIA, UNSPECIFIED TYPE: ICD-10-CM

## 2024-08-01 RX ORDER — ZOLPIDEM TARTRATE 10 MG/1
10 TABLET ORAL NIGHTLY PRN
Qty: 28 TABLET | Refills: 0 | OUTPATIENT
Start: 2024-08-01

## 2024-08-02 DIAGNOSIS — G47.00 INSOMNIA, UNSPECIFIED TYPE: ICD-10-CM

## 2024-08-02 RX ORDER — ZOLPIDEM TARTRATE 10 MG/1
10 TABLET ORAL NIGHTLY PRN
Qty: 28 TABLET | Refills: 0 | Status: SHIPPED | OUTPATIENT
Start: 2024-08-02

## 2024-08-13 ENCOUNTER — APPOINTMENT (OUTPATIENT)
Dept: CARDIOLOGY | Facility: HOSPITAL | Age: 69
End: 2024-08-13
Payer: MEDICAID

## 2024-08-16 ENCOUNTER — TELEPHONE (OUTPATIENT)
Dept: PAIN MEDICINE | Facility: CLINIC | Age: 69
End: 2024-08-16

## 2024-08-16 RX ORDER — BACLOFEN 20 MG/1
20 TABLET ORAL 3 TIMES DAILY
Qty: 90 TABLET | Refills: 0 | Status: SHIPPED | OUTPATIENT
Start: 2024-08-16

## 2024-08-21 ENCOUNTER — TELEPHONE (OUTPATIENT)
Dept: PAIN MEDICINE | Facility: CLINIC | Age: 69
End: 2024-08-21
Payer: MEDICAID

## 2024-08-21 RX ORDER — BLOOD SUGAR DIAGNOSTIC
1 STRIP MISCELLANEOUS 4 TIMES DAILY
Qty: 100 EACH | Refills: 1 | Status: SHIPPED | OUTPATIENT
Start: 2024-08-21

## 2024-08-21 RX ORDER — IBUPROFEN 800 MG/1
800 TABLET ORAL EVERY 8 HOURS PRN
Qty: 90 TABLET | Refills: 0 | Status: SHIPPED | OUTPATIENT
Start: 2024-08-21

## 2024-08-21 NOTE — TELEPHONE ENCOUNTER
Caller: Arianna Blanco    Relationship to patient: Self    Best call back number: [phone] (home)     Patient is needing: PATIENT WAS SUPPOSED TO HAVE HEART PROCEDURE THAT GOT CANCELLED AND SHE NEEDS TO CANCEL HER PROCEDURE WITH DR BASURTO THAT IS SCHEDULED FOR TOMORROW. THE HEART PROCEDURE HAS TO BE DONE BEFORE THE PROCEDURE WITH SB AND PATIENT WILL HAVE TO CALL BACK TO RS

## 2024-08-22 DIAGNOSIS — G43.909 MIGRAINE WITHOUT STATUS MIGRAINOSUS, NOT INTRACTABLE, UNSPECIFIED MIGRAINE TYPE: ICD-10-CM

## 2024-08-22 RX ORDER — BUTALBITAL/ACETAMINOPHEN 50MG-325MG
1 TABLET ORAL EVERY 6 HOURS PRN
Qty: 30 TABLET | Refills: 0 | Status: SHIPPED | OUTPATIENT
Start: 2024-08-22

## 2024-08-27 ENCOUNTER — HOSPITAL ENCOUNTER (OUTPATIENT)
Facility: HOSPITAL | Age: 69
Discharge: HOME | End: 2024-08-28
Attending: INTERNAL MEDICINE | Admitting: INTERNAL MEDICINE
Payer: MEDICAID

## 2024-08-27 ENCOUNTER — HOSPITAL ENCOUNTER (OUTPATIENT)
Dept: CARDIOLOGY | Facility: HOSPITAL | Age: 69
Setting detail: HOSPITAL OUTPATIENT SURGERY
Discharge: HOME | End: 2024-08-27
Payer: MEDICAID

## 2024-08-27 ENCOUNTER — ANESTHESIA (OUTPATIENT)
Dept: CARDIOLOGY | Facility: HOSPITAL | Age: 69
End: 2024-08-27
Payer: MEDICAID

## 2024-08-27 ENCOUNTER — LAB (OUTPATIENT)
Dept: LAB | Facility: HOSPITAL | Age: 69
End: 2024-08-27
Payer: MEDICAID

## 2024-08-27 ENCOUNTER — ANESTHESIA EVENT (OUTPATIENT)
Dept: CARDIOLOGY | Facility: HOSPITAL | Age: 69
End: 2024-08-27
Payer: MEDICAID

## 2024-08-27 DIAGNOSIS — I10 ESSENTIAL HYPERTENSION: ICD-10-CM

## 2024-08-27 DIAGNOSIS — I48.0 PAF (PAROXYSMAL ATRIAL FIBRILLATION): ICD-10-CM

## 2024-08-27 DIAGNOSIS — I48.92 ATRIAL FLUTTER WITH CONTROLLED RESPONSE: ICD-10-CM

## 2024-08-27 LAB
ACT BLD: 110 SECONDS (ref 89–137)
ACT BLD: 287 SECONDS (ref 89–137)
ACT BLD: 330 SECONDS (ref 89–137)
ALBUMIN SERPL-MCNC: 4.2 G/DL (ref 3.5–5.2)
ALBUMIN/GLOB SERPL: 1.5 G/DL
ALP SERPL-CCNC: 111 U/L (ref 39–117)
ALT SERPL W P-5'-P-CCNC: 16 U/L (ref 1–33)
ANION GAP SERPL CALCULATED.3IONS-SCNC: 11.6 MMOL/L (ref 5–15)
AST SERPL-CCNC: 23 U/L (ref 1–32)
BASOPHILS # BLD AUTO: 0.04 10*3/MM3 (ref 0–0.2)
BASOPHILS NFR BLD AUTO: 0.5 % (ref 0–1.5)
BH CV ECHO SHUNT ASSESSMENT PERFORMED (HIDDEN SCRIPTING): 1
BILIRUB SERPL-MCNC: <0.2 MG/DL (ref 0–1.2)
BUN SERPL-MCNC: 11 MG/DL (ref 8–23)
BUN/CREAT SERPL: 18.3 (ref 7–25)
CALCIUM SPEC-SCNC: 9.4 MG/DL (ref 8.6–10.5)
CHLORIDE SERPL-SCNC: 102 MMOL/L (ref 98–107)
CO2 SERPL-SCNC: 25.4 MMOL/L (ref 22–29)
CREAT SERPL-MCNC: 0.6 MG/DL (ref 0.57–1)
DEPRECATED RDW RBC AUTO: 43.3 FL (ref 37–54)
EGFRCR SERPLBLD CKD-EPI 2021: 97.9 ML/MIN/1.73
EOSINOPHIL # BLD AUTO: 0.16 10*3/MM3 (ref 0–0.4)
EOSINOPHIL NFR BLD AUTO: 2.1 % (ref 0.3–6.2)
ERYTHROCYTE [DISTWIDTH] IN BLOOD BY AUTOMATED COUNT: 13.2 % (ref 12.3–15.4)
GLOBULIN UR ELPH-MCNC: 2.8 GM/DL
GLUCOSE BLDC GLUCOMTR-MCNC: 280 MG/DL (ref 70–105)
GLUCOSE SERPL-MCNC: 229 MG/DL (ref 65–99)
HCT VFR BLD AUTO: 39.1 % (ref 34–46.6)
HGB BLD-MCNC: 12.6 G/DL (ref 12–15.9)
IMM GRANULOCYTES # BLD AUTO: 0.02 10*3/MM3 (ref 0–0.05)
IMM GRANULOCYTES NFR BLD AUTO: 0.3 % (ref 0–0.5)
LV EF 2D ECHO EST: 60 %
LYMPHOCYTES # BLD AUTO: 2.34 10*3/MM3 (ref 0.7–3.1)
LYMPHOCYTES NFR BLD AUTO: 30.9 % (ref 19.6–45.3)
MAGNESIUM SERPL-MCNC: 1.9 MG/DL (ref 1.6–2.4)
MCH RBC QN AUTO: 28.4 PG (ref 26.6–33)
MCHC RBC AUTO-ENTMCNC: 32.2 G/DL (ref 31.5–35.7)
MCV RBC AUTO: 88.3 FL (ref 79–97)
MONOCYTES # BLD AUTO: 0.56 10*3/MM3 (ref 0.1–0.9)
MONOCYTES NFR BLD AUTO: 7.4 % (ref 5–12)
NEUTROPHILS NFR BLD AUTO: 4.46 10*3/MM3 (ref 1.7–7)
NEUTROPHILS NFR BLD AUTO: 58.8 % (ref 42.7–76)
NRBC BLD AUTO-RTO: 0 /100 WBC (ref 0–0.2)
PLATELET # BLD AUTO: 143 10*3/MM3 (ref 140–450)
PMV BLD AUTO: 12.2 FL (ref 6–12)
POTASSIUM SERPL-SCNC: 4.4 MMOL/L (ref 3.5–5.2)
PROT SERPL-MCNC: 7 G/DL (ref 6–8.5)
RBC # BLD AUTO: 4.43 10*6/MM3 (ref 3.77–5.28)
SODIUM SERPL-SCNC: 139 MMOL/L (ref 136–145)
WBC NRBC COR # BLD AUTO: 7.58 10*3/MM3 (ref 3.4–10.8)

## 2024-08-27 PROCEDURE — G0378 HOSPITAL OBSERVATION PER HR: HCPCS

## 2024-08-27 PROCEDURE — 25010000002 GLYCOPYRROLATE 0.2 MG/ML SOLUTION

## 2024-08-27 PROCEDURE — 25010000002 HYDRALAZINE PER 20 MG

## 2024-08-27 PROCEDURE — 93656 COMPRE EP EVAL ABLTJ ATR FIB: CPT | Performed by: INTERNAL MEDICINE

## 2024-08-27 PROCEDURE — 25010000002 MORPHINE PER 10 MG: Performed by: INTERNAL MEDICINE

## 2024-08-27 PROCEDURE — C1893 INTRO/SHEATH, FIXED,NON-PEEL: HCPCS | Performed by: INTERNAL MEDICINE

## 2024-08-27 PROCEDURE — 25010000002 PROTAMINE SULFATE PER 10 MG

## 2024-08-27 PROCEDURE — C1766 INTRO/SHEATH,STRBLE,NON-PEEL: HCPCS | Performed by: INTERNAL MEDICINE

## 2024-08-27 PROCEDURE — 25810000003 SODIUM CHLORIDE 0.9 % SOLUTION: Performed by: INTERNAL MEDICINE

## 2024-08-27 PROCEDURE — 25010000002 PROPOFOL 10 MG/ML EMULSION: Performed by: NURSE ANESTHETIST, CERTIFIED REGISTERED

## 2024-08-27 PROCEDURE — C1733 CATH, EP, OTHR THAN COOL-TIP: HCPCS | Performed by: INTERNAL MEDICINE

## 2024-08-27 PROCEDURE — 82948 REAGENT STRIP/BLOOD GLUCOSE: CPT

## 2024-08-27 PROCEDURE — 93325 DOPPLER ECHO COLOR FLOW MAPG: CPT

## 2024-08-27 PROCEDURE — C1894 INTRO/SHEATH, NON-LASER: HCPCS | Performed by: INTERNAL MEDICINE

## 2024-08-27 PROCEDURE — 85025 COMPLETE CBC W/AUTO DIFF WBC: CPT

## 2024-08-27 PROCEDURE — 25010000002 SUGAMMADEX 200 MG/2ML SOLUTION

## 2024-08-27 PROCEDURE — 83735 ASSAY OF MAGNESIUM: CPT

## 2024-08-27 PROCEDURE — 25010000002 FENTANYL CITRATE (PF) 50 MCG/ML SOLUTION: Performed by: NURSE ANESTHETIST, CERTIFIED REGISTERED

## 2024-08-27 PROCEDURE — 85347 COAGULATION TIME ACTIVATED: CPT

## 2024-08-27 PROCEDURE — 25010000002 DEXAMETHASONE PER 1 MG

## 2024-08-27 PROCEDURE — 80053 COMPREHEN METABOLIC PANEL: CPT

## 2024-08-27 PROCEDURE — 93321 DOPPLER ECHO F-UP/LMTD STD: CPT

## 2024-08-27 PROCEDURE — C1769 GUIDE WIRE: HCPCS | Performed by: INTERNAL MEDICINE

## 2024-08-27 PROCEDURE — 36415 COLL VENOUS BLD VENIPUNCTURE: CPT

## 2024-08-27 PROCEDURE — C1732 CATH, EP, DIAG/ABL, 3D/VECT: HCPCS | Performed by: INTERNAL MEDICINE

## 2024-08-27 PROCEDURE — 63710000001 INSULIN GLARGINE PER 5 UNITS: Performed by: INTERNAL MEDICINE

## 2024-08-27 PROCEDURE — 25010000002 FENTANYL CITRATE (PF) 100 MCG/2ML SOLUTION: Performed by: NURSE ANESTHETIST, CERTIFIED REGISTERED

## 2024-08-27 PROCEDURE — 25010000002 HEPARIN (PORCINE) PER 1000 UNITS

## 2024-08-27 PROCEDURE — 25010000002 ONDANSETRON PER 1 MG

## 2024-08-27 PROCEDURE — 93312 ECHO TRANSESOPHAGEAL: CPT

## 2024-08-27 PROCEDURE — C1730 CATH, EP, 19 OR FEW ELECT: HCPCS | Performed by: INTERNAL MEDICINE

## 2024-08-27 PROCEDURE — C1759 CATH, INTRA ECHOCARDIOGRAPHY: HCPCS | Performed by: INTERNAL MEDICINE

## 2024-08-27 RX ORDER — ACETAMINOPHEN 650 MG/1
650 SUPPOSITORY RECTAL EVERY 4 HOURS PRN
Status: DISCONTINUED | OUTPATIENT
Start: 2024-08-27 | End: 2024-08-28 | Stop reason: HOSPADM

## 2024-08-27 RX ORDER — PROMETHAZINE HYDROCHLORIDE 25 MG/1
25 TABLET ORAL ONCE AS NEEDED
Status: DISCONTINUED | OUTPATIENT
Start: 2024-08-27 | End: 2024-08-28 | Stop reason: HOSPADM

## 2024-08-27 RX ORDER — SODIUM CHLORIDE 0.9 % (FLUSH) 0.9 %
3-10 SYRINGE (ML) INJECTION AS NEEDED
Status: DISCONTINUED | OUTPATIENT
Start: 2024-08-27 | End: 2024-08-27 | Stop reason: HOSPADM

## 2024-08-27 RX ORDER — HYDRALAZINE HYDROCHLORIDE 20 MG/ML
5 INJECTION INTRAMUSCULAR; INTRAVENOUS
Status: DISCONTINUED | OUTPATIENT
Start: 2024-08-27 | End: 2024-08-28 | Stop reason: HOSPADM

## 2024-08-27 RX ORDER — OXYCODONE HYDROCHLORIDE 5 MG/1
10 TABLET ORAL ONCE AS NEEDED
Status: COMPLETED | OUTPATIENT
Start: 2024-08-27 | End: 2024-08-27

## 2024-08-27 RX ORDER — SERTRALINE HYDROCHLORIDE 100 MG/1
100 TABLET, FILM COATED ORAL DAILY
Status: DISCONTINUED | OUTPATIENT
Start: 2024-08-27 | End: 2024-08-28 | Stop reason: HOSPADM

## 2024-08-27 RX ORDER — ROCURONIUM BROMIDE 10 MG/ML
INJECTION, SOLUTION INTRAVENOUS AS NEEDED
Status: DISCONTINUED | OUTPATIENT
Start: 2024-08-27 | End: 2024-08-27 | Stop reason: SURG

## 2024-08-27 RX ORDER — LISINOPRIL 5 MG/1
10 TABLET ORAL DAILY
Status: DISCONTINUED | OUTPATIENT
Start: 2024-08-27 | End: 2024-08-28 | Stop reason: HOSPADM

## 2024-08-27 RX ORDER — FENTANYL CITRATE 50 UG/ML
50 INJECTION, SOLUTION INTRAMUSCULAR; INTRAVENOUS
Status: DISCONTINUED | OUTPATIENT
Start: 2024-08-27 | End: 2024-08-28 | Stop reason: HOSPADM

## 2024-08-27 RX ORDER — HYDRALAZINE HYDROCHLORIDE 20 MG/ML
INJECTION INTRAMUSCULAR; INTRAVENOUS AS NEEDED
Status: DISCONTINUED | OUTPATIENT
Start: 2024-08-27 | End: 2024-08-27 | Stop reason: SURG

## 2024-08-27 RX ORDER — SODIUM CHLORIDE 9 MG/ML
40 INJECTION, SOLUTION INTRAVENOUS AS NEEDED
Status: DISCONTINUED | OUTPATIENT
Start: 2024-08-27 | End: 2024-08-27 | Stop reason: HOSPADM

## 2024-08-27 RX ORDER — BACLOFEN 10 MG/1
20 TABLET ORAL 3 TIMES DAILY
Status: DISCONTINUED | OUTPATIENT
Start: 2024-08-27 | End: 2024-08-28 | Stop reason: HOSPADM

## 2024-08-27 RX ORDER — PROMETHAZINE HYDROCHLORIDE 25 MG/1
25 SUPPOSITORY RECTAL ONCE AS NEEDED
Status: DISCONTINUED | OUTPATIENT
Start: 2024-08-27 | End: 2024-08-28 | Stop reason: HOSPADM

## 2024-08-27 RX ORDER — ACETAMINOPHEN 325 MG/1
650 TABLET ORAL EVERY 4 HOURS PRN
Status: DISCONTINUED | OUTPATIENT
Start: 2024-08-27 | End: 2024-08-28 | Stop reason: HOSPADM

## 2024-08-27 RX ORDER — ONDANSETRON 2 MG/ML
4 INJECTION INTRAMUSCULAR; INTRAVENOUS EVERY 6 HOURS PRN
Status: DISCONTINUED | OUTPATIENT
Start: 2024-08-27 | End: 2024-08-28 | Stop reason: HOSPADM

## 2024-08-27 RX ORDER — PROPOFOL 10 MG/ML
VIAL (ML) INTRAVENOUS AS NEEDED
Status: DISCONTINUED | OUTPATIENT
Start: 2024-08-27 | End: 2024-08-27 | Stop reason: SURG

## 2024-08-27 RX ORDER — PROTAMINE SULFATE 10 MG/ML
INJECTION, SOLUTION INTRAVENOUS AS NEEDED
Status: DISCONTINUED | OUTPATIENT
Start: 2024-08-27 | End: 2024-08-27 | Stop reason: SURG

## 2024-08-27 RX ORDER — OXYCODONE HYDROCHLORIDE 5 MG/1
5 TABLET ORAL EVERY 4 HOURS PRN
Status: COMPLETED | OUTPATIENT
Start: 2024-08-27 | End: 2024-08-28

## 2024-08-27 RX ORDER — BUSPIRONE HYDROCHLORIDE 15 MG/1
15 TABLET ORAL 3 TIMES DAILY
Status: DISCONTINUED | OUTPATIENT
Start: 2024-08-27 | End: 2024-08-28 | Stop reason: HOSPADM

## 2024-08-27 RX ORDER — FENTANYL CITRATE 50 UG/ML
INJECTION, SOLUTION INTRAMUSCULAR; INTRAVENOUS AS NEEDED
Status: DISCONTINUED | OUTPATIENT
Start: 2024-08-27 | End: 2024-08-27 | Stop reason: SURG

## 2024-08-27 RX ORDER — ONDANSETRON 2 MG/ML
INJECTION INTRAMUSCULAR; INTRAVENOUS AS NEEDED
Status: DISCONTINUED | OUTPATIENT
Start: 2024-08-27 | End: 2024-08-27 | Stop reason: SURG

## 2024-08-27 RX ORDER — ZOLPIDEM TARTRATE 5 MG/1
5 TABLET ORAL NIGHTLY PRN
Status: DISCONTINUED | OUTPATIENT
Start: 2024-08-27 | End: 2024-08-28 | Stop reason: HOSPADM

## 2024-08-27 RX ORDER — DROPERIDOL 2.5 MG/ML
0.62 INJECTION, SOLUTION INTRAMUSCULAR; INTRAVENOUS ONCE AS NEEDED
Status: DISCONTINUED | OUTPATIENT
Start: 2024-08-27 | End: 2024-08-28 | Stop reason: HOSPADM

## 2024-08-27 RX ORDER — LABETALOL HYDROCHLORIDE 5 MG/ML
5 INJECTION, SOLUTION INTRAVENOUS
Status: DISCONTINUED | OUTPATIENT
Start: 2024-08-27 | End: 2024-08-28 | Stop reason: HOSPADM

## 2024-08-27 RX ORDER — PREGABALIN 75 MG/1
150 CAPSULE ORAL 2 TIMES DAILY
Status: DISCONTINUED | OUTPATIENT
Start: 2024-08-27 | End: 2024-08-28 | Stop reason: HOSPADM

## 2024-08-27 RX ORDER — GLYCOPYRROLATE 0.2 MG/ML
INJECTION INTRAMUSCULAR; INTRAVENOUS AS NEEDED
Status: DISCONTINUED | OUTPATIENT
Start: 2024-08-27 | End: 2024-08-27 | Stop reason: SURG

## 2024-08-27 RX ORDER — ACETAMINOPHEN 325 MG/1
650 TABLET ORAL ONCE AS NEEDED
Status: DISCONTINUED | OUTPATIENT
Start: 2024-08-27 | End: 2024-08-28 | Stop reason: HOSPADM

## 2024-08-27 RX ORDER — FAMOTIDINE 10 MG/ML
INJECTION, SOLUTION INTRAVENOUS AS NEEDED
Status: DISCONTINUED | OUTPATIENT
Start: 2024-08-27 | End: 2024-08-27 | Stop reason: SURG

## 2024-08-27 RX ORDER — NALOXONE HCL 0.4 MG/ML
0.4 VIAL (ML) INJECTION
Status: DISCONTINUED | OUTPATIENT
Start: 2024-08-27 | End: 2024-08-28 | Stop reason: HOSPADM

## 2024-08-27 RX ORDER — SODIUM CHLORIDE 0.9 % (FLUSH) 0.9 %
3 SYRINGE (ML) INJECTION EVERY 12 HOURS SCHEDULED
Status: DISCONTINUED | OUTPATIENT
Start: 2024-08-27 | End: 2024-08-27 | Stop reason: HOSPADM

## 2024-08-27 RX ORDER — ALBUTEROL SULFATE 90 UG/1
2 AEROSOL, METERED RESPIRATORY (INHALATION) EVERY 6 HOURS PRN
Status: DISCONTINUED | OUTPATIENT
Start: 2024-08-27 | End: 2024-08-28 | Stop reason: HOSPADM

## 2024-08-27 RX ORDER — METOPROLOL SUCCINATE 50 MG/1
50 TABLET, EXTENDED RELEASE ORAL DAILY
Status: DISCONTINUED | OUTPATIENT
Start: 2024-08-27 | End: 2024-08-28 | Stop reason: HOSPADM

## 2024-08-27 RX ORDER — DEXAMETHASONE SODIUM PHOSPHATE 4 MG/ML
INJECTION, SOLUTION INTRA-ARTICULAR; INTRALESIONAL; INTRAMUSCULAR; INTRAVENOUS; SOFT TISSUE AS NEEDED
Status: DISCONTINUED | OUTPATIENT
Start: 2024-08-27 | End: 2024-08-27 | Stop reason: SURG

## 2024-08-27 RX ORDER — MORPHINE SULFATE 2 MG/ML
1 INJECTION, SOLUTION INTRAMUSCULAR; INTRAVENOUS EVERY 4 HOURS PRN
Status: DISCONTINUED | OUTPATIENT
Start: 2024-08-27 | End: 2024-08-28 | Stop reason: HOSPADM

## 2024-08-27 RX ORDER — SODIUM CHLORIDE 9 MG/ML
80 INJECTION, SOLUTION INTRAVENOUS CONTINUOUS
Status: DISCONTINUED | OUTPATIENT
Start: 2024-08-27 | End: 2024-08-28 | Stop reason: HOSPADM

## 2024-08-27 RX ORDER — INSULIN LISPRO 100 [IU]/ML
10 INJECTION, SOLUTION INTRAVENOUS; SUBCUTANEOUS
Status: DISCONTINUED | OUTPATIENT
Start: 2024-08-27 | End: 2024-08-28 | Stop reason: HOSPADM

## 2024-08-27 RX ORDER — HEPARIN SODIUM 1000 [USP'U]/ML
INJECTION, SOLUTION INTRAVENOUS; SUBCUTANEOUS AS NEEDED
Status: DISCONTINUED | OUTPATIENT
Start: 2024-08-27 | End: 2024-08-27 | Stop reason: SURG

## 2024-08-27 RX ADMIN — SUGAMMADEX 200 MG: 100 INJECTION, SOLUTION INTRAVENOUS at 16:27

## 2024-08-27 RX ADMIN — METOPROLOL SUCCINATE 50 MG: 50 TABLET, EXTENDED RELEASE ORAL at 18:09

## 2024-08-27 RX ADMIN — ROCURONIUM BROMIDE 15 MG: 10 INJECTION, SOLUTION INTRAVENOUS at 15:30

## 2024-08-27 RX ADMIN — GLYCOPYRROLATE 0.2 MG: 0.2 INJECTION, SOLUTION INTRAMUSCULAR; INTRAVENOUS at 15:13

## 2024-08-27 RX ADMIN — ROCURONIUM BROMIDE 10 MG: 10 INJECTION, SOLUTION INTRAVENOUS at 16:07

## 2024-08-27 RX ADMIN — MORPHINE SULFATE 1 MG: 2 INJECTION, SOLUTION INTRAMUSCULAR; INTRAVENOUS at 20:09

## 2024-08-27 RX ADMIN — METFORMIN HYDROCHLORIDE 1000 MG: 500 TABLET ORAL at 18:09

## 2024-08-27 RX ADMIN — PROPOFOL INJECTABLE EMULSION 50 MG: 10 INJECTION, EMULSION INTRAVENOUS at 14:49

## 2024-08-27 RX ADMIN — HEPARIN SODIUM 10000 UNITS: 1000 INJECTION INTRAVENOUS; SUBCUTANEOUS at 15:21

## 2024-08-27 RX ADMIN — ACETAMINOPHEN 650 MG: 325 TABLET, FILM COATED ORAL at 23:02

## 2024-08-27 RX ADMIN — DEXAMETHASONE SODIUM PHOSPHATE 8 MG: 4 INJECTION, SOLUTION INTRAMUSCULAR; INTRAVENOUS at 15:09

## 2024-08-27 RX ADMIN — FENTANYL CITRATE 50 MCG: 50 INJECTION, SOLUTION INTRAMUSCULAR; INTRAVENOUS at 17:03

## 2024-08-27 RX ADMIN — BUSPIRONE HYDROCHLORIDE 15 MG: 15 TABLET ORAL at 18:09

## 2024-08-27 RX ADMIN — PREGABALIN 150 MG: 75 CAPSULE ORAL at 21:34

## 2024-08-27 RX ADMIN — HEPARIN SODIUM 3000 UNITS: 1000 INJECTION INTRAVENOUS; SUBCUTANEOUS at 15:24

## 2024-08-27 RX ADMIN — ONDANSETRON 4 MG: 2 INJECTION INTRAMUSCULAR; INTRAVENOUS at 15:09

## 2024-08-27 RX ADMIN — LIDOCAINE HYDROCHLORIDE 100 MG: 20 INJECTION, SOLUTION EPIDURAL; INFILTRATION; INTRACAUDAL; PERINEURAL at 14:49

## 2024-08-27 RX ADMIN — FENTANYL CITRATE 100 MCG: 50 INJECTION, SOLUTION INTRAMUSCULAR; INTRAVENOUS at 15:00

## 2024-08-27 RX ADMIN — BACLOFEN 20 MG: 10 TABLET ORAL at 18:08

## 2024-08-27 RX ADMIN — HYDRALAZINE HYDROCHLORIDE 10 MG: 20 INJECTION, SOLUTION INTRAMUSCULAR; INTRAVENOUS at 15:31

## 2024-08-27 RX ADMIN — ZOLPIDEM TARTRATE 5 MG: 5 TABLET ORAL at 23:02

## 2024-08-27 RX ADMIN — INSULIN GLARGINE 48 UNITS: 100 INJECTION, SOLUTION SUBCUTANEOUS at 21:34

## 2024-08-27 RX ADMIN — PROTAMINE SULFATE 100 MG: 10 INJECTION, SOLUTION INTRAVENOUS at 16:23

## 2024-08-27 RX ADMIN — PROPOFOL INJECTABLE EMULSION 50 MG: 10 INJECTION, EMULSION INTRAVENOUS at 15:31

## 2024-08-27 RX ADMIN — LISINOPRIL 10 MG: 5 TABLET ORAL at 18:08

## 2024-08-27 RX ADMIN — APIXABAN 5 MG: 5 TABLET, FILM COATED ORAL at 21:34

## 2024-08-27 RX ADMIN — FENTANYL CITRATE 50 MCG: 50 INJECTION, SOLUTION INTRAMUSCULAR; INTRAVENOUS at 17:18

## 2024-08-27 RX ADMIN — SERTRALINE 100 MG: 100 TABLET, FILM COATED ORAL at 18:09

## 2024-08-27 RX ADMIN — PROPOFOL INJECTABLE EMULSION 150 MCG/KG/MIN: 10 INJECTION, EMULSION INTRAVENOUS at 14:49

## 2024-08-27 RX ADMIN — BREXPIPRAZOLE 3 MG: 1 TABLET ORAL at 23:03

## 2024-08-27 RX ADMIN — HEPARIN SODIUM 2000 UNITS: 1000 INJECTION INTRAVENOUS; SUBCUTANEOUS at 16:06

## 2024-08-27 RX ADMIN — FAMOTIDINE 20 MG: 10 INJECTION INTRAVENOUS at 15:13

## 2024-08-27 RX ADMIN — OXYCODONE HYDROCHLORIDE 5 MG: 5 TABLET ORAL at 18:33

## 2024-08-27 RX ADMIN — OXYCODONE HYDROCHLORIDE 10 MG: 5 TABLET ORAL at 22:10

## 2024-08-27 RX ADMIN — ROCURONIUM BROMIDE 50 MG: 10 INJECTION, SOLUTION INTRAVENOUS at 14:57

## 2024-08-27 RX ADMIN — SODIUM CHLORIDE: 9 INJECTION, SOLUTION INTRAVENOUS at 14:37

## 2024-08-27 NOTE — H&P
CC--AF     Sub  Patient reports being very nauseated this morning.  She denies abdominal pain, vomiting, diarrhea.     Obj  Patient converted pharmacologically with initiation of dronedarone.  Maintaining sinus rhythm at 71 bpm.  Blood pressure 143/70.     Previous history attached below  68-year-old pleasant patient transferred from outlying facility atrial fibrillation with rapid ventricular rate.  Patient started have recurrent AF with symptoms of fatigue tiredness lightheadedness without syncope.  Denies any chest pain or shortness of breath.  Patient was started on intravenous Cardizem and further transferred to our hospital and a consultation requested.  Patient continues to be in atrial fibrillation rates are fairly controlled at this point.  Stress test in the past did not show ischemia with normal EF and echocardiography in the past showed normal EF with a small pericardial effusion.  She has prior history of atrial flutter and right bundle branch block  Previous cardiac catheterization revealed EF of 60% with a mid RCA 50% stenosis which was performed in 2020.  Patient history of diabetes, hypertension, COPD and dyslipidemia.     Medical History           Past Medical History:   Diagnosis Date    COPD (chronic obstructive pulmonary disease)      Depression      Diabetes mellitus      Hyperlipidemia      Hypertension           Surgical History             Past Surgical History:   Procedure Laterality Date    CARDIAC CATHETERIZATION Left 10/30/2020     Procedure: Left Heart Cath with Coronary Angiography;  Surgeon: Donaldo Archer MD;  Location: [facility] CATH INVASIVE LOCATION;  Service: Cardiovascular;  Laterality: Left;    CARPAL TUNNEL RELEASE Bilateral 2017    CHOLECYSTECTOMY   1980    HYSTERECTOMY   1980               Physical Exam     General:      well developed, well nourished, in no acute distress.    Head:      normocephalic and atraumatic.    Eyes:      PERRL/EOM intact, conjunctivae and sclerae  clear without nystagmus.    Neck:      no  thyromegaly, trachea central with normal respiratory effort  Lungs:      clear bilaterally to auscultation.    Heart:       regular rate and rhythm, S1, S2 without murmurs, rubs, or gallops  Skin:      intact without lesions or rashes.    Psych:      alert and cooperative; normal mood and affect; normal attention span and concentration.                CBC            Results from last 7 days   Lab Units 07/06/24  0249   WBC 10*3/mm3 6.74   HEMOGLOBIN g/dL 11.6*   PLATELETS 10*3/mm3 124*      BMP           Results from last 7 days   Lab Units 07/06/24  0249   SODIUM mmol/L 138   POTASSIUM mmol/L 4.3   CHLORIDE mmol/L 107   CO2 mmol/L 22.9   BUN mg/dL 11   CREATININE mg/dL 0.60   GLUCOSE mg/dL 231*   MAGNESIUM mg/dL 1.8   PHOSPHORUS mg/dL 2.4*         Assessment and plan     Intermittent atrial fibrillation with symptoms and history of atrial flutter in the past--- started on dronedarone this admission with conversion to sinus rhythm.  Therapeutic options for recurrent atrial arrhythmias educated including catheter ablation.  Patient considering outpatient ablation treatment.  Continue anticoagulation  History of diabetes being managed by hospitalist  Hypertension controlled with current medical treatment including metoprolol  History of nonobstructive coronary artery disease without angina           Electronically signed by Shay Santos MD, 08/27/24, 2:07 PM EDT.

## 2024-08-27 NOTE — ANESTHESIA PROCEDURE NOTES
Airway  Urgency: elective    Date/Time: 8/27/2024 3:00 PM  Airway not difficult    General Information and Staff    Patient location during procedure: OR  Anesthesiologist: Jesse Stroud MD  CRNA/CAA: Ada Trejo CRNA    Indications and Patient Condition  Indications for airway management: airway protection    Preoxygenated: yes  MILS maintained throughout  Mask difficulty assessment: 1 - vent by mask    Final Airway Details  Final airway type: endotracheal airway      Successful airway: ETT  Cuffed: yes   Successful intubation technique: direct laryngoscopy  Endotracheal tube insertion site: oral  Blade: Paolo  Blade size: 3  ETT size (mm): 7.0  Cormack-Lehane Classification: grade I - full view of glottis  Placement verified by: chest auscultation   Cuff volume (mL): 8  Measured from: gums  ETT/EBT to gums (cm): 19  Number of attempts at approach: 1  Assessment: lips, teeth, and gum same as pre-op and atraumatic intubation

## 2024-08-27 NOTE — ANESTHESIA POSTPROCEDURE EVALUATION
Patient: Arianna Blanco    Procedure Summary       Date: 08/27/24 Room / Location: [facility] CATH LAB 3 / [facility] CATH INVASIVE LOCATION    Anesthesia Start: 1437 Anesthesia Stop: 1643    Procedure: Ablation atrial fibrillation (Right: Groin) Diagnosis:       Essential hypertension      Atrial flutter with controlled response      PAF (paroxysmal atrial fibrillation)      (Atrial fibrillation and atrial flutter)    Providers: Shay Santos MD Provider: Jesse Stroud MD    Anesthesia Type: general ASA Status: 3            Anesthesia Type: general    Vitals  Vitals Value Taken Time   /70 08/27/24 1735   Temp 97.7 °F (36.5 °C) 08/27/24 1740   Pulse 90 08/27/24 1744   Resp     SpO2 94 % 08/27/24 1744   Vitals shown include unfiled device data.        Post Anesthesia Care and Evaluation    Patient location during evaluation: PACU  Patient participation: complete - patient participated  Level of consciousness: awake  Pain scale: See nurse's notes for pain score.  Pain management: adequate    Airway patency: patent  Anesthetic complications: No anesthetic complications  PONV Status: none  Cardiovascular status: acceptable  Respiratory status: acceptable and spontaneous ventilation  Hydration status: acceptable    Comments: Patient seen and examined postoperatively; vital signs stable; SpO2 greater than or equal to 90%; cardiopulmonary status stable; nausea/vomiting adequately controlled; pain adequately controlled; no apparent anesthesia complications; patient discharged from anesthesia care when discharge criteria were met

## 2024-08-27 NOTE — ANESTHESIA PREPROCEDURE EVALUATION
Anesthesia Evaluation     Patient summary reviewed and Nursing notes reviewed   NPO Solid Status: > 8 hours  NPO Liquid Status: > 8 hours           Airway   Mallampati: II  TM distance: >3 FB  Neck ROM: full  No difficulty expected  Dental - normal exam   (+) edentulous    Pulmonary - normal exam   (+) a smoker Current, COPD, asthma,sleep apnea  Cardiovascular - normal exam    ECG reviewed    (+) hypertension, dysrhythmias Atrial Fib, hyperlipidemia      Neuro/Psych  (+) headaches, tremors  GI/Hepatic/Renal/Endo    (+) liver disease, renal disease-, diabetes mellitus    Musculoskeletal     Abdominal  - normal exam    Bowel sounds: normal.   Substance History      OB/GYN          Other   arthritis,     ROS/Med Hx Other: Sinus rhythm  Probable  left atrial enlargement  Right bundle branch block  Consider  anteroseptal infarct      ·  Left ventricular systolic function is normal. Estimated left ventricular EF = 60%  ·  Left ventricular wall thickness is consistent with mild concentric hypertrophy.  ·  Left ventricular diastolic function is consistent with (grade I) impaired relaxation.  ·  The left atrial cavity is mild to moderately dilated.  ·  There is mild calcification of the aortic valve.      2020  3. CORONARY ANGIOGRAPHY:  Left main coronary artery is normal.  Left anterior descending artery is normal.  Circumflex coronary artery is normal.  Right coronary artery has mid segment 50% disease     SUMMARY:  Left ventricle size and contractility is normal with ejection fraction of 60%.      Findings:  Heart and pulmonary vessels and mediastinal contours appear within normal limits. Lung fields are clear of acute infiltrates or effusions. There is no pneumothorax.     IMPRESSION:  Impression:  No acute process.                      Anesthesia Plan    ASA 3     general     intravenous induction     Anesthetic plan, risks, benefits, and alternatives have been provided, discussed and informed consent has been obtained  with: patient.    Plan discussed with CRNA.        CODE STATUS:

## 2024-08-27 NOTE — NURSING NOTE
Transported patient to 3108 per bed; did bedside report and right groin site check with Emerald RN, Artur, RN and VIKTORIYA Banuelos. Patient's son is in waiting room. Figure 8 suture/stopcock intact to right groin.

## 2024-08-28 ENCOUNTER — APPOINTMENT (OUTPATIENT)
Dept: CARDIOLOGY | Facility: HOSPITAL | Age: 69
End: 2024-08-28
Payer: MEDICAID

## 2024-08-28 VITALS
RESPIRATION RATE: 13 BRPM | HEIGHT: 67 IN | WEIGHT: 168.87 LBS | DIASTOLIC BLOOD PRESSURE: 58 MMHG | OXYGEN SATURATION: 93 % | TEMPERATURE: 97.5 F | BODY MASS INDEX: 26.51 KG/M2 | SYSTOLIC BLOOD PRESSURE: 110 MMHG | HEART RATE: 81 BPM

## 2024-08-28 LAB
ANION GAP SERPL CALCULATED.3IONS-SCNC: 12.4 MMOL/L (ref 5–15)
BH CV RIGHT GROIN PSA PROCEDURE SCRIPTING LRR: 1
BH CV XLRA MEAS EXT ILIAC A PSV RIGHT: 114 CM/SEC
BUN SERPL-MCNC: 15 MG/DL (ref 8–23)
BUN/CREAT SERPL: 22.1 (ref 7–25)
CALCIUM SPEC-SCNC: 9.1 MG/DL (ref 8.6–10.5)
CHLORIDE SERPL-SCNC: 104 MMOL/L (ref 98–107)
CO2 SERPL-SCNC: 22.6 MMOL/L (ref 22–29)
CREAT SERPL-MCNC: 0.68 MG/DL (ref 0.57–1)
DEPRECATED RDW RBC AUTO: 44.9 FL (ref 37–54)
EGFRCR SERPLBLD CKD-EPI 2021: 95 ML/MIN/1.73
ERYTHROCYTE [DISTWIDTH] IN BLOOD BY AUTOMATED COUNT: 13.7 % (ref 12.3–15.4)
GLUCOSE BLDC GLUCOMTR-MCNC: 272 MG/DL (ref 70–105)
GLUCOSE SERPL-MCNC: 162 MG/DL (ref 65–99)
HCT VFR BLD AUTO: 36.1 % (ref 34–46.6)
HGB BLD-MCNC: 11.9 G/DL (ref 12–15.9)
MCH RBC QN AUTO: 29.4 PG (ref 26.6–33)
MCHC RBC AUTO-ENTMCNC: 33 G/DL (ref 31.5–35.7)
MCV RBC AUTO: 89.1 FL (ref 79–97)
PLATELET # BLD AUTO: 140 10*3/MM3 (ref 140–450)
PMV BLD AUTO: 12.6 FL (ref 6–12)
POTASSIUM SERPL-SCNC: 4.1 MMOL/L (ref 3.5–5.2)
PROX PFA PSV RIGHT: 78 CM/SEC
PROX SFA PSV RIGHT: 103 CM/SEC
QT INTERVAL: 403 MS
QTC INTERVAL: 465 MS
RBC # BLD AUTO: 4.05 10*6/MM3 (ref 3.77–5.28)
RIGHT GROIN CFA SYS: 142 CM/SEC
SODIUM SERPL-SCNC: 139 MMOL/L (ref 136–145)
WBC NRBC COR # BLD AUTO: 16.09 10*3/MM3 (ref 3.4–10.8)

## 2024-08-28 PROCEDURE — 85027 COMPLETE CBC AUTOMATED: CPT | Performed by: INTERNAL MEDICINE

## 2024-08-28 PROCEDURE — 82948 REAGENT STRIP/BLOOD GLUCOSE: CPT

## 2024-08-28 PROCEDURE — 93005 ELECTROCARDIOGRAM TRACING: CPT | Performed by: INTERNAL MEDICINE

## 2024-08-28 PROCEDURE — 63710000001 INSULIN LISPRO (HUMAN) PER 5 UNITS: Performed by: INTERNAL MEDICINE

## 2024-08-28 PROCEDURE — 99239 HOSP IP/OBS DSCHRG MGMT >30: CPT | Performed by: NURSE PRACTITIONER

## 2024-08-28 PROCEDURE — 93926 LOWER EXTREMITY STUDY: CPT

## 2024-08-28 PROCEDURE — 80048 BASIC METABOLIC PNL TOTAL CA: CPT | Performed by: INTERNAL MEDICINE

## 2024-08-28 PROCEDURE — 93926 LOWER EXTREMITY STUDY: CPT | Performed by: SURGERY

## 2024-08-28 RX ORDER — OXYCODONE HYDROCHLORIDE 5 MG/1
5 TABLET ORAL EVERY 4 HOURS PRN
Status: COMPLETED | OUTPATIENT
Start: 2024-08-28 | End: 2024-08-28

## 2024-08-28 RX ADMIN — BACLOFEN 20 MG: 10 TABLET ORAL at 16:47

## 2024-08-28 RX ADMIN — OXYCODONE HYDROCHLORIDE 5 MG: 5 TABLET ORAL at 02:39

## 2024-08-28 RX ADMIN — INSULIN LISPRO 10 UNITS: 100 INJECTION, SOLUTION INTRAVENOUS; SUBCUTANEOUS at 08:25

## 2024-08-28 RX ADMIN — APIXABAN 5 MG: 5 TABLET, FILM COATED ORAL at 08:24

## 2024-08-28 RX ADMIN — LISINOPRIL 10 MG: 5 TABLET ORAL at 08:25

## 2024-08-28 RX ADMIN — PREGABALIN 150 MG: 75 CAPSULE ORAL at 08:24

## 2024-08-28 RX ADMIN — OXYCODONE HYDROCHLORIDE 5 MG: 5 TABLET ORAL at 08:25

## 2024-08-28 RX ADMIN — INSULIN LISPRO 10 UNITS: 100 INJECTION, SOLUTION INTRAVENOUS; SUBCUTANEOUS at 12:48

## 2024-08-28 RX ADMIN — BUSPIRONE HYDROCHLORIDE 15 MG: 15 TABLET ORAL at 16:47

## 2024-08-28 RX ADMIN — OXYCODONE HYDROCHLORIDE 5 MG: 5 TABLET ORAL at 12:48

## 2024-08-28 RX ADMIN — OXYCODONE HYDROCHLORIDE 5 MG: 5 TABLET ORAL at 16:47

## 2024-08-28 RX ADMIN — BUSPIRONE HYDROCHLORIDE 15 MG: 15 TABLET ORAL at 08:25

## 2024-08-28 RX ADMIN — METFORMIN HYDROCHLORIDE 1000 MG: 500 TABLET ORAL at 08:25

## 2024-08-28 RX ADMIN — BREXPIPRAZOLE 3 MG: 1 TABLET ORAL at 08:25

## 2024-08-28 RX ADMIN — BACLOFEN 20 MG: 10 TABLET ORAL at 08:25

## 2024-08-28 RX ADMIN — SERTRALINE 100 MG: 100 TABLET, FILM COATED ORAL at 08:27

## 2024-08-28 RX ADMIN — METOPROLOL SUCCINATE 50 MG: 50 TABLET, EXTENDED RELEASE ORAL at 08:24

## 2024-08-28 NOTE — DISCHARGE SUMMARY
[facility] CARDIOLOGY  [facility]    DISCHARGE SUMMARY      Patient Name: Arianna Blanco  :1955  69 y.o.    Date of Admit: 2024  Date of Discharge:  2024    Discharge Diagnosis:  Problems Addressed this Visit          Cardiac and Vasculature    Essential hypertension    Relevant Medications    sodium chloride 0.9 % infusion    lisinopril (PRINIVIL,ZESTRIL) tablet 10 mg    metoprolol succinate XL (TOPROL-XL) 24 hr tablet 50 mg    labetalol (NORMODYNE,TRANDATE) injection 5 mg    hydrALAZINE (APRESOLINE) injection 5 mg    Other Relevant Orders    EP/CRM Study (Completed)    Atrial flutter with controlled response    Relevant Medications    sodium chloride 0.9 % infusion    metoprolol succinate XL (TOPROL-XL) 24 hr tablet 50 mg    labetalol (NORMODYNE,TRANDATE) injection 5 mg    Other Relevant Orders    EP/CRM Study (Completed)    PAF (paroxysmal atrial fibrillation)    Relevant Medications    sodium chloride 0.9 % infusion    metoprolol succinate XL (TOPROL-XL) 24 hr tablet 50 mg    labetalol (NORMODYNE,TRANDATE) injection 5 mg    Other Relevant Orders    EP/CRM Study (Completed)     Diagnoses         Codes Comments    Essential hypertension     ICD-10-CM: I10  ICD-9-CM: 401.9     Atrial flutter with controlled response     ICD-10-CM: I48.92  ICD-9-CM: 427.32     PAF (paroxysmal atrial fibrillation)     ICD-10-CM: I48.0  ICD-9-CM: 427.31           1) PAF s/p ablation   - underlying RBBB  - periop MONTY shows EF 56-60% with grade 1 diastolic dysfunction with trace to mild MR  - failed Multaq  - on beta blocker  - on Eliquis for anticoagulation    2) nonobstructive CAD per cath   - nuclear stress test in  negative for ischemia    3) HTN    4) HLD     5) DM    6) COPD      Hospital Course:   68[facility] pleasant [facility] with [facility] rate.  Patient started have recurrent AF with symptoms of  fatigue tiredness lightheadedness without syncope.  Denies any chest pain or shortness of breath.  Patient was started on intravenous Cardizem and further transferred to our hospital and a consultation requested.  Patient continues to be in atrial fibrillation rates are fairly controlled at this point.  Stress test in the past did not show ischemia with normal EF and echocardiography in the past showed normal EF with a small pericardial effusion.  She has prior history of atrial flutter and right bundle branch block  Previous cardiac catheterization revealed EF of 60% with a mid RCA 50% stenosis which was performed in 2020.  Patient history of diabetes, hypertension, COPD and dyslipidemia.       Intermittent atrial fibrillation with symptoms and history of atrial flutter in the past--- started on dronedarone this admission with conversion to sinus rhythm.  Therapeutic options for recurrent atrial arrhythmias educated including catheter ablation.  Patient considering outpatient ablation treatment.  Continue anticoagulation  History of diabetes being managed by hospitalist  Hypertension controlled with current medical treatment including metoprolol  History of nonobstructive coronary artery disease without angina     Patient underwent afib ablation and was observed overnight.  Tele shows sinus today.  Right groin is without hematoma or bruit.  Patient c/o significant pain in right groin and back with difficulty walking.  There is no significant drop in Hgb post-op.  Right groin ultrasound showed no hematoma or pseudoaneurysm.  She has been able to ambulate in the hallway.  Note patient has underlying chronic pain issues.  As d/w Dr. Santos, will discharge patient home today.  She will discontinue Multaq.       Procedures Performed  Procedure(s):  Ablation atrial fibrillation      Indications    Essential hypertension [I10 (ICD-10-CM)]   Atrial flutter with controlled response [I48.92 (ICD-10-CM)]   PAF (paroxysmal  atrial fibrillation) [I48.0 (ICD-10-CM)]     Pre Procedure Diagnosis    Atrial fibrillation and atrial flutter       Conclusion    Procedures done  1. Comprehensive EP study with pacing from multiple sites including coronary sinus ,RV and superior vena cava with induction of arrhythmias   2.  Pulsed field ablation of pulmonary veins with pulmonary vein isolation  3. Intracardiac echocardiography  4.  Fluoroscopy  5. Trans septal  access of left atrium  6.  Postablation EP study  7. THREE-DIMENSIONAL MAPPING WITH CARTO SYSTEM           Procedure indication---recurrent symptomatic paroxysmal atrial fibrillation and  failed medical therapy        Timeout before procedure, patient placed under anesthesia by anesthesia team.  Pepcid before procedure  Complications none  Estimated blood loss less than 20 cc                    Procedure dictation     After obtaining a valid consent , patient was draped in sterile fashion and placed under  general anesthesia.   Micropuncture kit access was used for accessing the right common femoral vein.  A 18 Grenadian and 9 Grenadian venous sheath were placed in the right common femoral vein an ice catheter in the right atrium.  A preface  sheath along with Tingley needle was used and transseptal access obtained under the guidance of ice catheter.  Heparin was given after transseptal access to keep the ACT above 300.  Multipolar catheter called Octaray was used to map the pulmonary vein antrum  The preface sheath was exchanged over a spiral wire to a PFA farapulse sheath for pulsed field  ablation   Ablation catheter was eventually positioned over the Mcfarlane wire into the left superior  pulmonary vein  Pulsed field  ablation was carried initially in the biscuit mode and in basket mode and later  in the olive mode  Similar protocol was used to ablate left inferior pulmonary vein and later right inferior pulmonary vein and later right superior pulmonary vein   After completion of pulsed field  ablation, repeat mapping revealed complete antral isolation and EP study was done post pulsed field ablation revealing no further induction of arrhythmia   Catheters removed and sheath removed and figure-of-eight suture was placed for hemostasis and heparin was reversed with protamine and patient transferred to recovery           Findings     Patient had mild  left atrial enlargement    Patient had a large left superior pulmonary vein and a large left inferior pulmonary vein and  a  large right inferior pulmonary vein and right superior pulmonary vein  Pulmonary vein isolation with pulsed field  ablation done  Post pulsed field ablation complete antral isolation noted proved by voltage mapping and differential pacing  Clinical arrhythmias include paroxysmal atrial fibrillation   No pre-excitation  Normal retrograde conduction with retrograde Wenckebach cycle length less than 300 ms    Antegrade Wenckebach cycle length was 300  milliseconds  Av klarissa ERP is  500/230  No other SVT induced          Plan     Stop Multaq  Overnight observation        Electronically signed by Shay Santos MD, 08/27/24, 4:42 PM EDT.       Consults       No orders found for last 30 day(s).            Pertinent Test Results:   Results from last 7 days   Lab Units 08/28/24  0256 08/27/24  1307   SODIUM mmol/L 139 139   POTASSIUM mmol/L 4.1 4.4   CHLORIDE mmol/L 104 102   CO2 mmol/L 22.6 25.4   BUN mg/dL 15 11   CREATININE mg/dL 0.68 0.60   CALCIUM mg/dL 9.1 9.4   BILIRUBIN mg/dL  --  <0.2   ALK PHOS U/L  --  111   ALT (SGPT) U/L  --  16   AST (SGOT) U/L  --  23   GLUCOSE mg/dL 162* 229*         @LABRCNT(bnp)@  Results from last 7 days   Lab Units 08/28/24  0256   WBC 10*3/mm3 16.09*   HEMOGLOBIN g/dL 11.9*   HEMATOCRIT % 36.1   PLATELETS 10*3/mm3 140         Results from last 7 days   Lab Units 08/27/24  1307   MAGNESIUM mg/dL 1.9           ECHOCARDIOGRAM:    Results for orders placed during the hospital encounter of 08/27/24    Adult  Transesophageal Echo (MONTY) W/ Cont if Necessary Per Protocol    Interpretation Summary    Left ventricular systolic function is normal. Estimated left ventricular EF = 60% Left ventricular ejection fraction appears to be 56 - 60%.    Left ventricular wall thickness is consistent with mild concentric hypertrophy.    Left ventricular diastolic function is consistent with (grade I) impaired relaxation.    The left atrial cavity is mildly dilated.    Estimated right ventricular systolic pressure from tricuspid regurgitation is normal (<35 mmHg).    Procedure indication  Paroxysmal atrial fibrillation    conscious sedation administered by anesthesia  Timeout before procedure    consent obtained before procedure      Procedure note  after obtaining a valid consent patient was sedated by Anesthesia and a MONTY probe was easily placed into esophagus with multiplane imaging with 2D, color and Doppler followed by bubble study with agitated saline without any complications    MONTY  Findings    Normal LV systolic function with mild left atrial enlargement without any shunt or clot  No effusion      Plan  Proceed with ablation    Procedure done  Trans esophageal echocardiography      Electronically signed by Shay Santos MD, 08/27/24, 4:32 PM EDT.      Physical Exam  Neuro: AAOx3, no gross deficits  CV: S1S2 RRR, no murmur  Resp: nonlabored, faint wheezing  GI: BS+, abd soft  Ext: pedal pulses palp, no edema  Tele: SR    Condition on Discharge: stable    Discharge Medications     Discharge Medications        Continue These Medications        Instructions Start Date   Accu-Chek Guide test strip  Generic drug: glucose blood   1 each, Other, 4 Times Daily, Use to test blood sugar 4 times daily      albuterol sulfate  (90 Base) MCG/ACT inhaler  Commonly known as: PROVENTIL HFA;VENTOLIN HFA;PROAIR HFA   INHALE 2 PUFFS BY MOUTH EVERY 4 HOURS AS NEEDED FOR WHEEZING OR SHORTNESS OF AIR      Apidra 100 UNIT/ML  injection  Generic drug: insulin glulisine   5 Units, Subcutaneous, 3 Times Daily Before Meals      baclofen 20 MG tablet  Commonly known as: LIORESAL   20 mg, Oral, 3 Times Daily      Briefs Overnight Large misc   1 each, Does not apply, Nightly PRN      busPIRone 7.5 MG tablet  Commonly known as: BUSPAR   15 mg, Oral, 3 Times Daily      butalbital-acetaminophen  MG tablet tablet   1 tablet, Oral, Every 6 Hours PRN      Eliquis 5 MG tablet tablet  Generic drug: apixaban   5 mg, Oral, Every 12 Hours Scheduled      FreeStyle Willie 2 Zuni device   1 each, Does not apply, Daily, To be used daily to monitor continuous blood sugar reading for type 2 diabetic patient on insulin regimen.      FreeStyle Willie 2 Sensor misc   1 each, Does not apply, Every 14 Days, Dispense 2 sensor pack. To be used daily with freestyle willie device to monitor continuous blood sugar reading for type 2 diabetic patient on insulin regimen.      Insulin Glargine 100 UNIT/ML injection pen  Commonly known as: LANTUS SOLOSTAR   60 Units, Subcutaneous, Nightly      lisinopril 10 MG tablet  Commonly known as: PRINIVIL,ZESTRIL   10 mg, Oral, Daily      metFORMIN 1000 MG tablet  Commonly known as: GLUCOPHAGE   1,000 mg, Oral, 2 Times Daily With Meals      metoprolol succinate XL 50 MG 24 hr tablet  Commonly known as: TOPROL-XL   50 mg, Oral, Daily      Ozempic (1 MG/DOSE) 4 MG/3ML solution pen-injector  Generic drug: Semaglutide (1 MG/DOSE)   1 mg, Subcutaneous, Every 7 Days      pregabalin 150 MG capsule  Commonly known as: LYRICA   150 mg, Oral, 2 Times Daily      Rexulti 3 MG tablet  Generic drug: Brexpiprazole   3 mg, Oral, Daily      sertraline 100 MG tablet  Commonly known as: ZOLOFT   100 mg, Oral, Daily      zolpidem 10 MG tablet  Commonly known as: AMBIEN   10 mg, Oral, Nightly PRN             Stop These Medications      dronedarone 400 MG tablet  Commonly known as: MULTAQ              Discharge Diet:     Activity at Discharge:    Activity Instructions       Bathing Restrictions      For one week    Type of Restriction: Bathing    Bathing Restrictions: No Tub Bath    Driving Restrictions      Type of Restriction: Driving    Driving Restrictions: No Driving (Time Limited)    Length: Other    Indicate Length of Restriction: 3 days post heart catheterization    Lifting Restrictions      Type of Restriction: Lifting    Lifting Restrictions: Lifting Restriction (Indicate Limit)    Weight Limit (Pounds): 4    Length of Lifting Restriction: 3 days post heart catherization            Discharge disposition: home    Follow-up Appointments  No future appointments.      Test Results Pending at Discharge       Electronically signed by THOMAS Corona, 08/28/24, 2:29 PM EDT.     Time: > 30 minutes spent on discharge

## 2024-08-28 NOTE — SIGNIFICANT NOTE
08/28/24 1150   Living Situation   Current Living Arrangements apartment   Potentially Unsafe Housing Conditions none   Food Insecurity   Within the past 12 months, you worried that your food would run out before you got the money to buy more. Often true   Within the past 12 months, the food you bought just didn't last and you didn't have money to get more. Often true   Transportation Needs   In the past 12 months, has lack of transportation kept you from medical appointments or from getting medications? yes   In the past 12 months, has lack of transportation kept you from meetings, work, or from getting things needed for daily living? Yes   Utilities   In the past 12 months has the electric, gas, oil, or water company threatened to shut off services in your home? No   Abuse Screen (yes response referral indicated)   Feels Unsafe at Home or Work/School no   Feels Threatened by Someone no   Does Anyone Try to Keep You From Having Contact with Others or Doing Things Outside Your Home? no   Physical Signs of Abuse Present no   Financial Resource Strain   How hard is it for you to pay for the very basics like food, housing, medical care, and heating? Hard   Employment   Do you want help finding or keeping work or a job? I do not need or want help   Family and Community Support   If for any reason you need help with day-to-day activities such as bathing, preparing meals, shopping, managing finances, etc., do you get the help you need? I get all the help I need   How often do you feel lonely or isolated from those around you? Never   Education   Preferred Language English   Do you want help with school or training? For example, starting or completing job training or getting a high school diploma, GED or equivalent No   Physical Activity   On average, how many days per week do you engage in moderate to strenuous exercise (like a brisk walk)? 0 days   On average, how many minutes do you engage in exercise at this level?  0 min   Number of minutes of exercise per week (!) 0   Alcohol Use   Q1: How often do you have a drink containing alcohol? Monthly or l   Q2: How many drinks containing alcohol do you have on a typical day when you are drinking? 1 or 2   Q3: How often do you have six or more drinks on one occasion? Never   Stress   Do you feel stress - tense, restless, nervous, or anxious, or unable to sleep at night because your mind is troubled all the time - these days? Very much   Mental Health   Little Interest or Pleasure in Doing Things 0-->not at all   Feeling Down, Depressed or Hopeless 3-->nearly every day   Disabilities   Concentrating, Remembering or Making Decisions Difficulty yes   Concentration Management Son assists   Doing Errands Independently Difficulty (such as shopping) yes   Errands Management Son assists

## 2024-08-28 NOTE — PLAN OF CARE
Goal Outcome Evaluation:                   Patient discharge home with son. Went over the discharge summary with patient and son.

## 2024-08-28 NOTE — CASE MANAGEMENT/SOCIAL WORK
Discharge Planning Assessment   Heriberto     Patient Name: Arianna Blanco  MRN: [id number]  Today's Date: 8/28/2024    Admit Date: 8/27/2024    Plan: Routine Home.   Discharge Needs Assessment       Row Name 08/28/24 1216       Living Environment    People in Home alone    Current Living Arrangements apartment    Potentially Unsafe Housing Conditions none    In the past 12 months has the electric, gas, oil, or water company threatened to shut off services in your home? No    Primary Care Provided by self    Provides Primary Care For no one, unable/limited ability to care for self    Family Caregiver if Needed child(dilia), adult    Family Caregiver Names Son, Robby    Quality of Family Relationships helpful;involved;supportive    Able to Return to Prior Arrangements yes       Resource/Environmental Concerns    Resource/Environmental Concerns reliable transportation    Transportation Concerns no car;rides, unreliable from others       Transportation Needs    In the past 12 months, has lack of transportation kept you from medical appointments or from getting medications? yes    In the past 12 months, has lack of transportation kept you from meetings, work, or from getting things needed for daily living? Yes       Food Insecurity    Within the past 12 months, you worried that your food would run out before you got the money to buy more. Often true    Within the past 12 months, the food you bought just didn't last and you didn't have money to get more. Often true       Transition Planning    Patient/Family Anticipates Transition to home    Patient/Family Anticipated Services at Transition none    Transportation Anticipated family or friend will provide       Discharge Needs Assessment    Readmission Within the Last 30 Days no previous admission in last 30 days    Equipment Currently Used at Home walker, standard;cane, straight;cpap;glucometer    Concerns to be Addressed no discharge needs identified    Anticipated  "Changes Related to Illness none    Equipment Needed After Discharge none    Provided Post Acute Provider List? N/A    Provided Post Acute Provider Quality & Resource List? N/A                   Discharge Plan       Row Name 08/28/24 1324       Plan    Plan Routine Home.    Patient/Family in Agreement with Plan yes    Plan Comments CM met with patient at bedside. Confirmed patient lives at home alone in an apartment. Patient states she no longer drives, does not have a car, and has \"unreliable\" transportation via sonRobby. Independent with all ADLs. PCP and pharmacy confirmed. Agreeable to Meds to Bed program. Denies issues affording medications however, endorses difficulty affording food/utilities. DME use includes walker, cane, glucometer, and CPAP, supplied by [employer], which she currently does not use. Plan for routine home alone. Patient believes she is current with a  service that is due to come to her home \"8 hours a week\" however, does not know the company. Son will transport at discharge.                  Continued Care and Services - Admitted Since 8/27/2024    No active coordination exists for this encounter.        Demographic Summary       Row Name 08/28/24 1214       General Information    Admission Type other (see comments)  Outpatient    Arrived From emergency department    Referral Source admission list    Reason for Consult care coordination/care conference;discharge planning    Preferred Language English       Contact Information    Permission Granted to Share Info With                    Functional Status       Row Name 08/28/24 1215       Functional Status    Usual Activity Tolerance moderate    Current Activity Tolerance moderate       Functional Status, IADL    Medications independent    Meal Preparation assistive equipment    Housekeeping assistive equipment    Laundry assistive equipment    Shopping assistive equipment             Breana Bay RN   Case " Manager  Office: [phone]  Fax: [phone]

## 2024-08-29 NOTE — CASE MANAGEMENT/SOCIAL WORK
Case Management Discharge Note      Final Note: Routine home.    Selected Continued Care - Discharged on 8/28/2024 Admission date: 8/27/2024 - Discharge disposition: Home or Self Care          Transportation Services  Private: Car    Final Discharge Disposition Code: 01 - home or self-care

## 2024-08-30 ENCOUNTER — TELEPHONE (OUTPATIENT)
Dept: CARDIOLOGY | Facility: CLINIC | Age: 69
End: 2024-08-30
Payer: MEDICAID

## 2024-08-30 ENCOUNTER — TELEPHONE (OUTPATIENT)
Dept: PAIN MEDICINE | Facility: CLINIC | Age: 69
End: 2024-08-30
Payer: MEDICAID

## 2024-08-30 DIAGNOSIS — G47.00 INSOMNIA, UNSPECIFIED TYPE: ICD-10-CM

## 2024-08-30 RX ORDER — ZOLPIDEM TARTRATE 10 MG/1
10 TABLET ORAL NIGHTLY PRN
Qty: 28 TABLET | Refills: 0 | Status: SHIPPED | OUTPATIENT
Start: 2024-08-30

## 2024-08-30 RX ORDER — CALCIUM CARBONATE/VITAMIN D3 500MG-5MCG
2 TABLET ORAL DAILY
Qty: 180 TABLET | Refills: 3 | Status: SHIPPED | OUTPATIENT
Start: 2024-08-30

## 2024-08-30 NOTE — TELEPHONE ENCOUNTER
Provider: BS    Caller: ISRAEL RICHMOND    Relationship to Patient: SELF    Phone Number: [phone]    Reason for Call: PT CALLING TO SCHEDULE INJECTIONS IN BACK, WANTING THE EARLIEST TIME IN THE MORNING THAT'S AVAILABLE.

## 2024-08-30 NOTE — TELEPHONE ENCOUNTER
Caller: Arianna Blanco    Relationship to patient: Self    Best call back number: [phone]    Patient is needing:     NEEDING A HOSPITAL FOLLOW UP WITH DR. MONTENEGRO 2 WEEKS OUT. NO AVAILABILITY ON OUR END.

## 2024-09-04 ENCOUNTER — TELEPHONE (OUTPATIENT)
Dept: PAIN MEDICINE | Facility: CLINIC | Age: 69
End: 2024-09-04
Payer: MEDICAID

## 2024-09-04 NOTE — TELEPHONE ENCOUNTER
Hub staff attempted to follow warm transfer process and was unsuccessful     Caller: Arianna Blanco    Relationship to patient: Self    Best call back number: [phone]    Patient is needing: PT IS CALLING TO RS HER APPOINTMENT FOR 9/05/24.. PT IS GETTING SICK AND WONT BE ABLE TO MAKE THE APPOINTMENT.. PLEASE ADVISE..

## 2024-09-04 NOTE — TELEPHONE ENCOUNTER
Called patient. No answer. Left VM to return call about getting her procedure rescheduled. Canceled appointment for now.

## 2024-09-09 LAB
QT INTERVAL: 403 MS
QTC INTERVAL: 465 MS

## 2024-09-13 ENCOUNTER — TELEPHONE (OUTPATIENT)
Dept: PAIN MEDICINE | Facility: CLINIC | Age: 69
End: 2024-09-13
Payer: MEDICAID

## 2024-09-13 DIAGNOSIS — G47.00 INSOMNIA, UNSPECIFIED TYPE: ICD-10-CM

## 2024-09-13 RX ORDER — ZOLPIDEM TARTRATE 10 MG/1
10 TABLET ORAL NIGHTLY PRN
Qty: 28 TABLET | Refills: 0 | Status: SHIPPED | OUTPATIENT
Start: 2024-09-13

## 2024-09-13 NOTE — TELEPHONE ENCOUNTER
Caller: Arianna Blanco    Relationship to patient: Self    Best call back number: [phone]    Type of visit: INJECTIONS    Requested date: 09/19/24 AS EARLY AS POSSIBLE. IF THAT DATE ISN'T AN OPTION, SOMETIME AFTER 10/03/24.     If rescheduling, when is the original appointment: 10/03/24

## 2024-10-01 ENCOUNTER — TELEPHONE (OUTPATIENT)
Dept: FAMILY MEDICINE CLINIC | Facility: CLINIC | Age: 69
End: 2024-10-01
Payer: MEDICAID

## 2024-10-01 NOTE — TELEPHONE ENCOUNTER
Called patient she said she didn't know regarding the Ct chest low cancer screening. She asked that I talk to her sister regarding medication question.   Sister said that patient was staying with her in [place] and the Levamir that hospital prescribed has been discontinued and wants someone to send something in for her to control her diabetes until she can get in for her appointment. The semuglutide is not working she said and that was last sent in by you in July.

## 2024-10-01 NOTE — TELEPHONE ENCOUNTER
I called and spoke to patient this was from [facility] when she was in there. Changed script to reflect 22u of lantus nightly. She is requesting you send in nicotine patch to [facility] in [place].

## 2024-10-03 RX ORDER — NICOTINE 21 MG/24HR
1 PATCH, TRANSDERMAL 24 HOURS TRANSDERMAL EVERY 24 HOURS
Qty: 14 EACH | Refills: 0 | Status: SHIPPED | OUTPATIENT
Start: 2024-10-03

## 2024-10-07 ENCOUNTER — TELEPHONE (OUTPATIENT)
Dept: PAIN MEDICINE | Facility: CLINIC | Age: 69
End: 2024-10-07
Payer: MEDICAID

## 2024-10-07 NOTE — TELEPHONE ENCOUNTER
Caller: Arianna Blanco    Relationship to patient: Self    Best call back number: [phone]     Chief complaint: N/A     Type of visit: PROCEDURE     Requested date: WOULD LIKE TO CANCEL      If rescheduling, when is the original appointment: 10-10-24     Additional notes:WILL CALL BACK TO RESCHEDULE

## 2024-10-11 DIAGNOSIS — F41.9 ANXIETY: ICD-10-CM

## 2024-10-11 DIAGNOSIS — E11.65 TYPE 2 DIABETES MELLITUS WITH HYPERGLYCEMIA, WITHOUT LONG-TERM CURRENT USE OF INSULIN: ICD-10-CM

## 2024-10-11 RX ORDER — SEMAGLUTIDE 1.34 MG/ML
INJECTION, SOLUTION SUBCUTANEOUS
Refills: 2 | OUTPATIENT
Start: 2024-10-11

## 2024-10-11 RX ORDER — BUSPIRONE HYDROCHLORIDE 7.5 MG/1
TABLET ORAL
Qty: 30 TABLET | Refills: 2 | Status: SHIPPED | OUTPATIENT
Start: 2024-10-11

## 2024-10-11 RX ORDER — DILTIAZEM HYDROCHLORIDE 240 MG/1
CAPSULE, COATED, EXTENDED RELEASE ORAL
Qty: 90 CAPSULE | Refills: 1 | OUTPATIENT
Start: 2024-10-11

## 2024-10-16 ENCOUNTER — TELEPHONE (OUTPATIENT)
Dept: PAIN MEDICINE | Facility: CLINIC | Age: 69
End: 2024-10-16
Payer: MEDICAID

## 2024-10-16 NOTE — TELEPHONE ENCOUNTER
Caller: Arianna Blanco    Relationship to patient: Self    Best call back number: [phone]    Chief complaint: LOW BACK PAIN    Type of visit: INJECTION    Requested date: 10/31/24, AS EARLY AS POSSIBLE     If rescheduling, when is the original appointment: 10/10/24

## 2024-10-29 DIAGNOSIS — E11.65 TYPE 2 DIABETES MELLITUS WITH HYPERGLYCEMIA, WITHOUT LONG-TERM CURRENT USE OF INSULIN: ICD-10-CM

## 2024-10-29 DIAGNOSIS — I10 ESSENTIAL HYPERTENSION: ICD-10-CM

## 2024-10-29 DIAGNOSIS — G47.00 INSOMNIA, UNSPECIFIED TYPE: ICD-10-CM

## 2024-10-29 RX ORDER — ZOLPIDEM TARTRATE 10 MG/1
10 TABLET ORAL NIGHTLY PRN
Qty: 28 TABLET | Refills: 0 | Status: CANCELLED | OUTPATIENT
Start: 2024-10-29

## 2024-10-29 RX ORDER — INSULIN GLULISINE 100 [IU]/ML
5 INJECTION, SOLUTION SUBCUTANEOUS
Qty: 10 ML | Refills: 3 | Status: CANCELLED | OUTPATIENT
Start: 2024-10-29

## 2024-10-29 RX ORDER — SERTRALINE HYDROCHLORIDE 100 MG/1
100 TABLET, FILM COATED ORAL DAILY
Qty: 90 TABLET | Refills: 3 | Status: CANCELLED | OUTPATIENT
Start: 2024-10-29

## 2024-10-29 RX ORDER — BREXPIPRAZOLE 3 MG/1
1 TABLET ORAL DAILY
Qty: 90 TABLET | Refills: 1 | Status: CANCELLED | OUTPATIENT
Start: 2024-10-29

## 2024-10-29 RX ORDER — SEMAGLUTIDE 1.34 MG/ML
1 INJECTION, SOLUTION SUBCUTANEOUS
Qty: 3 ML | Refills: 2 | Status: CANCELLED | OUTPATIENT
Start: 2024-10-29

## 2024-10-29 RX ORDER — APIXABAN 5 MG/1
5 TABLET, FILM COATED ORAL EVERY 12 HOURS SCHEDULED
Qty: 180 TABLET | Refills: 1 | Status: CANCELLED | OUTPATIENT
Start: 2024-10-29

## 2024-10-29 RX ORDER — METOPROLOL SUCCINATE 50 MG/1
50 TABLET, EXTENDED RELEASE ORAL DAILY
Qty: 90 TABLET | Refills: 1 | Status: CANCELLED | OUTPATIENT
Start: 2024-10-29

## 2024-10-29 RX ORDER — INSULIN GLULISINE 100 [IU]/ML
10 INJECTION, SOLUTION SUBCUTANEOUS
Qty: 9 ML | Refills: 2 | Status: CANCELLED | OUTPATIENT
Start: 2024-10-29

## 2024-10-29 RX ORDER — LISINOPRIL 10 MG/1
10 TABLET ORAL DAILY
Qty: 90 TABLET | Refills: 1 | Status: CANCELLED | OUTPATIENT
Start: 2024-10-29

## 2024-10-29 NOTE — TELEPHONE ENCOUNTER
"PATIENT CALLED FOR MEDICATION REFILL OF    zolpidem (AMBIEN) 10 MG tablet   SHE IS OUT OF MEDICATION      Insulin Glargine (LANTUS SOLOSTAR) 100 UNIT/ML injection pen   SHE HAS 1 PEN LEFT    insulin glulisine (Apidra) 100 UNIT/ML injection   SHE HAS 1 PEN LEFT    IBUPROFEN 800 MG 3 TIMES A DAY  (NOT ON CURRENT MED LIST)      Brexpiprazole (Rexulti) 3 MG tablet     AMITRIPTYLINE   (NOT ON CURRENT MED LIST)       [id number] [place] [place] [address] [facility] [place] [facility] [address] [facility] - [phone]  - [phone]  [phone]     CALL BACK NUMBER [phone]  "

## 2024-10-31 ENCOUNTER — HOSPITAL ENCOUNTER (OUTPATIENT)
Dept: GENERAL RADIOLOGY | Facility: HOSPITAL | Age: 69
Discharge: HOME | End: 2024-10-31
Payer: MEDICAID

## 2024-10-31 ENCOUNTER — HOSPITAL ENCOUNTER (OUTPATIENT)
Dept: PAIN MEDICINE | Facility: HOSPITAL | Age: 69
Discharge: HOME | End: 2024-10-31
Payer: MEDICAID

## 2024-10-31 ENCOUNTER — TELEPHONE (OUTPATIENT)
Dept: PAIN MEDICINE | Facility: HOSPITAL | Age: 69
End: 2024-10-31
Payer: MEDICAID

## 2024-10-31 VITALS
HEART RATE: 80 BPM | OXYGEN SATURATION: 99 % | DIASTOLIC BLOOD PRESSURE: 83 MMHG | SYSTOLIC BLOOD PRESSURE: 137 MMHG | RESPIRATION RATE: 16 BRPM

## 2024-10-31 DIAGNOSIS — R52 PAIN: ICD-10-CM

## 2024-10-31 DIAGNOSIS — G47.00 INSOMNIA, UNSPECIFIED TYPE: ICD-10-CM

## 2024-10-31 DIAGNOSIS — E11.65 TYPE 2 DIABETES MELLITUS WITH HYPERGLYCEMIA, WITHOUT LONG-TERM CURRENT USE OF INSULIN: ICD-10-CM

## 2024-10-31 DIAGNOSIS — M47.816 SPONDYLOSIS OF LUMBAR REGION WITHOUT MYELOPATHY OR RADICULOPATHY: Primary | ICD-10-CM

## 2024-10-31 PROCEDURE — 77003 FLUOROGUIDE FOR SPINE INJECT: CPT

## 2024-10-31 PROCEDURE — 25010000002 METHYLPREDNISOLONE PER 40 MG: Performed by: STUDENT IN AN ORGANIZED HEALTH CARE EDUCATION/TRAINING PROGRAM

## 2024-10-31 PROCEDURE — 25010000002 BUPIVACAINE (PF) 0.25 % SOLUTION: Performed by: STUDENT IN AN ORGANIZED HEALTH CARE EDUCATION/TRAINING PROGRAM

## 2024-10-31 PROCEDURE — 25510000001 IOPAMIDOL 41 % SOLUTION: Performed by: STUDENT IN AN ORGANIZED HEALTH CARE EDUCATION/TRAINING PROGRAM

## 2024-10-31 RX ORDER — BUPIVACAINE HYDROCHLORIDE 2.5 MG/ML
10 INJECTION, SOLUTION EPIDURAL; INFILTRATION; INTRACAUDAL ONCE
Status: COMPLETED | OUTPATIENT
Start: 2024-10-31 | End: 2024-10-31

## 2024-10-31 RX ORDER — DILTIAZEM HYDROCHLORIDE 240 MG/1
240 CAPSULE, COATED, EXTENDED RELEASE ORAL DAILY
Qty: 90 CAPSULE | OUTPATIENT
Start: 2024-10-31

## 2024-10-31 RX ORDER — APIXABAN 5 MG/1
5 TABLET, FILM COATED ORAL EVERY 12 HOURS
Qty: 180 TABLET | Refills: 0 | Status: SHIPPED | OUTPATIENT
Start: 2024-10-31

## 2024-10-31 RX ORDER — BREXPIPRAZOLE 3 MG/1
1 TABLET ORAL DAILY
Qty: 90 TABLET | Refills: 1 | Status: SHIPPED | OUTPATIENT
Start: 2024-10-31

## 2024-10-31 RX ORDER — METHYLPREDNISOLONE ACETATE 40 MG/ML
40 INJECTION, SUSPENSION INTRA-ARTICULAR; INTRALESIONAL; INTRAMUSCULAR; SOFT TISSUE ONCE
Status: COMPLETED | OUTPATIENT
Start: 2024-10-31 | End: 2024-10-31

## 2024-10-31 RX ORDER — SEMAGLUTIDE 1.34 MG/ML
INJECTION, SOLUTION SUBCUTANEOUS
Qty: 9 ML | Refills: 0 | Status: SHIPPED | OUTPATIENT
Start: 2024-10-31

## 2024-10-31 RX ORDER — ZOLPIDEM TARTRATE 10 MG/1
10 TABLET ORAL NIGHTLY PRN
Qty: 28 TABLET | Refills: 0 | Status: SHIPPED | OUTPATIENT
Start: 2024-10-31

## 2024-10-31 RX ORDER — DILTIAZEM HYDROCHLORIDE 240 MG/1
1 CAPSULE, COATED, EXTENDED RELEASE ORAL DAILY
COMMUNITY
Start: 2024-09-30

## 2024-10-31 RX ORDER — INSULIN GLULISINE 100 [IU]/ML
10 INJECTION, SOLUTION SUBCUTANEOUS
Qty: 9 ML | Refills: 0 | Status: SHIPPED | OUTPATIENT
Start: 2024-10-31 | End: 2024-11-01 | Stop reason: SDUPTHER

## 2024-10-31 RX ORDER — BACLOFEN 20 MG/1
20 TABLET ORAL 3 TIMES DAILY
Qty: 90 TABLET | Refills: 0 | Status: SHIPPED | OUTPATIENT
Start: 2024-10-31

## 2024-10-31 RX ORDER — IOPAMIDOL 408 MG/ML
3 INJECTION, SOLUTION INTRATHECAL
Status: COMPLETED | OUTPATIENT
Start: 2024-10-31 | End: 2024-10-31

## 2024-10-31 RX ADMIN — METHYLPREDNISOLONE ACETATE 40 MG: 40 INJECTION, SUSPENSION INTRA-ARTICULAR; INTRALESIONAL; INTRAMUSCULAR; SOFT TISSUE at 08:45

## 2024-10-31 RX ADMIN — IOPAMIDOL 3 ML: 408 INJECTION, SOLUTION INTRATHECAL at 08:44

## 2024-10-31 RX ADMIN — BUPIVACAINE HYDROCHLORIDE 10 ML: 2.5 INJECTION, SOLUTION EPIDURAL; INFILTRATION; INTRACAUDAL; PERINEURAL at 08:44

## 2024-10-31 NOTE — TELEPHONE ENCOUNTER
Advise patient:   Contact cardiology for diltiazem refill.   Needs appt with cardiology.     She needs appt with me before any further refills. Type 2 DM and last A1C 7/2024.     She has no showed the last appt with myself and cardiology.

## 2024-10-31 NOTE — PROCEDURES
RIGHT L3-5 Lumbar Medial Branch Blockade  [facility]    PREOPERATIVE DIAGNOSIS:  Lumbar spondylosis without myelopathy    POSTOPERATIVE DIAGNOSIS:  Lumbar spondylosis without myelopathy    PROCEDURE:   Diagnostic Right Lumbar Medial Branch Nerve Blockades, with fluoroscopy:  L3, L4, and L5 nerves (at the L4, L5 transverse processes and the sacral alar groove) to block facet joints L4-5, and L5-S1  36974 -- Lumbar Facet block, 1st Level  21571 -- Lumbar Facet block, 2nd  Level      PRE-PROCEDURE DISCUSSION WITH PATIENT:    Risks and complications were discussed with the patient prior to starting the procedure and informed consent was obtained.      SURGEON:   Bill Lane MD    REASON FOR PROCEDURE:    The patient complains of pain that seems to have a significant axial component, Increased back pain on range of motion exams, Pain on extension of the lumbar spine, and Positive lumbar facet loading maneuver    SEDATION:  Patient declined administration of moderate sedation    ANESTHETIC:  Marcaine 0.25%  STEROID:  Methylprednisolone (DEPO MEDROL) 40mg/ml  TOTAL VOLUME OF SOLUTION:  3 mL    DESCRIPTON OF PROCEDURE:  After obtaining informed consent, IV access was not obtained in the preoperative area.   The patient was taken to the operating room.  The patient was placed in the prone position with a pillow under the abdomen. All pressure points were well padded.  The lumbosacral area was prepped with Chloraprep and draped in a sterile fashion. Under fluoroscopic guidance the transverse processes of the L4 and L5 vertebrae at the junctions of the superior articular processes were identified on the affected side.  Also identified was the groove between the ala and the superior articular process of the sacrum on the ipsilateral side.  Skin and subcutaneous tissue were anesthetized with 1% lidocaine above each of these points. A spinal needle was introduced under fluoroscopic guidance at the above junctions.  Aspiration was negative for blood and CSF.  After confirming the position of the needle with fluoroscope in all views, 0.25 mL of Omnipaque was injected, and after seeing the proper spread a total of 1 mL of the anesthetic solution noted above was injected at each of these points.  Needles were removed intact from each of the areas.   Onset of analgesia was noted.  Vital signs remained stable throughout.      ESTIMATED BLOOD LOSS:  <5 mL  SPECIMENS:  none    COMPLICATIONS:   No complications were noted., There was no indication of vascular uptake on live injection of contrast dye., and There was no indication of intrathecal uptake on live injection of contrast dye.    TOLERANCE & DISCHARGE CONDITION:    The patient tolerated the procedure well.  The patient was transported to the recovery area without difficulties.  The patient was discharged to home under the care of family in stable and satisfactory condition.    PLAN OF CARE:  The patient was given our standard instruction sheet.  We discussed that Lumbar Medial Branch Blockade is a diagnostic procedure in consideration for radiofrequency ablation if two diagnostic procedures prove to be positive for significant benefit.  If sustained relief of 6 to eight weeks is obtained, then an alternative plan could be therapeutic lumbar branch blockades.  The patient is asked to keep a pain log each hour for 8 hours after the procedure today.  The patient will  Return to clinic 4-6 wks  The patient will resume all medications as per the medication reconciliation sheet.

## 2024-11-01 ENCOUNTER — TELEPHONE (OUTPATIENT)
Dept: FAMILY MEDICINE CLINIC | Facility: CLINIC | Age: 69
End: 2024-11-01
Payer: MEDICAID

## 2024-11-01 ENCOUNTER — TELEPHONE (OUTPATIENT)
Dept: PAIN MEDICINE | Facility: CLINIC | Age: 69
End: 2024-11-01
Payer: MEDICAID

## 2024-11-01 DIAGNOSIS — E11.65 TYPE 2 DIABETES MELLITUS WITH HYPERGLYCEMIA, WITHOUT LONG-TERM CURRENT USE OF INSULIN: ICD-10-CM

## 2024-11-01 DIAGNOSIS — M47.816 SPONDYLOSIS OF LUMBAR REGION WITHOUT MYELOPATHY OR RADICULOPATHY: Primary | ICD-10-CM

## 2024-11-01 DIAGNOSIS — F41.9 ANXIETY: ICD-10-CM

## 2024-11-01 RX ORDER — LISINOPRIL 10 MG/1
10 TABLET ORAL DAILY
Qty: 90 TABLET | Refills: 1 | Status: SHIPPED | OUTPATIENT
Start: 2024-11-01

## 2024-11-01 RX ORDER — BUSPIRONE HYDROCHLORIDE 7.5 MG/1
7.5 TABLET ORAL NIGHTLY PRN
Qty: 30 TABLET | Refills: 2 | Status: SHIPPED | OUTPATIENT
Start: 2024-11-01

## 2024-11-01 RX ORDER — INSULIN GLULISINE 100 [IU]/ML
10 INJECTION, SOLUTION SUBCUTANEOUS
Qty: 9 ML | Refills: 0 | Status: SHIPPED | OUTPATIENT
Start: 2024-11-01 | End: 2024-11-01

## 2024-11-01 RX ORDER — TRAMADOL HYDROCHLORIDE 50 MG/1
50 TABLET ORAL EVERY 8 HOURS PRN
Qty: 45 TABLET | Refills: 0 | Status: SHIPPED | OUTPATIENT
Start: 2024-11-01

## 2024-11-01 RX ORDER — METOPROLOL SUCCINATE 50 MG/1
50 TABLET, EXTENDED RELEASE ORAL DAILY
Qty: 90 TABLET | Refills: 1 | Status: SHIPPED | OUTPATIENT
Start: 2024-11-01

## 2024-11-01 RX ORDER — INSULIN GLULISINE 100 [IU]/ML
10 INJECTION, SOLUTION SUBCUTANEOUS 3 TIMES DAILY
Qty: 9 ML | Refills: 2 | Status: SHIPPED | OUTPATIENT
Start: 2024-11-01

## 2024-11-01 RX ORDER — IBUPROFEN 800 MG/1
800 TABLET, FILM COATED ORAL EVERY 8 HOURS PRN
Qty: 90 TABLET | Refills: 0 | Status: SHIPPED | OUTPATIENT
Start: 2024-11-01

## 2024-11-01 NOTE — TELEPHONE ENCOUNTER
Caller: Arianna Blanco    Relationship: Self    Best call back number: [phone]       Who are you requesting to speak with (clinical staff, provider,  specific staff member): CLINICAL        What was the call regarding: PATIENT RETURNED CALL TO OFFICE - STATES WRONG MEDICATION CALLED IN

## 2024-11-01 NOTE — TELEPHONE ENCOUNTER
Called and spoke to pt. Pt states she went to  her Apidra and she got vials instead of pen injectors.  States she has never used insulin vials.  Called pharmacy and pharmacist states prescription has to be sent over for Apidra solostar in order for pt to receive pens.  Pt also states she is needing refills on Lisinopril 10mg, metoprolol 50mg and buspirone 7.5mg.  Pt also stated that she was to be on protonix and amitriptyline, which are not on her medication list.  States these were on her paperwork from the hospital

## 2024-11-01 NOTE — TELEPHONE ENCOUNTER
Caller: Arianna Blanco    Relationship to patient: Self    Best call back number: [phone]    Patient is needing: PATIENT WAS TOLD AT APPT WITH DR BASURTO YESTERDAY THAT TRAMADOL AND IBUPROFEN WAS SUPPOSED TO BE SENT IN FOR HER    [facility] [place] [place]     SHE IS OUT OF THESE MEDICATIONS

## 2024-11-12 DIAGNOSIS — E11.65 TYPE 2 DIABETES MELLITUS WITH HYPERGLYCEMIA, WITHOUT LONG-TERM CURRENT USE OF INSULIN: ICD-10-CM

## 2024-11-12 DIAGNOSIS — G47.00 INSOMNIA, UNSPECIFIED TYPE: ICD-10-CM

## 2024-11-12 RX ORDER — ZOLPIDEM TARTRATE 10 MG/1
10 TABLET ORAL NIGHTLY PRN
Qty: 28 TABLET | Refills: 0 | OUTPATIENT
Start: 2024-11-12

## 2024-11-12 RX ORDER — INSULIN GLULISINE 100 [IU]/ML
INJECTION, SOLUTION SUBCUTANEOUS
Qty: 10 ML | OUTPATIENT
Start: 2024-11-12

## 2024-11-20 ENCOUNTER — TELEPHONE (OUTPATIENT)
Dept: PAIN MEDICINE | Facility: CLINIC | Age: 69
End: 2024-11-20
Payer: MEDICAID

## 2024-11-20 DIAGNOSIS — M47.816 SPONDYLOSIS OF LUMBAR REGION WITHOUT MYELOPATHY OR RADICULOPATHY: ICD-10-CM

## 2024-11-20 RX ORDER — TRAMADOL HYDROCHLORIDE 50 MG/1
50 TABLET ORAL EVERY 8 HOURS PRN
Qty: 45 TABLET | Refills: 0 | Status: SHIPPED | OUTPATIENT
Start: 2024-11-20

## 2024-11-20 NOTE — TELEPHONE ENCOUNTER
"Caller: Stevan Blancotim BUTLER    Relationship: Self    Best call back number: [phone]    Requested Prescriptions:   Requested Prescriptions     Pending Prescriptions Disp Refills    traMADol (ULTRAM) 50 MG tablet 45 tablet 0     Sig: Take 1 tablet by mouth Every 8 (Eight) Hours As Needed for Moderate Pain.        Pharmacy where request should be sent: [id number] - [place], [place] - [address] AT [place] [address] [facility] [phone] [facility] [phone]      Last office visit with prescribing clinician: 7/26/2024   Last telemedicine visit with prescribing clinician: Visit date not found   Next office visit with prescribing clinician: 12/12/2024     Additional details provided by patient: PT IS OUT OF MEDICATION    Does the patient have less than a 3 day supply:  [x] Yes  [] No    Would you like a call back once the refill request has been completed: [] Yes [x] No    If the office needs to give you a call back, can they leave a voicemail: [] Yes [x] No    Agnes Scott Rep   11/20/24 11:58 EST   "

## 2024-12-06 ENCOUNTER — TELEPHONE (OUTPATIENT)
Dept: FAMILY MEDICINE CLINIC | Facility: CLINIC | Age: 69
End: 2024-12-06
Payer: MEDICAID

## 2024-12-06 NOTE — TELEPHONE ENCOUNTER
Caller: Arianna Blanco    Relationship: Self    Best call back number:   Telephone Information:   Mobile [phone]         Which medication are you concerned about: sertraline (ZOLOFT) 100 MG tablet     Who prescribed you this medication: PCP    When did you start taking this medication: YEARS AGO    What are your concerns: PATIENT STATES IT IS NOT WORKING ANYMORE CAN SOMETHING LIKE PROZAC CAN BE CALLED IN. PLEASE CALL PATIENT TO DISCUSS.

## 2024-12-16 ENCOUNTER — HOSPITAL ENCOUNTER (OUTPATIENT)
Facility: HOSPITAL | Age: 69
Setting detail: OBSERVATION
Discharge: HOME HEALTH | End: 2024-12-18
Attending: EMERGENCY MEDICINE | Admitting: HOSPITALIST
Payer: MEDICAID

## 2024-12-16 ENCOUNTER — APPOINTMENT (OUTPATIENT)
Dept: GENERAL RADIOLOGY | Facility: HOSPITAL | Age: 69
End: 2024-12-16
Payer: MEDICAID

## 2024-12-16 ENCOUNTER — OFFICE VISIT (OUTPATIENT)
Dept: FAMILY MEDICINE CLINIC | Facility: CLINIC | Age: 69
End: 2024-12-16
Payer: MEDICAID

## 2024-12-16 ENCOUNTER — TELEPHONE (OUTPATIENT)
Dept: FAMILY MEDICINE CLINIC | Facility: CLINIC | Age: 69
End: 2024-12-16

## 2024-12-16 VITALS
SYSTOLIC BLOOD PRESSURE: 142 MMHG | HEART RATE: 97 BPM | TEMPERATURE: 97.2 F | BODY MASS INDEX: 24.17 KG/M2 | RESPIRATION RATE: 18 BRPM | DIASTOLIC BLOOD PRESSURE: 74 MMHG | OXYGEN SATURATION: 99 % | HEIGHT: 67 IN | WEIGHT: 154 LBS

## 2024-12-16 DIAGNOSIS — R53.81 PHYSICAL DECONDITIONING: ICD-10-CM

## 2024-12-16 DIAGNOSIS — E11.65 TYPE 2 DIABETES MELLITUS WITH HYPERGLYCEMIA, WITHOUT LONG-TERM CURRENT USE OF INSULIN: Primary | ICD-10-CM

## 2024-12-16 DIAGNOSIS — R06.02 SHORTNESS OF BREATH: ICD-10-CM

## 2024-12-16 DIAGNOSIS — F17.200 TOBACCO DEPENDENCE: ICD-10-CM

## 2024-12-16 DIAGNOSIS — R16.0 HEPATOMEGALY: ICD-10-CM

## 2024-12-16 DIAGNOSIS — R53.1 GENERALIZED WEAKNESS: ICD-10-CM

## 2024-12-16 DIAGNOSIS — E11.00 TYPE 2 DIABETES MELLITUS WITH HYPEROSMOLAR HYPERGLYCEMIC STATE (HHS): Primary | ICD-10-CM

## 2024-12-16 DIAGNOSIS — N28.89 RENAL MASS, RIGHT: ICD-10-CM

## 2024-12-16 DIAGNOSIS — R63.4 UNINTENDED WEIGHT LOSS: ICD-10-CM

## 2024-12-16 DIAGNOSIS — F34.1 PERSISTENT DEPRESSIVE DISORDER: ICD-10-CM

## 2024-12-16 DIAGNOSIS — F41.9 ANXIETY: ICD-10-CM

## 2024-12-16 DIAGNOSIS — R19.8 ALTERNATING CONSTIPATION AND DIARRHEA: ICD-10-CM

## 2024-12-16 DIAGNOSIS — I10 ESSENTIAL HYPERTENSION: ICD-10-CM

## 2024-12-16 DIAGNOSIS — E27.8 RIGHT ADRENAL MASS: ICD-10-CM

## 2024-12-16 LAB
ALBUMIN SERPL-MCNC: 4.2 G/DL (ref 3.5–5.2)
ALBUMIN/GLOB SERPL: 1.5 G/DL
ALP SERPL-CCNC: 152 U/L (ref 39–117)
ALT SERPL W P-5'-P-CCNC: 25 U/L (ref 1–33)
ANION GAP SERPL CALCULATED.3IONS-SCNC: 14.4 MMOL/L (ref 5–15)
APTT PPP: 23.9 SECONDS (ref 22.7–35.4)
AST SERPL-CCNC: 30 U/L (ref 1–32)
ATMOSPHERIC PRESS: ABNORMAL MM[HG]
BACTERIA UR QL AUTO: ABNORMAL /HPF
BASE EXCESS BLDV CALC-SCNC: -1 MMOL/L (ref -2–2)
BASOPHILS # BLD AUTO: 0.06 10*3/MM3 (ref 0–0.2)
BASOPHILS NFR BLD AUTO: 0.6 % (ref 0–1.5)
BDY SITE: ABNORMAL
BILIRUB SERPL-MCNC: 0.3 MG/DL (ref 0–1.2)
BILIRUB UR QL STRIP: NEGATIVE
BUN SERPL-MCNC: 12 MG/DL (ref 8–23)
BUN/CREAT SERPL: 14.6 (ref 7–25)
CALCIUM SPEC-SCNC: 9.4 MG/DL (ref 8.6–10.5)
CHLORIDE SERPL-SCNC: 95 MMOL/L (ref 98–107)
CLARITY UR: CLEAR
CO2 BLDA-SCNC: 24.9 MMOL/L (ref 22–29)
CO2 SERPL-SCNC: 20.6 MMOL/L (ref 22–29)
COLOR UR: YELLOW
CREAT SERPL-MCNC: 0.82 MG/DL (ref 0.57–1)
D DIMER PPP FEU-MCNC: 0.3 MCGFEU/ML (ref 0–0.68)
DEPRECATED RDW RBC AUTO: 41.3 FL (ref 37–54)
EGFRCR SERPLBLD CKD-EPI 2021: 78 ML/MIN/1.73
EOSINOPHIL # BLD AUTO: 0.13 10*3/MM3 (ref 0–0.4)
EOSINOPHIL NFR BLD AUTO: 1.3 % (ref 0.3–6.2)
ERYTHROCYTE [DISTWIDTH] IN BLOOD BY AUTOMATED COUNT: 13.4 % (ref 12.3–15.4)
EXPIRATION DATE: ABNORMAL
FLUAV RNA RESP QL NAA+PROBE: NOT DETECTED
FLUBV RNA RESP QL NAA+PROBE: NOT DETECTED
GEN 5 1HR TROPONIN T REFLEX: 18 NG/L
GLOBULIN UR ELPH-MCNC: 2.8 GM/DL
GLUCOSE BLDC GLUCOMTR-MCNC: 386 MG/DL (ref 70–105)
GLUCOSE BLDC GLUCOMTR-MCNC: 407 MG/DL (ref 70–105)
GLUCOSE BLDC GLUCOMTR-MCNC: 577 MG/DL (ref 70–105)
GLUCOSE SERPL-MCNC: 585 MG/DL (ref 65–99)
GLUCOSE UR STRIP-MCNC: ABNORMAL MG/DL
HBA1C MFR BLD: 12.1 % (ref 4.5–5.7)
HCO3 BLDV-SCNC: 23.7 MMOL/L (ref 22–26)
HCT VFR BLD AUTO: 39.1 % (ref 34–46.6)
HGB BLD-MCNC: 13.7 G/DL (ref 12–15.9)
HGB UR QL STRIP.AUTO: ABNORMAL
HOLD SPECIMEN: NORMAL
HYALINE CASTS UR QL AUTO: ABNORMAL /LPF
IMM GRANULOCYTES # BLD AUTO: 0.05 10*3/MM3 (ref 0–0.05)
IMM GRANULOCYTES NFR BLD AUTO: 0.5 % (ref 0–0.5)
INHALED O2 CONCENTRATION: 21 %
INR PPP: 1 (ref 0.9–1.1)
KETONES UR QL STRIP: NEGATIVE
LEUKOCYTE ESTERASE UR QL STRIP.AUTO: NEGATIVE
LIPASE SERPL-CCNC: 62 U/L (ref 13–60)
LYMPHOCYTES # BLD AUTO: 2.64 10*3/MM3 (ref 0.7–3.1)
LYMPHOCYTES NFR BLD AUTO: 25.9 % (ref 19.6–45.3)
Lab: ABNORMAL
MCH RBC QN AUTO: 29.3 PG (ref 26.6–33)
MCHC RBC AUTO-ENTMCNC: 35 G/DL (ref 31.5–35.7)
MCV RBC AUTO: 83.5 FL (ref 79–97)
MODALITY: ABNORMAL
MONOCYTES # BLD AUTO: 0.79 10*3/MM3 (ref 0.1–0.9)
MONOCYTES NFR BLD AUTO: 7.8 % (ref 5–12)
NEUTROPHILS NFR BLD AUTO: 6.52 10*3/MM3 (ref 1.7–7)
NEUTROPHILS NFR BLD AUTO: 63.9 % (ref 42.7–76)
NITRITE UR QL STRIP: NEGATIVE
NRBC BLD AUTO-RTO: 0 /100 WBC (ref 0–0.2)
NT-PROBNP SERPL-MCNC: 114 PG/ML (ref 0–900)
PCO2 BLDV: 38.8 MM HG (ref 42–51)
PH BLDV: 7.39 PH UNITS (ref 7.32–7.43)
PH UR STRIP.AUTO: 6 [PH] (ref 5–8)
PLATELET # BLD AUTO: 172 10*3/MM3 (ref 140–450)
PMV BLD AUTO: 12.8 FL (ref 6–12)
PO2 BLDV: 26.1 MM HG (ref 40–42)
POTASSIUM SERPL-SCNC: 4.2 MMOL/L (ref 3.5–5.2)
PROT SERPL-MCNC: 7 G/DL (ref 6–8.5)
PROT UR QL STRIP: NEGATIVE
PROTHROMBIN TIME: 13.2 SECONDS (ref 11.7–14.2)
RBC # BLD AUTO: 4.68 10*6/MM3 (ref 3.77–5.28)
RBC # UR STRIP: ABNORMAL /HPF
REF LAB TEST METHOD: ABNORMAL
RSV RNA RESP QL NAA+PROBE: NOT DETECTED
SAO2 % BLDCOV: 47.9 % (ref 45–75)
SARS-COV-2 RNA RESP QL NAA+PROBE: NOT DETECTED
SODIUM SERPL-SCNC: 130 MMOL/L (ref 136–145)
SP GR UR STRIP: 1.01 (ref 1–1.03)
SQUAMOUS #/AREA URNS HPF: ABNORMAL /HPF
TROPONIN T % DELTA: -5 %
TROPONIN T NUMERIC DELTA: -1 NG/L
TROPONIN T SERPL HS-MCNC: 19 NG/L
TSH SERPL DL<=0.05 MIU/L-ACNC: 1.57 UIU/ML (ref 0.27–4.2)
UROBILINOGEN UR QL STRIP: ABNORMAL
WBC # UR STRIP: ABNORMAL /HPF
WBC NRBC COR # BLD AUTO: 10.19 10*3/MM3 (ref 3.4–10.8)
YEAST URNS QL MICRO: ABNORMAL /HPF

## 2024-12-16 PROCEDURE — 82948 REAGENT STRIP/BLOOD GLUCOSE: CPT

## 2024-12-16 PROCEDURE — 82607 VITAMIN B-12: CPT | Performed by: HOSPITALIST

## 2024-12-16 PROCEDURE — 93005 ELECTROCARDIOGRAM TRACING: CPT | Performed by: EMERGENCY MEDICINE

## 2024-12-16 PROCEDURE — 94799 UNLISTED PULMONARY SVC/PX: CPT

## 2024-12-16 PROCEDURE — G0378 HOSPITAL OBSERVATION PER HR: HCPCS

## 2024-12-16 PROCEDURE — 93005 ELECTROCARDIOGRAM TRACING: CPT

## 2024-12-16 PROCEDURE — 63710000001 INSULIN REGULAR HUMAN PER 5 UNITS: Performed by: EMERGENCY MEDICINE

## 2024-12-16 PROCEDURE — 80050 GENERAL HEALTH PANEL: CPT | Performed by: EMERGENCY MEDICINE

## 2024-12-16 PROCEDURE — 83690 ASSAY OF LIPASE: CPT | Performed by: EMERGENCY MEDICINE

## 2024-12-16 PROCEDURE — 1125F AMNT PAIN NOTED PAIN PRSNT: CPT

## 2024-12-16 PROCEDURE — 3078F DIAST BP <80 MM HG: CPT

## 2024-12-16 PROCEDURE — 36415 COLL VENOUS BLD VENIPUNCTURE: CPT

## 2024-12-16 PROCEDURE — 83036 HEMOGLOBIN GLYCOSYLATED A1C: CPT

## 2024-12-16 PROCEDURE — 94664 DEMO&/EVAL PT USE INHALER: CPT

## 2024-12-16 PROCEDURE — 83880 ASSAY OF NATRIURETIC PEPTIDE: CPT | Performed by: EMERGENCY MEDICINE

## 2024-12-16 PROCEDURE — 81001 URINALYSIS AUTO W/SCOPE: CPT

## 2024-12-16 PROCEDURE — 99285 EMERGENCY DEPT VISIT HI MDM: CPT

## 2024-12-16 PROCEDURE — 3077F SYST BP >= 140 MM HG: CPT

## 2024-12-16 PROCEDURE — 85610 PROTHROMBIN TIME: CPT | Performed by: EMERGENCY MEDICINE

## 2024-12-16 PROCEDURE — 85379 FIBRIN DEGRADATION QUANT: CPT | Performed by: EMERGENCY MEDICINE

## 2024-12-16 PROCEDURE — 94640 AIRWAY INHALATION TREATMENT: CPT

## 2024-12-16 PROCEDURE — 85730 THROMBOPLASTIN TIME PARTIAL: CPT | Performed by: EMERGENCY MEDICINE

## 2024-12-16 PROCEDURE — 82803 BLOOD GASES ANY COMBINATION: CPT

## 2024-12-16 PROCEDURE — 84484 ASSAY OF TROPONIN QUANT: CPT | Performed by: EMERGENCY MEDICINE

## 2024-12-16 PROCEDURE — 25810000003 SODIUM CHLORIDE 0.9 % SOLUTION: Performed by: EMERGENCY MEDICINE

## 2024-12-16 PROCEDURE — 71045 X-RAY EXAM CHEST 1 VIEW: CPT

## 2024-12-16 PROCEDURE — 3046F HEMOGLOBIN A1C LEVEL >9.0%: CPT

## 2024-12-16 PROCEDURE — 63710000001 INSULIN LISPRO (HUMAN) PER 5 UNITS

## 2024-12-16 PROCEDURE — 87637 SARSCOV2&INF A&B&RSV AMP PRB: CPT | Performed by: STUDENT IN AN ORGANIZED HEALTH CARE EDUCATION/TRAINING PROGRAM

## 2024-12-16 PROCEDURE — 94761 N-INVAS EAR/PLS OXIMETRY MLT: CPT

## 2024-12-16 PROCEDURE — 99214 OFFICE O/P EST MOD 30 MIN: CPT

## 2024-12-16 RX ORDER — ONDANSETRON 4 MG/1
4 TABLET, ORALLY DISINTEGRATING ORAL EVERY 6 HOURS PRN
Status: DISCONTINUED | OUTPATIENT
Start: 2024-12-16 | End: 2024-12-18 | Stop reason: HOSPADM

## 2024-12-16 RX ORDER — IPRATROPIUM BROMIDE AND ALBUTEROL SULFATE 2.5; .5 MG/3ML; MG/3ML
3 SOLUTION RESPIRATORY (INHALATION) ONCE
Status: COMPLETED | OUTPATIENT
Start: 2024-12-16 | End: 2024-12-16

## 2024-12-16 RX ORDER — ONDANSETRON 2 MG/ML
4 INJECTION INTRAMUSCULAR; INTRAVENOUS EVERY 6 HOURS PRN
Status: DISCONTINUED | OUTPATIENT
Start: 2024-12-16 | End: 2024-12-18 | Stop reason: HOSPADM

## 2024-12-16 RX ORDER — ACETAMINOPHEN 160 MG/5ML
650 SOLUTION ORAL EVERY 4 HOURS PRN
Status: DISCONTINUED | OUTPATIENT
Start: 2024-12-16 | End: 2024-12-18 | Stop reason: HOSPADM

## 2024-12-16 RX ORDER — SODIUM CHLORIDE 0.9 % (FLUSH) 0.9 %
10 SYRINGE (ML) INJECTION AS NEEDED
Status: DISCONTINUED | OUTPATIENT
Start: 2024-12-16 | End: 2024-12-18 | Stop reason: HOSPADM

## 2024-12-16 RX ORDER — BREXPIPRAZOLE 3 MG/1
1 TABLET ORAL DAILY
COMMUNITY

## 2024-12-16 RX ORDER — DEXTROSE MONOHYDRATE 25 G/50ML
25 INJECTION, SOLUTION INTRAVENOUS
Status: DISCONTINUED | OUTPATIENT
Start: 2024-12-16 | End: 2024-12-18 | Stop reason: HOSPADM

## 2024-12-16 RX ORDER — INSULIN LISPRO 100 [IU]/ML
2-9 INJECTION, SOLUTION INTRAVENOUS; SUBCUTANEOUS
Status: DISCONTINUED | OUTPATIENT
Start: 2024-12-16 | End: 2024-12-17

## 2024-12-16 RX ORDER — IPRATROPIUM BROMIDE AND ALBUTEROL SULFATE 2.5; .5 MG/3ML; MG/3ML
3 SOLUTION RESPIRATORY (INHALATION) EVERY 4 HOURS PRN
Status: DISCONTINUED | OUTPATIENT
Start: 2024-12-16 | End: 2024-12-18 | Stop reason: HOSPADM

## 2024-12-16 RX ORDER — ACETAMINOPHEN 650 MG/1
650 SUPPOSITORY RECTAL EVERY 4 HOURS PRN
Status: DISCONTINUED | OUTPATIENT
Start: 2024-12-16 | End: 2024-12-18 | Stop reason: HOSPADM

## 2024-12-16 RX ORDER — NICOTINE POLACRILEX 4 MG
15 LOZENGE BUCCAL
Status: DISCONTINUED | OUTPATIENT
Start: 2024-12-16 | End: 2024-12-18 | Stop reason: HOSPADM

## 2024-12-16 RX ORDER — ACETAMINOPHEN 325 MG/1
650 TABLET ORAL EVERY 4 HOURS PRN
Status: DISCONTINUED | OUTPATIENT
Start: 2024-12-16 | End: 2024-12-18 | Stop reason: HOSPADM

## 2024-12-16 RX ORDER — GABAPENTIN 100 MG/1
100 CAPSULE ORAL 3 TIMES DAILY
Status: DISCONTINUED | OUTPATIENT
Start: 2024-12-16 | End: 2024-12-18 | Stop reason: HOSPADM

## 2024-12-16 RX ORDER — BISACODYL 5 MG/1
5 TABLET, DELAYED RELEASE ORAL DAILY PRN
Status: DISCONTINUED | OUTPATIENT
Start: 2024-12-16 | End: 2024-12-18 | Stop reason: HOSPADM

## 2024-12-16 RX ORDER — BISACODYL 10 MG
10 SUPPOSITORY, RECTAL RECTAL DAILY PRN
Status: DISCONTINUED | OUTPATIENT
Start: 2024-12-16 | End: 2024-12-18 | Stop reason: HOSPADM

## 2024-12-16 RX ORDER — POLYETHYLENE GLYCOL 3350 17 G/17G
17 POWDER, FOR SOLUTION ORAL DAILY PRN
Status: DISCONTINUED | OUTPATIENT
Start: 2024-12-16 | End: 2024-12-18 | Stop reason: HOSPADM

## 2024-12-16 RX ORDER — AMOXICILLIN 250 MG
2 CAPSULE ORAL 2 TIMES DAILY PRN
Status: DISCONTINUED | OUTPATIENT
Start: 2024-12-16 | End: 2024-12-18 | Stop reason: HOSPADM

## 2024-12-16 RX ORDER — IBUPROFEN 600 MG/1
1 TABLET ORAL
Status: DISCONTINUED | OUTPATIENT
Start: 2024-12-16 | End: 2024-12-18 | Stop reason: HOSPADM

## 2024-12-16 RX ADMIN — IPRATROPIUM BROMIDE AND ALBUTEROL SULFATE 3 ML: .5; 3 SOLUTION RESPIRATORY (INHALATION) at 15:57

## 2024-12-16 RX ADMIN — INSULIN HUMAN 5 UNITS: 100 INJECTION, SOLUTION PARENTERAL at 16:12

## 2024-12-16 RX ADMIN — INSULIN LISPRO 9 UNITS: 100 INJECTION, SOLUTION INTRAVENOUS; SUBCUTANEOUS at 22:14

## 2024-12-16 RX ADMIN — GABAPENTIN 100 MG: 100 CAPSULE ORAL at 22:14

## 2024-12-16 RX ADMIN — SODIUM CHLORIDE 1000 ML: 9 INJECTION, SOLUTION INTRAVENOUS at 16:12

## 2024-12-16 NOTE — H&P
[facility] Medicine Services  History & Physical    Patient Name: Arianna Blanco  : 1955  MRN: [id number]  Primary Care Physician:  Maddie Cornejo APRN  Date of admission: 2024  Date and Time of Service: 2024 at 1741    Subjective      Chief Complaint: generalized weakness, hyperglycemia     History of Present Illness: Arianna Blanco is a 69 y.o. female with a CMH of COPD, depression, diabetes, hyperlipidemia, hypertension, atrial fibrillation, GERD, depression/anxiety who presented to [facility] on 2024 with generalized weakness over the past week as well as abnormal labs.  Patient was sent here by her PCP.  She also endorses intermittent shortness of breath.     On ED admission, blood glucose 585, venous blood gas shows pH 7.394, pCO2 of 38.8, pO2 26.1, HCO3 23.7, O2 saturation 47.9.  Troponin 19, 18.  proBNP 114, sodium 130, alk phos 152, lipase 62, TSH 1.570, D-dimer 0.30, CBC completely unremarkable.  Chest x-ray shows no active disease.  EKG shows sinus rhythm and incomplete right bundle branch block.  Patient was given insulin in the ED as well as a breathing treatment. Hospitalist team to admit for further medical management.       Review of Systems   Constitutional:  Positive for fatigue. Negative for chills and fever.       Personal History     Past Medical History:   Diagnosis Date    COPD (chronic obstructive pulmonary disease)     Depression     Diabetes mellitus     Hyperlipidemia     Hypertension        Past Surgical History:   Procedure Laterality Date    CARDIAC CATHETERIZATION Left 10/30/2020    Procedure: Left Heart Cath with Coronary Angiography;  Surgeon: Donaldo Archer MD;  Location: [facility] CATH INVASIVE LOCATION;  Service: Cardiovascular;  Laterality: Left;    CARDIAC ELECTROPHYSIOLOGY PROCEDURE Right 2024    Procedure: Ablation atrial fibrillation;  Surgeon: Shay Santos MD;  Location: [facility] CATH INVASIVE LOCATION;  Service:  Cardiovascular;  Laterality: Right;    CARPAL TUNNEL RELEASE Bilateral 2017    CHOLECYSTECTOMY  1980    HYSTERECTOMY  1980       Family History: family history includes Diabetes in her mother; Heart disease in her mother; Hypertension in her mother; Stroke in her mother. Otherwise pertinent FHx was reviewed and not pertinent to current issue.    Social History:  reports that she has been smoking cigarettes. She started smoking about 41 years ago. She has a 42 pack-year smoking history. She has been exposed to tobacco smoke. She has never used smokeless tobacco. She reports that she does not drink alcohol and does not use drugs.    Home Medications:  Prior to Admission Medications       Prescriptions Last Dose Informant Patient Reported? Taking?    albuterol sulfate  (90 Base) MCG/ACT inhaler   No No    INHALE 2 PUFFS BY MOUTH EVERY 4 HOURS AS NEEDED FOR WHEEZING OR SHORTNESS OF AIR    baclofen (LIORESAL) 20 MG tablet   No No    Take 1 tablet by mouth 3 (Three) Times a Day.    busPIRone (BUSPAR) 7.5 MG tablet   No No    Take 1 tablet by mouth At Night As Needed (anxiety).    butalbital-acetaminophen  MG tablet tablet   No No    TAKE 1 TABLET BY MOUTH EVERY 6 (SIX) HOURS AS NEEDED (HEADACHE).    Continuous Blood Gluc  (FreeStyle Willie 2 [place]) device   No No    Use 1 each Daily. To be used daily to monitor continuous blood sugar reading for type 2 diabetic patient on insulin regimen.    Continuous Blood Gluc Sensor (FreeStyle Willie 2 Sensor) misc   No No    Use 1 each Every 14 (Fourteen) Days. Dispense 2 sensor pack. To be used daily with freestyle willie device to monitor continuous blood sugar reading for type 2 diabetic patient on insulin regimen.    dilTIAZem CD (CARDIZEM CD) 240 MG 24 hr capsule   Yes No    Take 1 capsule by mouth Daily.    Eliquis 5 MG tablet tablet   No No    TAKE 1 TABLET BY MOUTH EVERY 12 HOURS    FLUoxetine (PROzac) 20 MG capsule   No No    Take 1 capsule by mouth Daily.     glucose blood (Accu-Chek Guide) test strip   No No    1 EACH BY OTHER ROUTE 4 (FOUR) TIMES A DAY. USE TO TEST BLOOD SUGAR 4 TIMES DAILY    ibuprofen (ADVIL,MOTRIN) 800 MG tablet   No No    Take 1 tablet by mouth Every 8 (Eight) Hours As Needed for Mild Pain.    Incontinence Supply Disposable (Briefs Overnight Large) misc   No No    Use 1 each At Night As Needed (incontinence).    Insulin Glargine (LANTUS SOLOSTAR) 100 UNIT/ML injection pen   No No    Inject 30 Units under the skin into the appropriate area as directed Every Night.    Insulin Glulisine (Apidra SoloStar) 100 UNIT/ML solution pen-injector   No No    Inject 0.1 mL under the skin into the appropriate area as directed 3 (Three) Times a Day. Take within 15 before or within 20 minutes of starting a meal.    lisinopril (PRINIVIL,ZESTRIL) 10 MG tablet   No No    Take 1 tablet by mouth Daily.    metFORMIN (GLUCOPHAGE) 1000 MG tablet   No No    Take 1 tablet by mouth 2 (Two) Times a Day With Meals.    metoprolol succinate XL (TOPROL-XL) 50 MG 24 hr tablet   No No    Take 1 tablet by mouth Daily.    nicotine (NICODERM CQ) 14 MG/24HR patch   No No    Place 1 patch on the skin as directed by provider Daily.    nicotine (NICODERM CQ) 21 MG/24HR patch   No No    Place 1 patch on the skin as directed by provider Daily.    nicotine (Nicoderm CQ) 7 MG/24HR patch   No No    Place 1 patch on the skin as directed by provider Daily.    pregabalin (LYRICA) 150 MG capsule   No No    Take 1 capsule by mouth 2 (Two) Times a Day.    Semaglutide, 1 MG/DOSE, (Ozempic, 1 MG/DOSE,) 4 MG/3ML solution pen-injector   No No    INJECT 1MG UNDER THE SKIN INTO THE APPROPRIATE AREA AS DIRECTED EVERY 7 DAYS    zolpidem (AMBIEN) 10 MG tablet   No No    Take 1 tablet by mouth At Night As Needed for Sleep.              Allergies:  Allergies   Allergen Reactions    Codeine GI Intolerance       Objective      Vitals:   Temp:  [97.2 °F (36.2 °C)-98.9 °F (37.2 °C)] 98.9 °F (37.2 °C)  Heart  Rate:  [89-97] 93  Resp:  [13-18] 13  BP: (140-155)/() 151/97  Body mass index is 24.13 kg/m².  Physical Exam  Vitals and nursing note reviewed.   Constitutional:       Appearance: Normal appearance.   HENT:      Head: Normocephalic and atraumatic.      Nose: Nose normal.      Mouth/Throat:      Mouth: Mucous membranes are moist.   Eyes:      Extraocular Movements: Extraocular movements intact.      Pupils: Pupils are equal, round, and reactive to light.   Cardiovascular:      Rate and Rhythm: Normal rate and regular rhythm.      Pulses: Normal pulses.   Pulmonary:      Effort: Respiratory distress (mild shortness of breath at rest) present.      Breath sounds: Normal breath sounds. No rhonchi or rales.   Abdominal:      General: Bowel sounds are normal.      Palpations: Abdomen is soft.   Musculoskeletal:         General: Normal range of motion.      Cervical back: Normal range of motion and neck supple.   Skin:     General: Skin is warm.      Capillary Refill: Capillary refill takes less than 2 seconds.   Neurological:      Mental Status: She is alert and oriented to person, place, and time.   Psychiatric:         Mood and Affect: Affect is tearful.         Diagnostic Data:  Lab Results (last 24 hours)       Procedure Component Value Units Date/Time    POC Glucose Once [[id number]]  (Abnormal) Collected: 12/16/24 1703    Specimen: Blood Updated: 12/16/24 1704     Glucose 386 mg/dL      Comment: Serial Number: [id number]:  [id number]       High Sensitivity Troponin T 1Hr [[id number]]  (Abnormal) Collected: 12/16/24 1618    Specimen: Blood from Arm, Left Updated: 12/16/24 1645     HS Troponin T 18 ng/L      Troponin T Delta -1 ng/L      Troponin T % Change -5 %     Narrative:      High Sensitive Troponin T Reference Range:  <14.0 ng/L- Negative Female for AMI  <22.0 ng/L- Negative Male for AMI  >=14 - Abnormal Female indicating possible myocardial injury.  >=22 - Abnormal Male indicating possible  myocardial injury.   Clinicians would have to utilize clinical acumen, EKG, Troponin, and serial changes to determine if it is an Acute Myocardial Infarction or myocardial injury due to an underlying chronic condition.         Comprehensive Metabolic Panel [[id number]]  (Abnormal) Collected: 12/16/24 1513    Specimen: Blood Updated: 12/16/24 1549     Glucose 585 mg/dL      BUN 12 mg/dL      Creatinine 0.82 mg/dL      Sodium 130 mmol/L      Potassium 4.2 mmol/L      Chloride 95 mmol/L      CO2 20.6 mmol/L      Calcium 9.4 mg/dL      Total Protein 7.0 g/dL      Albumin 4.2 g/dL      ALT (SGPT) 25 U/L      AST (SGOT) 30 U/L      Alkaline Phosphatase 152 U/L      Total Bilirubin 0.3 mg/dL      Globulin 2.8 gm/dL      A/G Ratio 1.5 g/dL      BUN/Creatinine Ratio 14.6     Anion Gap 14.4 mmol/L      eGFR 78.0 mL/min/1.73     Narrative:      GFR Categories in Chronic Kidney Disease (CKD)      GFR Category          GFR (mL/min/1.73)    Interpretation  G1                     90 or greater         Normal or high (1)  G2                      60-89                Mild decrease (1)  G3a                   45-59                Mild to moderate decrease  G3b                   30-44                Moderate to severe decrease  G4                    15-29                Severe decrease  G5                    14 or less           Kidney failure          (1)In the absence of evidence of kidney disease, neither GFR category G1 or G2 fulfill the criteria for CKD.    eGFR calculation 2021 CKD-EPI creatinine equation, which does not include race as a factor    TSH Rfx On Abnormal To Free T4 [[id number]]  (Normal) Collected: 12/16/24 1513    Specimen: Blood Updated: 12/16/24 1546     TSH 1.570 uIU/mL     High Sensitivity Troponin T [[id number]]  (Abnormal) Collected: 12/16/24 1513    Specimen: Blood Updated: 12/16/24 1546     HS Troponin T 19 ng/L     Narrative:      High Sensitive Troponin T Reference Range:  <14.0 ng/L- Negative Female for  AMI  <22.0 ng/L- Negative Male for AMI  >=14 - Abnormal Female indicating possible myocardial injury.  >=22 - Abnormal Male indicating possible myocardial injury.   Clinicians would have to utilize clinical acumen, EKG, Troponin, and serial changes to determine if it is an Acute Myocardial Infarction or myocardial injury due to an underlying chronic condition.         BNP [[id number]]  (Normal) Collected: 12/16/24 1513    Specimen: Blood Updated: 12/16/24 1546     proBNP 114.0 pg/mL     Narrative:      This assay is used as an aid in the diagnosis of individuals suspected of having heart failure. It can be used as an aid in the diagnosis of acute decompensated heart failure (ADHF) in patients presenting with signs and symptoms of ADHF to the emergency department (ED). In addition, NT-proBNP of <300 pg/mL indicates ADHF is not likely.    Age Range Result Interpretation  NT-proBNP Concentration (pg/mL:      <50             Positive            >450                   Gray                 300-450                    Negative             <300    50-75           Positive            >900                  Gray                300-900                  Negative            <300      >75             Positive            >1800                  Gray                300-1800                  Negative            <300    Lipase [[id number]]  (Abnormal) Collected: 12/16/24 1513    Specimen: Blood Updated: 12/16/24 1546     Lipase 62 U/L     Protime-INR [[id number]]  (Normal) Collected: 12/16/24 1513    Specimen: Blood Updated: 12/16/24 1532     Protime 13.2 Seconds      INR 1.00    aPTT [[id number]]  (Normal) Collected: 12/16/24 1513    Specimen: Blood Updated: 12/16/24 1532     PTT 23.9 seconds     D-dimer, Quantitative [[id number]]  (Normal) Collected: 12/16/24 1513    Specimen: Blood Updated: 12/16/24 1532     D-Dimer, Quantitative 0.30 MCGFEU/mL     Narrative:      According to the assay 's published package insert, a normal  "(<0.50 MCGFEU/mL) D-dimer result in conjunction with a non-high clinical probability assessment, excludes deep vein thrombosis (DVT) and pulmonary embolism (PE) with high sensitivity.    D-dimer values increase with age and this can make VTE exclusion of an older population difficult. To address this, the [employer], based on best available evidence and recent guidelines, recommends that clinicians use age-adjusted D-dimer thresholds in patients greater than 50 years of age with: a) a low probability of PE who do not meet all Pulmonary Embolism Rule Out Criteria, or b) in those with intermediate probability of PE.   The formula for an age-adjusted D-dimer cut-off is \"age/100\".  For example, a 60 year old patient would have an age-adjusted cut-off of 0.60 MCGFEU/mL and an 80 year old 0.80 MCGFEU/mL.    Blood Gas, Venous - [[id number]]  (Abnormal) Collected: 12/16/24 1528    Specimen: Venous Blood Updated: 12/16/24 1530     Site [facility]     pH, Venous 7.394 pH Units      pCO2, Venous 38.8 mm Hg      pO2, Venous 26.1 mm Hg      HCO3, Venous 23.7 mmol/L      Base Excess, Venous -1.0 mmol/L      Comment: Serial Number: [id number]:  [id number]        O2 Saturation, Venous 47.9 %      CO2 Content 24.9 mmol/L      Barometric Pressure for Blood Gas --     Comment: N/A        Modality Room Air     FIO2 21 %     CBC & Differential [[id number]]  (Abnormal) Collected: 12/16/24 1513    Specimen: Blood Updated: 12/16/24 1517    Narrative:      The following orders were created for panel order CBC & Differential.  Procedure                               Abnormality         Status                     ---------                               -----------         ------                     CBC Auto Differential[[id number]]        Abnormal            Final result                 Please view results for these tests on the individual orders.    CBC Auto Differential [[id number]]  (Abnormal) Collected: 12/16/24 1513    " Specimen: Blood Updated: 12/16/24 1517     WBC 10.19 10*3/mm3      RBC 4.68 10*6/mm3      Hemoglobin 13.7 g/dL      Hematocrit 39.1 %      MCV 83.5 fL      MCH 29.3 pg      MCHC 35.0 g/dL      RDW 13.4 %      RDW-SD 41.3 fl      MPV 12.8 fL      Platelets 172 10*3/mm3      Neutrophil % 63.9 %      Lymphocyte % 25.9 %      Monocyte % 7.8 %      Eosinophil % 1.3 %      Basophil % 0.6 %      Immature Grans % 0.5 %      Neutrophils, Absolute 6.52 10*3/mm3      Lymphocytes, Absolute 2.64 10*3/mm3      Monocytes, Absolute 0.79 10*3/mm3      Eosinophils, Absolute 0.13 10*3/mm3      Basophils, Absolute 0.06 10*3/mm3      Immature Grans, Absolute 0.05 10*3/mm3      nRBC 0.0 /100 WBC     Extra Tubes [[id number]] Collected: 12/16/24 1513    Specimen: Blood, Venous Line Updated: 12/16/24 1515    Narrative:      The following orders were created for panel order Extra Tubes.  Procedure                               Abnormality         Status                     ---------                               -----------         ------                     Gold Top - SST[[id number]]                                   Final result                 Please view results for these tests on the individual orders.    Gold Top - SST [[id number]] Collected: 12/16/24 1513    Specimen: Blood Updated: 12/16/24 1515     Extra Tube Hold for add-ons.     Comment: Auto resulted.       POC Glucose Once [[id number]]  (Abnormal) Collected: 12/16/24 1506    Specimen: Blood Updated: 12/16/24 1508     Glucose 577 mg/dL      Comment: Serial Number: [id number]:  [id number]                Imaging Results (Last 24 Hours)       Procedure Component Value Units Date/Time    XR Chest 1 View [[id number]] Collected: 12/16/24 1533     Updated: 12/16/24 1536    Narrative:      XR CHEST 1 VW    Date of Exam: 12/16/2024 3:30 PM EST    Indication: Cough.    Comparison: 7/11/2024.    Findings:  The left hemidiaphragm is elevated. The heart size is normal. The pulmonary vascular  markings are normal. The lungs and pleural spaces are clear of active disease. There are mild chronic age-related changes involving the bony thorax.      Impression:      Impression:  No active disease.      Electronically Signed: Christopher Figueroa MD    12/16/2024 3:34 PM EST    Workstation ID: [id number]              Assessment & Plan        This is a 69 y.o. female with:    Active and Resolved Problems  Active Hospital Problems    Diagnosis  POA    **Generalized weakness [R53.1]  Yes      Resolved Hospital Problems   No resolved problems to display.       Uncontrolled type 2 diabetes  - Start sliding scale insulin  - Consistent carb diet  - Hypoglycemia protocol  - patient having worsening peripheral neuropathy symptoms, will restart gabapentin, patient states she used to take, unsure if it helped    Generalized weakness  - PT/OT eval  - possibly due to physical deconditioning   - UA ordered and pending  - Fall precautions    Shortness of air  - History of COPD  - Oxygen saturation 98% on room air   - Continue as needed breathing treatments  - Continue home inhalers  - Chest x-ray negative  - D-dimer negative  - consider pulmonology consult    Hypertension   - BP stable  - Resume home medications once reconciled     Anxiety/depression  - continue home medications once reconciled    History of atrial fibrillation  - continue home BB/AC when medications reconciled     VTE Prophylaxis:  Mechanical VTE prophylaxis orders are present.        The patient desires to be as follows:    CODE STATUS:    Code Status (Patient has no pulse and is not breathing): CPR (Attempt to Resuscitate)  Medical Interventions (Patient has pulse or is breathing): Full Support        Drake Mata, who can be contacted at [phone], is the designated person to make medical decisions on the patient's behalf if She is incapable of doing so. This was clarified with patient and/or next of kin on 12/16/2024 during the course of this H&P.    Admission  Status:  I believe this patient meets inpatient status.    Expected Length of Stay: > 2 midnights     PDMP and Medication Dispenses via Sidebar reviewed and consistent with patient reported medications.    I discussed the patient's findings and my recommendations with patient.      Signature:     This document has been electronically signed by THOMAS Puga on December 16, 2024 17:41 [facility]

## 2024-12-16 NOTE — ASSESSMENT & PLAN NOTE
Referred to urology 9/2023   Orders:    Ambulatory Referral to Endocrinology    Ambulatory Referral to Urology

## 2024-12-16 NOTE — ASSESSMENT & PLAN NOTE
Her A1c level has significantly increased from 7.2 to 12.1 over the past 7 months, indicating a need for more intensive management. She is currently on Lantus 22 units at night and Apidra 5 units before meals. She reports not being able to take Ozempic due to insurance issues. She is advised to increase her Lantus dosage. A referral to endocrinology will be initiated for further management of her diabetes.    Orders:    POC Glycosylated Hemoglobin (Hb A1C)    Ambulatory Referral to Endocrinology

## 2024-12-16 NOTE — Clinical Note
Level of Care: Telemetry [5]   Admitting Physician: PIETER ACE [[id number]]   Attending Physician: PIETER ACE [[id number]]

## 2024-12-16 NOTE — ED PROVIDER NOTES
Subjective   History of Present Illness  32-year-old female with history of COPD, diabetes presents after referral from PCP for elevated blood sugars.  Reportedly over 600 with EMS.  Patient states she has been compliant with all her medications other than she has not had her doses today.  Been feeling generally weak for approximately a week.  Endorses some cough and shortness of breath.  Review of Systems  See HPI.  Past Medical History:   Diagnosis Date    COPD (chronic obstructive pulmonary disease)     Depression     Diabetes mellitus     Hyperlipidemia     Hypertension        Allergies   Allergen Reactions    Codeine GI Intolerance       Past Surgical History:   Procedure Laterality Date    CARDIAC CATHETERIZATION Left 10/30/2020    Procedure: Left Heart Cath with Coronary Angiography;  Surgeon: Donaldo Archer MD;  Location: [facility] CATH INVASIVE LOCATION;  Service: Cardiovascular;  Laterality: Left;    CARDIAC ELECTROPHYSIOLOGY PROCEDURE Right 8/27/2024    Procedure: Ablation atrial fibrillation;  Surgeon: Shay Santos MD;  Location: [facility] CATH INVASIVE LOCATION;  Service: Cardiovascular;  Laterality: Right;    CARPAL TUNNEL RELEASE Bilateral 2017    CHOLECYSTECTOMY  1980    HYSTERECTOMY  1980       Family History   Problem Relation Age of Onset    Heart disease Mother     Hypertension Mother     Diabetes Mother     Stroke Mother        Social History     Socioeconomic History    Marital status:    Tobacco Use    Smoking status: Every Day     Current packs/day: 1.00     Average packs/day: 1 pack/day for 42.0 years (42.0 ttl pk-yrs)     Types: Cigarettes     Start date: 1983     Passive exposure: Current    Smokeless tobacco: Never   Vaping Use    Vaping status: Some Days    Substances: Nicotine, Flavoring    Devices: Refillable tank   Substance and Sexual Activity    Alcohol use: No    Drug use: Never    Sexual activity: Defer           Objective   Physical Exam  Chronically ill-appearing,  "dry oral mucosa, diminished breath sounds bilaterally, abdomen soft and nontender without rebound or guarding, no scleral icterus, airway intact, no tachypnea, NCAT, moving all extremities but appears globally weak.  Procedures           ED Course      /85   Pulse 96   Temp 98.9 °F (37.2 °C)   Resp 13   Ht 170.2 cm (67.01\")   Wt 69.9 kg (154 lb 1.6 oz)   SpO2 95%   BMI 24.13 kg/m²   Labs Reviewed   COMPREHENSIVE METABOLIC PANEL - Abnormal; Notable for the following components:       Result Value    Glucose 585 (*)     Sodium 130 (*)     Chloride 95 (*)     CO2 20.6 (*)     Alkaline Phosphatase 152 (*)     All other components within normal limits    Narrative:     GFR Categories in Chronic Kidney Disease (CKD)      GFR Category          GFR (mL/min/1.73)    Interpretation  G1                     90 or greater         Normal or high (1)  G2                      60-89                Mild decrease (1)  G3a                   45-59                Mild to moderate decrease  G3b                   30-44                Moderate to severe decrease  G4                    15-29                Severe decrease  G5                    14 or less           Kidney failure          (1)In the absence of evidence of kidney disease, neither GFR category G1 or G2 fulfill the criteria for CKD.    eGFR calculation 2021 CKD-EPI creatinine equation, which does not include race as a factor   TROPONIN - Abnormal; Notable for the following components:    HS Troponin T 19 (*)     All other components within normal limits    Narrative:     High Sensitive Troponin T Reference Range:  <14.0 ng/L- Negative Female for AMI  <22.0 ng/L- Negative Male for AMI  >=14 - Abnormal Female indicating possible myocardial injury.  >=22 - Abnormal Male indicating possible myocardial injury.   Clinicians would have to utilize clinical acumen, EKG, Troponin, and serial changes to determine if it is an Acute Myocardial Infarction or myocardial injury due " to an underlying chronic condition.        LIPASE - Abnormal; Notable for the following components:    Lipase 62 (*)     All other components within normal limits   CBC WITH AUTO DIFFERENTIAL - Abnormal; Notable for the following components:    MPV 12.8 (*)     All other components within normal limits   BLOOD GAS, VENOUS - Abnormal; Notable for the following components:    pCO2, Venous 38.8 (*)     pO2, Venous 26.1 (*)     All other components within normal limits   HIGH SENSITIVITIY TROPONIN T 1HR - Abnormal; Notable for the following components:    HS Troponin T 18 (*)     All other components within normal limits    Narrative:     High Sensitive Troponin T Reference Range:  <14.0 ng/L- Negative Female for AMI  <22.0 ng/L- Negative Male for AMI  >=14 - Abnormal Female indicating possible myocardial injury.  >=22 - Abnormal Male indicating possible myocardial injury.   Clinicians would have to utilize clinical acumen, EKG, Troponin, and serial changes to determine if it is an Acute Myocardial Infarction or myocardial injury due to an underlying chronic condition.        URINALYSIS W/ MICROSCOPIC IF INDICATED (NO CULTURE) - Abnormal; Notable for the following components:    Glucose,  mg/dL (2+) (*)     Blood, UA Trace (*)     All other components within normal limits   URINALYSIS, MICROSCOPIC ONLY - Abnormal; Notable for the following components:    Yeast, UA Small/1+ Budding Yeast (*)     All other components within normal limits   POCT GLUCOSE FINGERSTICK - Abnormal; Notable for the following components:    Glucose 577 (*)     All other components within normal limits   POCT GLUCOSE FINGERSTICK - Abnormal; Notable for the following components:    Glucose 386 (*)     All other components within normal limits   POCT GLUCOSE FINGERSTICK - Abnormal; Notable for the following components:    Glucose 407 (*)     All other components within normal limits   COVID-19/FLUA&B/RSV, NP SWAB IN TRANSPORT MEDIA 1 HR TAT -  "Normal    Narrative:     Fact sheet for providers: https://www.fda.gov/media/948192/download    Fact sheet for patients: https://www.fda.gov/media/596554/download    Test performed by PCR.   D-DIMER, QUANTITATIVE - Normal    Narrative:     According to the assay 's published package insert, a normal (<0.50 MCGFEU/mL) D-dimer result in conjunction with a non-high clinical probability assessment, excludes deep vein thrombosis (DVT) and pulmonary embolism (PE) with high sensitivity.    D-dimer values increase with age and this can make VTE exclusion of an older population difficult. To address this, the [employer], based on best available evidence and recent guidelines, recommends that clinicians use age-adjusted D-dimer thresholds in patients greater than 50 years of age with: a) a low probability of PE who do not meet all Pulmonary Embolism Rule Out Criteria, or b) in those with intermediate probability of PE.   The formula for an age-adjusted D-dimer cut-off is \"age/100\".  For example, a 60 year old patient would have an age-adjusted cut-off of 0.60 MCGFEU/mL and an 80 year old 0.80 MCGFEU/mL.   BNP (IN-HOUSE) - Normal    Narrative:     This assay is used as an aid in the diagnosis of individuals suspected of having heart failure. It can be used as an aid in the diagnosis of acute decompensated heart failure (ADHF) in patients presenting with signs and symptoms of ADHF to the emergency department (ED). In addition, NT-proBNP of <300 pg/mL indicates ADHF is not likely.    Age Range Result Interpretation  NT-proBNP Concentration (pg/mL:      <50             Positive            >450                   Gray                 300-450                    Negative             <300    50-75           Positive            >900                  Gray                300-900                  Negative            <300      >75             Positive            >1800                  [place]                " 300-1800                  Negative            <300   PROTIME-INR - Normal   APTT - Normal   TSH RFX ON ABNORMAL TO FREE T4 - Normal   BLOOD GAS, VENOUS   BASIC METABOLIC PANEL   CBC WITH AUTO DIFFERENTIAL   POCT GLUCOSE FINGERSTICK   POCT GLUCOSE FINGERSTICK   POCT GLUCOSE FINGERSTICK   POCT GLUCOSE FINGERSTICK   CBC AND DIFFERENTIAL    Narrative:     The following orders were created for panel order CBC & Differential.  Procedure                               Abnormality         Status                     ---------                               -----------         ------                     CBC Auto Differential[[id number]]        Abnormal            Final result                 Please view results for these tests on the individual orders.   EXTRA TUBES    Narrative:     The following orders were created for panel order Extra Tubes.  Procedure                               Abnormality         Status                     ---------                               -----------         ------                     Gold Top - SST[[id number]]                                   Final result                 Please view results for these tests on the individual orders.   GOLD TOP - SST   CBC AND DIFFERENTIAL    Narrative:     The following orders were created for panel order CBC & Differential.  Procedure                               Abnormality         Status                     ---------                               -----------         ------                     CBC Auto Differential[[id number]]                                                         Please view results for these tests on the individual orders.     XR Chest 1 View   Final Result   Impression:   No active disease.         Electronically Signed: Christopher Figueroa MD     12/16/2024 3:34 PM EST     Workstation ID: [id number]                                                         Medical Decision Making    EKG interpretation: 2:55 PM, rate 95, normal sinus incomplete right  bundle branch block, normal QTc, no acute ischemic changes.    My interpretation of chest x-ray is no focal infiltrate or pneumothorax.  See system for radiology interpretation.    Blood sugar quite elevated.  Given fluids and insulin with some improvement.  Patient appears globally weak.  I think would benefit from case management and PT consult.  Still very hyperglycemic without evidence of DKA.  Dimer and troponin both unremarkable.  BMP unremarkable.  No shift in CBC differential.  Normal white count.  Satting well on room air.  Admitted to hospitalist service.  Final diagnoses:   Type 2 diabetes mellitus with hyperosmolar hyperglycemic state (HHS)   Generalized weakness   Shortness of breath       ED Disposition  ED Disposition       ED Disposition   Decision to Admit    Condition   --    Comment   Level of Care: Med/Surg [1]   Diagnosis: Generalized weakness [317492]   Admitting Physician: PIETER ACE [[id number]]   Certification: I Certify That Inpatient Hospital Services Are Medically Necessary For Greater Than 2 Midnights                 No follow-up provider specified.       Medication List      No changes were made to your prescriptions during this visit.            Marvin Prather MD  12/16/24 [id number]

## 2024-12-16 NOTE — ASSESSMENT & PLAN NOTE
She reports that Zoloft is no longer effective. Zoloft will be discontinued, and she will be transitioned to Prozac 20 mg daily. She is informed that it may take 3 to 4 weeks for the new medication to take effect.

## 2024-12-16 NOTE — PROGRESS NOTES
Office Note     Name: Arianna Blanco    : 1955     MRN: [id number]     Chief Complaint  Diabetes (DM follow up and medication follow up )    Subjective     History of Present Illness:  Arianna Blanco is a 69 y.o. female who presents today for for follow up on A1c and discuss medication. Ozempic will not get approved per patient so she needs something else. Patient wants to discuss why she can't walk right and very fatigue.   Diabetes  She presents for her follow-up diabetic visit. She has type 2 diabetes mellitus. Her disease course has been fluctuating. Hypoglycemia symptoms include nervousness/anxiousness, pallor and tremors. Associated symptoms include fatigue, weakness and weight loss. Pertinent negatives for diabetes include no chest pain. Symptoms are worsening. Diabetic complications include nephropathy. There are no known risk factors for coronary artery disease. When asked about current treatments, none were reported. She is compliant with treatment all of the time.       Patient Care Team:  Maddie Cornejo APRN as PCP - General (Nurse Practitioner)  Donaldo Archer MD as Consulting Physician (Cardiology)    History of Present Illness  The patient is a 69-year-old female who presents for medication refills and diabetes management. She has not been seen since 2024. She is additionally under the care of cardiology. She is requesting a medication change from Zoloft to a different medication as she reports it is no longer working effectively. She is specifically requesting Prozac.    She reports a significant decrease in energy levels, to the extent that she struggles to maintain an upright seated position. She experiences pain in her feet and legs, described as shooting in nature, which severely limits her mobility. Her diet primarily consists of Coke, and she expresses concern about her weight, which has decreased from 172 to 154. She has not been able to attend any of her scheduled  appointments due to her current health status. She does not drive. She has been unable to take Ozempic due to insurance issues but continues to take Lantus 22 units at night and Apidra 5 units with meals. She occasionally checks her blood sugar levels using traditional methods but does not use Dexcom. She continues to smoke approximately one pack per day and has attempted to quit using patches, which were unsuccessful.    She reports a lack of desire to engage in activities, including spending time with her grandchildren. She requests a change in her antidepressant medication. She has previously taken Prozac.    She has a history of a right renal mass and a right adrenal mass with referral to urology for further evaluation and management in September 2023. She has not yet consulted with Dr. Baker, the urologist, regarding her kidney and adrenal gland issues. She reports no presence of blood in her urine or stool. She occasionally experiences constipation but more frequently suffers from diarrhea. She has undergone a colonoscopy in the past but is uncertain about the date. She has a family history of colon cancer. She was informed that her liver was normal during her hospital stay for a heart procedure on 27th.    She has a history of hepatomegaly and was referred to gastroenterology for further care and evaluation in September 2023. She has not yet consulted with a gastroenterologist regarding her liver condition.    She was referred to neurology in April for a tremor of the right hand. She reports that her hand tremors have decreased in frequency, which she attributes to her focus on other health issues.    She additionally has a history of lumbar spondylosis without myelopathy and is under the care of Dr. Lane pain management. She has had facet blocks last completed on October 31, 2024. She was prescribed tramadol 50 mg every 8 hours for moderate pain on November 20 but reports she is not taking it  now.    Supplemental Information  Since the last office visit, she was seen at [facility], admitted on September 8 and discharged on September 11 for acute bronchitis, unspecified COPD with acute bronchitis, chest pain, rule out MI, depression, and flank pain. She reported symptoms of chest pain were sudden in onset off and on, worse with exertion. She was evaluated in the emergency room. Hospitalist service consulted and admitted the patient with A. fib with RVR, rule out ACS. She is rate controlled. She was discharged home with albuterol, ProAir, amitriptyline 75 mg daily, Eliquis 5 mg b.i.d., Rexulti 3 mg daily, Keflex 500 mg q.6 for 5 days, Nexium daily, insulin 22 units under the skin every evening, Apidra 5 units on the skin three times a day before meals, lisinopril 20 mg daily, metformin 1000 mg twice a day, metoprolol succinate 25 mg daily, nicotine patch, Protonix 40 mg daily, Lyrica 1 capsule b.i.d., sertraline 100 mg daily, tramadol 50 mg q.6h. as needed for pain and Ambien for sleep. She no showed her cardiology appointment with Dr. Reddy on 9/25/2024.    SOCIAL HISTORY  She admits to smoking about a pack a day.    FAMILY HISTORY  She mentions a family history of colon cancer.    MEDICATIONS  Current: albuterol, ProAir, amitriptyline, Eliquis, Rexulti, Keflex, Nexium, insulin, Apidra, lisinopril, metformin, metoprolol succinate, nicotine patch, Protonix, Lyrica, sertraline, tramadol, Ambien, Zoloft    Allergies   Allergen Reactions    Codeine GI Intolerance         Current Outpatient Medications:     albuterol sulfate  (90 Base) MCG/ACT inhaler, INHALE 2 PUFFS BY MOUTH EVERY 4 HOURS AS NEEDED FOR WHEEZING OR SHORTNESS OF AIR, Disp: 6.7 g, Rfl: 2    baclofen (LIORESAL) 20 MG tablet, Take 1 tablet by mouth 3 (Three) Times a Day., Disp: 90 tablet, Rfl: 0    busPIRone (BUSPAR) 7.5 MG tablet, Take 1 tablet by mouth At Night As Needed (anxiety)., Disp: 30 tablet, Rfl: 2     butalbital-acetaminophen  MG tablet tablet, TAKE 1 TABLET BY MOUTH EVERY 6 (SIX) HOURS AS NEEDED (HEADACHE)., Disp: 30 tablet, Rfl: 0    Continuous Blood Gluc  (FreeStyle Willie 2 [place]) device, Use 1 each Daily. To be used daily to monitor continuous blood sugar reading for type 2 diabetic patient on insulin regimen., Disp: 1 each, Rfl: 0    Continuous Blood Gluc Sensor (FreeStyle Willie 2 Sensor) misc, Use 1 each Every 14 (Fourteen) Days. Dispense 2 sensor pack. To be used daily with freestyle willie device to monitor continuous blood sugar reading for type 2 diabetic patient on insulin regimen., Disp: 2 each, Rfl: 12    dilTIAZem CD (CARDIZEM CD) 240 MG 24 hr capsule, Take 1 capsule by mouth Daily., Disp: , Rfl:     Eliquis 5 MG tablet tablet, TAKE 1 TABLET BY MOUTH EVERY 12 HOURS, Disp: 180 tablet, Rfl: 0    glucose blood (Accu-Chek Guide) test strip, 1 EACH BY OTHER ROUTE 4 (FOUR) TIMES A DAY. USE TO TEST BLOOD SUGAR 4 TIMES DAILY, Disp: 100 each, Rfl: 1    ibuprofen (ADVIL,MOTRIN) 800 MG tablet, Take 1 tablet by mouth Every 8 (Eight) Hours As Needed for Mild Pain., Disp: 90 tablet, Rfl: 0    Incontinence Supply Disposable (Briefs Overnight Large) misc, Use 1 each At Night As Needed (incontinence)., Disp: 90 each, Rfl: 3    Insulin Glargine (LANTUS SOLOSTAR) 100 UNIT/ML injection pen, Inject 30 Units under the skin into the appropriate area as directed Every Night., Disp: 54 mL, Rfl: 3    Insulin Glulisine (Apidra SoloStar) 100 UNIT/ML solution pen-injector, Inject 0.1 mL under the skin into the appropriate area as directed 3 (Three) Times a Day. Take within 15 before or within 20 minutes of starting a meal., Disp: 9 mL, Rfl: 2    lisinopril (PRINIVIL,ZESTRIL) 10 MG tablet, Take 1 tablet by mouth Daily., Disp: 90 tablet, Rfl: 1    metFORMIN (GLUCOPHAGE) 1000 MG tablet, Take 1 tablet by mouth 2 (Two) Times a Day With Meals., Disp: 180 tablet, Rfl: 3    metoprolol succinate XL (TOPROL-XL) 50 MG 24 hr  tablet, Take 1 tablet by mouth Daily., Disp: 90 tablet, Rfl: 1    nicotine (NICODERM CQ) 14 MG/24HR patch, Place 1 patch on the skin as directed by provider Daily., Disp: 14 each, Rfl: 0    nicotine (NICODERM CQ) 21 MG/24HR patch, Place 1 patch on the skin as directed by provider Daily., Disp: 14 each, Rfl: 0    nicotine (Nicoderm CQ) 7 MG/24HR patch, Place 1 patch on the skin as directed by provider Daily., Disp: 14 each, Rfl: 2    pregabalin (LYRICA) 150 MG capsule, Take 1 capsule by mouth 2 (Two) Times a Day., Disp: 60 capsule, Rfl: 1    zolpidem (AMBIEN) 10 MG tablet, Take 1 tablet by mouth At Night As Needed for Sleep., Disp: 28 tablet, Rfl: 0    FLUoxetine (PROzac) 20 MG capsule, Take 1 capsule by mouth Daily., Disp: 90 capsule, Rfl: 3    Semaglutide, 1 MG/DOSE, (Ozempic, 1 MG/DOSE,) 4 MG/3ML solution pen-injector, INJECT 1MG UNDER THE SKIN INTO THE APPROPRIATE AREA AS DIRECTED EVERY 7 DAYS, Disp: 9 mL, Rfl: 0    Review of Systems   Constitutional:  Positive for activity change, appetite change, fatigue and unexpected weight loss.   Eyes: Negative.    Respiratory:  Positive for cough.    Cardiovascular:  Negative for chest pain, palpitations and leg swelling.   Gastrointestinal:  Positive for constipation and diarrhea. Negative for abdominal distention, abdominal pain, anal bleeding, blood in stool, nausea and vomiting.   Musculoskeletal:  Positive for arthralgias and myalgias.   Skin:  Positive for pallor.   Neurological:  Positive for tremors and weakness.   Psychiatric/Behavioral:  Positive for dysphoric mood and depressed mood. Negative for self-injury and suicidal ideas. The patient is nervous/anxious.        Social History     Socioeconomic History    Marital status:    Tobacco Use    Smoking status: Every Day     Current packs/day: 1.00     Average packs/day: 1 pack/day for 42.0 years (42.0 ttl pk-yrs)     Types: Cigarettes     Start date: 1983     Passive exposure: Current    Smokeless tobacco:  "Never   Vaping Use    Vaping status: Some Days    Substances: Nicotine, Flavoring    Devices: Refillable tank   Substance and Sexual Activity    Alcohol use: No    Drug use: Never    Sexual activity: Defer       Family History   Problem Relation Age of Onset    Heart disease Mother     Hypertension Mother     Diabetes Mother     Stroke Mother            8/28/2024    11:50 AM   PHQ-2/PHQ-9 Depression Screening   Retired Little Interest or Pleasure in Doing Things 0-->not at all   Retired Feeling Down, Depressed or Hopeless 3-->nearly every day   Retired PHQ-9: Brief Depression Severity Measure Score 3       Fall Risk Screen:  STEADI Fall Risk Assessment was completed, and patient is at LOW risk for falls.Assessment completed on:3/29/2024      Objective     /74 (BP Location: Right arm, Patient Position: Sitting, Cuff Size: Large Adult)   Pulse 97   Temp 97.2 °F (36.2 °C) (Temporal)   Resp 18   Ht 170.2 cm (67.01\")   Wt 69.9 kg (154 lb)   SpO2 99%   BMI 24.11 kg/m²     BP Readings from Last 2 Encounters:   12/16/24 142/74   10/31/24 137/83       Wt Readings from Last 2 Encounters:   12/16/24 69.9 kg (154 lb)   08/27/24 76.6 kg (168 lb 14 oz)       BMI is >= 25 and <30. (Overweight) The following options were offered after discussion;: none (medical contraindication)         Physical Exam  Vitals and nursing note reviewed.   Constitutional:       General: She is not in acute distress.     Appearance: She is ill-appearing. She is not toxic-appearing or diaphoretic.   HENT:      Head: Normocephalic and atraumatic.   Eyes:      General: Vision grossly intact. Gaze aligned appropriately.   Neck:      Vascular: No carotid bruit.   Cardiovascular:      Rate and Rhythm: Normal rate and regular rhythm.      Heart sounds: Normal heart sounds.   Pulmonary:      Effort: Pulmonary effort is normal.      Breath sounds: Normal breath sounds and air entry.   Abdominal:      General: Bowel sounds are normal. There is no " distension.      Palpations: Abdomen is soft. There is no mass.      Tenderness: There is no abdominal tenderness. There is no guarding or rebound.      Hernia: No hernia is present.   Lymphadenopathy:      Cervical: No cervical adenopathy.   Skin:     General: Skin is warm and dry.      Coloration: Skin is sallow.   Neurological:      Mental Status: She is alert and oriented to person, place, and time.   Psychiatric:         Attention and Perception: Attention normal.         Mood and Affect: Mood is depressed. Affect is labile.         Speech: Speech normal.         Behavior: Behavior is slowed. Behavior is cooperative.         Thought Content: Thought content normal.         Cognition and Memory: Cognition normal.       Derm Physical Exam  Physical Exam      Vital Signs  Weight is 154.    Result Review :       Results  Laboratory Studies  A1c is 12.1. Last A1c was 7.2 seven months ago.    Imaging  CT abdomen, pelvis (9/5/2023): Spleen is mildly prominent. Pancreas and left adrenal have a normal appearance. Stable low density, right adrenal nodule, probably due to an adenoma and stable probable renal cyst. Urinary bladder is grossly unremarkable. Diffuse hypodensity throughout the liver suggesting steatosis. Gallbladder is absent. Liver is prominent in size measuring 21 cm. Stomach and small bowel have a normal caliber, no evidence of bowel obstruction, free air, flare, free fluid. Appendix is not identified. No pericolonic inflammatory changes identified. Aortic and other vascular calcification present. Degenerative changes in the spine.    Assessment and Plan     Plan      Assessment & Plan  Type 2 diabetes mellitus with hyperglycemia, without long-term current use of insulin  Her A1c level has significantly increased from 7.2 to 12.1 over the past 7 months, indicating a need for more intensive management. She is currently on Lantus 22 units at night and Apidra 5 units before meals. She reports not being able to  take Ozempic due to insurance issues. She is advised to increase her Lantus dosage. A referral to endocrinology will be initiated for further management of her diabetes.    Orders:    POC Glycosylated Hemoglobin (Hb A1C)    Ambulatory Referral to Endocrinology    Essential hypertension  Hypertension relatively controlled.  Patient is client compliant with medication.         Anxiety  Reports anxiety and depression worsening.  Requesting medication change from current medication to Prozac.       Hepatomegaly  Referred to gastroenterology 9/202.  She has an enlarged liver with diffuse hypodensity suggesting steatosis. A referral to gastroenterology will be reinitiated to evaluate the cause of her hepatomegaly.  Orders:    Ambulatory Referral to Gastroenterology    Right adrenal mass  Referred to urology 9/2023   Orders:    Ambulatory Referral to Endocrinology    Ambulatory Referral to Urology    Renal mass, right  Referred to urology 9/2023.  She has a stable low-density right adrenal nodule, likely due to an adenoma, and a stable probable renal cyst. A referral to urology will be reinitiated for further evaluation and management.  Orders:    Ambulatory Referral to Urology    Alternating constipation and diarrhea  There does not appear to be a recent colonoscopy on the chart.  Encouraged patient to follow-up with gastroenterology for further evaluation and management of constipation and diarrhea.  Orders:    Ambulatory Referral to Gastroenterology    Physical deconditioning  Arianna was encouraged to go to the hospital to seek further treatment and evaluation given her current state.  She reports she could not do that to her son this close to [place].  Although she reports she is so fatigued and down that she does not even want to partake in [employer] activities which bothers her.       Unintended weight loss  She reports a 15+ pound weight loss since last visit here in July.  I encouraged her to follow-up with the  previous referrals placed to evaluate the cause of the weight loss as well as to better manage the hepatomegaly and adrenal mass and renal lesion.  Referral placed again for gastroenterology to evaluate as she did not follow through with the previous referral.  Orders:    Ambulatory Referral to Gastroenterology    Tobacco dependence  She smokes about a pack a day and has previously tried nicotine patches without success. She is advised to consider quitting again and to use available resources for smoking cessation.       Persistent depressive disorder    She reports that Zoloft is no longer effective. Zoloft will be discontinued, and she will be transitioned to Prozac 20 mg daily. She is informed that it may take 3 to 4 weeks for the new medication to take effect.         Assessment & Plan  1. Diabetes Mellitus.  Her A1c level has significantly increased from 7.2 to 12.1 over the past 7 months, indicating a need for more intensive management. She is currently on Lantus 22 units at night and Apidra 5 units before meals. She reports not being able to take Ozempic due to insurance issues. She is advised to increase her Lantus dosage. A referral to endocrinology will be initiated for further management of her diabetes.    2. Depression.  She reports that Zoloft is no longer effective. Zoloft will be discontinued, and she will be transitioned to Prozac 20 mg daily. She is informed that it may take 3 to 4 weeks for the new medication to take effect.    3. Hepatomegaly.  She has an enlarged liver with diffuse hypodensity suggesting steatosis. A referral to gastroenterology will be reinitiated to evaluate the cause of her hepatomegaly.    4. Right Renal Mass.  She has a stable low-density right adrenal nodule, likely due to an adenoma, and a stable probable renal cyst. A referral to urology will be reinitiated for further evaluation and management.    5. Lumbar Spondylosis without Myelopathy.  She is under the care of   [facility] and has had facet blocks completed on October 31, 2024. She was prescribed tramadol 50 mg every 8 hours for moderate pain but reports she is not taking it now.     6. Smoking Cessation.  She smokes about a pack a day and has previously tried nicotine patches without success. She is advised to consider quitting again and to use available resources for smoking cessation.    PROCEDURE  Facet blocks were completed on 10/31/2024.       Follow Up   Wrapup Tab  No follow-ups on file.     Patient was given instructions and counseling regarding her condition or for health maintenance advice. Please see specific information pulled into the AVS if appropriate.  Hand hygiene was performed during entrance to exam room and following assessment of patient. This document is intended for medical expert use only.       THOMAS Scanlon, FNP-C  [address]  [facility]  [address] [place] [address] DR HALEY AMBRIZ [address] [place] [address] [place] [id number] [phone]    Patient or patient representative verbalized consent for the use of Ambient Listening during the visit with  THOMAS Scanlon for chart documentation. 12/16/2024  10:52 EST

## 2024-12-16 NOTE — ASSESSMENT & PLAN NOTE
Reports anxiety and depression worsening.  Requesting medication change from current medication to Prozac.

## 2024-12-16 NOTE — ASSESSMENT & PLAN NOTE
She smokes about a pack a day and has previously tried nicotine patches without success. She is advised to consider quitting again and to use available resources for smoking cessation.

## 2024-12-16 NOTE — TELEPHONE ENCOUNTER
Caller: Arianna Blanco    Relationship: Self    Best call back number: [phone]         Who are you requesting to speak with (clinical staff, provider,  specific staff member): VÍCTOR MEANS        What was the call regarding: PATIENT HAS DECIDED THAT SHE IS GOING TO TAKE YOUR ADVICE AND CALL AN AMBULANCE AND SHE WILL BE GOING TO [facility].

## 2024-12-16 NOTE — ASSESSMENT & PLAN NOTE
Referred to gastroenterology 9/202.  She has an enlarged liver with diffuse hypodensity suggesting steatosis. A referral to gastroenterology will be reinitiated to evaluate the cause of her hepatomegaly.  Orders:    Ambulatory Referral to Gastroenterology

## 2024-12-17 LAB
ANION GAP SERPL CALCULATED.3IONS-SCNC: 10.4 MMOL/L (ref 5–15)
ANION GAP SERPL CALCULATED.3IONS-SCNC: 10.6 MMOL/L (ref 5–15)
BASOPHILS # BLD AUTO: 0.04 10*3/MM3 (ref 0–0.2)
BASOPHILS # BLD AUTO: 0.05 10*3/MM3 (ref 0–0.2)
BASOPHILS NFR BLD AUTO: 0.6 % (ref 0–1.5)
BASOPHILS NFR BLD AUTO: 0.6 % (ref 0–1.5)
BUN SERPL-MCNC: 10 MG/DL (ref 8–23)
BUN SERPL-MCNC: 16 MG/DL (ref 8–23)
BUN/CREAT SERPL: 14.5 (ref 7–25)
BUN/CREAT SERPL: 22.2 (ref 7–25)
CALCIUM SPEC-SCNC: 8.8 MG/DL (ref 8.6–10.5)
CALCIUM SPEC-SCNC: 9 MG/DL (ref 8.6–10.5)
CHLORIDE SERPL-SCNC: 101 MMOL/L (ref 98–107)
CHLORIDE SERPL-SCNC: 102 MMOL/L (ref 98–107)
CO2 SERPL-SCNC: 21.4 MMOL/L (ref 22–29)
CO2 SERPL-SCNC: 22.6 MMOL/L (ref 22–29)
CREAT SERPL-MCNC: 0.69 MG/DL (ref 0.57–1)
CREAT SERPL-MCNC: 0.72 MG/DL (ref 0.57–1)
DEPRECATED RDW RBC AUTO: 40.6 FL (ref 37–54)
DEPRECATED RDW RBC AUTO: 41.1 FL (ref 37–54)
EGFRCR SERPLBLD CKD-EPI 2021: 91.2 ML/MIN/1.73
EGFRCR SERPLBLD CKD-EPI 2021: 94.7 ML/MIN/1.73
EOSINOPHIL # BLD AUTO: 0.13 10*3/MM3 (ref 0–0.4)
EOSINOPHIL # BLD AUTO: 0.14 10*3/MM3 (ref 0–0.4)
EOSINOPHIL NFR BLD AUTO: 1.7 % (ref 0.3–6.2)
EOSINOPHIL NFR BLD AUTO: 2.1 % (ref 0.3–6.2)
ERYTHROCYTE [DISTWIDTH] IN BLOOD BY AUTOMATED COUNT: 13.3 % (ref 12.3–15.4)
ERYTHROCYTE [DISTWIDTH] IN BLOOD BY AUTOMATED COUNT: 13.4 % (ref 12.3–15.4)
GLUCOSE BLDC GLUCOMTR-MCNC: 242 MG/DL (ref 70–105)
GLUCOSE BLDC GLUCOMTR-MCNC: 267 MG/DL (ref 70–105)
GLUCOSE BLDC GLUCOMTR-MCNC: 307 MG/DL (ref 70–105)
GLUCOSE BLDC GLUCOMTR-MCNC: 409 MG/DL (ref 70–105)
GLUCOSE BLDC GLUCOMTR-MCNC: 417 MG/DL (ref 70–105)
GLUCOSE SERPL-MCNC: 232 MG/DL (ref 65–99)
GLUCOSE SERPL-MCNC: 398 MG/DL (ref 65–99)
HCT VFR BLD AUTO: 33.9 % (ref 34–46.6)
HCT VFR BLD AUTO: 34.9 % (ref 34–46.6)
HGB BLD-MCNC: 11.8 G/DL (ref 12–15.9)
HGB BLD-MCNC: 12.1 G/DL (ref 12–15.9)
IMM GRANULOCYTES # BLD AUTO: 0.02 10*3/MM3 (ref 0–0.05)
IMM GRANULOCYTES # BLD AUTO: 0.02 10*3/MM3 (ref 0–0.05)
IMM GRANULOCYTES NFR BLD AUTO: 0.3 % (ref 0–0.5)
IMM GRANULOCYTES NFR BLD AUTO: 0.3 % (ref 0–0.5)
LYMPHOCYTES # BLD AUTO: 2.53 10*3/MM3 (ref 0.7–3.1)
LYMPHOCYTES # BLD AUTO: 3.13 10*3/MM3 (ref 0.7–3.1)
LYMPHOCYTES NFR BLD AUTO: 38.6 % (ref 19.6–45.3)
LYMPHOCYTES NFR BLD AUTO: 40.3 % (ref 19.6–45.3)
MAGNESIUM SERPL-MCNC: 1.9 MG/DL (ref 1.6–2.4)
MCH RBC QN AUTO: 28.9 PG (ref 26.6–33)
MCH RBC QN AUTO: 29.1 PG (ref 26.6–33)
MCHC RBC AUTO-ENTMCNC: 34.7 G/DL (ref 31.5–35.7)
MCHC RBC AUTO-ENTMCNC: 34.8 G/DL (ref 31.5–35.7)
MCV RBC AUTO: 83.5 FL (ref 79–97)
MCV RBC AUTO: 83.5 FL (ref 79–97)
MONOCYTES # BLD AUTO: 0.56 10*3/MM3 (ref 0.1–0.9)
MONOCYTES # BLD AUTO: 0.75 10*3/MM3 (ref 0.1–0.9)
MONOCYTES NFR BLD AUTO: 8.5 % (ref 5–12)
MONOCYTES NFR BLD AUTO: 9.7 % (ref 5–12)
NEUTROPHILS NFR BLD AUTO: 3.27 10*3/MM3 (ref 1.7–7)
NEUTROPHILS NFR BLD AUTO: 3.68 10*3/MM3 (ref 1.7–7)
NEUTROPHILS NFR BLD AUTO: 47.4 % (ref 42.7–76)
NEUTROPHILS NFR BLD AUTO: 49.9 % (ref 42.7–76)
NRBC BLD AUTO-RTO: 0 /100 WBC (ref 0–0.2)
NRBC BLD AUTO-RTO: 0 /100 WBC (ref 0–0.2)
PHOSPHATE SERPL-MCNC: 3.1 MG/DL (ref 2.5–4.5)
PLATELET # BLD AUTO: 133 10*3/MM3 (ref 140–450)
PLATELET # BLD AUTO: 141 10*3/MM3 (ref 140–450)
PMV BLD AUTO: 12.4 FL (ref 6–12)
PMV BLD AUTO: 12.9 FL (ref 6–12)
POTASSIUM SERPL-SCNC: 3.8 MMOL/L (ref 3.5–5.2)
POTASSIUM SERPL-SCNC: 3.8 MMOL/L (ref 3.5–5.2)
QT INTERVAL: 372 MS
QTC INTERVAL: 468 MS
RBC # BLD AUTO: 4.06 10*6/MM3 (ref 3.77–5.28)
RBC # BLD AUTO: 4.18 10*6/MM3 (ref 3.77–5.28)
SODIUM SERPL-SCNC: 133 MMOL/L (ref 136–145)
SODIUM SERPL-SCNC: 135 MMOL/L (ref 136–145)
VIT B12 BLD-MCNC: 627 PG/ML (ref 211–946)
WBC NRBC COR # BLD AUTO: 6.56 10*3/MM3 (ref 3.4–10.8)
WBC NRBC COR # BLD AUTO: 7.76 10*3/MM3 (ref 3.4–10.8)

## 2024-12-17 PROCEDURE — 83735 ASSAY OF MAGNESIUM: CPT | Performed by: HOSPITALIST

## 2024-12-17 PROCEDURE — G0378 HOSPITAL OBSERVATION PER HR: HCPCS

## 2024-12-17 PROCEDURE — 97162 PT EVAL MOD COMPLEX 30 MIN: CPT

## 2024-12-17 PROCEDURE — 85025 COMPLETE CBC W/AUTO DIFF WBC: CPT

## 2024-12-17 PROCEDURE — 97166 OT EVAL MOD COMPLEX 45 MIN: CPT

## 2024-12-17 PROCEDURE — 84100 ASSAY OF PHOSPHORUS: CPT | Performed by: HOSPITALIST

## 2024-12-17 PROCEDURE — 80048 BASIC METABOLIC PNL TOTAL CA: CPT

## 2024-12-17 PROCEDURE — 85025 COMPLETE CBC W/AUTO DIFF WBC: CPT | Performed by: HOSPITALIST

## 2024-12-17 PROCEDURE — 80048 BASIC METABOLIC PNL TOTAL CA: CPT | Performed by: HOSPITALIST

## 2024-12-17 PROCEDURE — 82948 REAGENT STRIP/BLOOD GLUCOSE: CPT

## 2024-12-17 PROCEDURE — 63710000001 INSULIN LISPRO (HUMAN) PER 5 UNITS: Performed by: HOSPITALIST

## 2024-12-17 PROCEDURE — 63710000001 ONDANSETRON ODT 4 MG TABLET DISPERSIBLE

## 2024-12-17 PROCEDURE — 63710000001 INSULIN GLARGINE PER 5 UNITS: Performed by: HOSPITALIST

## 2024-12-17 RX ORDER — DILTIAZEM HYDROCHLORIDE 240 MG/1
240 CAPSULE, COATED, EXTENDED RELEASE ORAL DAILY
Status: DISCONTINUED | OUTPATIENT
Start: 2024-12-17 | End: 2024-12-18 | Stop reason: HOSPADM

## 2024-12-17 RX ORDER — NICOTINE 21 MG/24HR
1 PATCH, TRANSDERMAL 24 HOURS TRANSDERMAL
Status: DISCONTINUED | OUTPATIENT
Start: 2024-12-17 | End: 2024-12-18 | Stop reason: HOSPADM

## 2024-12-17 RX ORDER — INSULIN LISPRO 100 [IU]/ML
3-14 INJECTION, SOLUTION INTRAVENOUS; SUBCUTANEOUS
Status: DISCONTINUED | OUTPATIENT
Start: 2024-12-17 | End: 2024-12-18 | Stop reason: HOSPADM

## 2024-12-17 RX ORDER — ZOLPIDEM TARTRATE 5 MG/1
5 TABLET ORAL NIGHTLY PRN
Status: DISCONTINUED | OUTPATIENT
Start: 2024-12-17 | End: 2024-12-18 | Stop reason: HOSPADM

## 2024-12-17 RX ORDER — INSULIN LISPRO 100 [IU]/ML
5 INJECTION, SOLUTION INTRAVENOUS; SUBCUTANEOUS
Status: DISCONTINUED | OUTPATIENT
Start: 2024-12-17 | End: 2024-12-18

## 2024-12-17 RX ORDER — BACLOFEN 10 MG/1
10 TABLET ORAL 3 TIMES DAILY PRN
Status: DISCONTINUED | OUTPATIENT
Start: 2024-12-17 | End: 2024-12-18 | Stop reason: HOSPADM

## 2024-12-17 RX ORDER — LISINOPRIL 5 MG/1
10 TABLET ORAL DAILY
Status: DISCONTINUED | OUTPATIENT
Start: 2024-12-17 | End: 2024-12-18 | Stop reason: HOSPADM

## 2024-12-17 RX ORDER — ALBUTEROL SULFATE 90 UG/1
2 INHALANT RESPIRATORY (INHALATION) EVERY 4 HOURS PRN
Status: DISCONTINUED | OUTPATIENT
Start: 2024-12-17 | End: 2024-12-18 | Stop reason: HOSPADM

## 2024-12-17 RX ORDER — BUSPIRONE HYDROCHLORIDE 15 MG/1
7.5 TABLET ORAL NIGHTLY PRN
Status: DISCONTINUED | OUTPATIENT
Start: 2024-12-17 | End: 2024-12-18 | Stop reason: HOSPADM

## 2024-12-17 RX ORDER — TRAMADOL HYDROCHLORIDE 50 MG/1
50 TABLET ORAL EVERY 8 HOURS PRN
Status: DISCONTINUED | OUTPATIENT
Start: 2024-12-17 | End: 2024-12-18 | Stop reason: HOSPADM

## 2024-12-17 RX ORDER — METOPROLOL SUCCINATE 50 MG/1
50 TABLET, EXTENDED RELEASE ORAL DAILY
Status: DISCONTINUED | OUTPATIENT
Start: 2024-12-17 | End: 2024-12-18 | Stop reason: HOSPADM

## 2024-12-17 RX ADMIN — ACETAMINOPHEN 650 MG: 325 TABLET, FILM COATED ORAL at 01:31

## 2024-12-17 RX ADMIN — INSULIN LISPRO 8 UNITS: 100 INJECTION, SOLUTION INTRAVENOUS; SUBCUTANEOUS at 17:15

## 2024-12-17 RX ADMIN — NICOTINE 1 PATCH: 21 PATCH TRANSDERMAL at 10:00

## 2024-12-17 RX ADMIN — ACETAMINOPHEN 650 MG: 325 TABLET, FILM COATED ORAL at 09:10

## 2024-12-17 RX ADMIN — FLUOXETINE HYDROCHLORIDE 20 MG: 20 CAPSULE ORAL at 09:10

## 2024-12-17 RX ADMIN — LISINOPRIL 10 MG: 5 TABLET ORAL at 09:10

## 2024-12-17 RX ADMIN — GABAPENTIN 100 MG: 100 CAPSULE ORAL at 09:10

## 2024-12-17 RX ADMIN — APIXABAN 5 MG: 5 TABLET, FILM COATED ORAL at 09:00

## 2024-12-17 RX ADMIN — GABAPENTIN 100 MG: 100 CAPSULE ORAL at 20:19

## 2024-12-17 RX ADMIN — INSULIN LISPRO 5 UNITS: 100 INJECTION, SOLUTION INTRAVENOUS; SUBCUTANEOUS at 17:14

## 2024-12-17 RX ADMIN — GABAPENTIN 100 MG: 100 CAPSULE ORAL at 15:48

## 2024-12-17 RX ADMIN — INSULIN LISPRO 14 UNITS: 100 INJECTION, SOLUTION INTRAVENOUS; SUBCUTANEOUS at 12:00

## 2024-12-17 RX ADMIN — TRAMADOL HYDROCHLORIDE 50 MG: 50 TABLET, COATED ORAL at 20:19

## 2024-12-17 RX ADMIN — ACETAMINOPHEN 650 MG: 325 TABLET, FILM COATED ORAL at 15:48

## 2024-12-17 RX ADMIN — INSULIN GLARGINE 40 UNITS: 100 INJECTION, SOLUTION SUBCUTANEOUS at 09:10

## 2024-12-17 RX ADMIN — INSULIN LISPRO 5 UNITS: 100 INJECTION, SOLUTION INTRAVENOUS; SUBCUTANEOUS at 22:18

## 2024-12-17 RX ADMIN — ZOLPIDEM TARTRATE 5 MG: 5 TABLET ORAL at 22:19

## 2024-12-17 RX ADMIN — APIXABAN 5 MG: 5 TABLET, FILM COATED ORAL at 20:19

## 2024-12-17 RX ADMIN — ONDANSETRON 4 MG: 4 TABLET, ORALLY DISINTEGRATING ORAL at 09:10

## 2024-12-17 RX ADMIN — METOPROLOL SUCCINATE 50 MG: 50 TABLET, EXTENDED RELEASE ORAL at 09:10

## 2024-12-17 RX ADMIN — DILTIAZEM HYDROCHLORIDE 240 MG: 240 CAPSULE, EXTENDED RELEASE ORAL at 09:10

## 2024-12-17 RX ADMIN — INSULIN LISPRO 14 UNITS: 100 INJECTION, SOLUTION INTRAVENOUS; SUBCUTANEOUS at 09:10

## 2024-12-17 RX ADMIN — TRAMADOL HYDROCHLORIDE 50 MG: 50 TABLET, COATED ORAL at 11:59

## 2024-12-17 RX ADMIN — BACLOFEN 10 MG: 10 TABLET ORAL at 09:10

## 2024-12-17 NOTE — THERAPY EVALUATION
Patient Name: Arianna Blanco  : 1955    MRN: [id number]                              Today's Date: 2024       Admit Date: 2024    Visit Dx:     ICD-10-CM ICD-9-CM   1. Type 2 diabetes mellitus with hyperosmolar hyperglycemic state (HHS)  E11.00 250.22   2. Generalized weakness  R53.1 780.79   3. Shortness of breath  R06.02 786.05     Patient Active Problem List   Diagnosis    Essential hypertension    Dyslipidemia    Simple chronic bronchitis    Unstable angina    Chest pain    Atrial fibrillation    Type 2 diabetes mellitus with hyperglycemia    Anxiety associated with depression    Anxiety    Cataracts, bilateral    Cervico-occipital neuralgia    Chronic obstructive pulmonary disease    Degenerative disc disease, lumbar    Headaches, cluster    Lesion of ulnar nerve, bilateral upper limbs    Degenerative disc disease, cervical    Median nerve neuropathy    Migraine    Nuclear senile cataract    Open angle with borderline findings and high glaucoma risk in left eye    Open-angle glaucoma of right eye    Presbyopia    Asthma    Tobacco dependence    Hypertriglyceridemia    Hypercholesteremia    Restless legs syndrome (RLS)    Sleep apnea syndrome    Defect of endplate of vertebra    Sacroiliac joint dysfunction    Lumbar spondylosis    Lumbar facet joint pain    Osteopenia of neck of left femur    Right renal mass    Splenomegaly    Right adrenal mass    Hepatic steatosis    Hepatomegaly    Nephrolithiasis    Mammogram declined    Tremor of right hand    Depression    Cervical stenosis of spinal canal    Numbness and tingling in right hand    Lumbar radiculopathy    Incontinence of feces    Lymphadenopathy, submental    Insomnia    Atrial fibrillation with RVR    Atrial flutter with controlled response    PAF (paroxysmal atrial fibrillation)    Fatigue    Paroxysmal atrial fibrillation    Generalized weakness     Past Medical History:   Diagnosis Date    COPD (chronic obstructive pulmonary  disease)     Depression     Diabetes mellitus     Hyperlipidemia     Hypertension      Past Surgical History:   Procedure Laterality Date    CARDIAC CATHETERIZATION Left 10/30/2020    Procedure: Left Heart Cath with Coronary Angiography;  Surgeon: Donaldo Archer MD;  Location: [facility] CATH INVASIVE LOCATION;  Service: Cardiovascular;  Laterality: Left;    CARDIAC ELECTROPHYSIOLOGY PROCEDURE Right 8/27/2024    Procedure: Ablation atrial fibrillation;  Surgeon: Shay Santos MD;  Location: [facility] CATH INVASIVE LOCATION;  Service: Cardiovascular;  Laterality: Right;    CARPAL TUNNEL RELEASE Bilateral 2017    CHOLECYSTECTOMY  1980    HYSTERECTOMY  1980      General Information       Row Name 12/17/24 1727          OT Time and Intention    Document Type evaluation  -LS     Mode of Treatment occupational therapy  -LS       Row Name 12/17/24 1727          General Information    Patient Profile Reviewed yes  -LS     Prior Level of Function independent:;ADL's;all household mobility  -LS     Barriers to Rehab medically complex  -LS       Row Name 12/17/24 1727          Living Environment    People in Home alone  -LS       Row Name 12/17/24 1727          Home Main Entrance    Number of Stairs, Main Entrance none  -LS       Row Name 12/17/24 1727          Stairs Within Home, Primary    Number of Stairs, Within Home, Primary none  -LS       Row Name 12/17/24 1727          Cognition    Orientation Status (Cognition) oriented x 4  -LS       Row Name 12/17/24 1727          Safety Issues/Impairments Affecting Functional Mobility    Impairments Affecting Function (Mobility) sensation/sensory awareness;strength;endurance/activity tolerance;balance  -LS               User Key  (r) = Recorded By, (t) = Taken By, (c) = Cosigned By      Initials Name Provider Type    LS Adriel Sanders OT Occupational Therapist                     Mobility/ADL's       Row Name 12/17/24 1727          Bed Mobility    Bed Mobility  supine-sit;sit-supine  -LS     Supine-Sit [place] (Bed Mobility) modified independence  -LS     Sit-Supine [place] (Bed Mobility) modified independence  -LS     Assistive Device (Bed Mobility) bed rails  -       Row Name 12/17/24 1727          Transfers    Transfers sit-stand transfer  -LS       Row Name 12/17/24 1727          Bed-Chair Transfer    Bed-Chair [place] (Transfers) standby assist  -LS     Assistive Device (Bed-Chair Transfers) walker, front-wheeled  -LS       Row Name 12/17/24 1727          Sit-Stand Transfer    Sit-Stand [place] (Transfers) standby assist  -LS       Row Name 12/17/24 1727          Functional Mobility    Functional Mobility- Ind. Level standby assist  -LS     Functional Mobility- Device walker, front-wheeled  -LS     Patient was able to Ambulate yes  -       Row Name 12/17/24 1727          Activities of Daily Living    BADL Assessment/Intervention lower body dressing  -       Row Name 12/17/24 1727          Lower Body Dressing Assessment/Training    [place] Level (Lower Body Dressing) lower body dressing skills;standby assist  -               User Key  (r) = Recorded By, (t) = Taken By, (c) = Cosigned By      Initials Name Provider Type     Adriel Sanders OT Occupational Therapist                   Obj/Interventions       Row Name 12/17/24 1729          Sensory Assessment (Somatosensory)    Sensory Assessment neuropathy in BLEs and BUEs  -       Row Name 12/17/24 1729          Vision Assessment/Intervention    Visual Impairment/Limitations corrective lenses for reading  -       Row Name 12/17/24 1729          Range of Motion Comprehensive    General Range of Motion bilateral upper extremity ROM WFL  -       Row Name 12/17/24 1729          Strength Comprehensive (MMT)    Comment, General Manual Muscle Testing (MMT) Assessment BUE grossly 4/5  -       Row Name 12/17/24 1729          Balance    Balance Assessment sitting static balance;sitting  dynamic balance;standing static balance;standing dynamic balance  -LS     Static Sitting Balance independent  -LS     Dynamic Sitting Balance independent  -LS     Position, Sitting Balance unsupported;sitting edge of bed  -LS     Static Standing Balance contact guard  -LS     Dynamic Standing Balance contact guard  -LS     Position/Device Used, Standing Balance walker, front-wheeled  -LS               User Key  (r) = Recorded By, (t) = Taken By, (c) = Cosigned By      Initials Name Provider Type    Adriel Alcantara OT Occupational Therapist                   Goals/Plan       Row Name 12/17/24 1733          Bed Mobility Goal 1 (OT)    Activity/Assistive Device (Bed Mobility Goal 1, OT) bed mobility activities, all  -LS     [place] Level/Cues Needed (Bed Mobility Goal 1, OT) modified independence  -LS     Time Frame (Bed Mobility Goal 1, OT) long term goal (LTG);2 weeks  -       Row Name 12/17/24 3663          Transfer Goal 1 (OT)    Activity/Assistive Device (Transfer Goal 1, OT) transfers, all  -LS     [place] Level/Cues Needed (Transfer Goal 1, OT) modified independence  -LS     Time Frame (Transfer Goal 1, OT) long term goal (LTG);2 weeks  -       Row Name 12/17/24 1737          Dressing Goal 1 (OT)    Activity/Device (Dressing Goal 1, OT) dressing skills, all  -LS     [place]/Cues Needed (Dressing Goal 1, OT) modified independence  -LS     Time Frame (Dressing Goal 1, OT) long term goal (LTG);2 weeks  -       Row Name 12/17/24 6218          Toileting Goal 1 (OT)    Activity/Device (Toileting Goal 1, OT) toileting skills, all  -LS     [place] Level/Cues Needed (Toileting Goal 1, OT) modified independence  -LS     Time Frame (Toileting Goal 1, OT) long term goal (LTG);2 weeks  -       Row Name 12/17/24 3216          Therapy Assessment/Plan (OT)    Planned Therapy Interventions (OT) activity tolerance training;BADL retraining;functional balance retraining;IADL  retraining;occupation/activity based interventions;ROM/therapeutic exercise;strengthening exercise;transfer/mobility retraining;patient/caregiver education/training  -LS               User Key  (r) = Recorded By, (t) = Taken By, (c) = Cosigned By      Initials Name Provider Type    Adriel Alcantara OT Occupational Therapist                   Clinical Impression       Row Name 12/17/24 1727          Pain Assessment    Pretreatment Pain Rating 5/10  -LS     Posttreatment Pain Rating 5/10  -LS     Pain Location extremity  -LS     Pain Side/Orientation bilateral;lower  -LS       Row Name 12/17/24 1728          Plan of Care Review    Plan of Care Reviewed With patient  -LS     Outcome Evaluation 69 y.o. female with a CMH of COPD, depression, diabetes, hyperlipidemia, hypertension, atrial fibrillation, GERD, depression/anxiety who presented [facility] with generalized weakness over the past week as well as abnormal labs. Pt dx with uncontrolled type 2 diabetes, admitted for further medical management. Patient lives alone and normally uses rw in home and cane when out in community. She is normally IND with ADLs. This date, pt A&Ox4. She performs bed mobility with Mod Ind. At EOB, pt performs lower body dressing with setup assist. She comes to standing with CGA and ambulated out of room with CGA w/o use of RW. Walker used from this point, pt much more stable with use of RW ambulating with SBA. OT will follow, but pt reports she will be staying with her son at dc and thus will be safe to dc home with him. Recommend use of RW during ADL completion for increased safety in the home environment. OT will follow while inpatient.  -LS       Row Name 12/17/24 6338          Therapy Assessment/Plan (OT)    Rehab Potential (OT) good  -LS     Criteria for Skilled Therapeutic Interventions Met (OT) yes;skilled treatment is necessary  -LS     Therapy Frequency (OT) 3 times/wk  -LS     Predicted Duration of  Therapy Intervention (OT) until dc  -LS       Row Name 12/17/24 1729          Therapy Plan Review/Discharge Plan (OT)    Anticipated Discharge Disposition (OT) home with assist;home with home health  -LS       Row Name 12/17/24 1729          Vital Signs    O2 Delivery Pre Treatment room air  -LS     O2 Delivery Intra Treatment room air  -LS     O2 Delivery Post Treatment room air  -LS     Pre Patient Position Supine  -LS     Intra Patient Position Standing  -LS     Post Patient Position Sitting  -LS       Row Name 12/17/24 1729          Positioning and Restraints    Pre-Treatment Position in bed  -LS     Post Treatment Position bed  -LS     In Bed notified nsg;sitting;call light within reach;encouraged to call for assist  -LS               User Key  (r) = Recorded By, (t) = Taken By, (c) = Cosigned By      Initials Name Provider Type    Adriel Alcantara, SERG Occupational Therapist                   Outcome Measures       Row Name 12/17/24 1733          How much help from another is currently needed...    Putting on and taking off regular lower body clothing? 3  -LS     Bathing (including washing, rinsing, and drying) 3  -LS     Toileting (which includes using toilet bed pan or urinal) 3  -LS     Putting on and taking off regular upper body clothing 4  -LS     Taking care of personal grooming (such as brushing teeth) 4  -LS     Eating meals 4  -LS     AM-PAC 6 Clicks Score (OT) 21  -LS       Row Name 12/17/24 1103 12/17/24 0810       How much help from another person do you currently need...    Turning from your back to your side while in flat bed without using bedrails? 4  -CR 4  -CA    Moving from lying on back to sitting on the side of a flat bed without bedrails? 4  -CR 4  -CA    Moving to and from a bed to a chair (including a wheelchair)? 4  -CR 4  -CA    Standing up from a chair using your arms (e.g., wheelchair, bedside chair)? 4  -CR 4  -CA    Climbing 3-5 steps with a railing? 3  -CR 4  -CA    To walk in  hospital room? 3  -CR 4  -CA    AM-PAC 6 Clicks Score (PT) 22  -CR 24  -CA      Row Name 12/17/24 1733          Functional Assessment    Outcome Measure Options AM-PAC 6 Clicks Daily Activity (OT)  -               User Key  (r) = Recorded By, (t) = Taken By, (c) = Cosigned By      Initials Name Provider Type    CR Reyes, Carmela, PT Physical Therapist    Adriel Alcantara OT Occupational Therapist    Crissy Buchanan LPN Licensed Nurse                    Occupational Therapy Education       Title: PT OT SLP Therapies (In Progress)       Topic: Occupational Therapy (In Progress)       Point: ADL training (Done)       Description:   Instruct learner(s) on proper safety adaptation and remediation techniques during self care or transfers.   Instruct in proper use of assistive devices.                  Learning Progress Summary            Patient Acceptance, E,TB, VU by  at 12/17/2024 1733                      Point: Home exercise program (Not Started)       Description:   Instruct learner(s) on appropriate technique for monitoring, assisting and/or progressing therapeutic exercises/activities.                  Learner Progress:  Not documented in this visit.              Point: Precautions (Done)       Description:   Instruct learner(s) on prescribed precautions during self-care and functional transfers.                  Learning Progress Summary            Patient Acceptance, E,TB, VU by  at 12/17/2024 1733                      Point: Body mechanics (Done)       Description:   Instruct learner(s) on proper positioning and spine alignment during self-care, functional mobility activities and/or exercises.                  Learning Progress Summary            Patient Acceptance, E,TB, VU by  at 12/17/2024 1733                                      User Key       Initials Effective Dates Name Provider Type Discipline     09/22/22 -  Adriel Sanders OT Occupational Therapist OT                  OT  Recommendation and Plan  Planned Therapy Interventions (OT): activity tolerance training, BADL retraining, functional balance retraining, IADL retraining, occupation/activity based interventions, ROM/therapeutic exercise, strengthening exercise, transfer/mobility retraining, patient/caregiver education/training  Therapy Frequency (OT): 3 times/wk  Plan of Care Review  Plan of Care Reviewed With: patient  Outcome Evaluation: 69 y.o. female with a CMH of COPD, depression, diabetes, hyperlipidemia, hypertension, atrial fibrillation, GERD, depression/anxiety who presented to [facility] on 12/16/2024 with generalized weakness over the past week as well as abnormal labs. Pt dx with uncontrolled type 2 diabetes, admitted for further medical management. Patient lives alone and normally uses rw in home and cane when out in community. She is normally IND with ADLs. This date, pt A&Ox4. She performs bed mobility with Mod Ind. At EOB, pt performs lower body dressing with setup assist. She comes to standing with CGA and ambulated out of room with CGA w/o use of RW. Walker used from this point, pt much more stable with use of RW ambulating with SBA. OT will follow, but pt reports she will be staying with her son at dc and thus will be safe to dc home with him. Recommend use of RW during ADL completion for increased safety in the home environment. OT will follow while inpatient.     Time Calculation:         Time Calculation- OT       Row Name 12/17/24 7072             Time Calculation- OT    OT Start Time 0930  -LS      OT Stop Time 0946  -LS      OT Time Calculation (min) 16 min  -LS      OT Received On 12/17/24  -LS      OT - Next Appointment 12/19/24  -      OT Goal Re-Cert Due Date 12/31/24  -         Untimed Charges    OT Eval/Re-eval Minutes 16  -LS         Total Minutes    Untimed Charges Total Minutes 16  -LS       Total Minutes 16  -LS                User Key  (r) = Recorded By, (t) = Taken By, (c) =  Cosigned By      Initials Name Provider Type     Adriel Sanders OT Occupational Therapist                  Therapy Charges for Today       Code Description Service Date Service Provider Modifiers Qty    01331494777 HC OT EVAL MOD COMPLEXITY 3 12/17/2024 Adriel Sanders OT GO 1                 Adriel Sanders OT  12/17/2024

## 2024-12-17 NOTE — PLAN OF CARE
Goal Outcome Evaluation:  Plan of Care Reviewed With: patient        Progress: no change  Outcome Evaluation: Patient slept throughout the night c/o general discomfort and pain

## 2024-12-17 NOTE — CONSULTS
"Diabetes Education  Assessment/Teaching    Patient Name:  Arianna Blanco  YOB: 1955  MRN: [id number]  Admit Date:  12/16/2024      Assessment Date:  12/17/2024  Flowsheet Row Most Recent Value   General Information     Referral From: MD order  [Consult received due to bs >200. Adm bs 585. A1c this adm 12.1%.]   Height 172.7 cm (68\")   Height Method Stated   Weight 69.9 kg (154 lb)   Weight Method Stated   Pregnancy Assessment    Diabetes History    What type of diabetes do you have? Type 2   Length of Diabetes Diagnosis --  [Pt with long hx.]   Have you had diabetes education/teaching in the past? yes   When and where was your diabetes education? Last seen by inpatient diabetes educator at [facility] on 7/8/2024.   Do you test your blood sugar at home? yes   Frequency of checks only checking bs once in a while   Meter type Accuchek, 2 years old, pt states she does have current test strips at home   Who performs the test? self   Have you had low blood sugar? (<70mg/dl) no   Feeling down, depressed, or hopeless Not at all   Little interest or pleasure in doing things Not at all   Education Preferences    Nutrition Information    Assessment Topics    DM Goals             Flowsheet Row Most Recent Value   DM Education Needs    Meter Has own   Meter Type Accuchek   Frequency of Testing AC/HS  [Discussed importance of checking bs 3-4 times/day. Discussed importance of recording bs and sharing readings with MD/NP.]   Blood Glucose Target Range Discussed A1c result of 12.1%. Discussed healthy bs range and healthy A1c target. Discussed importance of bs control.   Medication Insulin, Oral, Pen  [Pt taking Metformin 1000 mg bid, Lantus 22 units hs and Apidra 5 units ac. Pt states she eats 2 meals/day and is taking Apidra 2 times/day.]   Problem Solving Hypoglycemia, Hyperglycemia, Signs, Symptoms, Treatment  [Discussed importance of always keeping low bs tx available.]   Reducing Risks A1C testing  [Gave A1c info " sheet.]   Healthy Eating --  [Pt eating 2 meals/day. Pt states she does not want to be cooking anymore. Pt was having [employer] delivered but cancelled this service. She did not like the meals. Discussed prepared meals that she could purchase at grocery store.]   Motivation Engaged   Teaching Method Explanation, Discussion, Handouts   Patient Response Verbalized understanding              Other Comments:  Met with pt at bedside. Pt has PCP and saw NP on 12/16/2024. NP adjusted Lantus dosage and added Apridra 5 units ac. Discussed with pt importance of taking insulin as prescribed. Pt states NP was wanting her to use continuous glucose sensors. Pt states she is not interested in using them. Discussed advantages to using the sensors. Pt states she is still not interested. Pt is agreeable to checking bs 2-3 times/day. Pt states she has lost weight in the past 4-5 months. Discussed high bs can cause weight loss. Pt states she also has not been eating much due to not feeling well.     Pt states she has been eating junk food since she has not felt like cooking. She drinks coffee for breakfast without sugar. She does drink 2 16-oz cokes daily. Encouraged pt to try to decrease consumption of sugared beverages. Pt agreeable to try drinking flavored water with sugar free packets for flavoring. Pt states she has tried sugar free sodas in the past and does not like them. Pt also drinking unsweetened tea. Encouraged pt to try to limit purchases of high CHO snack foods like cookies, chips, candies. Pt states she will try. Pt verbalized understanding of all info. Pt states she does not have additional education needs at present. Pt states she has diabetes medicines and monitoring supplies.       Electronically signed by:  Brittny Mckeon RN  12/17/24 18:26 EST

## 2024-12-17 NOTE — CASE MANAGEMENT/SOCIAL WORK
Continued Stay Note  JASMINE Louis     Patient Name: Arianna Blanco  MRN: [id number]  Today's Date: 12/17/2024    Admit Date: 12/16/2024    Plan: Return home, referral to [facility] pending.   Discharge Plan       Row Name 12/17/24 1558       Plan    Plan Return home, referral to [facility] pending.    Patient/Family in Agreement with Plan yes    Plan Comments DC barriers: elevated blood sugars and insulin adjustments. A1C is 12.1. Diabetes educator consulted.             Megan Naegele, RN     Office Phone: [phone]  Office Cell: [phone]

## 2024-12-17 NOTE — CASE MANAGEMENT/SOCIAL WORK
Discharge Planning Assessment   Heriberto     Patient Name: Arianna Blanco  MRN: [id number]  Today's Date: 12/16/2024    Admit Date: 12/16/2024    Plan: From Home Alone; PT/OT [place] Pending; Referral to [facility]   Discharge Needs Assessment       Row Name 12/16/24 1954       Living Environment    People in Home alone    Current Living Arrangements apartment    Potentially Unsafe Housing Conditions none    In the past 12 months has the electric, gas, oil, or water company threatened to shut off services in your home? No    Primary Care Provided by self    Provides Primary Care For no one    Family Caregiver if Needed child(dilia), adult    Family Caregiver Names Son, ann    Quality of Family Relationships unable to assess    Living Arrangement Comments Home       Resource/Environmental Concerns    Transportation Concerns no car;rides, unreliable from others       Transportation Needs    In the past 12 months, has lack of transportation kept you from medical appointments or from getting medications? no    In the past 12 months, has lack of transportation kept you from meetings, work, or from getting things needed for daily living? No       Food Insecurity    Within the past 12 months, you worried that your food would run out before you got the money to buy more. Never true    Within the past 12 months, the food you bought just didn't last and you didn't have money to get more. Never true       Transition Planning    Patient/Family Anticipated Services at Transition none    Transportation Anticipated family or friend will provide       Discharge Needs Assessment    Readmission Within the Last 30 Days no previous admission in last 30 days    Equipment Currently Used at Home walker, rolling;cane, straight    Concerns to be Addressed discharge planning    Do you want help finding or keeping work or a job? I do not need or want help    Do you want help with school or training? For example, starting or completing job  training or getting a high school diploma, GED or equivalent No    Anticipated Changes Related to Illness none    Equipment Needed After Discharge none    Outpatient/Agency/Support Group Needs homecare agency    Provided Post Acute Provider List? Yes    Post Acute Provider List Home Health    Delivered To Patient    Method of Delivery In person    Patient's [facility] of [facility] Agency([facility]) [facility]                   Discharge Plan       Row Name 12/1955       Plan    Plan From Home Alone; PT/OT Evals Pending; Referral to [facility]    Patient/Family in Agreement with Plan unable to assess    Plan Comments Met with patient at bedside, confirmed PCP and Pharm. States she was independent with ADL's until 2 weeks prior to admission. Her son, Drake, assists as needed. Denies financial concerns and family will provide dc transportation. Pt states she had someone  from [facility] contact her but she does not currently have services with them. Referral sent through [employer]. Will need to confirm if she is current for any services. Discussed dc plan, patient anticipates home and HH with [facility]. Final dc plan is pending clinical course and therapy evals. DC Barriers: Urology, Endocrinology and Urology consults; IV meds                  Continued Care and Services - Admitted Since 12/16/2024       Home Medical Care       Service Provider Request Status Services Address Phone Fax Patient Preferred    [facility] Pending - Request Sent -- [address] [place] [place] [address] [phone] --                     Demographic Summary       Row Name 12/16/24 1953       General Information    Admission Type inpatient    Arrived From home    Referral Source emergency department    Reason for Consult discharge planning    Preferred Language English       Contact Information    Permission Granted to Share Info With                    Functional Status       Row Name 12/16/24  1953       Functional Status    Usual Activity Tolerance moderate    Current Activity Tolerance fair       Physical Activity    On average, how many days per week do you engage in moderate to strenuous exercise (like a brisk walk)? 0 days    On average, how many minutes do you engage in exercise at this level? 0 min    Number of minutes of exercise per week 0       Assessment of Health Literacy    How often do you have someone help you read hospital materials? Occasionally    How often do you have problems learning about your medical condition because of difficulty understanding written information? Occasionally    How often do you have a problem understanding what is told to you about your medical condition? Occasionally    How confident are you filling out medical forms by yourself? Quite a bit    Health Literacy Good       Functional Status, IADL    Medications independent    Meal Preparation independent    Housekeeping independent    Laundry independent    Shopping assistive person    If for any reason you need help with day-to-day activities such as bathing, preparing meals, shopping, managing finances, etc., do you get the help you need? I could use a little more help    IADL Comments Son, Drake, assists as needed       Mental Status    General Appearance WDL WDL       Mental Status Summary    Recent Changes in Mental Status/Cognitive Functioning no changes             Met with patient in room wearing PPE: mask    Maintained distance greater than six feet and spent less than 15 minutes in the room    Liudmila Carlton RN    Phone [phone]  Fax [phone]

## 2024-12-17 NOTE — PLAN OF CARE
Goal Outcome Evaluation:  Plan of Care Reviewed With: patient           Outcome Evaluation: 70 y/o female admitted on 12/16 from MD office due to hyperglycemia with blood glucose of >500. Patient also reporting of fatigue, SOA and painful feet. PMH includes COPD not on home O2, anxiety, depression, htn, afib, DM. Patient lives alone and normally uses rw in home and cane when out in community. At time of eval, patient is modified independent with supine<>sit. CG/SBA to come to standing without a.d. and ambulated with rw for 60 ft with CGA. No SOA reported during gait however noted to be unsteady when ambulating without a.d. Patient plans to stay with son upon discharge from hospital and can benefit from HH PT follow up. Educated patient use rw at all times.    Anticipated Discharge Disposition (PT): home with assist, home with home health

## 2024-12-17 NOTE — PROGRESS NOTES
[facility] MEDICINE SERVICE  DAILY PROGRESS NOTE    NAME: Arianna Blanco  : 1955  MRN: [id number]      LOS: 1 day     PROVIDER OF SERVICE: Sean Stockton MD    Chief Complaint: Generalized weakness    Subjective:     Interval History:  History taken from: patient chart RN    Generalized weakness fatigue somewhat better since admission        Review of Systems:   Review of Systems  All negative except as above  Objective:     Vital Signs  Temp:  [97.5 °F (36.4 °C)-98.9 °F (37.2 °C)] 97.8 °F (36.6 °C)  Heart Rate:  [81-96] 83  Resp:  [13-22] 22  BP: (140-170)/() 144/72   Body mass index is 23.42 kg/m².    Physical Exam  Physical Exam  AOx3 NAD  RRR S1-S2 audible  Lungs are clear   abdomen soft nontender nondistended     Diagnostic Data    Results from last 7 days   Lab Units 24  0500 24  1513   WBC 10*3/mm3 6.56 10.19   HEMOGLOBIN g/dL 11.8* 13.7   HEMATOCRIT % 33.9* 39.1   PLATELETS 10*3/mm3 133* 172   GLUCOSE mg/dL 398* 585*   CREATININE mg/dL 0.69 0.82   BUN mg/dL 10 12   SODIUM mmol/L 133* 130*   POTASSIUM mmol/L 3.8 4.2   AST (SGOT) U/L  --  30   ALT (SGPT) U/L  --  25   ALK PHOS U/L  --  152*   BILIRUBIN mg/dL  --  0.3   ANION GAP mmol/L 10.6 14.4       XR Chest 1 View    Result Date: 2024  Impression: No active disease. Electronically Signed: Christopher Figueroa MD  2024 3:34 PM EST  Workstation ID: [id number]       I reviewed the patient's new clinical results.  I reviewed the patient's new imaging results and agree with the interpretation.    Assessment/Plan:     Active and Resolved Problems  Active Hospital Problems    Diagnosis  POA    **Generalized weakness [R53.1]  Yes      Resolved Hospital Problems   No resolved problems to display.       69-year-old female with history of COPD, depression, IDDM, hyperlipidemia, hypertension, A-fib paroxysmal, GERD, anxiety/depression admitted to [facility]  with generalized weakness    #Uncontrolled IDDM  A1c  12.1  Increase Lantus to 40 units daily  Start lispro 5 units scheduled 3 times daily  Increase ISS lispro to high correctional  Diabetic diet  Diabetes educator consult  Started on gabapentin 100 3 times daily for neuropathy monitor  Check B12 levels    #History of COPD not in exacerbation      #Paroxysmal A-fib  Continue home dose of Cardizem and Toprol  Continue Eliquis      #Hypertension  Continue Cardizem Toprol lisinopril monitor blood pressure    #Anxiety/depression  Continue home dose of Prozac and buspar       #Generalized weakness  Suspect in setting of uncontrolled diabetes  Diabetes control as above  Check B12  TSH within normal limits  PT/OT    VTE Prophylaxis:  Pharmacologic & mechanical VTE prophylaxis orders are present.             Disposition Planning:     Barriers to Discharge: Medical workup  Anticipated Date of Discharge: 12/18  Place of Discharge: Home       Time: 45 minutes     Code Status and Medical Interventions: CPR (Attempt to Resuscitate); Full Support   Ordered at: 12/16/24 1740     Code Status (Patient has no pulse and is not breathing):    CPR (Attempt to Resuscitate)     Medical Interventions (Patient has pulse or is breathing):    Full Support       Signature: Electronically signed by Sean Stockton MD, 12/17/24, 12:50 EST.  [facility] Hospitalist Team

## 2024-12-17 NOTE — THERAPY EVALUATION
Patient Name: Arianna Blanco  : 1955    MRN: [id number]                              Today's Date: 2024       Admit Date: 2024    Visit Dx:     ICD-10-CM ICD-9-CM   1. Type 2 diabetes mellitus with hyperosmolar hyperglycemic state (HHS)  E11.00 250.22   2. Generalized weakness  R53.1 780.79   3. Shortness of breath  R06.02 786.05     Patient Active Problem List   Diagnosis    Essential hypertension    Dyslipidemia    Simple chronic bronchitis    Unstable angina    Chest pain    Atrial fibrillation    Type 2 diabetes mellitus with hyperglycemia    Anxiety associated with depression    Anxiety    Cataracts, bilateral    Cervico-occipital neuralgia    Chronic obstructive pulmonary disease    Degenerative disc disease, lumbar    Headaches, cluster    Lesion of ulnar nerve, bilateral upper limbs    Degenerative disc disease, cervical    Median nerve neuropathy    Migraine    Nuclear senile cataract    Open angle with borderline findings and high glaucoma risk in left eye    Open-angle glaucoma of right eye    Presbyopia    Asthma    Tobacco dependence    Hypertriglyceridemia    Hypercholesteremia    Restless legs syndrome (RLS)    Sleep apnea syndrome    Defect of endplate of vertebra    Sacroiliac joint dysfunction    Lumbar spondylosis    Lumbar facet joint pain    Osteopenia of neck of left femur    Right renal mass    Splenomegaly    Right adrenal mass    Hepatic steatosis    Hepatomegaly    Nephrolithiasis    Mammogram declined    Tremor of right hand    Depression    Cervical stenosis of spinal canal    Numbness and tingling in right hand    Lumbar radiculopathy    Incontinence of feces    Lymphadenopathy, submental    Insomnia    Atrial fibrillation with RVR    Atrial flutter with controlled response    PAF (paroxysmal atrial fibrillation)    Fatigue    Paroxysmal atrial fibrillation    Generalized weakness     Past Medical History:   Diagnosis Date    COPD (chronic obstructive pulmonary  disease)     Depression     Diabetes mellitus     Hyperlipidemia     Hypertension      Past Surgical History:   Procedure Laterality Date    CARDIAC CATHETERIZATION Left 10/30/2020    Procedure: Left Heart Cath with Coronary Angiography;  Surgeon: Donaldo Archer MD;  Location: [facility] CATH INVASIVE LOCATION;  Service: Cardiovascular;  Laterality: Left;    CARDIAC ELECTROPHYSIOLOGY PROCEDURE Right 8/27/2024    Procedure: Ablation atrial fibrillation;  Surgeon: Shay Santos MD;  Location: [facility] CATH INVASIVE LOCATION;  Service: Cardiovascular;  Laterality: Right;    CARPAL TUNNEL RELEASE Bilateral 2017    CHOLECYSTECTOMY  1980    HYSTERECTOMY  1980      General Information       Row Name 12/17/24 1054          Physical Therapy Time and Intention    Document Type evaluation  -CR     Mode of Treatment physical therapy  -CR       Row Name 12/17/24 1054          General Information    Patient Profile Reviewed yes  -CR     Prior Level of Function independent:;all household mobility;community mobility;ADL's  -CR     Barriers to Rehab medically complex  -CR       Row Name 12/17/24 1054          Living Environment    People in Home alone  -CR       Row Name 12/17/24 1054          Home Main Entrance    Number of Stairs, Main Entrance none  -CR       Row Name 12/17/24 1054          Cognition    Orientation Status (Cognition) oriented x 4  -CR       Row Name 12/17/24 1054          Safety Issues/Impairments Affecting Functional Mobility    Impairments Affecting Function (Mobility) sensation/sensory awareness;strength;endurance/activity tolerance  -CR               User Key  (r) = Recorded By, (t) = Taken By, (c) = Cosigned By      Initials Name Provider Type    CR Reyes, Carmela, PT Physical Therapist                   Mobility       Row Name 12/17/24 1055          Bed Mobility    Bed Mobility supine-sit;sit-supine  -CR     Supine-Sit [place] (Bed Mobility) modified independence  -CR     Sit-Supine [place]  (Bed Mobility) modified independence  -CR     Assistive Device (Bed Mobility) bed rails  -CR       Row Name 12/17/24 1055          Bed-Chair Transfer    Bed-Chair [place] (Transfers) standby assist  -CR     Assistive Device (Bed-Chair Transfers) walker, front-wheeled  -CR       Row Name 12/17/24 1055          Sit-Stand Transfer    Sit-Stand [place] (Transfers) standby assist  -CR       Row Name 12/17/24 1055          Gait/Stairs (Locomotion)    [place] Level (Gait) standby assist;contact guard  -CR     Assistive Device (Gait) walker, front-wheeled  -CR     Distance in Feet (Gait) 60  -CR     Deviations/Abnormal Patterns (Gait) gait speed decreased  -CR     Bilateral Gait Deviations forward flexed posture  -CR               User Key  (r) = Recorded By, (t) = Taken By, (c) = Cosigned By      Initials Name Provider Type    CR Reyes, Carmela, PT Physical Therapist                   Obj/Interventions       Row Name 12/17/24 1056          Range of Motion Comprehensive    General Range of Motion bilateral lower extremity ROM WNL  -CR       Row Name 12/17/24 1056          Strength Comprehensive (MMT)    Comment, General Manual Muscle Testing (MMT) Assessment BLE grossly 4/5  -CR       Row Name 12/17/24 1056          Balance    Balance Assessment sitting static balance;sitting dynamic balance;standing static balance;standing dynamic balance  -CR     Static Sitting Balance independent  -CR     Dynamic Sitting Balance independent  -CR     Position, Sitting Balance sitting edge of bed  -CR     Static Standing Balance contact guard  -CR     Dynamic Standing Balance standby assist  -CR       Row Name 12/17/24 1056          Sensory Assessment (Somatosensory)    Sensory Assessment (Somatosensory) --  reports of tingling, numbness to both feet and R hand  -CR               User Key  (r) = Recorded By, (t) = Taken By, (c) = Cosigned By      Initials Name Provider Type    CR Reyes, Carmela, PT Physical Therapist                    Goals/Plan       Row Name 12/17/24 1102          Gait Training Goal 1 (PT)    Activity/Assistive Device (Gait Training Goal 1, PT) gait (walking locomotion);assistive device use;improve balance and speed;increase endurance/gait distance  -CR     Major Level (Gait Training Goal 1, PT) standby assist  -CR     Distance (Gait Training Goal 1, PT) 150 ft  -CR     Time Frame (Gait Training Goal 1, PT) long term goal (LTG);2 weeks  -CR       Row Name 12/17/24 1102          Balance Goal 1 (PT)    Activity/Assistive Device (Balance Goal) standing static balance;standing dynamic balance;unsupported  -CR     Major Level/Cues Needed (Balance Goal 1, PT) supervision required  -CR     Time Frame (Balance Goal 1, PT) long-term goal (LTG);2 weeks  -CR       Row Name 12/17/24 1102          Therapy Assessment/Plan (PT)    Planned Therapy Interventions (PT) balance training;neuromuscular re-education;gait training;patient/family education  -CR               User Key  (r) = Recorded By, (t) = Taken By, (c) = Cosigned By      Initials Name Provider Type    CR Reyes, Carmela, PT Physical Therapist                   Clinical Impression       Row Name 12/17/24 1057          Pain    Pretreatment Pain Rating 5/10  -CR     Posttreatment Pain Rating 5/10  -CR     Pain Location foot  -CR     Pain Side/Orientation bilateral  -CR       Row Name 12/17/24 1057          Plan of Care Review    Plan of Care Reviewed With patient  -CR     Outcome Evaluation 68 y/o female admitted on 12/16 from MD office due to hyperglycemia with blood glucose of >500. Patient also reporting of fatigue, SOA and painful feet. PMH includes COPD not on home O2, anxiety, depression, htn, afib, DM. Patient lives alone and normally uses rw in home and cane when out in community. At time of eval, patient is modified independent with supine<>sit. CG/SBA to come to standing without a.d. and ambulated with rw for 60 ft with CGA. No SOA reported during gait  however noted to be unsteady when ambulating without a.d. Patient plans to stay with son upon discharge from hospital and can benefit from  PT follow up. Educated patient use rw at all times.  -CR       Row Name 12/17/24 1057          Therapy Assessment/Plan (PT)    Patient/Family Therapy Goals Statement (PT) home  -CR     Rehab Potential (PT) good  -CR     Criteria for Skilled Interventions Met (PT) yes;skilled treatment is necessary  -CR     Therapy Frequency (PT) 3 times/wk  -CR     Predicted Duration of Therapy Intervention (PT) dc  -CR       Row Name 12/17/24 1057          Positioning and Restraints    Post Treatment Position bed  -CR     In Bed notified nsg;supine;call light within reach  -CR               User Key  (r) = Recorded By, (t) = Taken By, (c) = Cosigned By      Initials Name Provider Type    CR Reyes, Carmela, PT Physical Therapist                   Outcome Measures       Row Name 12/17/24 1103 12/17/24 0133       How much help from another person do you currently need...    Turning from your back to your side while in flat bed without using bedrails? 4  -CR 4  -JM    Moving from lying on back to sitting on the side of a flat bed without bedrails? 4  -CR 4  -JM    Moving to and from a bed to a chair (including a wheelchair)? 4  -CR 4  -JM    Standing up from a chair using your arms (e.g., wheelchair, bedside chair)? 4  -CR 4  -JM    Climbing 3-5 steps with a railing? 3  -CR 4  -JM    To walk in hospital room? 3  -CR 4  -JM    AM-PAC 6 Clicks Score (PT) 22  -CR 24  -JM              User Key  (r) = Recorded By, (t) = Taken By, (c) = Cosigned By      Initials Name Provider Type    CR Reyes, Carmela, PT Physical Therapist    Thelma Rivera RN Registered Nurse                                 Physical Therapy Education       Title: PT OT SLP Therapies (In Progress)       Topic: Physical Therapy (In Progress)       Point: Mobility training (Done)       Learning Progress Summary            Patient  Acceptance, E, VU by CR at 12/17/2024 1103                      Point: Home exercise program (Not Started)       Learner Progress:  Not documented in this visit.              Point: Precautions (Not Started)       Learner Progress:  Not documented in this visit.                              User Key       Initials Effective Dates Name Provider Type Discipline    CR 06/16/21 -  Reyes, Carmela, PT Physical Therapist PT                  PT Recommendation and Plan  Planned Therapy Interventions (PT): balance training, neuromuscular re-education, gait training, patient/family education  Outcome Evaluation: 68 y/o female admitted on 12/16 from MD office due to hyperglycemia with blood glucose of >500. Patient also reporting of fatigue, SOA and painful feet. PMH includes COPD not on home O2, anxiety, depression, htn, afib, DM. Patient lives alone and normally uses rw in home and cane when out in community. At time of eval, patient is modified independent with supine<>sit. CG/SBA to come to standing without a.d. and ambulated with rw for 60 ft with CGA. No SOA reported during gait however noted to be unsteady when ambulating without a.d. Patient plans to stay with son upon discharge from hospital and can benefit from HH PT follow up. Educated patient use rw at all times.     Time Calculation:         PT Charges       Row Name 12/17/24 1103             Time Calculation    Start Time 0929  -CR      Stop Time 0946  -CR      Time Calculation (min) 17 min  -CR      PT Received On 12/17/24  -CR      PT - Next Appointment 12/18/24  -CR      PT Goal Re-Cert Due Date 12/31/24  -CR         Time Calculation- PT    Total Timed Code Minutes- PT 0 minute(s)  -CR                User Key  (r) = Recorded By, (t) = Taken By, (c) = Cosigned By      Initials Name Provider Type    CR Reyes, Carmela, PT Physical Therapist                  Therapy Charges for Today       Code Description Service Date Service Provider Modifiers Qty    [id number]  HC PT EVAL MOD COMPLEXITY 3 12/17/2024 Reyes, Carmela, PT GP 1            PT G-Codes  AM-PAC 6 Clicks Score (PT): 22  PT Discharge Summary  Anticipated Discharge Disposition (PT): home with assist, home with home health    Carmela Reyes, PT  12/17/2024

## 2024-12-17 NOTE — PLAN OF CARE
Goal Outcome Evaluation:  Plan of Care Reviewed With: patient           Outcome Evaluation: 69 y.o. female with a CMH of COPD, depression, diabetes, hyperlipidemia, hypertension, atrial fibrillation, GERD, depression/anxiety who presented to [facility] on 12/16/2024 with generalized weakness over the past week as well as abnormal labs. Pt dx with uncontrolled type 2 diabetes, admitted for further medical management. Patient lives alone and normally uses rw in home and cane when out in community. She is normally IND with ADLs. This date, pt A&Ox4. She performs bed mobility with Mod Ind. At EOB, pt performs lower body dressing with setup assist. She comes to standing with CGA and ambulated out of room with CGA w/o use of RW. Walker used from this point, pt much more stable with use of RW ambulating with SBA. OT will follow, but pt reports she will be staying with her son at dc and thus will be safe to dc home with him. Recommend use of RW during ADL completion for increased safety in the home environment. OT will follow while inpatient.    Anticipated Discharge Disposition (OT): home with assist, home with home health

## 2024-12-17 NOTE — SIGNIFICANT NOTE
12/16/24 2004   Living Situation   Current Living Arrangements apartment   Potentially Unsafe Housing Conditions none   Food Insecurity   Within the past 12 months, you worried that your food would run out before you got the money to buy more. Never true   Within the past 12 months, the food you bought just didn't last and you didn't have money to get more. Never true   Transportation Needs   In the past 12 months, has lack of transportation kept you from medical appointments or from getting medications? no   In the past 12 months, has lack of transportation kept you from meetings, work, or from getting things needed for daily living? No   Utilities   In the past 12 months has the electric, gas, oil, or water company threatened to shut off services in your home? No   Abuse Screen (yes response referral indicated)   Feels Unsafe at Home or Work/School no   Feels Threatened by Someone no   Financial Resource Strain   How hard is it for you to pay for the very basics like food, housing, medical care, and heating? Not very   Employment   Do you want help finding or keeping work or a job? I do not need or want help   Family and Community Support   If for any reason you need help with day-to-day activities such as bathing, preparing meals, shopping, managing finances, etc., do you get the help you need? I could use a little more help   How often do you feel lonely or isolated from those around you? Never   Education   Preferred Language English   Do you want help with school or training? For example, starting or completing job training or getting a high school diploma, GED or equivalent No   Physical Activity   On average, how many days per week do you engage in moderate to strenuous exercise (like a brisk walk)? 0 days   On average, how many minutes do you engage in exercise at this level? 0 min   Number of minutes of exercise per week (!) 0   Alcohol Use   Q1: How often do you have a drink containing alcohol? Monthly  or l   Q2: How many drinks containing alcohol do you have on a typical day when you are drinking? 1 or 2   Q3: How often do you have six or more drinks on one occasion? Never   Stress   Do you feel stress - tense, restless, nervous, or anxious, or unable to sleep at night because your mind is troubled all the time - these days? Only a littl   Mental Health   Little interest or pleasure in doing things Not at all   Feeling down, depressed, or hopeless Not at all   Disabilities   Difficulty Concentrating, Remembering or Making Decisions no   Difficulty Managing Errands Independently no         Met with patient in room wearing PPE: mask    Maintained distance greater than six feet and spent less than 15 minutes in the room    Liudmila Carlton RN    Phone [phone]  Fax [phone]

## 2024-12-18 ENCOUNTER — READMISSION MANAGEMENT (OUTPATIENT)
Dept: CALL CENTER | Facility: HOSPITAL | Age: 69
End: 2024-12-18
Payer: MEDICAID

## 2024-12-18 VITALS
OXYGEN SATURATION: 97 % | SYSTOLIC BLOOD PRESSURE: 112 MMHG | HEIGHT: 68 IN | TEMPERATURE: 97 F | HEART RATE: 63 BPM | DIASTOLIC BLOOD PRESSURE: 67 MMHG | BODY MASS INDEX: 23.34 KG/M2 | WEIGHT: 154 LBS | RESPIRATION RATE: 12 BRPM

## 2024-12-18 LAB
GLUCOSE BLDC GLUCOMTR-MCNC: 287 MG/DL (ref 70–105)
GLUCOSE BLDC GLUCOMTR-MCNC: 299 MG/DL (ref 70–105)

## 2024-12-18 PROCEDURE — 96374 THER/PROPH/DIAG INJ IV PUSH: CPT

## 2024-12-18 PROCEDURE — 63710000001 INSULIN GLARGINE PER 5 UNITS: Performed by: HOSPITALIST

## 2024-12-18 PROCEDURE — G0378 HOSPITAL OBSERVATION PER HR: HCPCS

## 2024-12-18 PROCEDURE — 25010000002 ONDANSETRON PER 1 MG

## 2024-12-18 PROCEDURE — 63710000001 INSULIN LISPRO (HUMAN) PER 5 UNITS: Performed by: HOSPITALIST

## 2024-12-18 PROCEDURE — 82948 REAGENT STRIP/BLOOD GLUCOSE: CPT

## 2024-12-18 RX ORDER — INSULIN LISPRO 100 [IU]/ML
9 INJECTION, SOLUTION INTRAVENOUS; SUBCUTANEOUS
Status: DISCONTINUED | OUTPATIENT
Start: 2024-12-18 | End: 2024-12-18 | Stop reason: HOSPADM

## 2024-12-18 RX ORDER — INSULIN GLULISINE 100 [IU]/ML
10 INJECTION, SOLUTION SUBCUTANEOUS 3 TIMES DAILY
Status: ON HOLD
Start: 2024-12-18 | End: 2024-12-23

## 2024-12-18 RX ORDER — GABAPENTIN 100 MG/1
100 CAPSULE ORAL 3 TIMES DAILY
Qty: 15 CAPSULE | Refills: 0 | Status: SHIPPED | OUTPATIENT
Start: 2024-12-18 | End: 2024-12-23 | Stop reason: HOSPADM

## 2024-12-18 RX ADMIN — INSULIN LISPRO 8 UNITS: 100 INJECTION, SOLUTION INTRAVENOUS; SUBCUTANEOUS at 08:30

## 2024-12-18 RX ADMIN — NICOTINE 1 PATCH: 21 PATCH TRANSDERMAL at 09:35

## 2024-12-18 RX ADMIN — TRAMADOL HYDROCHLORIDE 50 MG: 50 TABLET, COATED ORAL at 09:00

## 2024-12-18 RX ADMIN — INSULIN LISPRO 9 UNITS: 100 INJECTION, SOLUTION INTRAVENOUS; SUBCUTANEOUS at 12:00

## 2024-12-18 RX ADMIN — LISINOPRIL 10 MG: 5 TABLET ORAL at 09:00

## 2024-12-18 RX ADMIN — APIXABAN 5 MG: 5 TABLET, FILM COATED ORAL at 09:00

## 2024-12-18 RX ADMIN — GABAPENTIN 100 MG: 100 CAPSULE ORAL at 09:00

## 2024-12-18 RX ADMIN — FLUOXETINE HYDROCHLORIDE 20 MG: 20 CAPSULE ORAL at 09:00

## 2024-12-18 RX ADMIN — INSULIN LISPRO 5 UNITS: 100 INJECTION, SOLUTION INTRAVENOUS; SUBCUTANEOUS at 09:01

## 2024-12-18 RX ADMIN — INSULIN GLARGINE 40 UNITS: 100 INJECTION, SOLUTION SUBCUTANEOUS at 09:00

## 2024-12-18 RX ADMIN — DILTIAZEM HYDROCHLORIDE 240 MG: 240 CAPSULE, EXTENDED RELEASE ORAL at 09:00

## 2024-12-18 RX ADMIN — ONDANSETRON 4 MG: 2 INJECTION, SOLUTION INTRAMUSCULAR; INTRAVENOUS at 01:48

## 2024-12-18 RX ADMIN — ACETAMINOPHEN 650 MG: 325 TABLET, FILM COATED ORAL at 15:09

## 2024-12-18 RX ADMIN — BACLOFEN 10 MG: 10 TABLET ORAL at 09:00

## 2024-12-18 RX ADMIN — BACLOFEN 10 MG: 10 TABLET ORAL at 15:09

## 2024-12-18 RX ADMIN — INSULIN LISPRO 8 UNITS: 100 INJECTION, SOLUTION INTRAVENOUS; SUBCUTANEOUS at 12:36

## 2024-12-18 RX ADMIN — METOPROLOL SUCCINATE 50 MG: 50 TABLET, EXTENDED RELEASE ORAL at 09:00

## 2024-12-18 NOTE — CASE MANAGEMENT/SOCIAL WORK
Case Management Discharge Note      Final Note: Routine home.    Selected Continued Care - Discharged on 12/18/2024 Admission date: 12/16/2024 - Discharge disposition: Home-Health Care Svc      Durable Medical Equipment Coordination complete.      Service Provider Services Address Phone Fax Patient Preferred    [facility] [employer] [address], [place] [address] [phone] --             Transportation Services  Private: Car    Final Discharge Disposition Code: 01 - home or self-care

## 2024-12-18 NOTE — SIGNIFICANT NOTE
12/18/24 1150   OTHER   Discipline physical therapist   Rehab Time/Intention   Session Not Performed patient/family declined treatment  (Pt states that she is being discharged to her son's home this afternoon and would like to just sleep at this time to rest up for leaving.)

## 2024-12-18 NOTE — DISCHARGE SUMMARY
[facility] Medicine Services  Discharge Summary    Date of Service: 2024  Patient Name: Arianna Blanco  : 1955  MRN: [id number]    Date of Admission: 2024  Discharge Diagnosis: #Uncontrolled IDDM, #generalized weakness  Date of Discharge: 2024  Primary Care Physician: Maddei Cornejo APRN      Presenting Problem:   Shortness of breath [R06.02]  Generalized weakness [R53.1]  Type 2 diabetes mellitus with hyperosmolar hyperglycemic state (HHS) [E11.00]    Active and Resolved Hospital Problems:  Active Hospital Problems    Diagnosis POA    **Generalized weakness [R53.1] Yes      Resolved Hospital Problems   No resolved problems to display.         Hospital Course     HPI:  Admitting team HPI   Arianna Blanco is a 69 y.o. female with a CMH of COPD, depression, diabetes, hyperlipidemia, hypertension, atrial fibrillation, GERD, depression/anxiety who presented to [facility] on 2024 with generalized weakness over the past week as well as abnormal labs.  Patient was sent here by her PCP.  She also endorses intermittent shortness of breath.     Hospital Course:  69-year-old female with history of COPD, depression, IDDM, hyperlipidemia, hypertension, A-fib paroxysmal, GERD, anxiety/depression admitted to [facility]  with generalized weakness     #Uncontrolled IDDM  A1c 12.1  Lantus increased to 50 units daily  Increase short acting insulin to 10 units 3 times daily  Continue metformin on discharge  Seen by diabetes educator  Patient counseled on adherence to insulin regimen and low-carb diet  Started on gabapentin 100 mg 3 times daily for neuropathic pain.  Consider uptitrating outpatient as tolerated.  Patient will benefit from being seen by endocrinology as outpatient.  Recommend outpatient endocrine referral locally  TSH B12 levels within normal limits      #History of COPD not in exacerbation        #Paroxysmal A-fib  Continue home dose of Cardizem  and Toprol  Continue Eliquis        #Hypertension  Continue Cardizem Toprol lisinopril      #Anxiety/depression  Continue home dose of Prozac and buspar         #Generalized weakness  Suspect in setting of uncontrolled diabetes  Seen by PT/OT  Gabapentin as above  Needs better glycemic control        DISCHARGE Follow Up Recommendations for labs and diagnostics:   Outpatient referral to endocrine locally          Day of Discharge     Vital Signs:  Temp:  [97 °F (36.1 °C)-98.2 °F (36.8 °C)] 97 °F (36.1 °C)  Heart Rate:  [63-70] 63  Resp:  [12-17] 12  BP: (112-146)/(62-73) 112/67    Physical Exam:  Physical Exam   AOx3 NAD  RRR S1-S2 audible  Lungs are clear   abdomen soft nontender nondistended          Pertinent  and/or Most Recent Results     LAB RESULTS:      Lab 12/17/24 2223 12/17/24  0500 12/16/24  1513   WBC 7.76 6.56 10.19   HEMOGLOBIN 12.1 11.8* 13.7   HEMATOCRIT 34.9 33.9* 39.1   PLATELETS 141 133* 172   NEUTROS ABS 3.68 3.27 6.52   IMMATURE GRANS (ABS) 0.02 0.02 0.05   LYMPHS ABS 3.13* 2.53 2.64   MONOS ABS 0.75 0.56 0.79   EOS ABS 0.13 0.14 0.13   MCV 83.5 83.5 83.5   PROTIME  --   --  13.2   APTT  --   --  23.9   D DIMER QUANT  --   --  0.30         Lab 12/17/24  2223 12/17/24  0500 12/16/24  1513 12/16/24  1109   SODIUM 135* 133* 130*  --    POTASSIUM 3.8 3.8 4.2  --    CHLORIDE 102 101 95*  --    CO2 22.6 21.4* 20.6*  --    ANION GAP 10.4 10.6 14.4  --    BUN 16 10 12  --    CREATININE 0.72 0.69 0.82  --    EGFR 91.2 94.7 78.0  --    GLUCOSE 232* 398* 585*  --    CALCIUM 9.0 8.8 9.4  --    MAGNESIUM 1.9  --   --   --    PHOSPHORUS 3.1  --   --   --    HEMOGLOBIN A1C  --   --   --  12.1*   TSH  --   --  1.570  --          Lab 12/16/24  1513   TOTAL PROTEIN 7.0   ALBUMIN 4.2   GLOBULIN 2.8   ALT (SGPT) 25   AST (SGOT) 30   BILIRUBIN 0.3   ALK PHOS 152*   LIPASE 62*         Lab 12/16/24  1618 12/16/24  1513   PROBNP  --  114.0   HSTROP T 18* 19*   PROTIME  --  13.2   INR  --  1.00             Lab  12/16/24  1513   VITAMIN B 12 627         Lab 12/16/24  1528   FIO2 21     Brief Urine Lab Results  (Last result in the past 365 days)        Color   Clarity   Blood   Leuk Est   Nitrite   Protein   CREAT   Urine HCG        12/16/24 2106 Yellow   Clear   Trace   Negative   Negative   Negative                 Microbiology Results (last 10 days)       Procedure Component Value - Date/Time    COVID-19, FLU A/B, RSV PCR 1 HR TAT - Swab, Nasopharynx [[id number]]  (Normal) Collected: 12/16/24 2103    Lab Status: Final result Specimen: Swab from Nasopharynx Updated: 12/16/24 2155     COVID19 Not Detected     Influenza A PCR Not Detected     Influenza B PCR Not Detected     RSV, PCR Not Detected    Narrative:      Fact sheet for providers: https://www.fda.gov/media/792295/download    Fact sheet for patients: https://www.fda.gov/media/641209/download    Test performed by PCR.            XR Chest 1 View    Result Date: 12/16/2024  Impression: Impression: No active disease. Electronically Signed: Christopher Figueroa MD  12/16/2024 3:34 PM EST  Workstation ID: [id number]     Results for orders placed during the hospital encounter of 08/27/24    Duplex Pseudoaneurysm CAR    Interpretation Summary    No evidence of pseudoaneurysm or AV fistula in the right groin.      Results for orders placed during the hospital encounter of 08/27/24    Duplex Pseudoaneurysm CAR    Interpretation Summary    No evidence of pseudoaneurysm or AV fistula in the right groin.      Results for orders placed during the hospital encounter of 08/27/24    Adult Transesophageal Echo (MONTY) W/ Cont if Necessary Per Protocol    Interpretation Summary    Left ventricular systolic function is normal. Estimated left ventricular EF = 60% Left ventricular ejection fraction appears to be 56 - 60%.    Left ventricular wall thickness is consistent with mild concentric hypertrophy.    Left ventricular diastolic function is consistent with (grade I) impaired relaxation.    The  left atrial cavity is mildly dilated.    Estimated right ventricular systolic pressure from tricuspid regurgitation is normal (<35 mmHg).    Procedure indication  Paroxysmal atrial fibrillation    conscious sedation administered by anesthesia  Timeout before procedure    consent obtained before procedure      Procedure note  after obtaining a valid consent patient was sedated by Anesthesia and a MONTY probe was easily placed into esophagus with multiplane imaging with 2D, color and Doppler followed by bubble study with agitated saline without any complications    MONTY  Findings    Normal LV systolic function with mild left atrial enlargement without any shunt or clot  No effusion      Plan  Proceed with ablation    Procedure done  Trans esophageal echocardiography      Electronically signed by Shay Santos MD, 08/27/24, 4:32 PM EDT.      Labs Pending at Discharge:  Pending Results       None            Procedures Performed           Consults:   Consults       No orders found from 11/17/2024 to 12/17/2024.              Discharge Details        Discharge Medications        New Medications        Instructions Start Date   gabapentin 100 MG capsule  Commonly known as: NEURONTIN   100 mg, Oral, 3 Times Daily             Changes to Medications        Instructions Start Date   Insulin Glargine 100 UNIT/ML injection pen  Commonly known as: LANTUS SOLOSTAR  What changed:   how much to take  when to take this   50 Units, Subcutaneous, Daily             Continue These Medications        Instructions Start Date   albuterol sulfate  (90 Base) MCG/ACT inhaler  Commonly known as: PROVENTIL HFA;VENTOLIN HFA;PROAIR HFA   INHALE 2 PUFFS BY MOUTH EVERY 4 HOURS AS NEEDED FOR WHEEZING OR SHORTNESS OF AIR      Apidra SoloStar 100 UNIT/ML solution pen-injector  Generic drug: Insulin Glulisine   10 Units, Subcutaneous, 3 Times Daily, Take within 15 before or within 20 minutes of starting a meal.      baclofen 20 MG  tablet  Commonly known as: LIORESAL   20 mg, Oral, 3 Times Daily      busPIRone 7.5 MG tablet  Commonly known as: BUSPAR   7.5 mg, Oral, Nightly PRN      butalbital-acetaminophen  MG tablet tablet   1 tablet, Oral, Every 6 Hours PRN      dilTIAZem  MG 24 hr capsule  Commonly known as: CARDIZEM CD   1 capsule, Daily      Eliquis 5 MG tablet tablet  Generic drug: apixaban   5 mg, Oral, Every 12 Hours      FLUoxetine 20 MG capsule  Commonly known as: PROzac   20 mg, Oral, Daily      lisinopril 10 MG tablet  Commonly known as: PRINIVIL,ZESTRIL   10 mg, Oral, Daily      metFORMIN 1000 MG tablet  Commonly known as: GLUCOPHAGE   1,000 mg, Oral, 2 Times Daily With Meals      metoprolol succinate XL 50 MG 24 hr tablet  Commonly known as: TOPROL-XL   50 mg, Oral, Daily      Rexulti 3 MG tablet  Generic drug: Brexpiprazole   1 tablet, Daily      zolpidem 10 MG tablet  Commonly known as: AMBIEN   10 mg, Oral, Nightly PRN             Stop These Medications      ibuprofen 800 MG tablet  Commonly known as: ADVIL,MOTRIN              Allergies   Allergen Reactions    Codeine GI Intolerance         Discharge Disposition:   [place] [facility]    Diet:  Hospital:  Diet Order   Procedures    Diet: Diabetic; Consistent Carbohydrate; Fluid Consistency: Thin (IDDSI 0)         Discharge Activity:         CODE STATUS:  Code Status and Medical Interventions: CPR (Attempt to Resuscitate); Full Support   Ordered at: 12/16/24 5100     Code Status (Patient has no pulse and is not breathing):    CPR (Attempt to Resuscitate)     Medical Interventions (Patient has pulse or is breathing):    Full Support         Future Appointments   Date Time Provider Department Center   1/17/2025 11:15 AM Maddie Cornejo APRN [facility]           Time spent on Discharge including face to face service:  45 minutes    Signature: Electronically signed by Sean Stockton MD, 12/18/24, 12:33 EST.  [facility] Hospitalist Team

## 2024-12-18 NOTE — DISCHARGE PLACEMENT REQUEST
"Arianna Richmond (69 y.o. Female)       Date of Birth   1955    Social Security Number       Address   [address] [place] [address]    Home Phone   [phone]    MRN   [id number]       Zoroastrianism   [employer]ar[employer]    Marital Status                               Admission Date   12/16/24    Admission Type   Emergency    Admitting Provider   Sean Stockton MD    Attending Provider   eSan Stockton MD    Department, Room/Bed   [facility] MEDICAL INPATIENT, 360/A       Discharge Date       Discharge Disposition   [place] [facility] [place]charge Destination                                 Attending Provider: Sean Stockton MD    Allergies: Codeine    Isolation: None   Infection: None   Code Status: CPR    Ht: 172.7 cm (68\")   Wt: 69.9 kg (154 lb)    Admission Cmt: None   Principal Problem: Generalized weakness [R53.1]                   Active Insurance as of 12/16/2024       Primary Coverage       Payor Plan Insurance Group Employer/Plan Group    [id number]       Payor Plan Address Payor Plan Phone Number Payor Plan Fax Number Effective Dates    MAIL STOP:   7/1/2024 - None Entered    [address] [address]       [facility] [address]         Subscriber Name Subscriber Birth Date Member ID       ARIANNA RICHMOND 1955 [id number]                     Emergency Contacts        (Rel.) Home Phone Work Phone Mobile Phone    JENNIFER MARIEE (Son) -- -- [phone]          "

## 2024-12-18 NOTE — PLAN OF CARE
Problem: Adult Inpatient Plan of Care  Goal: Plan of Care Review  Outcome: Not Progressing  Flowsheets (Taken 12/17/2024 2318)  Progress: no change  Plan of Care Reviewed With: patient  Goal: Patient-Specific Goal (Individualized)  Outcome: Not Progressing  Goal: Absence of Hospital-Acquired Illness or Injury  Outcome: Not Progressing  Intervention: Identify and Manage Fall Risk  Recent Flowsheet Documentation  Taken 12/17/2024 2218 by Lilli Page RN  Safety Promotion/Fall Prevention:   assistive device/personal items within reach   clutter free environment maintained   nonskid shoes/slippers when out of bed   room organization consistent   safety round/check completed  Taken 12/17/2024 2003 by Lilli Page RN  Safety Promotion/Fall Prevention:   assistive device/personal items within reach   clutter free environment maintained   nonskid shoes/slippers when out of bed   room organization consistent   safety round/check completed  Intervention: Prevent Skin Injury  Recent Flowsheet Documentation  Taken 12/17/2024 2003 by Lilli Page RN  Body Position:   sitting up in bed   position changed independently  Intervention: Prevent and Manage VTE (Venous Thromboembolism) Risk  Recent Flowsheet Documentation  Taken 12/17/2024 2003 by Lilli Page RN  VTE Prevention/Management:   patient refused intervention   SCDs (sequential compression devices) off  Intervention: Prevent Infection  Recent Flowsheet Documentation  Taken 12/17/2024 2218 by Lilli Page RN  Infection Prevention:   environmental surveillance performed   cohorting utilized   hand hygiene promoted  Taken 12/17/2024 2003 by Lilli Page RN  Infection Prevention:   cohorting utilized   environmental surveillance performed   hand hygiene promoted  Goal: Optimal Comfort and Wellbeing  Outcome: Not Progressing  Intervention: Monitor Pain and Promote Comfort  Recent Flowsheet Documentation  Taken 12/17/2024 2003 by Lilli Page RN  Pain  Management Interventions: pain medication given  Intervention: Provide Person-Centered Care  Recent Flowsheet Documentation  Taken 12/17/2024 2003 by Lilli Page, RN  Trust Relationship/Rapport:   care explained   choices provided  Goal: Readiness for Transition of Care  Outcome: Not Progressing  Intervention: Mutually Develop Transition Plan  Recent Flowsheet Documentation  Taken 12/17/2024 2317 by Lilli Page, RN  Discharge Facility/Level of Care Needs: home with home health  Equipment Needed After Discharge: none  Equipment Currently Used at Home:   walker, rolling   cane, straight  Anticipated Changes Related to Illness: none  Transportation Anticipated: family or friend will provide  Outpatient/Agency/Support Group Needs: homecare agency  Transportation Concerns: none  Current Discharge Risk: chronically ill  Concerns to be Addressed:   discharge planning   compliance issue  Readmission Within the Last 30 Days: no previous admission in last 30 days  Patient/Family Anticipated Services at Transition: none  Patient/Family Anticipates Transition to: home  Current Outpatient/Agency/Support Group: homecare agency   Goal Outcome Evaluation:  Plan of Care Reviewed With: patient        Progress: no change     Alert and oriented x 4. Able to verbalize needs and wants. Takes medication whole and tolerates well. Continent of bowel and bladder. C/O bilateral foot and leg pain. PRN Ultram administered with positive effect noted. No s/s of hyper/hypoglycemia noted. No c/o SOB noted, does not require O2 therapy at this time. C/O insomnia, PRN Ambien administered. Stand by assist for transfers/ambulation. Currently in bed, awake. Call bell in reach.

## 2024-12-18 NOTE — PLAN OF CARE
Goal Outcome Evaluation:  Plan of Care Reviewed With: patient         All goals met, patient discharging home.

## 2024-12-18 NOTE — OUTREACH NOTE
Prep Survey      Flowsheet Row Responses   [facility] patient discharged from? [facility]   Is LACE score < 7 ? No   Eligibility [facility]   Date of Admission 12/16/24   Date of Discharge 12/18/24   Discharge Disposition Home-Health Care Sv   Discharge diagnosis Generalized weakness, Uncontrolled IDDM   Does the patient have one of the following disease processes/diagnoses(primary or secondary)? Other   Does the patient have Home health ordered? No   Is there a DME ordered? Yes   What DME was ordered? walker from [facility]   Prep survey completed? Yes            Rhiannon PUGA - Registered Nurse

## 2024-12-19 ENCOUNTER — TRANSITIONAL CARE MANAGEMENT TELEPHONE ENCOUNTER (OUTPATIENT)
Dept: CALL CENTER | Facility: HOSPITAL | Age: 69
End: 2024-12-19
Payer: MEDICAID

## 2024-12-19 NOTE — OUTREACH NOTE
Call Center TCM Note      Flowsheet Row Responses   [facility] patient discharged from? [facility]   Does the patient have one of the following disease processes/diagnoses(primary or secondary)? Other   TCM attempt successful? Yes   Call start time 1135   Call end time 1142   Discharge diagnosis Generalized weakness, Uncontrolled IDDM   Person spoke with today (if not patient) and relationship patient   Meds reviewed with patient/caregiver? Yes   Is the patient having any side effects they believe may be caused by any medication additions or changes? No   Does the patient have all medications ordered at discharge? Yes   Is the patient taking all medications as directed (includes completed medication regime)? Yes   Comments Patient has a followup with PCP office on 1/17/2024. At present she ndeclines to schedule a sooner followup.   Does the patient have an appointment with their PCP within 7-14 days of discharge? Other   Psychosocial issues? No   Did the patient receive a copy of their discharge instructions? Yes   Nursing interventions Reviewed instructions with patient   What is the patient's perception of their health status since discharge? Same  [vomiting]   Is the patient/caregiver able to teach back signs and symptoms related to disease process for when to call PCP? Yes   Is the patient/caregiver able to teach back signs and symptoms related to disease process for when to call 911? Yes   Is the patient/caregiver able to teach back the hierarchy of who to call/visit for symptoms/problems? PCP, Specialist, Home health nurse, Urgent Care, ED, 911 Yes   TCM call completed? Yes   Wrap up additional comments Patient is not feeling well at all. She is presently vomiting. If worseing of symptoms she will return to ED,   Call end time 1142   Would this patient benefit from a Referral to Amb Social Work? No   Is the patient interested in additional calls from an ambulatory ? No            Robby Lechuga,  RN    12/19/2024, 11:42 EST

## 2024-12-20 ENCOUNTER — HOSPITAL ENCOUNTER (OUTPATIENT)
Facility: HOSPITAL | Age: 69
Setting detail: OBSERVATION
Discharge: HOME | End: 2024-12-23
Attending: EMERGENCY MEDICINE | Admitting: FAMILY MEDICINE
Payer: MEDICAID

## 2024-12-20 ENCOUNTER — APPOINTMENT (OUTPATIENT)
Dept: CT IMAGING | Facility: HOSPITAL | Age: 69
End: 2024-12-20
Payer: MEDICAID

## 2024-12-20 DIAGNOSIS — G89.4 CHRONIC PAIN SYNDROME: ICD-10-CM

## 2024-12-20 DIAGNOSIS — G47.00 INSOMNIA, UNSPECIFIED TYPE: ICD-10-CM

## 2024-12-20 DIAGNOSIS — R10.33 PERIUMBILICAL ABDOMINAL PAIN: Primary | ICD-10-CM

## 2024-12-20 DIAGNOSIS — F41.9 ANXIETY: ICD-10-CM

## 2024-12-20 DIAGNOSIS — R11.2 INTRACTABLE NAUSEA AND VOMITING: ICD-10-CM

## 2024-12-20 DIAGNOSIS — E11.65 TYPE 2 DIABETES MELLITUS WITH HYPERGLYCEMIA, UNSPECIFIED WHETHER LONG TERM INSULIN USE: ICD-10-CM

## 2024-12-20 DIAGNOSIS — I10 ESSENTIAL HYPERTENSION: ICD-10-CM

## 2024-12-20 DIAGNOSIS — E86.0 DEHYDRATION: ICD-10-CM

## 2024-12-20 PROBLEM — G89.29 CHRONIC PAIN: Status: ACTIVE | Noted: 2024-12-20

## 2024-12-20 LAB
ALBUMIN SERPL-MCNC: 3.7 G/DL (ref 3.5–5.2)
ALBUMIN/GLOB SERPL: 1.6 G/DL
ALP SERPL-CCNC: 112 U/L (ref 39–117)
ALT SERPL W P-5'-P-CCNC: 22 U/L (ref 1–33)
AMPHET+METHAMPHET UR QL: NEGATIVE
AMPHETAMINES UR QL: NEGATIVE
ANION GAP SERPL CALCULATED.3IONS-SCNC: 9.9 MMOL/L (ref 5–15)
AST SERPL-CCNC: 28 U/L (ref 1–32)
BARBITURATES UR QL SCN: NEGATIVE
BASOPHILS # BLD AUTO: 0.04 10*3/MM3 (ref 0–0.2)
BASOPHILS NFR BLD AUTO: 0.5 % (ref 0–1.5)
BENZODIAZ UR QL SCN: NEGATIVE
BILIRUB SERPL-MCNC: <0.2 MG/DL (ref 0–1.2)
BILIRUB UR QL STRIP: NEGATIVE
BUN SERPL-MCNC: 17 MG/DL (ref 8–23)
BUN/CREAT SERPL: 26.6 (ref 7–25)
BUPRENORPHINE SERPL-MCNC: NEGATIVE NG/ML
CALCIUM SPEC-SCNC: 8.9 MG/DL (ref 8.6–10.5)
CANNABINOIDS SERPL QL: NEGATIVE
CHLORIDE SERPL-SCNC: 104 MMOL/L (ref 98–107)
CLARITY UR: CLEAR
CO2 SERPL-SCNC: 24.1 MMOL/L (ref 22–29)
COCAINE UR QL: NEGATIVE
COLOR UR: YELLOW
CREAT SERPL-MCNC: 0.64 MG/DL (ref 0.57–1)
DEPRECATED RDW RBC AUTO: 43.1 FL (ref 37–54)
EGFRCR SERPLBLD CKD-EPI 2021: 96.4 ML/MIN/1.73
EOSINOPHIL # BLD AUTO: 0.16 10*3/MM3 (ref 0–0.4)
EOSINOPHIL NFR BLD AUTO: 2 % (ref 0.3–6.2)
ERYTHROCYTE [DISTWIDTH] IN BLOOD BY AUTOMATED COUNT: 13.7 % (ref 12.3–15.4)
GLOBULIN UR ELPH-MCNC: 2.3 GM/DL
GLUCOSE BLDC GLUCOMTR-MCNC: 183 MG/DL (ref 70–105)
GLUCOSE BLDC GLUCOMTR-MCNC: 240 MG/DL (ref 70–105)
GLUCOSE SERPL-MCNC: 221 MG/DL (ref 65–99)
GLUCOSE UR STRIP-MCNC: ABNORMAL MG/DL
HCT VFR BLD AUTO: 34.7 % (ref 34–46.6)
HGB BLD-MCNC: 11.6 G/DL (ref 12–15.9)
HGB UR QL STRIP.AUTO: NEGATIVE
IMM GRANULOCYTES # BLD AUTO: 0.03 10*3/MM3 (ref 0–0.05)
IMM GRANULOCYTES NFR BLD AUTO: 0.4 % (ref 0–0.5)
KETONES UR QL STRIP: ABNORMAL
LEUKOCYTE ESTERASE UR QL STRIP.AUTO: NEGATIVE
LIPASE SERPL-CCNC: 45 U/L (ref 13–60)
LYMPHOCYTES # BLD AUTO: 2.74 10*3/MM3 (ref 0.7–3.1)
LYMPHOCYTES NFR BLD AUTO: 33.6 % (ref 19.6–45.3)
MCH RBC QN AUTO: 28.7 PG (ref 26.6–33)
MCHC RBC AUTO-ENTMCNC: 33.4 G/DL (ref 31.5–35.7)
MCV RBC AUTO: 85.9 FL (ref 79–97)
METHADONE UR QL SCN: NEGATIVE
MONOCYTES # BLD AUTO: 0.7 10*3/MM3 (ref 0.1–0.9)
MONOCYTES NFR BLD AUTO: 8.6 % (ref 5–12)
NEUTROPHILS NFR BLD AUTO: 4.49 10*3/MM3 (ref 1.7–7)
NEUTROPHILS NFR BLD AUTO: 54.9 % (ref 42.7–76)
NITRITE UR QL STRIP: NEGATIVE
NRBC BLD AUTO-RTO: 0 /100 WBC (ref 0–0.2)
OPIATES UR QL: NEGATIVE
OXYCODONE UR QL SCN: NEGATIVE
PCP UR QL SCN: NEGATIVE
PH UR STRIP.AUTO: 6.5 [PH] (ref 5–8)
PLAT MORPH BLD: NORMAL
PLATELET # BLD AUTO: 140 10*3/MM3 (ref 140–450)
PMV BLD AUTO: 12.5 FL (ref 6–12)
POTASSIUM SERPL-SCNC: 4.2 MMOL/L (ref 3.5–5.2)
PROT SERPL-MCNC: 6 G/DL (ref 6–8.5)
PROT UR QL STRIP: NEGATIVE
RBC # BLD AUTO: 4.04 10*6/MM3 (ref 3.77–5.28)
RBC MORPH BLD: NORMAL
SODIUM SERPL-SCNC: 138 MMOL/L (ref 136–145)
SP GR UR STRIP: 1.03 (ref 1–1.03)
TRICYCLICS UR QL SCN: NEGATIVE
TSH SERPL DL<=0.05 MIU/L-ACNC: 0.94 UIU/ML (ref 0.27–4.2)
UROBILINOGEN UR QL STRIP: ABNORMAL
WBC MORPH BLD: NORMAL
WBC NRBC COR # BLD AUTO: 8.16 10*3/MM3 (ref 3.4–10.8)

## 2024-12-20 PROCEDURE — 85007 BL SMEAR W/DIFF WBC COUNT: CPT | Performed by: EMERGENCY MEDICINE

## 2024-12-20 PROCEDURE — 93005 ELECTROCARDIOGRAM TRACING: CPT | Performed by: NURSE PRACTITIONER

## 2024-12-20 PROCEDURE — 83690 ASSAY OF LIPASE: CPT | Performed by: EMERGENCY MEDICINE

## 2024-12-20 PROCEDURE — 82948 REAGENT STRIP/BLOOD GLUCOSE: CPT

## 2024-12-20 PROCEDURE — 25810000003 SEPSIS FLUID NS 0.9 % SOLUTION: Performed by: EMERGENCY MEDICINE

## 2024-12-20 PROCEDURE — 80050 GENERAL HEALTH PANEL: CPT | Performed by: EMERGENCY MEDICINE

## 2024-12-20 PROCEDURE — 96375 TX/PRO/DX INJ NEW DRUG ADDON: CPT

## 2024-12-20 PROCEDURE — 81003 URINALYSIS AUTO W/O SCOPE: CPT | Performed by: EMERGENCY MEDICINE

## 2024-12-20 PROCEDURE — G0378 HOSPITAL OBSERVATION PER HR: HCPCS

## 2024-12-20 PROCEDURE — 80306 DRUG TEST PRSMV INSTRMNT: CPT | Performed by: EMERGENCY MEDICINE

## 2024-12-20 PROCEDURE — 99285 EMERGENCY DEPT VISIT HI MDM: CPT

## 2024-12-20 PROCEDURE — 25010000002 ONDANSETRON PER 1 MG: Performed by: EMERGENCY MEDICINE

## 2024-12-20 PROCEDURE — 25010000002 HYDROMORPHONE 1 MG/ML SOLUTION: Performed by: EMERGENCY MEDICINE

## 2024-12-20 PROCEDURE — 74176 CT ABD & PELVIS W/O CONTRAST: CPT

## 2024-12-20 PROCEDURE — 96374 THER/PROPH/DIAG INJ IV PUSH: CPT

## 2024-12-20 RX ORDER — BACLOFEN 10 MG/1
20 TABLET ORAL 3 TIMES DAILY
Status: DISCONTINUED | OUTPATIENT
Start: 2024-12-21 | End: 2024-12-23 | Stop reason: HOSPADM

## 2024-12-20 RX ORDER — BUSPIRONE HYDROCHLORIDE 15 MG/1
7.5 TABLET ORAL NIGHTLY PRN
Status: DISCONTINUED | OUTPATIENT
Start: 2024-12-20 | End: 2024-12-23 | Stop reason: HOSPADM

## 2024-12-20 RX ORDER — METOPROLOL SUCCINATE 50 MG/1
50 TABLET, EXTENDED RELEASE ORAL DAILY
Status: DISCONTINUED | OUTPATIENT
Start: 2024-12-21 | End: 2024-12-23 | Stop reason: HOSPADM

## 2024-12-20 RX ORDER — NICOTINE POLACRILEX 4 MG
15 LOZENGE BUCCAL
Status: DISCONTINUED | OUTPATIENT
Start: 2024-12-20 | End: 2024-12-23 | Stop reason: HOSPADM

## 2024-12-20 RX ORDER — ZOLPIDEM TARTRATE 5 MG/1
5 TABLET ORAL NIGHTLY PRN
Status: DISCONTINUED | OUTPATIENT
Start: 2024-12-20 | End: 2024-12-23 | Stop reason: HOSPADM

## 2024-12-20 RX ORDER — SODIUM CHLORIDE 9 MG/ML
100 INJECTION, SOLUTION INTRAVENOUS CONTINUOUS
Status: DISCONTINUED | OUTPATIENT
Start: 2024-12-20 | End: 2024-12-21

## 2024-12-20 RX ORDER — PANTOPRAZOLE SODIUM 40 MG/10ML
40 INJECTION, POWDER, LYOPHILIZED, FOR SOLUTION INTRAVENOUS ONCE
Status: COMPLETED | OUTPATIENT
Start: 2024-12-20 | End: 2024-12-20

## 2024-12-20 RX ORDER — IBUPROFEN 800 MG/1
800 TABLET, FILM COATED ORAL EVERY 8 HOURS PRN
COMMUNITY
End: 2024-12-23 | Stop reason: HOSPADM

## 2024-12-20 RX ORDER — GABAPENTIN 100 MG/1
100 CAPSULE ORAL 3 TIMES DAILY
Status: DISCONTINUED | OUTPATIENT
Start: 2024-12-21 | End: 2024-12-21

## 2024-12-20 RX ORDER — INSULIN LISPRO 100 [IU]/ML
2-9 INJECTION, SOLUTION INTRAVENOUS; SUBCUTANEOUS
Status: DISCONTINUED | OUTPATIENT
Start: 2024-12-20 | End: 2024-12-23 | Stop reason: HOSPADM

## 2024-12-20 RX ORDER — IBUPROFEN 600 MG/1
1 TABLET ORAL
Status: DISCONTINUED | OUTPATIENT
Start: 2024-12-20 | End: 2024-12-23 | Stop reason: HOSPADM

## 2024-12-20 RX ORDER — DEXTROSE MONOHYDRATE 25 G/50ML
25 INJECTION, SOLUTION INTRAVENOUS
Status: DISCONTINUED | OUTPATIENT
Start: 2024-12-20 | End: 2024-12-23 | Stop reason: HOSPADM

## 2024-12-20 RX ORDER — IPRATROPIUM BROMIDE AND ALBUTEROL SULFATE 2.5; .5 MG/3ML; MG/3ML
3 SOLUTION RESPIRATORY (INHALATION) EVERY 4 HOURS PRN
Status: DISCONTINUED | OUTPATIENT
Start: 2024-12-20 | End: 2024-12-23 | Stop reason: HOSPADM

## 2024-12-20 RX ORDER — LISINOPRIL 5 MG/1
10 TABLET ORAL DAILY
Status: DISCONTINUED | OUTPATIENT
Start: 2024-12-21 | End: 2024-12-23 | Stop reason: HOSPADM

## 2024-12-20 RX ORDER — ALBUTEROL SULFATE 0.83 MG/ML
2.5 SOLUTION RESPIRATORY (INHALATION) EVERY 4 HOURS PRN
Status: DISCONTINUED | OUTPATIENT
Start: 2024-12-20 | End: 2024-12-23 | Stop reason: HOSPADM

## 2024-12-20 RX ORDER — ONDANSETRON 2 MG/ML
4 INJECTION INTRAMUSCULAR; INTRAVENOUS ONCE AS NEEDED
Status: COMPLETED | OUTPATIENT
Start: 2024-12-20 | End: 2024-12-21

## 2024-12-20 RX ORDER — ONDANSETRON 2 MG/ML
4 INJECTION INTRAMUSCULAR; INTRAVENOUS ONCE
Status: COMPLETED | OUTPATIENT
Start: 2024-12-20 | End: 2024-12-20

## 2024-12-20 RX ORDER — DILTIAZEM HYDROCHLORIDE 240 MG/1
240 CAPSULE, COATED, EXTENDED RELEASE ORAL DAILY
Status: DISCONTINUED | OUTPATIENT
Start: 2024-12-21 | End: 2024-12-23 | Stop reason: HOSPADM

## 2024-12-20 RX ADMIN — SODIUM CHLORIDE 2097 ML: 9 INJECTION, SOLUTION INTRAVENOUS at 19:41

## 2024-12-20 RX ADMIN — ONDANSETRON 4 MG: 2 INJECTION INTRAMUSCULAR; INTRAVENOUS at 19:15

## 2024-12-20 RX ADMIN — PANTOPRAZOLE SODIUM 40 MG: 40 INJECTION, POWDER, FOR SOLUTION INTRAVENOUS at 19:15

## 2024-12-20 RX ADMIN — HYDROMORPHONE HYDROCHLORIDE 0.5 MG: 1 INJECTION, SOLUTION INTRAMUSCULAR; INTRAVENOUS; SUBCUTANEOUS at 19:15

## 2024-12-21 ENCOUNTER — READMISSION MANAGEMENT (OUTPATIENT)
Dept: CALL CENTER | Facility: HOSPITAL | Age: 69
End: 2024-12-21
Payer: MEDICAID

## 2024-12-21 LAB
ANION GAP SERPL CALCULATED.3IONS-SCNC: 7.5 MMOL/L (ref 5–15)
BASOPHILS # BLD AUTO: 0.04 10*3/MM3 (ref 0–0.2)
BASOPHILS NFR BLD AUTO: 0.7 % (ref 0–1.5)
BUN SERPL-MCNC: 14 MG/DL (ref 8–23)
BUN/CREAT SERPL: 19.7 (ref 7–25)
CALCIUM SPEC-SCNC: 8.6 MG/DL (ref 8.6–10.5)
CHLORIDE SERPL-SCNC: 104 MMOL/L (ref 98–107)
CO2 SERPL-SCNC: 25.5 MMOL/L (ref 22–29)
CREAT SERPL-MCNC: 0.71 MG/DL (ref 0.57–1)
DEPRECATED RDW RBC AUTO: 43.6 FL (ref 37–54)
EGFRCR SERPLBLD CKD-EPI 2021: 92.7 ML/MIN/1.73
EOSINOPHIL # BLD AUTO: 0.11 10*3/MM3 (ref 0–0.4)
EOSINOPHIL NFR BLD AUTO: 1.9 % (ref 0.3–6.2)
ERYTHROCYTE [DISTWIDTH] IN BLOOD BY AUTOMATED COUNT: 13.8 % (ref 12.3–15.4)
GLUCOSE BLDC GLUCOMTR-MCNC: 203 MG/DL (ref 70–105)
GLUCOSE BLDC GLUCOMTR-MCNC: 219 MG/DL (ref 70–105)
GLUCOSE BLDC GLUCOMTR-MCNC: 295 MG/DL (ref 70–105)
GLUCOSE BLDC GLUCOMTR-MCNC: 299 MG/DL (ref 70–105)
GLUCOSE BLDC GLUCOMTR-MCNC: 326 MG/DL (ref 70–105)
GLUCOSE SERPL-MCNC: 267 MG/DL (ref 65–99)
HCT VFR BLD AUTO: 35.4 % (ref 34–46.6)
HGB BLD-MCNC: 11.8 G/DL (ref 12–15.9)
IMM GRANULOCYTES # BLD AUTO: 0.02 10*3/MM3 (ref 0–0.05)
IMM GRANULOCYTES NFR BLD AUTO: 0.3 % (ref 0–0.5)
LYMPHOCYTES # BLD AUTO: 2.23 10*3/MM3 (ref 0.7–3.1)
LYMPHOCYTES NFR BLD AUTO: 37.9 % (ref 19.6–45.3)
MCH RBC QN AUTO: 29.1 PG (ref 26.6–33)
MCHC RBC AUTO-ENTMCNC: 33.3 G/DL (ref 31.5–35.7)
MCV RBC AUTO: 87.2 FL (ref 79–97)
MONOCYTES # BLD AUTO: 0.47 10*3/MM3 (ref 0.1–0.9)
MONOCYTES NFR BLD AUTO: 8 % (ref 5–12)
NEUTROPHILS NFR BLD AUTO: 3.02 10*3/MM3 (ref 1.7–7)
NEUTROPHILS NFR BLD AUTO: 51.2 % (ref 42.7–76)
NRBC BLD AUTO-RTO: 0 /100 WBC (ref 0–0.2)
PLATELET # BLD AUTO: 126 10*3/MM3 (ref 140–450)
PMV BLD AUTO: 12.8 FL (ref 6–12)
POTASSIUM SERPL-SCNC: 3.7 MMOL/L (ref 3.5–5.2)
RBC # BLD AUTO: 4.06 10*6/MM3 (ref 3.77–5.28)
SODIUM SERPL-SCNC: 137 MMOL/L (ref 136–145)
WBC NRBC COR # BLD AUTO: 5.89 10*3/MM3 (ref 3.4–10.8)

## 2024-12-21 PROCEDURE — 63710000001 INSULIN LISPRO (HUMAN) PER 5 UNITS: Performed by: NURSE PRACTITIONER

## 2024-12-21 PROCEDURE — G0378 HOSPITAL OBSERVATION PER HR: HCPCS

## 2024-12-21 PROCEDURE — 82948 REAGENT STRIP/BLOOD GLUCOSE: CPT

## 2024-12-21 PROCEDURE — 80048 BASIC METABOLIC PNL TOTAL CA: CPT | Performed by: NURSE PRACTITIONER

## 2024-12-21 PROCEDURE — 82948 REAGENT STRIP/BLOOD GLUCOSE: CPT | Performed by: NURSE PRACTITIONER

## 2024-12-21 PROCEDURE — 25810000003 SODIUM CHLORIDE 0.9 % SOLUTION: Performed by: NURSE PRACTITIONER

## 2024-12-21 PROCEDURE — 96376 TX/PRO/DX INJ SAME DRUG ADON: CPT

## 2024-12-21 PROCEDURE — 25010000002 ONDANSETRON PER 1 MG: Performed by: NURSE PRACTITIONER

## 2024-12-21 PROCEDURE — 85025 COMPLETE CBC W/AUTO DIFF WBC: CPT | Performed by: NURSE PRACTITIONER

## 2024-12-21 RX ORDER — PANTOPRAZOLE SODIUM 40 MG/1
40 TABLET, DELAYED RELEASE ORAL EVERY 12 HOURS SCHEDULED
Status: DISCONTINUED | OUTPATIENT
Start: 2024-12-21 | End: 2024-12-23 | Stop reason: HOSPADM

## 2024-12-21 RX ORDER — METOCLOPRAMIDE HYDROCHLORIDE 5 MG/ML
10 INJECTION INTRAMUSCULAR; INTRAVENOUS EVERY 6 HOURS
Status: DISCONTINUED | OUTPATIENT
Start: 2024-12-21 | End: 2024-12-21

## 2024-12-21 RX ORDER — PANTOPRAZOLE SODIUM 40 MG/10ML
40 INJECTION, POWDER, LYOPHILIZED, FOR SOLUTION INTRAVENOUS EVERY 12 HOURS SCHEDULED
Status: DISCONTINUED | OUTPATIENT
Start: 2024-12-21 | End: 2024-12-23 | Stop reason: HOSPADM

## 2024-12-21 RX ORDER — GABAPENTIN 300 MG/1
300 CAPSULE ORAL 3 TIMES DAILY
Status: DISCONTINUED | OUTPATIENT
Start: 2024-12-21 | End: 2024-12-23 | Stop reason: HOSPADM

## 2024-12-21 RX ORDER — NICOTINE 21 MG/24HR
1 PATCH, TRANSDERMAL 24 HOURS TRANSDERMAL
Status: DISCONTINUED | OUTPATIENT
Start: 2024-12-21 | End: 2024-12-23 | Stop reason: HOSPADM

## 2024-12-21 RX ORDER — METOCLOPRAMIDE HYDROCHLORIDE 5 MG/ML
5 INJECTION INTRAMUSCULAR; INTRAVENOUS
Status: DISCONTINUED | OUTPATIENT
Start: 2024-12-21 | End: 2024-12-21

## 2024-12-21 RX ORDER — ZOLPIDEM TARTRATE 5 MG/1
5 TABLET ORAL NIGHTLY PRN
Status: DISCONTINUED | OUTPATIENT
Start: 2024-12-21 | End: 2024-12-23 | Stop reason: HOSPADM

## 2024-12-21 RX ADMIN — GABAPENTIN 300 MG: 300 CAPSULE ORAL at 14:14

## 2024-12-21 RX ADMIN — DILTIAZEM HYDROCHLORIDE 240 MG: 240 CAPSULE, EXTENDED RELEASE ORAL at 09:21

## 2024-12-21 RX ADMIN — INSULIN LISPRO 6 UNITS: 100 INJECTION, SOLUTION INTRAVENOUS; SUBCUTANEOUS at 11:40

## 2024-12-21 RX ADMIN — BREXPIPRAZOLE 3 MG: 1 TABLET ORAL at 09:31

## 2024-12-21 RX ADMIN — BACLOFEN 20 MG: 10 TABLET ORAL at 16:02

## 2024-12-21 RX ADMIN — INSULIN LISPRO 7 UNITS: 100 INJECTION, SOLUTION INTRAVENOUS; SUBCUTANEOUS at 21:21

## 2024-12-21 RX ADMIN — GABAPENTIN 300 MG: 300 CAPSULE ORAL at 20:05

## 2024-12-21 RX ADMIN — ZOLPIDEM TARTRATE 5 MG: 5 TABLET ORAL at 20:04

## 2024-12-21 RX ADMIN — FLUOXETINE 20 MG: 20 CAPSULE ORAL at 09:21

## 2024-12-21 RX ADMIN — ZOLPIDEM TARTRATE 5 MG: 5 TABLET ORAL at 00:43

## 2024-12-21 RX ADMIN — INSULIN LISPRO 4 UNITS: 100 INJECTION, SOLUTION INTRAVENOUS; SUBCUTANEOUS at 00:43

## 2024-12-21 RX ADMIN — PANTOPRAZOLE SODIUM 40 MG: 40 TABLET, DELAYED RELEASE ORAL at 20:05

## 2024-12-21 RX ADMIN — APIXABAN 5 MG: 5 TABLET, FILM COATED ORAL at 11:41

## 2024-12-21 RX ADMIN — BACLOFEN 20 MG: 10 TABLET ORAL at 09:31

## 2024-12-21 RX ADMIN — GABAPENTIN 100 MG: 100 CAPSULE ORAL at 09:31

## 2024-12-21 RX ADMIN — BACLOFEN 20 MG: 10 TABLET ORAL at 20:04

## 2024-12-21 RX ADMIN — LISINOPRIL 10 MG: 5 TABLET ORAL at 09:21

## 2024-12-21 RX ADMIN — BUSPIRONE HYDROCHLORIDE 7.5 MG: 15 TABLET ORAL at 20:05

## 2024-12-21 RX ADMIN — APIXABAN 5 MG: 5 TABLET, FILM COATED ORAL at 20:05

## 2024-12-21 RX ADMIN — INSULIN LISPRO 6 UNITS: 100 INJECTION, SOLUTION INTRAVENOUS; SUBCUTANEOUS at 09:20

## 2024-12-21 RX ADMIN — METOPROLOL SUCCINATE 50 MG: 50 TABLET, EXTENDED RELEASE ORAL at 09:21

## 2024-12-21 RX ADMIN — SODIUM CHLORIDE 100 ML/HR: 9 INJECTION, SOLUTION INTRAVENOUS at 00:43

## 2024-12-21 RX ADMIN — INSULIN LISPRO 4 UNITS: 100 INJECTION, SOLUTION INTRAVENOUS; SUBCUTANEOUS at 16:59

## 2024-12-21 RX ADMIN — APIXABAN 5 MG: 5 TABLET, FILM COATED ORAL at 00:43

## 2024-12-21 RX ADMIN — PANTOPRAZOLE SODIUM 40 MG: 40 TABLET, DELAYED RELEASE ORAL at 11:40

## 2024-12-21 RX ADMIN — GABAPENTIN 100 MG: 100 CAPSULE ORAL at 00:43

## 2024-12-21 RX ADMIN — BACLOFEN 20 MG: 10 TABLET ORAL at 00:43

## 2024-12-21 RX ADMIN — INSULIN GLARGINE-YFGN 35 UNITS: 100 INJECTION, SOLUTION SUBCUTANEOUS at 09:21

## 2024-12-21 RX ADMIN — ONDANSETRON 4 MG: 2 INJECTION INTRAMUSCULAR; INTRAVENOUS at 17:00

## 2024-12-21 RX ADMIN — NICOTINE 1 PATCH: 14 PATCH TRANSDERMAL at 10:12

## 2024-12-21 NOTE — ED NOTES
"Nursing report ED to floor  Arianna Blanco  69 y.o.  female    HPI :  HPI  Stated Reason for Visit: Pt c/o N/V with fatigue, denies diarrhea x 1 week. Recently seen in the ED. EMS gave 4mg Zofran & 1L NS in truck, N/V has resolved.  History Obtained From: EMS    Chief Complaint  Chief Complaint   Patient presents with    Nausea    Vomiting     Pt c/o N/V with fatigue, denies diarrhea x 1 week. Recently seen in the ED. EMS gave 4mg Zofran & 1L NS in truck, N/V has resolved.        Admitting doctor:   Carlos Llanes Alvarez, MD    Admitting diagnosis:   The primary encounter diagnosis was Periumbilical abdominal pain. Diagnoses of Type 2 diabetes mellitus with hyperglycemia, unspecified whether long term insulin use and Dehydration were also pertinent to this visit.    Code status:   Current Code Status       Date Active Code Status Order ID Comments User Context       12/20/2024 2234 CPR (Attempt to Resuscitate) [id number]  Cinda Degroot APRN ED        Question Answer    Code Status (Patient has no pulse and is not breathing) CPR (Attempt to Resuscitate)    Medical Interventions (Patient has pulse or is breathing) Full Support                    Allergies:   Codeine    Isolation:   No active isolations    Intake and Output  No intake or output data in the 24 hours ending 12/20/24 2310    Weight:       12/20/24  1837   Weight: 69.9 kg (154 lb 1.6 oz)       Most recent vitals:   Vitals:    12/20/24 1837 12/20/24 1855 12/20/24 1902 12/20/24 2101   BP: 135/65 (!) 137/109 133/56 141/62   Pulse: 71 77 73 66   Resp: 17      Temp: 97.6 °F (36.4 °C)      TempSrc: Oral      SpO2: 97% 97% 98% 96%   Weight: 69.9 kg (154 lb 1.6 oz)      Height: 172.7 cm (68\")          Active LDAs/IV Access:   Lines, Drains & Airways       Active LDAs       Name Placement date Placement time Site Days    Peripheral IV 12/20/24 1848 Anterior;Left Forearm 12/20/24 1848  Forearm  less than 1                    Labs (abnormal labs have a " star):   Labs Reviewed   COMPREHENSIVE METABOLIC PANEL - Abnormal; Notable for the following components:       Result Value    Glucose 221 (*)     BUN/Creatinine Ratio 26.6 (*)     All other components within normal limits    Narrative:     GFR Categories in Chronic Kidney Disease (CKD)      GFR Category          GFR (mL/min/1.73)    Interpretation  G1                     90 or greater         Normal or high (1)  G2                      60-89                Mild decrease (1)  G3a                   45-59                Mild to moderate decrease  G3b                   30-44                Moderate to severe decrease  G4                    15-29                Severe decrease  G5                    14 or less           Kidney failure          (1)In the absence of evidence of kidney disease, neither GFR category G1 or G2 fulfill the criteria for CKD.    eGFR calculation 2021 CKD-EPI creatinine equation, which does not include race as a factor   URINALYSIS W/ CULTURE IF INDICATED - Abnormal; Notable for the following components:    Specific Gravity, UA 1.031 (*)     Glucose, UA >=1000 mg/dL (3+) (*)     Ketones, UA Trace (*)     All other components within normal limits    Narrative:     In absence of clinical symptoms, the presence of pyuria, bacteria, and/or nitrites on the urinalysis result does not correlate with infection.  Urine microscopic not indicated.   CBC WITH AUTO DIFFERENTIAL - Abnormal; Notable for the following components:    Hemoglobin 11.6 (*)     MPV 12.5 (*)     All other components within normal limits   POCT GLUCOSE FINGERSTICK - Abnormal; Notable for the following components:    Glucose 240 (*)     All other components within normal limits   POCT GLUCOSE FINGERSTICK - Abnormal; Notable for the following components:    Glucose 183 (*)     All other components within normal limits   LIPASE - Normal   TSH - Normal   URINE DRUG SCREEN - Normal    Narrative:     Cutoff For Drugs  Screened:    Amphetamines               500 ng/ml  Barbiturates               200 ng/ml  Benzodiazepines            150 ng/ml  Cocaine                    150 ng/ml  Methadone                  200 ng/ml  Opiates                    100 ng/ml  Phencyclidine               25 ng/ml  THC                         50 ng/ml  Methamphetamine            500 ng/ml  Tricyclic Antidepressants  300 ng/ml  Oxycodone                  100 ng/ml  Buprenorphine               10 ng/ml    The normal value for all drugs tested is negative. This report includes unconfirmed screening results, with the cutoff values listed, to be used for medical treatment purposes only.  Unconfirmed results must not be used for non-medical purposes such as employment or legal testing.  Clinical consideration should be applied to any drug of abuse test, particularly when unconfirmed results are used.    All urine drugs of abuse requests without chain of custody are for medical screening purposes only.  False positives are possible.     SCAN SLIDE - Normal    Narrative:     Slide Reviewed     BASIC METABOLIC PANEL   CBC WITH AUTO DIFFERENTIAL   POCT GLUCOSE FINGERSTICK   POCT GLUCOSE FINGERSTICK   POCT GLUCOSE FINGERSTICK   POCT GLUCOSE FINGERSTICK   CBC AND DIFFERENTIAL    Narrative:     The following orders were created for panel order CBC & Differential.  Procedure                               Abnormality         Status                     ---------                               -----------         ------                     CBC Auto Differential[[id number]]        Abnormal            Final result               Scan Slide[[id number]]                   Normal              Final result                 Please view results for these tests on the individual orders.   CBC AND DIFFERENTIAL    Narrative:     The following orders were created for panel order CBC & Differential.  Procedure                               Abnormality         Status                      ---------                               -----------         ------                     CBC Auto Differential[[id number]]                                                         Please view results for these tests on the individual orders.       EKG:   ECG 12 Lead Other; QT for meds    (Results Pending)       Meds given in ED:   Medications   dextrose (GLUTOSE) oral gel 15 g (has no administration in time range)   dextrose (D50W) (25 g/50 mL) IV injection 25 g (has no administration in time range)   glucagon (GLUCAGEN) injection 1 mg (has no administration in time range)   insulin lispro (HUMALOG/ADMELOG) injection 2-9 Units (has no administration in time range)   sodium chloride 0.9 % infusion (has no administration in time range)   ondansetron (ZOFRAN) injection 4 mg (has no administration in time range)   sepsis fluid NS 0.9 % bolus 2,097 mL (2,097 mL Intravenous New Bag 12/20/24 1941)   pantoprazole (PROTONIX) injection 40 mg (40 mg Intravenous Given 12/20/24 1915)   ondansetron (ZOFRAN) injection 4 mg (4 mg Intravenous Given 12/20/24 1915)   HYDROmorphone (DILAUDID) injection 0.5 mg (0.5 mg Intravenous Given 12/20/24 1915)       Imaging results:  CT Abdomen Pelvis Without Contrast    Result Date: 12/20/2024  Impression: 1.Wall thickening of multiple loops of small bowel in the left hemiabdomen, concerning for enteritis. 2.Otherwise no acute abnormality of the abdomen or pelvis. Electronically Signed: Leandro Nguyen MD  12/20/2024 8:07 PM EST  Workstation ID: [id number]     Ambulatory status:   - 1 assist    Social issues:   Social History     Socioeconomic History    Marital status:    Tobacco Use    Smoking status: Every Day     Current packs/day: 1.00     Average packs/day: 1 pack/day for 42.0 years (42.0 ttl pk-yrs)     Types: Cigarettes     Start date: 1983     Passive exposure: Current    Smokeless tobacco: Never   Vaping Use    Vaping status: Some Days    Substances: Nicotine, Flavoring    Devices:  Refillable tank   Substance and Sexual Activity    Alcohol use: No    Drug use: Never    Sexual activity: Defer       Peripheral Neurovascular  Peripheral Neurovascular (Adult)  Peripheral Neurovascular WDL: .WDL except, neurovascular assessment lower  LLE Neurovascular Assessment  Temperature LLE: warm  Color LLE: no discoloration  Sensation LLE: tingling present  RLE Neurovascular Assessment  Temperature RLE: warm  Color RLE: no discoloration  Sensation RLE: tingling present    Neuro Cognitive  Neuro Cognitive (Adult)  Cognitive/Neuro/Behavioral WDL: WDL  Additional Documentation: [place] Coma Scale (Group)  [place] Coma Scale  Best Eye Response: 4-->(E4) spontaneous  Best Motor Response: 6-->(M6) obeys commands  Best Verbal Response: 5-->(V5) oriented  [place] Coma Scale Score: 15    Learning       Respiratory  Respiratory WDL  Respiratory WDL: WDL    Abdominal Pain       Pain Assessments  Pain (Adult)  (0-10) Pain Rating: Rest: 8  (0-10) Pain Rating: Activity: 0    NIH Stroke Scale       Dillon Johnson RN  12/20/24 23:10 EST

## 2024-12-21 NOTE — ED PROVIDER NOTES
Subjective   History of Present Illness  69-year-old female just recently discharged from the hospital returns with the onset of multiple episodes of vomiting.  She states has vomited at least 6 times today.  She states that she has had diarrhea today.  She reports subjective fever as well as chills.  She reports that she has had a crampy central abdominal pain, she reports her blood sugars have been running high since discharge from the hospital.  She reports that her urine has seemed concentrated.  She denies melena hematemesis or hematochezia      Review of Systems   Constitutional:  Positive for chills, fatigue and fever.   HENT:  Negative for trouble swallowing.    Gastrointestinal:  Positive for abdominal pain, diarrhea, nausea and vomiting.   Endocrine: Positive for polydipsia and polyuria.   Genitourinary:  Negative for flank pain.   All other systems reviewed and are negative.      Past Medical History:   Diagnosis Date    COPD (chronic obstructive pulmonary disease)     Depression     Diabetes mellitus     Hyperlipidemia     Hypertension        Allergies   Allergen Reactions    Codeine GI Intolerance       Past Surgical History:   Procedure Laterality Date    CARDIAC CATHETERIZATION Left 10/30/2020    Procedure: Left Heart Cath with Coronary Angiography;  Surgeon: Donaldo Archer MD;  Location: [facility] CATH INVASIVE LOCATION;  Service: Cardiovascular;  Laterality: Left;    CARDIAC ELECTROPHYSIOLOGY PROCEDURE Right 8/27/2024    Procedure: Ablation atrial fibrillation;  Surgeon: Shay Santos MD;  Location: [facility] CATH INVASIVE LOCATION;  Service: Cardiovascular;  Laterality: Right;    CARPAL TUNNEL RELEASE Bilateral 2017    CHOLECYSTECTOMY  1980    HYSTERECTOMY  1980       Family History   Problem Relation Age of Onset    Heart disease Mother     Hypertension Mother     Diabetes Mother     Stroke Mother        Social History     Socioeconomic History    Marital status:    Tobacco Use     Smoking status: Every Day     Current packs/day: 1.00     Average packs/day: 1 pack/day for 42.0 years (42.0 ttl pk-yrs)     Types: Cigarettes     Start date: 1983     Passive exposure: Current    Smokeless tobacco: Never   Vaping Use    Vaping status: Some Days    Substances: Nicotine, Flavoring    Devices: Refillable tank   Substance and Sexual Activity    Alcohol use: No    Drug use: Never    Sexual activity: Defer     States that she lives alone.  No reported safety issues.  No unusual food water travel activity since recent discharge      Objective   Physical Exam  Alert [place] Coma Scale 15   HEENT: Pupils equal and reactive to light. Conjunctivae are not injected. Normal tympanic membranes. Oropharynx and nares are normal.   Neck: Supple. Midline trachea. No JVD. No goiter.   Chest: Clear and equal breath sounds bilaterally, regular rate and rhythm without murmur or rub.   Abdomen: Positive bowel sounds, diffuse periumbilical tenderness is noted, nondistended. No rebound or peritoneal signs. No CVA tenderness.  Negative Rajan sign  Extremities no clubbing. cyanosis or edema. Motor sensory exam is normal. The full range of motion is intact   Skin: Warm and dry, no rashes or petechia.   Lymphatic: No regional lymphadenopathy. No calf pain, swelling or Homans sign    Procedures           ED Course                                             Labs Reviewed   COMPREHENSIVE METABOLIC PANEL - Abnormal; Notable for the following components:       Result Value    Glucose 221 (*)     BUN/Creatinine Ratio 26.6 (*)     All other components within normal limits    Narrative:     GFR Categories in Chronic Kidney Disease (CKD)      GFR Category          GFR (mL/min/1.73)    Interpretation  G1                     90 or greater         Normal or high (1)  G2                      60-89                Mild decrease (1)  G3a                   45-59                Mild to moderate decrease  G3b                   30-44                 Moderate to severe decrease  G4                    15-29                Severe decrease  G5                    14 or less           Kidney failure          (1)In the absence of evidence of kidney disease, neither GFR category G1 or G2 fulfill the criteria for CKD.    eGFR calculation 2021 CKD-EPI creatinine equation, which does not include race as a factor   URINALYSIS W/ CULTURE IF INDICATED - Abnormal; Notable for the following components:    Specific Gravity, UA 1.031 (*)     Glucose, UA >=1000 mg/dL (3+) (*)     Ketones, UA Trace (*)     All other components within normal limits    Narrative:     In absence of clinical symptoms, the presence of pyuria, bacteria, and/or nitrites on the urinalysis result does not correlate with infection.  Urine microscopic not indicated.   CBC WITH AUTO DIFFERENTIAL - Abnormal; Notable for the following components:    Hemoglobin 11.6 (*)     MPV 12.5 (*)     All other components within normal limits   POCT GLUCOSE FINGERSTICK - Abnormal; Notable for the following components:    Glucose 240 (*)     All other components within normal limits   POCT GLUCOSE FINGERSTICK - Abnormal; Notable for the following components:    Glucose 183 (*)     All other components within normal limits   LIPASE - Normal   TSH - Normal   URINE DRUG SCREEN - Normal    Narrative:     Cutoff For Drugs Screened:    Amphetamines               500 ng/ml  Barbiturates               200 ng/ml  Benzodiazepines            150 ng/ml  Cocaine                    150 ng/ml  Methadone                  200 ng/ml  Opiates                    100 ng/ml  Phencyclidine               25 ng/ml  THC                         50 ng/ml  Methamphetamine            500 ng/ml  Tricyclic Antidepressants  300 ng/ml  Oxycodone                  100 ng/ml  Buprenorphine               10 ng/ml    The normal value for all drugs tested is negative. This report includes unconfirmed screening results, with the cutoff values listed, to be  used for medical treatment purposes only.  Unconfirmed results must not be used for non-medical purposes such as employment or legal testing.  Clinical consideration should be applied to any drug of abuse test, particularly when unconfirmed results are used.    All urine drugs of abuse requests without chain of custody are for medical screening purposes only.  False positives are possible.     SCAN SLIDE - Normal    Narrative:     Slide Reviewed     CBC AND DIFFERENTIAL    Narrative:     The following orders were created for panel order CBC & Differential.  Procedure                               Abnormality         Status                     ---------                               -----------         ------                     CBC Auto Differential[[id number]]        Abnormal            Final result               Scan Slide[[id number]]                   Normal              Final result                 Please view results for these tests on the individual orders.     .EDS  CT Abdomen Pelvis Without Contrast    Result Date: 12/20/2024  Impression: 1.Wall thickening of multiple loops of small bowel in the left hemiabdomen, concerning for enteritis. 2.Otherwise no acute abnormality of the abdomen or pelvis. Electronically Signed: Leandro Nguyen MD  12/20/2024 8:07 PM EST  Workstation ID: [id number]             Medical Decision Making  Patient felt better with IV fluids Emergency Department as well as medication for discomfort and nausea.  Also given Zofran prehospital the patient reports that she does not feel as if she can adequately care for herself at home.  The case discussed with the hospitalist service the patient will be admitted the patient may require case management    Amount and/or Complexity of Data Reviewed  Independent Historian: EMS  Labs: ordered. Decision-making details documented in ED Course.  Radiology: ordered and independent interpretation performed.    Risk  OTC drugs.  Prescription drug  management.  Parenteral controlled substances.  Decision regarding hospitalization.        Final diagnoses:   Periumbilical abdominal pain   Type 2 diabetes mellitus with hyperglycemia, unspecified whether long term insulin use   Dehydration       ED Disposition  ED Disposition       ED Disposition   Decision to Admit    Condition   --    Comment   --               No follow-up provider specified.       Medication List      No changes were made to your prescriptions during this visit.            Arun Smith MD  12/20/24 6414

## 2024-12-21 NOTE — PROGRESS NOTES
[facility] MEDICINE SERVICE  DAILY PROGRESS NOTE    NAME: Arianna Blanco  : 1955  MRN: [id number]      LOS: 0 days     PROVIDER OF SERVICE: Awilda Cassidy MD    Chief Complaint: Intractable nausea and vomiting    Subjective:     Interval History:  History taken from: patient    Patient states that she feels slightly better this morning.  She endorses mild nausea.  She has not had any emesis episodes while inpatient.  She remains on IV fluids.  She remains afebrile as well.        Review of Systems:   Review of Systems   All other systems reviewed and are negative.      Objective:     Vital Signs  Temp:  [97.5 °F (36.4 °C)-97.6 °F (36.4 °C)] 97.5 °F (36.4 °C)  Heart Rate:  [64-77] 64  Resp:  [15-18] 15  BP: (133-161)/() 161/66   Body mass index is 24.27 kg/m².    Physical Exam  Physical Exam  Constitutional:       General: She is awake.      Appearance: She is well-developed and well-groomed. She is ill-appearing.   HENT:      Head: Normocephalic and atraumatic.      Nose: Nose normal.      Mouth/Throat:      Mouth: Mucous membranes are moist.      Pharynx: Oropharynx is clear.   Eyes:      Extraocular Movements: Extraocular movements intact.      Conjunctiva/sclera: Conjunctivae normal.      Pupils: Pupils are equal, round, and reactive to light.   Cardiovascular:      Rate and Rhythm: Normal rate and regular rhythm.      Pulses: Normal pulses.      Heart sounds: Normal heart sounds.   Pulmonary:      Effort: Pulmonary effort is normal.      Breath sounds: Normal breath sounds.   Abdominal:      General: Abdomen is flat. Bowel sounds are normal.      Palpations: Abdomen is soft.      Tenderness: There is abdominal tenderness.   Musculoskeletal:         General: Normal range of motion.      Cervical back: Normal range of motion and neck supple.      Right lower leg: No edema.      Left lower leg: No edema.   Skin:     General: Skin is warm and dry.   Neurological:      General: No focal  deficit present.      Mental Status: She is alert and oriented to person, place, and time. Mental status is at baseline.      Cranial Nerves: Cranial nerves 2-12 are intact.      Sensory: Sensation is intact.      Motor: Motor function is intact.   Psychiatric:         Mood and Affect: Mood normal.         Behavior: Behavior normal. Behavior is cooperative.         Thought Content: Thought content normal.         Judgment: Judgment normal.            Diagnostic Data    Results from last 7 days   Lab Units 12/21/24  0202 12/20/24  1919   WBC 10*3/mm3 5.89 8.16   HEMOGLOBIN g/dL 11.8* 11.6*   HEMATOCRIT % 35.4 34.7   PLATELETS 10*3/mm3 126* 140   GLUCOSE mg/dL 267* 221*   CREATININE mg/dL 0.71 0.64   BUN mg/dL 14 17   SODIUM mmol/L 137 138   POTASSIUM mmol/L 3.7 4.2   AST (SGOT) U/L  --  28   ALT (SGPT) U/L  --  22   ALK PHOS U/L  --  112   BILIRUBIN mg/dL  --  <0.2   ANION GAP mmol/L 7.5 9.9       CT Abdomen Pelvis Without Contrast    Result Date: 12/20/2024  Impression: 1.Wall thickening of multiple loops of small bowel in the left hemiabdomen, concerning for enteritis. 2.Otherwise no acute abnormality of the abdomen or pelvis. Electronically Signed: Leandro Nguyen MD  12/20/2024 8:07 PM EST  Workstation ID: [id number]       I reviewed the patient's new clinical results.    Assessment/Plan:     Active and Resolved Problems  Active Hospital Problems    Diagnosis  POA    **Intractable nausea and vomiting [R11.2]  Yes    Nausea and vomiting [R11.2]  Yes    Chronic pain [G89.29]  Yes    Paroxysmal atrial fibrillation [I48.0]  Yes    Chronic obstructive pulmonary disease [J44.9]  Yes    Hypercholesteremia [E78.00]  Yes    Uncontrolled diabetes mellitus with hyperglycemia [E11.65]  Yes    Anxiety associated with depression [F41.8]  Yes    Essential hypertension [I10]  Yes    Dyslipidemia [E78.5]  Yes      Resolved Hospital Problems   No resolved problems to display.     Diabetes type 2, poorly controlled  Probable diabetic  gastroparesis  Enteritis as per CT imaging  Intractable nausea vomiting  COPD, with ongoing tobacco abuse  Malnutrition  Failure to thrive  Chronic deconditioning  Noncompliance of medications due to his lack of social support  Proximal atrial fibrillation currently on chronic anticoagulation  Hyperlipidemia  Chronic low back pain  Generalized anxiety disorder  Major depressive disorder      Reviewed plan of care with patient.  Noted no further need for antibiotics at the present time.  Patient has intractable nausea and vomiting likely secondary to underlying diabetic gastroparesis.  She was started on Ozempic as an outpatient several weeks ago however has not taken this medication for a while due to issues with pharmacy.  She in fact has recurring issues with her pharmacy in regards to receiving her insulin regimen as well as other routine medications and sometimes goes many days without these medications.  Will consult case management as well as  to assist.  Will order gastric emptying scan for now.    If gastric emptying scan can be done today we will start patient on IV Reglan.  If unable to do so we will trial Reglan through weekend and patient may follow-up as an outpatient for ongoing management.  Secondary to abnormal CT imaging results patient likely will require outpatient gastroenterology evaluation.  She does have a prior history of colon polyps as well.  She also endorses an approximate 30 pound weight loss over the past 1 to 2 months.  Unclear if this is secondary to poorly controlled diabetes versus underlying malignancy.    Discontinue IV fluids.          VTE Prophylaxis:  Pharmacologic VTE prophylaxis orders are present.             Disposition Planning:     Barriers to Discharge: Improvement of nausea vomiting, stability of blood sugars, ability to receive medications while at home, lack of social support  Anticipated Date of Discharge: 12/23/2024  Place of Discharge: Home      Time:  35 minutes     Code Status and Medical Interventions: CPR (Attempt to Resuscitate); Full Support   Ordered at: 12/20/24 9110     Code Status (Patient has no pulse and is not breathing):    CPR (Attempt to Resuscitate)     Medical Interventions (Patient has pulse or is breathing):    Full Support       Signature: Electronically signed by Awilda Cassidy MD, 12/21/24, 11:17 EST.  [facility]

## 2024-12-21 NOTE — NURSING NOTE
Nurse secure chat MD to verify what procedure will take place on 12/22. Pt placed NPO at midnight. Pt educated.

## 2024-12-21 NOTE — PLAN OF CARE
Goal Outcome Evaluation:  Plan of Care Reviewed With: patient        Progress: improving  Outcome Evaluation: Patient was a new admit over night. Admitted for nausea and vomiting. Has not had any episodes of vomiting for me. Has been resting in bed. Diabetes educator consult placed r/t blood sugars running > 200. IVF infusing. CT abdomen showed possible eneteritis. Will treat nausea with PRN Zofran if needed.

## 2024-12-21 NOTE — H&P
[facility]  History & Physical    Patient Name: Arianna Blanco  : 1955  MRN: [id number]  Primary Care Physician:  Maddie Cornejo APRN  Date of admission: 2024  Date and Time of Service: 2024 at 2245    Subjective      Chief Complaint: nausea and vomiting     History of Present Illness: Arianna Blanco is a 69 y.o. female with a CMH of insulin-dependent diabetes mellitus type 2, poorly controlled, COPD with continued tobacco use, denies home oxygen use, physical deconditioning, hypertension, known right adrenal mass known right renal mass, anxiety depression, chronic lumbar back pain sees pain management, paroxsymal  atrial fibrillation on anticoagulation, hyperlipidemia, who presented to [facility] on 2024 with complaints of intractable nausea and vomiting which she reports she has had since discharged from here 24. She reports some diarrhea today without melena, hematochezia, denies hematemesis , abdominal pain , chest pain, dyspnea or fever  Review of records shows she was just admitted here from 2024 to 2024 for type 2 diabetes mellitus with hyperosmolar hyperglycemic state generalized weakness.  She is afebrile here and vital signs are stable, labs today show glucose of 221, WBC not elevated, hemoglobin 11.6, was 12.12 days ago, urinalysis negative for infection, CT abdomen and pelvis per radiology showed wall thickening of multiple loops of small bowel in the left hemiabdomen concerning for enteritis otherwise no acute abnormality.  WBC not elevated afebrile, no antibiotics indicated at this time patient was given a 30 cc/kg fluid bolus in ED for dehydration and hyperglycemia.  Diabetes educator has been consulted for hyperglycemia.  Case management has been consulted for frequent admissions.  Patient states she lives alone and has no one to help her.    Review of Systems   Constitutional: Negative.    HENT:  Negative.     Eyes: Negative.    Respiratory: Negative.     Cardiovascular: Negative.    Gastrointestinal:  Positive for diarrhea, nausea and vomiting.   Endocrine: Negative.    Genitourinary: Negative.    Musculoskeletal:  Positive for back pain.   Skin: Negative.    Allergic/Immunologic: Negative.    Neurological:  Positive for weakness.   Hematological: Negative.    Psychiatric/Behavioral:  The patient is nervous/anxious.    All other systems reviewed and are negative.      Personal History     Past Medical History:   Diagnosis Date    COPD (chronic obstructive pulmonary disease)     Depression     Diabetes mellitus     Hyperlipidemia     Hypertension        Past Surgical History:   Procedure Laterality Date    CARDIAC CATHETERIZATION Left 10/30/2020    Procedure: Left Heart Cath with Coronary Angiography;  Surgeon: Donaldo Archer MD;  Location: [facility] CATH INVASIVE LOCATION;  Service: Cardiovascular;  Laterality: Left;    CARDIAC ELECTROPHYSIOLOGY PROCEDURE Right 8/27/2024    Procedure: Ablation atrial fibrillation;  Surgeon: Shay Snatos MD;  Location: [facility] CATH INVASIVE LOCATION;  Service: Cardiovascular;  Laterality: Right;    CARPAL TUNNEL RELEASE Bilateral 2017    CHOLECYSTECTOMY  1980    HYSTERECTOMY  1980       Family History: family history includes Diabetes in her mother; Heart disease in her mother; Hypertension in her mother; Stroke in her mother. Otherwise pertinent FHx was reviewed and not pertinent to current issue.    Social History:  reports that she has been smoking cigarettes. She started smoking about 41 years ago. She has a 42 pack-year smoking history. She has been exposed to tobacco smoke. She has never used smokeless tobacco. She reports that she does not drink alcohol and does not use drugs.    Home Medications:  Prior to Admission Medications       Prescriptions Last Dose Informant Patient Reported? Taking?    albuterol sulfate  (90 Base) MCG/ACT inhaler   No No     INHALE 2 PUFFS BY MOUTH EVERY 4 HOURS AS NEEDED FOR WHEEZING OR SHORTNESS OF AIR    baclofen (LIORESAL) 20 MG tablet   No No    Take 1 tablet by mouth 3 (Three) Times a Day.    Brexpiprazole (Rexulti) 3 MG tablet   Yes No    Take 1 tablet by mouth Daily.    busPIRone (BUSPAR) 7.5 MG tablet   No No    Take 1 tablet by mouth At Night As Needed (anxiety).    butalbital-acetaminophen  MG tablet tablet   No No    TAKE 1 TABLET BY MOUTH EVERY 6 (SIX) HOURS AS NEEDED (HEADACHE).    dilTIAZem CD (CARDIZEM CD) 240 MG 24 hr capsule   Yes No    Take 1 capsule by mouth Daily.    Eliquis 5 MG tablet tablet   No No    TAKE 1 TABLET BY MOUTH EVERY 12 HOURS    FLUoxetine (PROzac) 20 MG capsule   No No    Take 1 capsule by mouth Daily.    gabapentin (NEURONTIN) 100 MG capsule   No No    Take 1 capsule by mouth 3 (Three) Times a Day for 5 days.    Insulin Glargine (LANTUS SOLOSTAR) 100 UNIT/ML injection pen   No No    Inject 50 Units under the skin into the appropriate area as directed Daily for 30 days.    Insulin Glulisine (Apidra SoloStar) 100 UNIT/ML solution pen-injector   No No    Inject 0.1 mL under the skin into the appropriate area as directed 3 (Three) Times a Day. Take within 15 before or within 20 minutes of starting a meal.    lisinopril (PRINIVIL,ZESTRIL) 10 MG tablet   No No    Take 1 tablet by mouth Daily.    metFORMIN (GLUCOPHAGE) 1000 MG tablet   No No    Take 1 tablet by mouth 2 (Two) Times a Day With Meals.    metoprolol succinate XL (TOPROL-XL) 50 MG 24 hr tablet   No No    Take 1 tablet by mouth Daily.    zolpidem (AMBIEN) 10 MG tablet   No No    Take 1 tablet by mouth At Night As Needed for Sleep.              Allergies:  Allergies   Allergen Reactions    Codeine GI Intolerance       Objective      Vitals:   Temp:  [97.6 °F (36.4 °C)] 97.6 °F (36.4 °C)  Heart Rate:  [66-77] 66  Resp:  [17] 17  BP: (133-141)/() 141/62  Body mass index is 23.43 kg/m².  Physical Exam  Vitals reviewed.   Constitutional:        Appearance: Normal appearance. She is normal weight.   HENT:      Head: Normocephalic and atraumatic.      Right Ear: External ear normal.      Left Ear: External ear normal.      Nose: Nose normal.      Mouth/Throat:      Mouth: Mucous membranes are moist.   Eyes:      Extraocular Movements: Extraocular movements intact.   Cardiovascular:      Rate and Rhythm: Normal rate and regular rhythm.      Pulses: Normal pulses.      Heart sounds: Normal heart sounds.   Pulmonary:      Effort: Pulmonary effort is normal.      Breath sounds: Normal breath sounds.   Abdominal:      Palpations: Abdomen is soft.   Genitourinary:     Comments: deferred  Musculoskeletal:         General: Normal range of motion.      Cervical back: Normal range of motion and neck supple.   Skin:     General: Skin is warm and dry.   Neurological:      General: No focal deficit present.      Mental Status: She is alert and oriented to person, place, and time.   Psychiatric:         Mood and Affect: Mood normal.         Behavior: Behavior normal.         Thought Content: Thought content normal.         Judgment: Judgment normal.         Diagnostic Data:  Lab Results (last 24 hours)       Procedure Component Value Units Date/Time    Urine Drug Screen - Urine, Clean Catch [[id number]]  (Normal) Collected: 12/20/24 1958    Specimen: Urine, Clean Catch Updated: 12/20/24 2028     THC, Screen, Urine Negative     Phencyclidine (PCP), Urine Negative     Cocaine Screen, Urine Negative     Methamphetamine, Ur Negative     Opiate Screen Negative     Amphetamine Screen, Urine Negative     Benzodiazepine Screen, Urine Negative     Tricyclic Antidepressants Screen Negative     Methadone Screen, Urine Negative     Barbiturates Screen, Urine Negative     Oxycodone Screen, Urine Negative     Buprenorphine, Screen, Urine Negative    Narrative:      Cutoff For Drugs Screened:    Amphetamines               500 ng/ml  Barbiturates               200  ng/ml  Benzodiazepines            150 ng/ml  Cocaine                    150 ng/ml  Methadone                  200 ng/ml  Opiates                    100 ng/ml  Phencyclidine               25 ng/ml  THC                         50 ng/ml  Methamphetamine            500 ng/ml  Tricyclic Antidepressants  300 ng/ml  Oxycodone                  100 ng/ml  Buprenorphine               10 ng/ml    The normal value for all drugs tested is negative. This report includes unconfirmed screening results, with the cutoff values listed, to be used for medical treatment purposes only.  Unconfirmed results must not be used for non-medical purposes such as employment or legal testing.  Clinical consideration should be applied to any drug of abuse test, particularly when unconfirmed results are used.    All urine drugs of abuse requests without chain of custody are for medical screening purposes only.  False positives are possible.      Urinalysis With Culture If Indicated - Urine, Clean Catch [[id number]]  (Abnormal) Collected: 12/20/24 1958    Specimen: Urine, Clean Catch Updated: 12/20/24 2005     Color, UA Yellow     Appearance, UA Clear     pH, UA 6.5     Specific Gravity, UA 1.031     Glucose, UA >=1000 mg/dL (3+)     Ketones, UA Trace     Bilirubin, UA Negative     Blood, UA Negative     Protein, UA Negative     Leuk Esterase, UA Negative     Nitrite, UA Negative     Urobilinogen, UA 1.0 E.U./dL    Narrative:      In absence of clinical symptoms, the presence of pyuria, bacteria, and/or nitrites on the urinalysis result does not correlate with infection.  Urine microscopic not indicated.    POC Glucose Once [[id number]]  (Abnormal) Collected: 12/1956    Specimen: Blood Updated: 12/20/24 1959     Glucose 183 mg/dL      Comment: Serial Number: [id number]:  [id number]       Comprehensive Metabolic Panel [[id number]]  (Abnormal) Collected: 12/20/24 1919    Specimen: Blood from Arm, Left Updated: 12/20/24 1958     Glucose 221  mg/dL      BUN 17 mg/dL      Creatinine 0.64 mg/dL      Sodium 138 mmol/L      Potassium 4.2 mmol/L      Chloride 104 mmol/L      CO2 24.1 mmol/L      Calcium 8.9 mg/dL      Total Protein 6.0 g/dL      Albumin 3.7 g/dL      ALT (SGPT) 22 U/L      AST (SGOT) 28 U/L      Alkaline Phosphatase 112 U/L      Total Bilirubin <0.2 mg/dL      Globulin 2.3 gm/dL      A/G Ratio 1.6 g/dL      BUN/Creatinine Ratio 26.6     Anion Gap 9.9 mmol/L      eGFR 96.4 mL/min/1.73     Narrative:      GFR Categories in Chronic Kidney Disease (CKD)      GFR Category          GFR (mL/min/1.73)    Interpretation  G1                     90 or greater         Normal or high (1)  G2                      60-89                Mild decrease (1)  G3a                   45-59                Mild to moderate decrease  G3b                   30-44                Moderate to severe decrease  G4                    15-29                Severe decrease  G5                    14 or less           Kidney failure          (1)In the absence of evidence of kidney disease, neither GFR category G1 or G2 fulfill the criteria for CKD.    eGFR calculation 2021 CKD-EPI creatinine equation, which does not include race as a factor    Lipase [[id number]]  (Normal) Collected: 12/20/24 1919    Specimen: Blood from Arm, Left Updated: 12/20/24 1958     Lipase 45 U/L     TSH [[id number]]  (Normal) Collected: 12/20/24 1919    Specimen: Blood from Arm, Left Updated: 12/20/24 1958     TSH 0.939 uIU/mL     CBC & Differential [[id number]]  (Abnormal) Collected: 12/20/24 1919    Specimen: Blood from Arm, Left Updated: 12/20/24 1952    Narrative:      The following orders were created for panel order CBC & Differential.  Procedure                               Abnormality         Status                     ---------                               -----------         ------                     CBC Auto Differential[[id number]]        Abnormal            Final result               Scan  Slide[[id number]]                   Normal              Final result                 Please view results for these tests on the individual orders.    CBC Auto Differential [[id number]]  (Abnormal) Collected: 12/20/24 1919    Specimen: Blood from Arm, Left Updated: 12/20/24 1952     WBC 8.16 10*3/mm3      RBC 4.04 10*6/mm3      Hemoglobin 11.6 g/dL      Hematocrit 34.7 %      MCV 85.9 fL      MCH 28.7 pg      MCHC 33.4 g/dL      RDW 13.7 %      RDW-SD 43.1 fl      MPV 12.5 fL      Platelets 140 10*3/mm3      Neutrophil % 54.9 %      Lymphocyte % 33.6 %      Monocyte % 8.6 %      Eosinophil % 2.0 %      Basophil % 0.5 %      Immature Grans % 0.4 %      Neutrophils, Absolute 4.49 10*3/mm3      Lymphocytes, Absolute 2.74 10*3/mm3      Monocytes, Absolute 0.70 10*3/mm3      Eosinophils, Absolute 0.16 10*3/mm3      Basophils, Absolute 0.04 10*3/mm3      Immature Grans, Absolute 0.03 10*3/mm3      nRBC 0.0 /100 WBC     Scan Slide [[id number]]  (Normal) Collected: 12/20/24 1919    Specimen: Blood from Arm, Left Updated: 12/20/24 1952     RBC Morphology Normal     WBC Morphology Normal     Platelet Morphology Normal    Narrative:      Slide Reviewed      POC Glucose Once [[id number]]  (Abnormal) Collected: 12/20/24 1839    Specimen: Blood Updated: 12/20/24 1841     Glucose 240 mg/dL      Comment: Serial Number: [id number]:  [id number]                Imaging Results (Last 24 Hours)       Procedure Component Value Units Date/Time    CT Abdomen Pelvis Without Contrast [[id number]] Collected: 12/20/24 2001     Updated: 12/20/24 2009    Narrative:      CT ABDOMEN PELVIS WO CONTRAST    Date of Exam: 12/20/2024 7:22 PM EST    Indication: Left upper quadrant pain persistent vomiting.    Comparison: 9/29/2023 CT    Technique: Axial CT images were obtained of the abdomen and pelvis without the administration of contrast. Sagittal and coronal reconstructions were performed.  Automated exposure control and iterative reconstruction  methods were used.      Findings:  The included lung bases show no acute abnormality.    Mild hepatic steatosis. Cholecystectomy. Unchanged mild biliary ductal dilation which is likely secondary to reservoir effect.    Spleen and pancreas are unchanged. Adrenal glands are unchanged with a stable lipid rich right adrenal gland adenoma.    No hydronephrosis or nephrolithiasis.    No evidence of bowel obstruction. There is moderate colonic stool burden. There is wall thickening of multiple loops of small bowel in the left hemiabdomen. There are also gas fluid levels throughout the small bowel indicative of stasis.    No suspicious lymphadenopathy. No abdominal aortic aneurysm.    Urinary bladder is unremarkable. Hysterectomy.    Abdominal and pelvic wall soft tissues show no acute abnormality. No acute fracture or suspicious bone lesion.      Impression:      Impression:  1.Wall thickening of multiple loops of small bowel in the left hemiabdomen, concerning for enteritis.  2.Otherwise no acute abnormality of the abdomen or pelvis.        Electronically Signed: Leandro Nguyen MD    12/20/2024 8:07 PM EST    Workstation ID: [id number]              Assessment & Plan        This is a 69 y.o. female with:    Active and Resolved Problems  Active Hospital Problems    Diagnosis  POA    **Intractable nausea and vomiting [R11.2]  Yes    Uncontrolled diabetes mellitus with hyperglycemia [E11.65]  Yes     Priority: High    Nausea and vomiting [R11.2]  Yes     Priority: Medium    Chronic pain [G89.29]  Yes    Paroxysmal atrial fibrillation [I48.0]  Yes    Chronic obstructive pulmonary disease [J44.9]  Yes    Hypercholesteremia [E78.00]  Yes    Anxiety associated with depression [F41.8]  Yes    Essential hypertension [I10]  Yes    Dyslipidemia [E78.5]  Yes      Resolved Hospital Problems   No resolved problems to display.       Intractable nausea and vomiting, diarrhea afebrile WBC not elevated  enteritis per CT report likely viral,  antibiotics not indicated at this time, 30 cc/kg fluid bolus in ED, normal saline at 100 cc/h, antiemetics as needed    Uncontrolled diabetes mellitus with hyperglycemia serum glucose 240, reports compliance, diabetes educator consulted, home meds unverified at this time reorder pending verification pharmacy, consistent carb heart healthy diet, add SSI as needed with Accu-Cheks ACHS normal saline at 100 cc/h last A1c was 12.1 on December 16, 2024, case management consulted for help at home lives alone frequent admissions    Chronic pain, sees pain management, home meds verified at this time reorder pending verification pharmacy if appropriate    Paroxysmal atrial fibrillation, EKG ordered and pending, heart rate 66 per monitor sinus rhythm, was on home Eliquis, one-time dose ordered until meds verified and reordered if appropriate continuous cardiac monitoring    COPD, stable on room air with continued tobacco use, encourage cessation, DuoNebs every 4 hours as needed Home meds unverified at this time reorder pending verification pharmacy and if appropriate    Hypercholesteremia, on home statin reorder once verified and if appropriate    Anxiety associated with depression, home meds unverified at this time reorder pending verification pharmacy if appropriate    Essential hypertension, home meds unverified at this time reorder pending verification pharmacy and if appropriate monitor BP will add as needed antihypertensives if needed    VTE Prophylaxis:  No VTE prophylaxis order currently exists.        The patient desires to be as follows:    CODE STATUS:    Code Status (Patient has no pulse and is not breathing): CPR (Attempt to Resuscitate)  Medical Interventions (Patient has pulse or is breathing): Full Support          Admission Status:  I believe this patient meets observation status.    Expected Length of Stay: pending clinical course     PDMP and Medication Dispenses via Sidebar reviewed and consistent with  patient reported medications.    I discussed the patient's findings and my recommendations with patient.      Signature:     This document has been electronically signed by THOMAS Madrid on December 20, 2024 23:37 [facility] Team

## 2024-12-21 NOTE — PLAN OF CARE
Goal Outcome Evaluation:   Pt alert and orient x four makes needs made verbally. Pt complaint with plan of care at this time. Pt c/o nerve pain to legs. Attending notified. See MAR.

## 2024-12-22 ENCOUNTER — APPOINTMENT (OUTPATIENT)
Dept: NUCLEAR MEDICINE | Facility: HOSPITAL | Age: 69
End: 2024-12-22
Payer: MEDICAID

## 2024-12-22 LAB
ALBUMIN SERPL-MCNC: 3.6 G/DL (ref 3.5–5.2)
ALBUMIN/GLOB SERPL: 1.5 G/DL
ALP SERPL-CCNC: 93 U/L (ref 39–117)
ALT SERPL W P-5'-P-CCNC: 29 U/L (ref 1–33)
ANION GAP SERPL CALCULATED.3IONS-SCNC: 8.3 MMOL/L (ref 5–15)
AST SERPL-CCNC: 34 U/L (ref 1–32)
BASOPHILS # BLD AUTO: 0.03 10*3/MM3 (ref 0–0.2)
BASOPHILS NFR BLD AUTO: 0.5 % (ref 0–1.5)
BILIRUB SERPL-MCNC: 0.2 MG/DL (ref 0–1.2)
BUN SERPL-MCNC: 12 MG/DL (ref 8–23)
BUN/CREAT SERPL: 16 (ref 7–25)
CALCIUM SPEC-SCNC: 9.3 MG/DL (ref 8.6–10.5)
CHLORIDE SERPL-SCNC: 103 MMOL/L (ref 98–107)
CO2 SERPL-SCNC: 26.7 MMOL/L (ref 22–29)
CREAT SERPL-MCNC: 0.75 MG/DL (ref 0.57–1)
DEPRECATED RDW RBC AUTO: 45.7 FL (ref 37–54)
EGFRCR SERPLBLD CKD-EPI 2021: 86.8 ML/MIN/1.73
EOSINOPHIL # BLD AUTO: 0.13 10*3/MM3 (ref 0–0.4)
EOSINOPHIL NFR BLD AUTO: 2 % (ref 0.3–6.2)
ERYTHROCYTE [DISTWIDTH] IN BLOOD BY AUTOMATED COUNT: 13.8 % (ref 12.3–15.4)
GLOBULIN UR ELPH-MCNC: 2.4 GM/DL
GLUCOSE BLDC GLUCOMTR-MCNC: 343 MG/DL (ref 70–105)
GLUCOSE BLDC GLUCOMTR-MCNC: 376 MG/DL (ref 70–105)
GLUCOSE BLDC GLUCOMTR-MCNC: 384 MG/DL (ref 70–105)
GLUCOSE SERPL-MCNC: 239 MG/DL (ref 65–99)
HCT VFR BLD AUTO: 35.6 % (ref 34–46.6)
HGB BLD-MCNC: 11.5 G/DL (ref 12–15.9)
IMM GRANULOCYTES # BLD AUTO: 0.01 10*3/MM3 (ref 0–0.05)
IMM GRANULOCYTES NFR BLD AUTO: 0.2 % (ref 0–0.5)
LIPASE SERPL-CCNC: 33 U/L (ref 13–60)
LYMPHOCYTES # BLD AUTO: 2.38 10*3/MM3 (ref 0.7–3.1)
LYMPHOCYTES NFR BLD AUTO: 37.2 % (ref 19.6–45.3)
MCH RBC QN AUTO: 29.1 PG (ref 26.6–33)
MCHC RBC AUTO-ENTMCNC: 32.3 G/DL (ref 31.5–35.7)
MCV RBC AUTO: 90.1 FL (ref 79–97)
MONOCYTES # BLD AUTO: 0.59 10*3/MM3 (ref 0.1–0.9)
MONOCYTES NFR BLD AUTO: 9.2 % (ref 5–12)
NEUTROPHILS NFR BLD AUTO: 3.26 10*3/MM3 (ref 1.7–7)
NEUTROPHILS NFR BLD AUTO: 50.9 % (ref 42.7–76)
NRBC BLD AUTO-RTO: 0 /100 WBC (ref 0–0.2)
PLATELET # BLD AUTO: 115 10*3/MM3 (ref 140–450)
PMV BLD AUTO: 12.9 FL (ref 6–12)
POTASSIUM SERPL-SCNC: 3.6 MMOL/L (ref 3.5–5.2)
PROT SERPL-MCNC: 6 G/DL (ref 6–8.5)
RBC # BLD AUTO: 3.95 10*6/MM3 (ref 3.77–5.28)
SODIUM SERPL-SCNC: 138 MMOL/L (ref 136–145)
WBC NRBC COR # BLD AUTO: 6.4 10*3/MM3 (ref 3.4–10.8)

## 2024-12-22 PROCEDURE — 96376 TX/PRO/DX INJ SAME DRUG ADON: CPT

## 2024-12-22 PROCEDURE — 34310000005 TECHNETIUM SULFUR COLLOID: Performed by: FAMILY MEDICINE

## 2024-12-22 PROCEDURE — G0378 HOSPITAL OBSERVATION PER HR: HCPCS

## 2024-12-22 PROCEDURE — 85025 COMPLETE CBC W/AUTO DIFF WBC: CPT | Performed by: FAMILY MEDICINE

## 2024-12-22 PROCEDURE — 25010000002 METOCLOPRAMIDE PER 10 MG: Performed by: FAMILY MEDICINE

## 2024-12-22 PROCEDURE — 97162 PT EVAL MOD COMPLEX 30 MIN: CPT

## 2024-12-22 PROCEDURE — 82948 REAGENT STRIP/BLOOD GLUCOSE: CPT

## 2024-12-22 PROCEDURE — 83690 ASSAY OF LIPASE: CPT | Performed by: FAMILY MEDICINE

## 2024-12-22 PROCEDURE — A9541 TC99M SULFUR COLLOID: HCPCS | Performed by: FAMILY MEDICINE

## 2024-12-22 PROCEDURE — 63710000001 INSULIN LISPRO (HUMAN) PER 5 UNITS: Performed by: NURSE PRACTITIONER

## 2024-12-22 PROCEDURE — 80053 COMPREHEN METABOLIC PANEL: CPT | Performed by: FAMILY MEDICINE

## 2024-12-22 PROCEDURE — 82948 REAGENT STRIP/BLOOD GLUCOSE: CPT | Performed by: NURSE PRACTITIONER

## 2024-12-22 PROCEDURE — 78264 GASTRIC EMPTYING IMG STUDY: CPT

## 2024-12-22 PROCEDURE — 96375 TX/PRO/DX INJ NEW DRUG ADDON: CPT

## 2024-12-22 RX ORDER — TRAMADOL HYDROCHLORIDE 50 MG/1
50 TABLET ORAL EVERY 6 HOURS PRN
Status: DISCONTINUED | OUTPATIENT
Start: 2024-12-22 | End: 2024-12-23 | Stop reason: HOSPADM

## 2024-12-22 RX ORDER — METOCLOPRAMIDE HYDROCHLORIDE 5 MG/ML
10 INJECTION INTRAMUSCULAR; INTRAVENOUS EVERY 6 HOURS
Status: DISCONTINUED | OUTPATIENT
Start: 2024-12-22 | End: 2024-12-23 | Stop reason: HOSPADM

## 2024-12-22 RX ORDER — HYDROCODONE BITARTRATE AND ACETAMINOPHEN 10; 325 MG/1; MG/1
1 TABLET ORAL EVERY 6 HOURS PRN
Status: DISCONTINUED | OUTPATIENT
Start: 2024-12-22 | End: 2024-12-23 | Stop reason: HOSPADM

## 2024-12-22 RX ADMIN — GABAPENTIN 300 MG: 300 CAPSULE ORAL at 17:15

## 2024-12-22 RX ADMIN — INSULIN LISPRO 8 UNITS: 100 INJECTION, SOLUTION INTRAVENOUS; SUBCUTANEOUS at 20:50

## 2024-12-22 RX ADMIN — GABAPENTIN 300 MG: 300 CAPSULE ORAL at 20:50

## 2024-12-22 RX ADMIN — BACLOFEN 20 MG: 10 TABLET ORAL at 17:15

## 2024-12-22 RX ADMIN — LISINOPRIL 10 MG: 5 TABLET ORAL at 12:24

## 2024-12-22 RX ADMIN — PANTOPRAZOLE SODIUM 40 MG: 40 TABLET, DELAYED RELEASE ORAL at 12:24

## 2024-12-22 RX ADMIN — INSULIN GLARGINE-YFGN 35 UNITS: 100 INJECTION, SOLUTION SUBCUTANEOUS at 12:27

## 2024-12-22 RX ADMIN — HYDROCODONE BITARTRATE AND ACETAMINOPHEN 1 TABLET: 10; 325 TABLET ORAL at 19:09

## 2024-12-22 RX ADMIN — APIXABAN 5 MG: 5 TABLET, FILM COATED ORAL at 12:24

## 2024-12-22 RX ADMIN — METOCLOPRAMIDE HYDROCHLORIDE 10 MG: 5 INJECTION INTRAMUSCULAR; INTRAVENOUS at 14:30

## 2024-12-22 RX ADMIN — METOPROLOL SUCCINATE 50 MG: 50 TABLET, EXTENDED RELEASE ORAL at 12:24

## 2024-12-22 RX ADMIN — INSULIN LISPRO 7 UNITS: 100 INJECTION, SOLUTION INTRAVENOUS; SUBCUTANEOUS at 14:30

## 2024-12-22 RX ADMIN — APIXABAN 5 MG: 5 TABLET, FILM COATED ORAL at 20:50

## 2024-12-22 RX ADMIN — INSULIN LISPRO 8 UNITS: 100 INJECTION, SOLUTION INTRAVENOUS; SUBCUTANEOUS at 17:16

## 2024-12-22 RX ADMIN — NICOTINE 1 PATCH: 14 PATCH TRANSDERMAL at 13:00

## 2024-12-22 RX ADMIN — BUSPIRONE HYDROCHLORIDE 7.5 MG: 15 TABLET ORAL at 20:50

## 2024-12-22 RX ADMIN — BREXPIPRAZOLE 3 MG: 1 TABLET ORAL at 12:25

## 2024-12-22 RX ADMIN — DILTIAZEM HYDROCHLORIDE 240 MG: 240 CAPSULE, EXTENDED RELEASE ORAL at 12:25

## 2024-12-22 RX ADMIN — TECHNETIUM TC 99M SULFUR COLLOID 1 DOSE: KIT at 08:00

## 2024-12-22 RX ADMIN — ZOLPIDEM TARTRATE 5 MG: 5 TABLET ORAL at 20:50

## 2024-12-22 RX ADMIN — METOCLOPRAMIDE HYDROCHLORIDE 10 MG: 5 INJECTION INTRAMUSCULAR; INTRAVENOUS at 17:26

## 2024-12-22 RX ADMIN — BACLOFEN 20 MG: 10 TABLET ORAL at 12:24

## 2024-12-22 RX ADMIN — FLUOXETINE 20 MG: 20 CAPSULE ORAL at 12:25

## 2024-12-22 RX ADMIN — BACLOFEN 20 MG: 10 TABLET ORAL at 20:50

## 2024-12-22 RX ADMIN — GABAPENTIN 300 MG: 300 CAPSULE ORAL at 12:25

## 2024-12-22 RX ADMIN — PANTOPRAZOLE SODIUM 40 MG: 40 TABLET, DELAYED RELEASE ORAL at 20:50

## 2024-12-22 NOTE — PLAN OF CARE
Problem: Adult Inpatient Plan of Care  Goal: Plan of Care Review  Outcome: Progressing  Goal: Patient-Specific Goal (Individualized)  Outcome: Progressing  Goal: Absence of Hospital-Acquired Illness or Injury  Outcome: Progressing  Intervention: Identify and Manage Fall Risk  Recent Flowsheet Documentation  Taken 12/22/2024 0200 by Andrez Del Cid RN  Safety Promotion/Fall Prevention:   safety round/check completed   room organization consistent   nonskid shoes/slippers when out of bed   clutter free environment maintained  Taken 12/22/2024 0000 by Andrez Del Cid RN  Safety Promotion/Fall Prevention:   safety round/check completed   room organization consistent   nonskid shoes/slippers when out of bed   clutter free environment maintained  Taken 12/21/2024 2200 by Andrez Del Cid RN  Safety Promotion/Fall Prevention:   toileting scheduled   safety round/check completed   clutter free environment maintained   assistive device/personal items within reach   room organization consistent   nonskid shoes/slippers when out of bed   mobility aid in reach   lighting adjusted   muscle strengthening facilitated   fall prevention program maintained   activity supervised  Taken 12/21/2024 2000 by Andrez Del Cid RN  Safety Promotion/Fall Prevention:   toileting scheduled   safety round/check completed   room organization consistent   nonskid shoes/slippers when out of bed   muscle strengthening facilitated   mobility aid in reach   clutter free environment maintained  Intervention: Prevent Skin Injury  Recent Flowsheet Documentation  Taken 12/22/2024 0200 by Andrez Del Cid RN  Body Position: position changed independently  Taken 12/22/2024 0000 by Andrez Del Cid RN  Body Position: position changed independently  Taken 12/21/2024 2000 by Andrez Del Cid RN  Body Position:   position changed independently   position maintained   dangle, side of bed  Skin Protection: transparent dressing maintained  Taken  12/21/2024 1900 by Andrez Del Cid RN  Skin Protection: transparent dressing maintained  Intervention: Prevent and Manage VTE (Venous Thromboembolism) Risk  Recent Flowsheet Documentation  Taken 12/21/2024 1900 by Andrez Del Cid RN  VTE Prevention/Management: (FOB elevated, pt taking eliquis) other (see comments)  Intervention: Prevent Infection  Recent Flowsheet Documentation  Taken 12/22/2024 0200 by Andrez Del Cid RN  Infection Prevention:   hand hygiene promoted   personal protective equipment utilized   rest/sleep promoted   single patient room provided  Taken 12/22/2024 0000 by Andrez Del Cid RN  Infection Prevention:   hand hygiene promoted   personal protective equipment utilized   rest/sleep promoted   single patient room provided  Taken 12/21/2024 2200 by Andrez Del Cid RN  Infection Prevention:   rest/sleep promoted   personal protective equipment utilized   hand hygiene promoted  Taken 12/21/2024 2000 by Andrez Del Cid RN  Infection Prevention:   rest/sleep promoted   personal protective equipment utilized   hand hygiene promoted   single patient room provided  Goal: Optimal Comfort and Wellbeing  Outcome: Progressing  Intervention: Monitor Pain and Promote Comfort  Recent Flowsheet Documentation  Taken 12/22/2024 0000 by Andrez Del Cid RN  Pain Management Interventions:   care clustered   quiet environment facilitated   relaxation techniques promoted  Taken 12/21/2024 2000 by Andrez Del Cid RN  Pain Management Interventions:   care clustered   pain management plan reviewed with patient/caregiver   pillow support provided   pain medication given   quiet environment facilitated   relaxation techniques promoted  Taken 12/21/2024 1900 by Andrez Del Cid RN  Pain Management Interventions:   care clustered   pain management plan reviewed with patient/caregiver   pain medication given   pillow support provided   position adjusted   quiet environment facilitated   relaxation techniques  promoted  Intervention: Provide Person-Centered Care  Recent Flowsheet Documentation  Taken 12/21/2024 2000 by Andrez Del Cid RN  Trust Relationship/Rapport:   care explained   choices provided   emotional support provided   empathic listening provided   questions answered   questions encouraged   reassurance provided   thoughts/feelings acknowledged  Taken 12/21/2024 1900 by Andrez Del Cid RN  Trust Relationship/Rapport:   care explained   choices provided   emotional support provided   questions answered   empathic listening provided   questions encouraged   reassurance provided   thoughts/feelings acknowledged  Goal: Readiness for Transition of Care  Outcome: Progressing     Problem: Nausea and Vomiting  Goal: Nausea and Vomiting Relief  Outcome: Progressing  Intervention: Prevent and Manage Nausea and Vomiting  Recent Flowsheet Documentation  Taken 12/21/2024 2000 by Andrez Del Cid RN  Fluid/Electrolyte Management: fluids provided  Environmental Support:   calm environment promoted   distractions minimized   personal routine supported   rest periods encouraged   environmental consistency promoted  Oral Care: lip/mouth moisturizer applied  Taken 12/21/2024 1900 by Andrez Del Cid RN  Fluid/Electrolyte Management: fluids provided  Environmental Support:   calm environment promoted   environmental consistency promoted   personal routine supported   rest periods encouraged   rooming-in facilitated     Problem: Pain Acute  Goal: Optimal Pain Control and Function  Outcome: Progressing  Intervention: Optimize Psychosocial Wellbeing  Recent Flowsheet Documentation  Taken 12/21/2024 2000 by Andrez Del Cid RN  Supportive Measures:   relaxation techniques promoted   self-care encouraged   self-reflection promoted   self-responsibility promoted   verbalization of feelings encouraged  Diversional Activities: smartphone  Taken 12/21/2024 1900 by Andrez Del Cid RN  Supportive Measures: relaxation techniques  promoted  Diversional Activities: smartphone  Intervention: Develop Pain Management Plan  Recent Flowsheet Documentation  Taken 12/22/2024 0000 by Andrez Del Cid RN  Pain Management Interventions:   care clustered   quiet environment facilitated   relaxation techniques promoted  Taken 12/21/2024 2000 by Andrez Del Cid RN  Pain Management Interventions:   care clustered   pain management plan reviewed with patient/caregiver   pillow support provided   pain medication given   quiet environment facilitated   relaxation techniques promoted  Taken 12/21/2024 1900 by Andrez Del Cid RN  Pain Management Interventions:   care clustered   pain management plan reviewed with patient/caregiver   pain medication given   pillow support provided   position adjusted   quiet environment facilitated   relaxation techniques promoted  Intervention: Prevent or Manage Pain  Recent Flowsheet Documentation  Taken 12/22/2024 0200 by Andrez Del Cid RN  Medication Review/Management: medications reviewed  Taken 12/22/2024 0000 by Andrez Del Cid RN  Sleep/Rest Enhancement:   awakenings minimized   consistent schedule promoted   regular sleep/rest pattern promoted   relaxation techniques promoted   room darkened  Medication Review/Management: medications reviewed  Taken 12/21/2024 2200 by Andrez Del Cid RN  Medication Review/Management: medications reviewed  Taken 12/21/2024 2000 by Andrez Del Cid RN  Medication Review/Management: medications reviewed  Taken 12/21/2024 1900 by Andrez Del Cid RN  Sensory Stimulation Regulation: care clustered  Sleep/Rest Enhancement:   consistent schedule promoted   relaxation techniques promoted   noise level reduced     Problem: Comorbidity Management  Goal: Maintenance of COPD Symptom Control  Outcome: Progressing  Intervention: Maintain COPD (Chronic Obstructive Pulmonary Disease) Symptom Control  Recent Flowsheet Documentation  Taken 12/22/2024 0200 by Andrez Del Cid RN  Medication  Review/Management: medications reviewed  Taken 12/22/2024 0000 by Andrez Del Cid RN  Medication Review/Management: medications reviewed  Taken 12/21/2024 2200 by Andrez Del Cid RN  Medication Review/Management: medications reviewed  Taken 12/21/2024 2000 by Andrez Del Cid RN  Breathing Techniques/Airway Clearance: airway clearance techniques utilized  Medication Review/Management: medications reviewed  Goal: Blood Glucose Level Within Target Range  Outcome: Progressing  Intervention: Monitor and Manage Glycemia  Recent Flowsheet Documentation  Taken 12/22/2024 0200 by Andrez Del Cid RN  Medication Review/Management: medications reviewed  Taken 12/22/2024 0000 by Andrez Del Cid RN  Medication Review/Management: medications reviewed  Taken 12/21/2024 2200 by Andrez Del Cid RN  Medication Review/Management: medications reviewed  Taken 12/21/2024 2000 by Andrez Del Cid RN  Medication Review/Management: medications reviewed  Goal: Maintenance of Heart Failure Symptom Control  Outcome: Progressing  Intervention: Maintain Heart Failure Management  Recent Flowsheet Documentation  Taken 12/22/2024 0200 by Andrez Del Cid RN  Medication Review/Management: medications reviewed  Taken 12/22/2024 0000 by Andrez Del Cid RN  Medication Review/Management: medications reviewed  Taken 12/21/2024 2200 by Andrez Del Cid RN  Medication Review/Management: medications reviewed  Taken 12/21/2024 2000 by Andrez Del Cid RN  Medication Review/Management: medications reviewed  Goal: Blood Pressure in Desired Range  Outcome: Progressing  Intervention: Maintain Blood Pressure Management  Recent Flowsheet Documentation  Taken 12/22/2024 0200 by Andrez Del Cid RN  Medication Review/Management: medications reviewed  Taken 12/22/2024 0000 by Andrez Del Cid RN  Medication Review/Management: medications reviewed  Taken 12/21/2024 2200 by Andrez Del Cid RN  Medication Review/Management: medications  reviewed  Taken 12/21/2024 2000 by Andrez Del Cid RN  Medication Review/Management: medications reviewed     Problem: Fall Injury Risk  Goal: Absence of Fall and Fall-Related Injury  Outcome: Progressing  Intervention: Identify and Manage Contributors  Recent Flowsheet Documentation  Taken 12/22/2024 0200 by Andrez Del Cid RN  Medication Review/Management: medications reviewed  Taken 12/22/2024 0000 by Andrez Del Cid RN  Medication Review/Management: medications reviewed  Self-Care Promotion: independence encouraged  Taken 12/21/2024 2200 by Andrez Del Cid RN  Medication Review/Management: medications reviewed  Taken 12/21/2024 2000 by Andrez Del Cid RN  Medication Review/Management: medications reviewed  Self-Care Promotion:   independence encouraged   BADL personal routines maintained   BADL personal objects within reach   meal set-up provided  Intervention: Promote Injury-Free Environment  Recent Flowsheet Documentation  Taken 12/22/2024 0200 by Andrez Del Cid RN  Safety Promotion/Fall Prevention:   safety round/check completed   room organization consistent   nonskid shoes/slippers when out of bed   clutter free environment maintained  Taken 12/22/2024 0000 by Andrez Del Cid RN  Safety Promotion/Fall Prevention:   safety round/check completed   room organization consistent   nonskid shoes/slippers when out of bed   clutter free environment maintained  Taken 12/21/2024 2200 by Andrez Del Cid RN  Safety Promotion/Fall Prevention:   toileting scheduled   safety round/check completed   clutter free environment maintained   assistive device/personal items within reach   room organization consistent   nonskid shoes/slippers when out of bed   mobility aid in reach   lighting adjusted   muscle strengthening facilitated   fall prevention program maintained   activity supervised  Taken 12/21/2024 2000 by Andrez Del Cid RN  Safety Promotion/Fall Prevention:   toileting scheduled   safety  round/check completed   room organization consistent   nonskid shoes/slippers when out of bed   muscle strengthening facilitated   mobility aid in reach   clutter free environment maintained   Goal Outcome Evaluation:  Pt denies N/V overnight, requested ambien & buspar and was able to rest. Pt NPO as of midnight for delayed gastric emptying may be done OPT though note says we may trial reglan for weekend and f/u as OPT if unable to do study. Consults placed to: Diabetes educator, CM, Nutritionist. Per note plans for dc to home 12/23. Pt will need meds to beds set up and CM to help with setting up HH Nurse to assist with organizing medications.

## 2024-12-22 NOTE — CASE MANAGEMENT/SOCIAL WORK
Continued Stay Note  JASMINE Louis     Patient Name: Arianna Blanco  MRN: [id number]  Today's Date: 12/22/2024    Admit Date: 12/20/2024        Discharge Plan       Row Name 12/22/24 8150       Plan    Plan Comments D/C barriers: assessing ability to tolerate PO diet, pending PT eval.             Bella Stout RN     Office Phone: [phone]  Office Cell: [phone]

## 2024-12-22 NOTE — PROGRESS NOTES
[facility] MEDICINE SERVICE  DAILY PROGRESS NOTE    NAME: Arianna Blanco  : 1955  MRN: [id number]      LOS: 0 days     PROVIDER OF SERVICE: Awilda Cassidy MD    Chief Complaint: Intractable nausea and vomiting    Subjective:     Interval History:  History taken from: patient    Patient feels better this morning.  She has just returned from her gastric emptying scan.  She denies any abdominal pain at the present time.  She is hungry.        Review of Systems:   Review of Systems   All other systems reviewed and are negative.      Objective:     Vital Signs  Temp:  [97.6 °F (36.4 °C)-97.7 °F (36.5 °C)] 97.6 °F (36.4 °C)  Heart Rate:  [63-65] 63  Resp:  [17-20] 17  BP: (136-148)/(52-75) 148/75   Body mass index is 24.27 kg/m².    Physical Exam  Physical Exam  Constitutional:       General: She is awake.      Appearance: Normal appearance. She is well-developed and well-groomed.   HENT:      Head: Normocephalic and atraumatic.      Nose: Nose normal.      Mouth/Throat:      Mouth: Mucous membranes are moist.      Pharynx: Oropharynx is clear.   Eyes:      Extraocular Movements: Extraocular movements intact.      Conjunctiva/sclera: Conjunctivae normal.      Pupils: Pupils are equal, round, and reactive to light.   Cardiovascular:      Rate and Rhythm: Normal rate and regular rhythm.      Pulses: Normal pulses.      Heart sounds: Normal heart sounds.   Pulmonary:      Effort: Pulmonary effort is normal.      Breath sounds: Normal breath sounds.   Abdominal:      General: Abdomen is flat. Bowel sounds are normal.      Palpations: Abdomen is soft.      Tenderness: There is abdominal tenderness.   Musculoskeletal:         General: Normal range of motion.      Cervical back: Normal range of motion and neck supple.      Right lower leg: No edema.      Left lower leg: No edema.   Skin:     General: Skin is warm and dry.   Neurological:      General: No focal deficit present.      Mental Status: She is alert  and oriented to person, place, and time. Mental status is at baseline.      Cranial Nerves: Cranial nerves 2-12 are intact.      Sensory: Sensation is intact.      Motor: Motor function is intact.   Psychiatric:         Mood and Affect: Mood normal.         Behavior: Behavior normal. Behavior is cooperative.         Thought Content: Thought content normal.         Judgment: Judgment normal.            Diagnostic Data    Results from last 7 days   Lab Units 12/22/24  0334 12/22/24  0102   WBC 10*3/mm3  --  6.40   HEMOGLOBIN g/dL  --  11.5*   HEMATOCRIT %  --  35.6   PLATELETS 10*3/mm3  --  115*   GLUCOSE mg/dL 239*  --    CREATININE mg/dL 0.75  --    BUN mg/dL 12  --    SODIUM mmol/L 138  --    POTASSIUM mmol/L 3.6  --    AST (SGOT) U/L 34*  --    ALT (SGPT) U/L 29  --    ALK PHOS U/L 93  --    BILIRUBIN mg/dL 0.2  --    ANION GAP mmol/L 8.3  --        CT Abdomen Pelvis Without Contrast    Result Date: 12/20/2024  Impression: 1.Wall thickening of multiple loops of small bowel in the left hemiabdomen, concerning for enteritis. 2.Otherwise no acute abnormality of the abdomen or pelvis. Electronically Signed: Leandro Nguyen MD  12/20/2024 8:07 PM EST  Workstation ID: [id number]       I reviewed the patient's new clinical results.    Assessment/Plan:     Active and Resolved Problems  Active Hospital Problems    Diagnosis  POA    **Intractable nausea and vomiting [R11.2]  Yes    Nausea and vomiting [R11.2]  Yes    Chronic pain [G89.29]  Yes    Paroxysmal atrial fibrillation [I48.0]  Yes    Chronic obstructive pulmonary disease [J44.9]  Yes    Hypercholesteremia [E78.00]  Yes    Uncontrolled diabetes mellitus with hyperglycemia [E11.65]  Yes    Anxiety associated with depression [F41.8]  Yes    Essential hypertension [I10]  Yes    Dyslipidemia [E78.5]  Yes      Resolved Hospital Problems   No resolved problems to display.     Diabetes type 2, poorly controlled  Probable diabetic gastroparesis  Enteritis as per CT  imaging  Intractable nausea vomiting  COPD, with ongoing tobacco abuse  Malnutrition  Failure to thrive  Chronic deconditioning  Noncompliance of medications due to his lack of social support  Paroxysmal atrial fibrillation currently on chronic anticoagulation  Hyperlipidemia  Chronic low back pain  Generalized anxiety disorder  Major depressive disorder      Underwent gastric emptying scan this morning.  Results currently pending.  Suspect underlying diabetic gastroparesis.  Will start patient on Reglan 5 mg IV scheduled.  Will initiate diabetic diet.  If patient is able to tolerate through day today as well as cleared by therapy plan for discharge home tomorrow.  Patient has had longstanding issues with her pharmacy in regards to medication regimen as well as receiving insulin.  Will go ahead and ask pharmacy/social work to evaluate in morning for possible home delivery of these medications.    Patient to follow-up with her outpatient primary care provider in regards to diabetic regimen.  Patient to follow-up with her outpatient endocrinologist for ongoing diabetic management.      VTE Prophylaxis:  Pharmacologic VTE prophylaxis orders are present.             Disposition Planning:     Barriers to Discharge: Ability to tolerate oral diet, insurance approval for insulin  Anticipated Date of Discharge: 12/23/2024  Place of Discharge: Home      Time: 42 minutes     Code Status and Medical Interventions: CPR (Attempt to Resuscitate); Full Support   Ordered at: 12/20/24 2234     Code Status (Patient has no pulse and is not breathing):    CPR (Attempt to Resuscitate)     Medical Interventions (Patient has pulse or is breathing):    Full Support       Signature: Electronically signed by Awilda Cassidy MD, 12/22/24, 12:07 EST.  [facility] Hospitalist Team

## 2024-12-22 NOTE — OUTREACH NOTE
Prep Survey      Flowsheet Row Responses   [facility] facility patient discharged from? [facility]   Does the patient have one of the following disease processes/diagnoses(primary or secondary)? Other            GABI BURDICK - Registered Nurse

## 2024-12-23 ENCOUNTER — READMISSION MANAGEMENT (OUTPATIENT)
Dept: CALL CENTER | Facility: HOSPITAL | Age: 69
End: 2024-12-23
Payer: MEDICAID

## 2024-12-23 VITALS
RESPIRATION RATE: 22 BRPM | DIASTOLIC BLOOD PRESSURE: 50 MMHG | WEIGHT: 159.61 LBS | OXYGEN SATURATION: 96 % | HEIGHT: 68 IN | HEART RATE: 73 BPM | SYSTOLIC BLOOD PRESSURE: 132 MMHG | TEMPERATURE: 98 F | BODY MASS INDEX: 24.19 KG/M2

## 2024-12-23 DIAGNOSIS — G89.4 CHRONIC PAIN SYNDROME: ICD-10-CM

## 2024-12-23 LAB
GLUCOSE BLDC GLUCOMTR-MCNC: 258 MG/DL (ref 70–105)
GLUCOSE BLDC GLUCOMTR-MCNC: 278 MG/DL (ref 70–105)

## 2024-12-23 PROCEDURE — 63710000001 INSULIN GLARGINE PER 5 UNITS: Performed by: NURSE PRACTITIONER

## 2024-12-23 PROCEDURE — 63710000001 INSULIN LISPRO (HUMAN) PER 5 UNITS: Performed by: NURSE PRACTITIONER

## 2024-12-23 PROCEDURE — 25010000002 ONDANSETRON PER 1 MG: Performed by: FAMILY MEDICINE

## 2024-12-23 PROCEDURE — 96376 TX/PRO/DX INJ SAME DRUG ADON: CPT

## 2024-12-23 PROCEDURE — 25010000002 METOCLOPRAMIDE PER 10 MG: Performed by: FAMILY MEDICINE

## 2024-12-23 PROCEDURE — G0378 HOSPITAL OBSERVATION PER HR: HCPCS

## 2024-12-23 PROCEDURE — 63710000001 ONDANSETRON ODT 4 MG TABLET DISPERSIBLE

## 2024-12-23 PROCEDURE — 82948 REAGENT STRIP/BLOOD GLUCOSE: CPT | Performed by: NURSE PRACTITIONER

## 2024-12-23 PROCEDURE — 82948 REAGENT STRIP/BLOOD GLUCOSE: CPT

## 2024-12-23 RX ORDER — HYDROCODONE BITARTRATE AND ACETAMINOPHEN 10; 325 MG/1; MG/1
1 TABLET ORAL EVERY 6 HOURS PRN
Qty: 12 TABLET | Refills: 0 | Status: SHIPPED | OUTPATIENT
Start: 2024-12-23 | End: 2024-12-26

## 2024-12-23 RX ORDER — ONDANSETRON 8 MG/1
8 TABLET, ORALLY DISINTEGRATING ORAL EVERY 8 HOURS PRN
Qty: 90 TABLET | Refills: 0 | Status: CANCELLED | OUTPATIENT
Start: 2024-12-23 | End: 2025-01-22

## 2024-12-23 RX ORDER — ONDANSETRON 8 MG/1
8 TABLET, ORALLY DISINTEGRATING ORAL EVERY 8 HOURS PRN
Qty: 90 TABLET | Refills: 0 | Status: SHIPPED | OUTPATIENT
Start: 2024-12-23 | End: 2025-01-22

## 2024-12-23 RX ORDER — GABAPENTIN 300 MG/1
300 CAPSULE ORAL 3 TIMES DAILY
Qty: 9 CAPSULE | Refills: 0 | Status: SHIPPED | OUTPATIENT
Start: 2024-12-23 | End: 2024-12-26

## 2024-12-23 RX ORDER — ONDANSETRON 4 MG/1
8 TABLET, ORALLY DISINTEGRATING ORAL ONCE
Status: COMPLETED | OUTPATIENT
Start: 2024-12-23 | End: 2024-12-23

## 2024-12-23 RX ORDER — PANTOPRAZOLE SODIUM 40 MG/1
40 TABLET, DELAYED RELEASE ORAL DAILY
Qty: 30 TABLET | Refills: 0 | Status: CANCELLED | OUTPATIENT
Start: 2024-12-23 | End: 2025-01-22

## 2024-12-23 RX ORDER — INSULIN GLULISINE 100 [IU]/ML
10 INJECTION, SOLUTION SUBCUTANEOUS 3 TIMES DAILY
Start: 2024-12-23 | End: 2025-01-22

## 2024-12-23 RX ORDER — HYDROCODONE BITARTRATE AND ACETAMINOPHEN 10; 325 MG/1; MG/1
1 TABLET ORAL EVERY 6 HOURS PRN
Qty: 12 TABLET | Refills: 0 | Status: CANCELLED | OUTPATIENT
Start: 2024-12-23 | End: 2024-12-26

## 2024-12-23 RX ORDER — ONDANSETRON 2 MG/ML
4 INJECTION INTRAMUSCULAR; INTRAVENOUS EVERY 6 HOURS PRN
Status: DISCONTINUED | OUTPATIENT
Start: 2024-12-23 | End: 2024-12-23 | Stop reason: HOSPADM

## 2024-12-23 RX ORDER — PANTOPRAZOLE SODIUM 40 MG/1
40 TABLET, DELAYED RELEASE ORAL DAILY
Qty: 30 TABLET | Refills: 0 | Status: SHIPPED | OUTPATIENT
Start: 2024-12-23 | End: 2025-01-22

## 2024-12-23 RX ORDER — METOCLOPRAMIDE 10 MG/1
10 TABLET ORAL 4 TIMES DAILY
Qty: 120 TABLET | Refills: 0 | Status: CANCELLED | OUTPATIENT
Start: 2024-12-23 | End: 2025-01-22

## 2024-12-23 RX ORDER — GABAPENTIN 300 MG/1
300 CAPSULE ORAL 3 TIMES DAILY
Qty: 9 CAPSULE | Refills: 0 | Status: CANCELLED | OUTPATIENT
Start: 2024-12-23 | End: 2024-12-26

## 2024-12-23 RX ORDER — METOCLOPRAMIDE 10 MG/1
10 TABLET ORAL 4 TIMES DAILY
Qty: 120 TABLET | Refills: 0 | Status: SHIPPED | OUTPATIENT
Start: 2024-12-23 | End: 2025-01-22

## 2024-12-23 RX ADMIN — BACLOFEN 20 MG: 10 TABLET ORAL at 08:04

## 2024-12-23 RX ADMIN — METOCLOPRAMIDE HYDROCHLORIDE 10 MG: 5 INJECTION INTRAMUSCULAR; INTRAVENOUS at 11:59

## 2024-12-23 RX ADMIN — ONDANSETRON 4 MG: 2 INJECTION INTRAMUSCULAR; INTRAVENOUS at 08:54

## 2024-12-23 RX ADMIN — METOCLOPRAMIDE HYDROCHLORIDE 10 MG: 5 INJECTION INTRAMUSCULAR; INTRAVENOUS at 04:27

## 2024-12-23 RX ADMIN — FLUOXETINE 20 MG: 20 CAPSULE ORAL at 08:04

## 2024-12-23 RX ADMIN — HYDROCODONE BITARTRATE AND ACETAMINOPHEN 1 TABLET: 10; 325 TABLET ORAL at 11:59

## 2024-12-23 RX ADMIN — PANTOPRAZOLE SODIUM 40 MG: 40 INJECTION, POWDER, FOR SOLUTION INTRAVENOUS at 08:03

## 2024-12-23 RX ADMIN — INSULIN GLARGINE-YFGN 35 UNITS: 100 INJECTION, SOLUTION SUBCUTANEOUS at 08:03

## 2024-12-23 RX ADMIN — METOPROLOL SUCCINATE 50 MG: 50 TABLET, EXTENDED RELEASE ORAL at 08:05

## 2024-12-23 RX ADMIN — LISINOPRIL 10 MG: 5 TABLET ORAL at 08:04

## 2024-12-23 RX ADMIN — BREXPIPRAZOLE 3 MG: 1 TABLET ORAL at 08:05

## 2024-12-23 RX ADMIN — ONDANSETRON 8 MG: 4 TABLET, ORALLY DISINTEGRATING ORAL at 15:12

## 2024-12-23 RX ADMIN — DILTIAZEM HYDROCHLORIDE 240 MG: 240 CAPSULE, EXTENDED RELEASE ORAL at 08:03

## 2024-12-23 RX ADMIN — HYDROCODONE BITARTRATE AND ACETAMINOPHEN 1 TABLET: 10; 325 TABLET ORAL at 04:27

## 2024-12-23 RX ADMIN — APIXABAN 5 MG: 5 TABLET, FILM COATED ORAL at 08:05

## 2024-12-23 RX ADMIN — INSULIN LISPRO 6 UNITS: 100 INJECTION, SOLUTION INTRAVENOUS; SUBCUTANEOUS at 11:59

## 2024-12-23 RX ADMIN — NICOTINE 1 PATCH: 14 PATCH TRANSDERMAL at 08:05

## 2024-12-23 RX ADMIN — INSULIN LISPRO 6 UNITS: 100 INJECTION, SOLUTION INTRAVENOUS; SUBCUTANEOUS at 08:04

## 2024-12-23 RX ADMIN — GABAPENTIN 300 MG: 300 CAPSULE ORAL at 08:04

## 2024-12-23 NOTE — DISCHARGE PLACEMENT REQUEST
"Arianna Blanco (69 y.o. Female)       Date of Birth   1955    Social Security Number       Address   [address] [address] [place] [address]    Home Phone   [phone]    MRN   [id number]       Anabaptism   Oriental orthodox    Marital Status                               Admission Date   12/20/24    Admission Type   Emergency    Admitting Provider   Llanes Alvarez, Carlos, MD    Attending Provider   Awilda Cassidy MD    Department, Room/Bed   [facility] INPATIENT, 359/1       Discharge Date       Discharge Disposition       Discharge Destination                                 Attending Provider: Awilda Cassidy MD    Allergies: Codeine    Isolation: None   Infection: None   Code Status: CPR    Ht: 172.7 cm (68\")   Wt: 72.4 kg (159 lb 9.8 oz)    Admission Cmt: None   Principal Problem: Intractable nausea and vomiting [R11.2]                   Active Insurance as of 12/20/2024       Primary Coverage       Payor Plan Insurance Group Employer/Plan Group    [id number]       Payor Plan Address Payor Plan Phone Number Payor Plan Fax Number Effective Dates    MAIL STOP:   7/1/2024 - None Entered    [address] [address]       [facility] [address]         Subscriber Name Subscriber Birth Date Member ID       ARIANNA BLANCO 1955 [id number]                     Emergency Contacts        (Rel.) Home Phone Work Phone Mobile Phone    JENNIFER MARIEE (Son) -- -- [phone]          "

## 2024-12-23 NOTE — PLAN OF CARE
Goal Outcome Evaluation:   Pt is a/ox3, able to make needs known to staff. No needs at this time.

## 2024-12-23 NOTE — PLAN OF CARE
Problem: Adult Inpatient Plan of Care  Goal: Plan of Care Review  Outcome: Progressing  Goal: Patient-Specific Goal (Individualized)  Outcome: Progressing  Goal: Absence of Hospital-Acquired Illness or Injury  Outcome: Progressing  Intervention: Identify and Manage Fall Risk  Recent Flowsheet Documentation  Taken 12/23/2024 1000 by Sariah Brewer, RN  Safety Promotion/Fall Prevention:   activity supervised   clutter free environment maintained   fall prevention program maintained   lighting adjusted   nonskid shoes/slippers when out of bed   room organization consistent   safety round/check completed  Taken 12/23/2024 0835 by Sariah Brewer, RN  Safety Promotion/Fall Prevention:   safety round/check completed   room organization consistent   nonskid shoes/slippers when out of bed   lighting adjusted   fall prevention program maintained   clutter free environment maintained   activity supervised  Intervention: Prevent Skin Injury  Recent Flowsheet Documentation  Taken 12/23/2024 0835 by Sariah Brewer, RN  Body Position: position changed independently  Skin Protection: transparent dressing maintained  Intervention: Prevent and Manage VTE (Venous Thromboembolism) Risk  Recent Flowsheet Documentation  Taken 12/23/2024 0835 by Sariah Brewer, RN  VTE Prevention/Management:   bilateral   SCDs (sequential compression devices) off   patient refused intervention  Intervention: Prevent Infection  Recent Flowsheet Documentation  Taken 12/23/2024 1000 by Sariah Brewer, RN  Infection Prevention:   single patient room provided   rest/sleep promoted   personal protective equipment utilized   hand hygiene promoted   equipment surfaces disinfected  Taken 12/23/2024 0835 by Sariah Brewer, RN  Infection Prevention:   single patient room provided   rest/sleep promoted   personal protective equipment utilized   hand hygiene promoted   equipment surfaces disinfected  Goal: Optimal Comfort and  Wellbeing  Outcome: Progressing  Intervention: Provide Person-Centered Care  Recent Flowsheet Documentation  Taken 12/23/2024 0835 by Sariah Brewer, RN  Trust Relationship/Rapport:   care explained   choices provided   emotional support provided   empathic listening provided   questions answered   questions encouraged   reassurance provided   thoughts/feelings acknowledged  Goal: Readiness for Transition of Care  Outcome: Progressing     Problem: Nausea and Vomiting  Goal: Nausea and Vomiting Relief  Outcome: Progressing  Intervention: Prevent and Manage Nausea and Vomiting  Recent Flowsheet Documentation  Taken 12/23/2024 0835 by Sariah Brewer, RN  Fluid/Electrolyte Management: fluids provided  Environmental Support:   calm environment promoted   rest periods encouraged   environmental consistency promoted     Problem: Pain Acute  Goal: Optimal Pain Control and Function  Outcome: Progressing  Intervention: Optimize Psychosocial Wellbeing  Recent Flowsheet Documentation  Taken 12/23/2024 0835 by Sariah Brewer, RN  Supportive Measures:   active listening utilized   relaxation techniques promoted  Diversional Activities:   television   smartphone  Intervention: Prevent or Manage Pain  Recent Flowsheet Documentation  Taken 12/23/2024 1000 by Sariah Brewer, RN  Medication Review/Management: medications reviewed  Taken 12/23/2024 0835 by Sariah Brewer, RN  Sensory Stimulation Regulation:   care clustered   lighting decreased   television on  Sleep/Rest Enhancement:   awakenings minimized   consistent schedule promoted   noise level reduced   regular sleep/rest pattern promoted   relaxation techniques promoted   room darkened   therapeutic touch utilized  Medication Review/Management: medications reviewed     Problem: Comorbidity Management  Goal: Maintenance of COPD Symptom Control  Outcome: Progressing  Intervention: Maintain COPD (Chronic Obstructive Pulmonary Disease) Symptom Control  Recent  Flowsheet Documentation  Taken 12/23/2024 1000 by Sariah Brewer, RN  Medication Review/Management: medications reviewed  Taken 12/23/2024 0835 by Sariah Brewer, RN  Breathing Techniques/Airway Clearance:   deep/controlled cough encouraged   airway clearance techniques utilized  Medication Review/Management: medications reviewed  Goal: Blood Glucose Level Within Target Range  Outcome: Progressing  Intervention: Monitor and Manage Glycemia  Recent Flowsheet Documentation  Taken 12/23/2024 1000 by Sariah Brewer, RN  Medication Review/Management: medications reviewed  Taken 12/23/2024 0835 by Sariah Brewer, RN  Medication Review/Management: medications reviewed  Goal: Maintenance of Heart Failure Symptom Control  Outcome: Progressing  Intervention: Maintain Heart Failure Management  Recent Flowsheet Documentation  Taken 12/23/2024 1000 by Sariah Brewer, RN  Medication Review/Management: medications reviewed  Taken 12/23/2024 0835 by Sariah Brewer, RN  Medication Review/Management: medications reviewed  Goal: Blood Pressure in Desired Range  Outcome: Progressing  Intervention: Maintain Blood Pressure Management  Recent Flowsheet Documentation  Taken 12/23/2024 1000 by Sariah Brewer, RN  Medication Review/Management: medications reviewed  Taken 12/23/2024 0835 by Sariah Brewer, RN  Medication Review/Management: medications reviewed     Problem: Fall Injury Risk  Goal: Absence of Fall and Fall-Related Injury  Outcome: Progressing  Intervention: Identify and Manage Contributors  Recent Flowsheet Documentation  Taken 12/23/2024 1000 by Sariah Brweer, RN  Medication Review/Management: medications reviewed  Taken 12/23/2024 0835 by Sariah Brewer, RN  Medication Review/Management: medications reviewed  Self-Care Promotion: independence encouraged  Intervention: Promote Injury-Free Environment  Recent Flowsheet Documentation  Taken 12/23/2024 1000 by Sariah Brewer,  RN  Safety Promotion/Fall Prevention:   activity supervised   clutter free environment maintained   fall prevention program maintained   lighting adjusted   nonskid shoes/slippers when out of bed   room organization consistent   safety round/check completed  Taken 12/23/2024 0835 by Sariah Brewer RN  Safety Promotion/Fall Prevention:   safety round/check completed   room organization consistent   nonskid shoes/slippers when out of bed   lighting adjusted   fall prevention program maintained   clutter free environment maintained   activity supervised   Goal Outcome Evaluation:Pt. Progressing towards goal, nearing dc

## 2024-12-23 NOTE — OUTREACH NOTE
Prep Survey      Flowsheet Row Responses   [facility] patient discharged from? [facility]   Is LACE score < 7 ? No   Eligibility [facility]   Date of Admission 12/20/24   Date of Discharge 12/23/24   Discharge Disposition Home or Self Care   Discharge diagnosis Intractable nausea and vomiting   Does the patient have one of the following disease processes/diagnoses(primary or secondary)? Other   Does the patient have Home health ordered? No   Is there a DME ordered? No   Prep survey completed? Yes            Rhiannon PUGA - Registered Nurse

## 2024-12-23 NOTE — SIGNIFICANT NOTE
12/23/24 1403   Living Situation   Current Living Arrangements apartment   Potentially Unsafe Housing Conditions none   Food Insecurity   Within the past 12 months, you worried that your food would run out before you got the money to buy more. Sometimes   Within the past 12 months, the food you bought just didn't last and you didn't have money to get more. Sometimes   Transportation Needs   In the past 12 months, has lack of transportation kept you from medical appointments or from getting medications? yes   In the past 12 months, has lack of transportation kept you from meetings, work, or from getting things needed for daily living? Yes   Utilities   In the past 12 months has the electric, gas, oil, or water company threatened to shut off services in your home? Yes   Abuse Screen (yes response referral indicated)   Feels Unsafe at Home or Work/School no   Feels Threatened by Someone no   Does Anyone Try to Keep You From Having Contact with Others or Doing Things Outside Your Home? no   Physical Signs of Abuse Present no   Financial Resource Strain   How hard is it for you to pay for the very basics like food, housing, medical care, and heating? Somewhat   Employment   Do you want help finding or keeping work or a job? I do not need or want help   Family and Community Support   If for any reason you need help with day-to-day activities such as bathing, preparing meals, shopping, managing finances, etc., do you get the help you need? I need a lot more help   How often do you feel lonely or isolated from those around you? Always   Education   Preferred Language English   Do you want help with school or training? For example, starting or completing job training or getting a high school diploma, GED or equivalent No   Physical Activity   On average, how many days per week do you engage in moderate to strenuous exercise (like a brisk walk)? 0 days   On average, how many minutes do you engage in exercise at this level?  0 min   Number of minutes of exercise per week (!) 0   Alcohol Use   Q1: How often do you have a drink containing alcohol? Never   Q2: How many drinks containing alcohol do you have on a typical day when you are drinking? None   Q3: How often do you have six or more drinks on one occasion? Never   Stress   Do you feel stress - tense, restless, nervous, or anxious, or unable to sleep at night because your mind is troubled all the time - these days? Very much   Mental Health   Little interest or pleasure in doing things Almost all   Feeling down, depressed, or hopeless Almost all   Disabilities   Difficulty Concentrating, Remembering or Making Decisions yes   Difficulty Managing Errands Independently yes

## 2024-12-23 NOTE — CASE MANAGEMENT/SOCIAL WORK
Continued Stay Note  [facility]     Patient Name: Arianna Blanco  MRN: [id number]  Today's Date: 12/23/2024    Admit Date: 12/20/2024    Plan: Return home, with son.   Discharge Plan       Row Name 12/23/24 9185       Plan    Plan Return home, with son.    Patient/Family in Agreement with Plan yes    Plan Comments CM received consult from hospitalist Dr Cassidy that pt is having difficulty obtaining her medications through her normal pharmacy ([facility] [place]). CM contacted [facility] and spoke to Juan who is going to pass on to the pharmacist about getting pt enrolled in [facility] [place] mail[facility]. CM met with pt at bedside and confirmed she was agreeable. Address and phone number confirmed and pt says she will only be staying with her son until Tarik. Referral was made to [facility] and [facility] for nursing care, but they were unable to accept d/t staffing.        Megan Naegele, RN     Office Phone: [phone]  Office Cell: [phone]

## 2024-12-23 NOTE — PLAN OF CARE
Goal Outcome Evaluation:  Plan of Care Reviewed With: patient           Outcome Evaluation: Pt is a 70 YO F admitted with hyperglycemia, nausea vomitting. Pt reports living home alone, typically is independetn with all ADLs, ambulation with RWx and reports no recent falls. Pt reports difficutly managing her own medications, and difficulty checkng BG at home. Pt reports having a CGM but does not know how to use it. Pt this date demonstrates good mobiltiy, requiring no assistance to come to sitting EOB, CGA to stand and ambulate wiht RWx. Pt without significant balance deficit and mild endurance deficit. Pt appears safe to return home at d/c, PT to follow while admitted. Does not need continued therapy services at d/c at this time, but woudl benefit from home health nursing to assist with medicine management and administration.    Anticipated Discharge Disposition (PT): home with home health

## 2024-12-23 NOTE — DISCHARGE SUMMARY
"             [facility]  Discharge Summary    Date of Service: 2024  Patient Name: Arianna Blanco  : 1955  MRN: [id number]    Date of Admission: 2024  Discharge Diagnosis: Intractable nausea and vomiting  Date of Discharge: 2024  Primary Care Physician: Maddie Cornejo APRN      Presenting Problem:   Dehydration [E86.0]  Periumbilical abdominal pain [R10.33]  Intractable nausea and vomiting [R11.2]  Type 2 diabetes mellitus with hyperglycemia, unspecified whether long term insulin use [E11.65]    Active and Resolved Hospital Problems:  Active Hospital Problems    Diagnosis POA    **Intractable nausea and vomiting [R11.2] Yes    Nausea and vomiting [R11.2] Yes    Chronic pain [G89.29] Yes    Paroxysmal atrial fibrillation [I48.0] Yes    Chronic obstructive pulmonary disease [J44.9] Yes    Hypercholesteremia [E78.00] Yes    Uncontrolled diabetes mellitus with hyperglycemia [E11.65] Yes    Anxiety associated with depression [F41.8] Yes    Essential hypertension [I10] Yes    Dyslipidemia [E78.5] Yes      Resolved Hospital Problems   No resolved problems to display.         Hospital Course     HPI:    \"Arianna Blanco is a 69 y.o. female with a CMH of insulin-dependent diabetes mellitus type 2, poorly controlled, COPD with continued tobacco use, denies home oxygen use, physical deconditioning, hypertension, known right adrenal mass known right renal mass, anxiety depression, chronic lumbar back pain sees pain management, paroxsymal  atrial fibrillation on anticoagulation, hyperlipidemia, who presented to [facility] on 2024 with complaints of intractable nausea and vomiting which she reports she has had since discharged from here 24. She reports some diarrhea today without melena, hematochezia, denies hematemesis , abdominal pain , chest pain, dyspnea or fever  Review of records shows she was just admitted here from 2024 to December " "18, 2024 for type 2 diabetes mellitus with hyperosmolar hyperglycemic state generalized weakness.  She is afebrile here and vital signs are stable, labs today show glucose of 221, WBC not elevated, hemoglobin 11.6, was 12.12 days ago, urinalysis negative for infection, CT abdomen and pelvis per radiology showed wall thickening of multiple loops of small bowel in the left hemiabdomen concerning for enteritis otherwise no acute abnormality.  WBC not elevated afebrile, no antibiotics indicated at this time patient was given a 30 cc/kg fluid bolus in ED for dehydration and hyperglycemia.  Diabetes educator has been consulted for hyperglycemia.  Case management has been consulted for frequent admissions.  Patient states she lives alone and has no one to help her. \"    Hospital Course:  Patient was admitted with enteritis and intractable nausea and vomiting. It was suspected she had gastroparesis due to poorly controlled diabetes and she was started on scheduled Reglan. She underwent gastric emptying scan which was normal.  Diarrhea resolved but she continued to have ongoing nausea and vomiting, was treated with IV fluids and multiple antiemetics.  She reported her abdominal pain resolved. She states overall she is feeling much better and is requesting to go home.  She is able to tolerate small meals but continues to have nausea.  She did endorse a 30 lb weight loss in the past 1-2 months which could be due to uncontrolled diabetes, however, with abnormal CT findings, recommended outpatient GI followup for further work up including endoscopic evaluation. Referral to gastroenterology was placed. At this time, patient is thought to be medically stable for discharge.    Patient reported issues with obtaining medications through her pharmacy.  She states her pharmacy frequently does not have her insulin in stock as well as some of her other medications.  This causes gaps of time where she does not have insulin.  There is not " any other pharmacy in her home town. Discussed with [facility] retail pharmacy to see about enrolling patient in [facility] mail-order.           DISCHARGE Follow Up Recommendations for labs and diagnostics: colonoscopy/EGD in near future        Day of Discharge     Vital Signs:  Temp:  [97.8 °F (36.6 °C)-98.9 °F (37.2 °C)] 98 °F (36.7 °C)  Heart Rate:  [52-73] 73  Resp:  [14-22] 22  BP: (132-166)/(50-73) 132/50    Physical Exam:  Physical Exam  Constitutional:       Appearance: Normal appearance. She is not ill-appearing.   HENT:      Head: Normocephalic and atraumatic.      Mouth/Throat:      Mouth: Mucous membranes are moist.   Eyes:      Extraocular Movements: Extraocular movements intact.   Cardiovascular:      Rate and Rhythm: Normal rate and regular rhythm.   Pulmonary:      Effort: Pulmonary effort is normal.      Breath sounds: Normal breath sounds.   Abdominal:      General: Abdomen is flat.      Palpations: Abdomen is soft.      Tenderness: There is no abdominal tenderness.   Musculoskeletal:         General: Normal range of motion.      Cervical back: Normal range of motion.      Right lower leg: No edema.      Left lower leg: No edema.   Skin:     General: Skin is warm.   Neurological:      General: No focal deficit present.      Mental Status: She is alert and oriented to person, place, and time.            Pertinent  and/or Most Recent Results     LAB RESULTS:      Lab 12/22/24  0102 12/21/24  0202 12/20/24  1919 12/17/24  2223 12/17/24  0500 12/16/24  1513   WBC 6.40 5.89 8.16 7.76 6.56 10.19   HEMOGLOBIN 11.5* 11.8* 11.6* 12.1 11.8* 13.7   HEMATOCRIT 35.6 35.4 34.7 34.9 33.9* 39.1   PLATELETS 115* 126* 140 141 133* 172   NEUTROS ABS 3.26 3.02 4.49 3.68 3.27 6.52   IMMATURE GRANS (ABS) 0.01 0.02 0.03 0.02 0.02 0.05   LYMPHS ABS 2.38 2.23 2.74 3.13* 2.53 2.64   MONOS ABS 0.59 0.47 0.70 0.75 0.56 0.79   EOS ABS 0.13 0.11 0.16 0.13 0.14 0.13   MCV 90.1 87.2 85.9 83.5 83.5 83.5   PROTIME  --   --   --   --   --   13.2   APTT  --   --   --   --   --  23.9   D DIMER QUANT  --   --   --   --   --  0.30         Lab 12/22/24  0334 12/21/24  0202 12/20/24 1919 12/17/24  2223 12/17/24  0500 12/16/24  1513   SODIUM 138 137 138 135* 133* 130*   POTASSIUM 3.6 3.7 4.2 3.8 3.8 4.2   CHLORIDE 103 104 104 102 101 95*   CO2 26.7 25.5 24.1 22.6 21.4* 20.6*   ANION GAP 8.3 7.5 9.9 10.4 10.6 14.4   BUN 12 14 17 16 10 12   CREATININE 0.75 0.71 0.64 0.72 0.69 0.82   EGFR 86.8 92.7 96.4 91.2 94.7 78.0   GLUCOSE 239* 267* 221* 232* 398* 585*   CALCIUM 9.3 8.6 8.9 9.0 8.8 9.4   MAGNESIUM  --   --   --  1.9  --   --    PHOSPHORUS  --   --   --  3.1  --   --    TSH  --   --  0.939  --   --  1.570         Lab 12/22/24  0334 12/20/24 1919 12/16/24  1513   TOTAL PROTEIN 6.0 6.0 7.0   ALBUMIN 3.6 3.7 4.2   GLOBULIN 2.4 2.3 2.8   ALT (SGPT) 29 22 25   AST (SGOT) 34* 28 30   BILIRUBIN 0.2 <0.2 0.3   ALK PHOS 93 112 152*   LIPASE 33 45 62*         Lab 12/16/24  1618 12/16/24  1513   PROBNP  --  114.0   HSTROP T 18* 19*   PROTIME  --  13.2   INR  --  1.00             Lab 12/16/24  1513   VITAMIN B 12 627         Lab 12/16/24  1528   FIO2 21     Brief Urine Lab Results  (Last result in the past 365 days)        Color   Clarity   Blood   Leuk Est   Nitrite   Protein   CREAT   Urine HCG        12/20/24 1958 Yellow   Clear   Negative   Negative   Negative   Negative                 Microbiology Results (last 10 days)       Procedure Component Value - Date/Time    COVID-19, FLU A/B, RSV PCR 1 HR TAT - Swab, Nasopharynx [[id number]]  (Normal) Collected: 12/16/24 2103    Lab Status: Final result Specimen: Swab from Nasopharynx Updated: 12/16/24 2155     COVID19 Not Detected     Influenza A PCR Not Detected     Influenza B PCR Not Detected     RSV, PCR Not Detected    Narrative:      Fact sheet for providers: https://www.fda.gov/media/422947/download    Fact sheet for patients: https://www.fda.gov/media/363696/download    Test performed by PCR.            NM  Gastric Emptying    Result Date: 12/22/2024  Impression: Gastric emptying appears to be within normal limits. Electronically Signed: Dillon Posada  12/22/2024 12:10 PM EST  Workstation ID: [id number]    CT Abdomen Pelvis Without Contrast    Result Date: 12/20/2024  Impression: Impression: 1.Wall thickening of multiple loops of small bowel in the left hemiabdomen, concerning for enteritis. 2.Otherwise no acute abnormality of the abdomen or pelvis. Electronically Signed: Leandro Nguyen MD  12/20/2024 8:07 PM EST  Workstation ID: [id number]    XR Chest 1 View    Result Date: 12/16/2024  Impression: Impression: No active disease. Electronically Signed: Christopher Figueroa MD  12/16/2024 3:34 PM EST  Workstation ID: [id number]     Results for orders placed during the hospital encounter of 08/27/24    Duplex Pseudoaneurysm CAR    Interpretation Summary    No evidence of pseudoaneurysm or AV fistula in the right groin.      Results for orders placed during the hospital encounter of 08/27/24    Duplex Pseudoaneurysm CAR    Interpretation Summary    No evidence of pseudoaneurysm or AV fistula in the right groin.      Results for orders placed during the hospital encounter of 08/27/24    Adult Transesophageal Echo (MONTY) W/ Cont if Necessary Per Protocol    Interpretation Summary    Left ventricular systolic function is normal. Estimated left ventricular EF = 60% Left ventricular ejection fraction appears to be 56 - 60%.    Left ventricular wall thickness is consistent with mild concentric hypertrophy.    Left ventricular diastolic function is consistent with (grade I) impaired relaxation.    The left atrial cavity is mildly dilated.    Estimated right ventricular systolic pressure from tricuspid regurgitation is normal (<35 mmHg).    Procedure indication  Paroxysmal atrial fibrillation    conscious sedation administered by anesthesia  Timeout before procedure    consent obtained before procedure      Procedure note  after obtaining a valid  consent patient was sedated by Anesthesia and a MONTY probe was easily placed into esophagus with multiplane imaging with 2D, color and Doppler followed by bubble study with agitated saline without any complications    MONTY  Findings    Normal LV systolic function with mild left atrial enlargement without any shunt or clot  No effusion      Plan  Proceed with ablation    Procedure done  Trans esophageal echocardiography      Electronically signed by Shay Santos MD, 08/27/24, 4:32 PM EDT.      Labs Pending at Discharge:  Pending Results       None            Procedures Performed           Consults:   Consults       No orders found from 11/21/2024 to 12/21/2024.              Discharge Details        Discharge Medications        New Medications        Instructions Start Date   HYDROcodone-acetaminophen  MG per tablet  Commonly known as: NORCO   1 tablet, Oral, Every 6 Hours PRN      metoclopramide 10 MG tablet  Commonly known as: REGLAN   10 mg, Oral, 4 Times Daily      ondansetron ODT 8 MG disintegrating tablet  Commonly known as: ZOFRAN-ODT   8 mg, Translingual, Every 8 Hours PRN      pantoprazole 40 MG EC tablet  Commonly known as: Protonix   40 mg, Oral, Daily             Changes to Medications        Instructions Start Date   gabapentin 300 MG capsule  Commonly known as: NEURONTIN  What changed:   medication strength  how much to take   300 mg, Oral, 3 Times Daily             Continue These Medications        Instructions Start Date   Apidra SoloStar 100 UNIT/ML solution pen-injector  Generic drug: Insulin Glulisine   10 Units, Subcutaneous, 3 Times Daily, Take within 15 before or within 20 minutes of starting a meal.      baclofen 20 MG tablet  Commonly known as: LIORESAL   20 mg, Oral, 3 Times Daily      busPIRone 7.5 MG tablet  Commonly known as: BUSPAR   7.5 mg, Oral, Nightly PRN      dilTIAZem  MG 24 hr capsule  Commonly known as: CARDIZEM CD   1 capsule, Daily      Eliquis 5 MG tablet  tablet  Generic drug: apixaban   5 mg, Oral, Every 12 Hours      FLUoxetine 20 MG capsule  Commonly known as: PROzac   20 mg, Oral, Daily      Insulin Glargine 100 UNIT/ML injection pen  Commonly known as: LANTUS SOLOSTAR   50 Units, Subcutaneous, Daily      lisinopril 10 MG tablet  Commonly known as: PRINIVIL,ZESTRIL   10 mg, Oral, Daily      metoprolol succinate XL 50 MG 24 hr tablet  Commonly known as: TOPROL-XL   50 mg, Oral, Daily      Rexulti 3 MG tablet  Generic drug: Brexpiprazole   1 tablet, Daily      zolpidem 10 MG tablet  Commonly known as: AMBIEN   10 mg, Oral, Nightly PRN             Stop These Medications      albuterol sulfate  (90 Base) MCG/ACT inhaler  Commonly known as: PROVENTIL HFA;VENTOLIN HFA;PROAIR HFA     ibuprofen 800 MG tablet  Commonly known as: ADVIL,MOTRIN     metFORMIN 1000 MG tablet  Commonly known as: GLUCOPHAGE              Allergies   Allergen Reactions    Codeine GI Intolerance         Discharge Disposition:   Home or Self Care home with son    Diet:  Hospital:  Diet Order   Procedures    Diet: Diabetic; Consistent Carbohydrate; Fluid Consistency: Thin (IDDSI 0)         Discharge Activity: as tolerated        CODE STATUS:  Code Status and Medical Interventions: CPR (Attempt to Resuscitate); Full Support   Ordered at: 12/20/24 2234     Code Status (Patient has no pulse and is not breathing):    CPR (Attempt to Resuscitate)     Medical Interventions (Patient has pulse or is breathing):    Full Support         Future Appointments   Date Time Provider Department Center   1/17/2025 11:15 AM Maddie Cornejo APRN [facility]       Additional Instructions for the Follow-ups that You Need to Schedule       Discharge Follow-up with PCP   As directed       Currently Documented PCP:    Maddie Cornejo APRN    PCP Phone Number:    [phone]     Follow Up Details: within 1-2 weeks                Time spent on Discharge including face to face service:  35 minutes    Signature:  Electronically signed by Heather Herrera PA-C, 12/23/24, 12:46 EST.  [facility] Hospitalist Team

## 2024-12-23 NOTE — THERAPY EVALUATION
Patient Name: Arianna Blanco  : 1955    MRN: [id number]                              Today's Date: 2024       Admit Date: 2024    Visit Dx:     ICD-10-CM ICD-9-CM   1. Periumbilical abdominal pain  R10.33 789.05   2. Type 2 diabetes mellitus with hyperglycemia, unspecified whether long term insulin use  E11.65 250.00   3. Dehydration  E86.0 276.51     Patient Active Problem List   Diagnosis    Essential hypertension    Dyslipidemia    Simple chronic bronchitis    Unstable angina    Chest pain    Atrial fibrillation    Uncontrolled diabetes mellitus with hyperglycemia    Anxiety associated with depression    Anxiety    Cataracts, bilateral    Cervico-occipital neuralgia    Chronic obstructive pulmonary disease    Degenerative disc disease, lumbar    Headaches, cluster    Lesion of ulnar nerve, bilateral upper limbs    Degenerative disc disease, cervical    Median nerve neuropathy    Migraine    Nuclear senile cataract    Open angle with borderline findings and high glaucoma risk in left eye    Open-angle glaucoma of right eye    Presbyopia    Asthma    Tobacco dependence    Hypertriglyceridemia    Hypercholesteremia    Restless legs syndrome (RLS)    Sleep apnea syndrome    Defect of endplate of vertebra    Sacroiliac joint dysfunction    Lumbar spondylosis    Lumbar facet joint pain    Osteopenia of neck of left femur    Right renal mass    Splenomegaly    Right adrenal mass    Hepatic steatosis    Hepatomegaly    Nephrolithiasis    Mammogram declined    Tremor of right hand    Depression    Cervical stenosis of spinal canal    Numbness and tingling in right hand    Lumbar radiculopathy    Incontinence of feces    Lymphadenopathy, submental    Insomnia    Atrial fibrillation with RVR    Atrial flutter with controlled response    PAF (paroxysmal atrial fibrillation)    Fatigue    Paroxysmal atrial fibrillation    Generalized weakness    Nausea and vomiting    Intractable nausea and vomiting     Chronic pain     Past Medical History:   Diagnosis Date    CHF (congestive heart failure)     COPD (chronic obstructive pulmonary disease)     Depression     Diabetes mellitus     Hyperlipidemia     Hypertension      Past Surgical History:   Procedure Laterality Date    CARDIAC CATHETERIZATION Left 10/30/2020    Procedure: Left Heart Cath with Coronary Angiography;  Surgeon: Donaldo Archer MD;  Location: [facility] CATH INVASIVE LOCATION;  Service: Cardiovascular;  Laterality: Left;    CARDIAC ELECTROPHYSIOLOGY PROCEDURE Right 8/27/2024    Procedure: Ablation atrial fibrillation;  Surgeon: Shay Santos MD;  Location: [facility] CATH INVASIVE LOCATION;  Service: Cardiovascular;  Laterality: Right;    CARPAL TUNNEL RELEASE Bilateral 2017    CHOLECYSTECTOMY  1980    HYSTERECTOMY  1980      General Information       Row Name 12/22/24 1906          Physical Therapy Time and Intention    Document Type evaluation  -EL     Mode of Treatment individual therapy;physical therapy  -EL       Row Name 12/22/24 1906          General Information    Prior Level of Function independent:;all household mobility;ADL's  Struggles to maintain own medication  -EL       Row Name 12/22/24 1906          Living Environment    People in Home alone  -EL       Row Name 12/22/24 1906          Home Main Entrance    Number of Stairs, Main Entrance none  -EL       Row Name 12/22/24 1906          Stairs Within Home, Primary    Number of Stairs, Within Home, Primary none  -EL       Row Name 12/22/24 1906          Cognition    Orientation Status (Cognition) oriented x 4  -EL       Row Name 12/22/24 1906          Safety Issues/Impairments Affecting Functional Mobility    Impairments Affecting Function (Mobility) endurance/activity tolerance;strength  -EL               User Key  (r) = Recorded By, (t) = Taken By, (c) = Cosigned By      Initials Name Provider Type    Gael Gong PT Physical Therapist                   Mobility       Row Name 12/22/24  1908          Bed Mobility    Bed Mobility bed mobility (all) activities  -EL     All Activities, [place] (Bed Mobility) modified independence  -EL     Assistive Device (Bed Mobility) bed rails  -EL       Row Name 12/22/24 1908          Bed-Chair Transfer    Bed-Chair [place] (Transfers) contact guard  -EL     Assistive Device (Bed-Chair Transfers) walker, front-wheeled  -EL       Row Name 12/22/24 1908          Sit-Stand Transfer    Sit-Stand [place] (Transfers) contact guard  -EL     Assistive Device (Sit-Stand Transfers) walker, front-wheeled  -EL       Row Name 12/22/24 1908          Gait/Stairs (Locomotion)    [place] Level (Gait) contact guard  -EL     Assistive Device (Gait) walker, front-wheeled  -EL     Distance in Feet (Gait) 75  -EL     Deviations/Abnormal Patterns (Gait) gait speed decreased  -EL     Left Sided Gait Deviations forward flexed posture  -EL     Comment, (Gait/Stairs) No significant balance deficit, mild endurance deficit  -EL               User Key  (r) = Recorded By, (t) = Taken By, (c) = Cosigned By      Initials Name Provider Type    Gael Gong, PT Physical Therapist                   Obj/Interventions       Row Name 12/22/24 1908          Range of Motion Comprehensive    General Range of Motion bilateral lower extremity ROM WFL  -EL       Row Name 12/22/24 1908          Strength Comprehensive (MMT)    General Manual Muscle Testing (MMT) Assessment lower extremity strength deficits identified  -EL     Comment, General Manual Muscle Testing (MMT) Assessment BLE 4-/5 gross  -EL       Row Name 12/22/24 1908          Sensory Assessment (Somatosensory)    Sensory Assessment (Somatosensory) other (see comments)  significant neuropathy in BLE  -EL               User Key  (r) = Recorded By, (t) = Taken By, (c) = Cosigned By      Initials Name Provider Type    Gael Gong, PT Physical Therapist                   Goals/Plan       Row Name 12/22/24 1915          Bed  Mobility Goal 1 (PT)    Activity/Assistive Device (Bed Mobility Goal 1, PT) bed mobility activities, all  -EL     [place] Level/Cues Needed (Bed Mobility Goal 1, PT) modified independence  -EL     Time Frame (Bed Mobility Goal 1, PT) long term goal (LTG);2 weeks  -EL       Row Name 12/22/24 1915          Transfer Goal 1 (PT)    Activity/Assistive Device (Transfer Goal 1, PT) transfers, all  -EL     [place] Level/Cues Needed (Transfer Goal 1, PT) modified independence  -EL     Time Frame (Transfer Goal 1, PT) long term goal (LTG);2 weeks  -EL       Row Name 12/22/24 1915          Gait Training Goal 1 (PT)    Activity/Assistive Device (Gait Training Goal 1, PT) gait (walking locomotion)  -EL     [place] Level (Gait Training Goal 1, PT) modified independence  -EL     Distance (Gait Training Goal 1, PT) 250  -EL     Time Frame (Gait Training Goal 1, PT) long term goal (LTG);2 weeks  -EL       Row Name 12/22/24 1915          Therapy Assessment/Plan (PT)    Planned Therapy Interventions (PT) neuromuscular re-education;balance training;bed mobility training;transfer training;gait training;patient/family education;strengthening  -EL               User Key  (r) = Recorded By, (t) = Taken By, (c) = Cosigned By      Initials Name Provider Type    Gael Gong, PT Physical Therapist                   Clinical Impression       Row Name 12/22/24 1909          Pain    Pretreatment Pain Rating 0/10 - no pain  -EL     Posttreatment Pain Rating 0/10 - no pain  -EL       Row Name 12/22/24 1909          Plan of Care Review    Plan of Care Reviewed With patient  -EL     Outcome Evaluation Pt is a 70 YO F admitted with hyperglycemia, nausea vomitting. Pt reports living home alone, typically is independetn with all ADLs, ambulation with RWx and reports no recent falls. Pt reports difficutly managing her own medications, and difficulty checkng BG at home. Pt reports having a CGM but does not know how to use it. Pt this date  demonstrates good mobiltiy, requiring no assistance to come to sitting EOB, CGA to stand and ambulate wiht RWx. Pt without significant balance deficit and mild endurance deficit. Pt appears safe to return home at d/c, PT to follow while admitted. Does not need continued therapy services at d/ at this time, but woudl benefit from home health nursing to assist with medicine management and administration.  -EL       Row Name 12/22/24 1909          Therapy Assessment/Plan (PT)    Rehab Potential (PT) good  -EL     Criteria for Skilled Interventions Met (PT) yes  -EL     Therapy Frequency (PT) 3 times/wk  -EL     Predicted Duration of Therapy Intervention (PT) until d/c  -EL       Row Name 12/22/24 1909          Vital Signs    O2 Delivery Pre Treatment room air  -EL     O2 Delivery Intra Treatment room air  -EL     O2 Delivery Post Treatment room air  -EL     Pre Patient Position Supine  -EL     Intra Patient Position Standing  -EL     Post Patient Position Sitting  -EL       Row Name 12/22/24 1909          Positioning and Restraints    Pre-Treatment Position in bed  -EL     Post Treatment Position chair  -EL     In Chair notified nsg;reclined;call light within reach;encouraged to call for assist  -EL               User Key  (r) = Recorded By, (t) = Taken By, (c) = Cosigned By      Initials Name Provider Type    EL Gael Robles, PT Physical Therapist                   Outcome Measures       Row Name 12/22/24 1915          How much help from another person do you currently need...    Turning from your back to your side while in flat bed without using bedrails? 4  -EL     Moving from lying on back to sitting on the side of a flat bed without bedrails? 4  -EL     Moving to and from a bed to a chair (including a wheelchair)? 4  -EL     Standing up from a chair using your arms (e.g., wheelchair, bedside chair)? 4  -EL     Climbing 3-5 steps with a railing? 3  -EL     To walk in hospital room? 4  -EL     AM-PAC 6 Clicks Score  (PT) 23  -     Highest Level of Mobility Goal 7 --> Walk 25 feet or more  -       Row Name 12/22/24 1915          Functional Assessment    Outcome Measure Options AM-PAC 6 Clicks Basic Mobility (PT)  -EL               User Key  (r) = Recorded By, (t) = Taken By, (c) = Cosigned By      Initials Name Provider Type    EL Gael Robles PT Physical Therapist                                 Physical Therapy Education       Title: PT OT SLP Therapies (Done)       Topic: Physical Therapy (Done)       Point: Mobility training (Done)       Learning Progress Summary            Patient Acceptance, E,TB, VU by  at 12/22/2024 1915                      Point: Precautions (Done)       Learning Progress Summary            Patient Acceptance, E,TB, VU by  at 12/22/2024 1915                                      User Key       Initials Effective Dates Name Provider Type Discipline     06/23/20 -  Gael Robles, PT Physical Therapist PT                  PT Recommendation and Plan  Planned Therapy Interventions (PT): neuromuscular re-education, balance training, bed mobility training, transfer training, gait training, patient/family education, strengthening  Outcome Evaluation: Pt is a 70 YO F admitted with hyperglycemia, nausea vomitting. Pt reports living home alone, typically is independetn with all ADLs, ambulation with RWx and reports no recent falls. Pt reports difficutly managing her own medications, and difficulty checkng BG at home. Pt reports having a CGM but does not know how to use it. Pt this date demonstrates good mobiltiy, requiring no assistance to come to sitting EOB, CGA to stand and ambulate wiht RWx. Pt without significant balance deficit and mild endurance deficit. Pt appears safe to return home at d/c, PT to follow while admitted. Does not need continued therapy services at d/c at this time, but woudl benefit from home health nursing to assist with medicine management and administration.     Time Calculation:    PT Evaluation Complexity  History, PT Evaluation Complexity: 1-2 personal factors and/or comorbidities  Examination of Body Systems (PT Eval Complexity): total of 3 or more elements  Clinical Presentation (PT Evaluation Complexity): evolving  Clinical Decision Making (PT Evaluation Complexity): moderate complexity  Overall Complexity (PT Evaluation Complexity): moderate complexity     PT Charges       Row Name 12/22/24 1915             Time Calculation    Start Time 1431  -EL      Stop Time 1446  -EL      Time Calculation (min) 15 min  -EL      PT Received On 12/22/24  -EL      PT - Next Appointment 12/24/24  -EL      PT Goal Re-Cert Due Date 01/05/25  -EL                User Key  (r) = Recorded By, (t) = Taken By, (c) = Cosigned By      Initials Name Provider Type    Geal Gong PT Physical Therapist                  Therapy Charges for Today       Code Description Service Date Service Provider Modifiers Qty    [id number] HC PT EVAL MOD COMPLEXITY 3 12/22/2024 Gael Robles PT GP 1            PT G-Codes  Outcome Measure Options: AM-PAC 6 Clicks Basic Mobility (PT)  AM-PAC 6 Clicks Score (PT): 23  PT Discharge Summary  Anticipated Discharge Disposition (PT): home with home health    Gael Robles PT  12/22/2024

## 2024-12-23 NOTE — PLAN OF CARE
Problem: Adult Inpatient Plan of Care  Goal: Plan of Care Review  12/23/2024 1149 by Sariah Brewer RN  Outcome: Adequate for Care Transition  12/23/2024 1148 by Sariah Brewer RN  Outcome: Progressing  Goal: Patient-Specific Goal (Individualized)  12/23/2024 1149 by Sariah Brewer, VIKTORIYA  Outcome: Adequate for Care Transition  12/23/2024 1148 by Sariah Brewer RN  Outcome: Progressing  Goal: Absence of Hospital-Acquired Illness or Injury  12/23/2024 1149 by Sariah Brewer, VIKTORIYA  Outcome: Adequate for Care Transition  12/23/2024 1148 by Sariha Brewer RN  Outcome: Progressing  Intervention: Identify and Manage Fall Risk  Recent Flowsheet Documentation  Taken 12/23/2024 1000 by Sariah Brewer RN  Safety Promotion/Fall Prevention:   activity supervised   clutter free environment maintained   fall prevention program maintained   lighting adjusted   nonskid shoes/slippers when out of bed   room organization consistent   safety round/check completed  Taken 12/23/2024 0835 by Sariah Brewer RN  Safety Promotion/Fall Prevention:   safety round/check completed   room organization consistent   nonskid shoes/slippers when out of bed   lighting adjusted   fall prevention program maintained   clutter free environment maintained   activity supervised  Intervention: Prevent Skin Injury  Recent Flowsheet Documentation  Taken 12/23/2024 0835 by Sariah Brewer RN  Body Position: position changed independently  Skin Protection: transparent dressing maintained  Intervention: Prevent and Manage VTE (Venous Thromboembolism) Risk  Recent Flowsheet Documentation  Taken 12/23/2024 0835 by Sariah Brewer, RN  VTE Prevention/Management:   bilateral   SCDs (sequential compression devices) off   patient refused intervention  Intervention: Prevent Infection  Recent Flowsheet Documentation  Taken 12/23/2024 1000 by Sariah Brewer, RN  Infection Prevention:   single patient room provided    rest/sleep promoted   personal protective equipment utilized   hand hygiene promoted   equipment surfaces disinfected  Taken 12/23/2024 0835 by Sariah Brewer, RN  Infection Prevention:   single patient room provided   rest/sleep promoted   personal protective equipment utilized   hand hygiene promoted   equipment surfaces disinfected  Goal: Optimal Comfort and Wellbeing  12/23/2024 1149 by Sariah Brewer, RN  Outcome: Adequate for Care Transition  12/23/2024 1148 by Sariah Brewer, RN  Outcome: Progressing  Intervention: Provide Person-Centered Care  Recent Flowsheet Documentation  Taken 12/23/2024 0835 by Sariah Brewer, RN  Trust Relationship/Rapport:   care explained   choices provided   emotional support provided   empathic listening provided   questions answered   questions encouraged   reassurance provided   thoughts/feelings acknowledged  Goal: Readiness for Transition of Care  12/23/2024 1149 by Sariah Brewer, VIKTORIYA  Outcome: Adequate for Care Transition  12/23/2024 1148 by Sariah Brewer, RN  Outcome: Progressing     Problem: Nausea and Vomiting  Goal: Nausea and Vomiting Relief  12/23/2024 1149 by Sariah Brewer, RN  Outcome: Adequate for Care Transition  12/23/2024 1148 by Sariah Brewer, RN  Outcome: Progressing  Intervention: Prevent and Manage Nausea and Vomiting  Recent Flowsheet Documentation  Taken 12/23/2024 0835 by Sariah Brewer, RN  Fluid/Electrolyte Management: fluids provided  Environmental Support:   calm environment promoted   rest periods encouraged   environmental consistency promoted     Problem: Pain Acute  Goal: Optimal Pain Control and Function  12/23/2024 1149 by Sariah Brewer, RN  Outcome: Adequate for Care Transition  12/23/2024 1148 by Sariah Brewer, RN  Outcome: Progressing  Intervention: Optimize Psychosocial Wellbeing  Recent Flowsheet Documentation  Taken 12/23/2024 0835 by Sariah Brewer, RN  Supportive Measures:   active  listening utilized   relaxation techniques promoted  Diversional Activities:   television   smartphone  Intervention: Prevent or Manage Pain  Recent Flowsheet Documentation  Taken 12/23/2024 1000 by Sariah Brewer, RN  Medication Review/Management: medications reviewed  Taken 12/23/2024 0835 by Sariah Brewer, RN  Sensory Stimulation Regulation:   care clustered   lighting decreased   television on  Sleep/Rest Enhancement:   awakenings minimized   consistent schedule promoted   noise level reduced   regular sleep/rest pattern promoted   relaxation techniques promoted   room darkened   therapeutic touch utilized  Medication Review/Management: medications reviewed     Problem: Comorbidity Management  Goal: Maintenance of COPD Symptom Control  12/23/2024 1149 by Sariah Brewer, RN  Outcome: Adequate for Care Transition  12/23/2024 1148 by Sariah Brewer, RN  Outcome: Progressing  Intervention: Maintain COPD (Chronic Obstructive Pulmonary Disease) Symptom Control  Recent Flowsheet Documentation  Taken 12/23/2024 1000 by Sariah Brewer, RN  Medication Review/Management: medications reviewed  Taken 12/23/2024 0835 by Sariah Brewer, RN  Breathing Techniques/Airway Clearance:   deep/controlled cough encouraged   airway clearance techniques utilized  Medication Review/Management: medications reviewed  Goal: Blood Glucose Level Within Target Range  12/23/2024 1149 by Sariah Brewer, RN  Outcome: Adequate for Care Transition  12/23/2024 1148 by Sariah Brewer, RN  Outcome: Progressing  Intervention: Monitor and Manage Glycemia  Recent Flowsheet Documentation  Taken 12/23/2024 1000 by Sariah Brewer, RN  Medication Review/Management: medications reviewed  Taken 12/23/2024 0835 by Sariah Brewer, RN  Medication Review/Management: medications reviewed  Goal: Maintenance of Heart Failure Symptom Control  12/23/2024 1149 by Sariah Brewer, RN  Outcome: Adequate for Care  Transition  12/23/2024 1148 by Sariah Brewer, RN  Outcome: Progressing  Intervention: Maintain Heart Failure Management  Recent Flowsheet Documentation  Taken 12/23/2024 1000 by Sariah Brewer, RN  Medication Review/Management: medications reviewed  Taken 12/23/2024 0835 by Sariah Brewer, RN  Medication Review/Management: medications reviewed  Goal: Blood Pressure in Desired Range  12/23/2024 1149 by Sariah Brewer, RN  Outcome: Adequate for Care Transition  12/23/2024 1148 by Sariah Brewer, RN  Outcome: Progressing  Intervention: Maintain Blood Pressure Management  Recent Flowsheet Documentation  Taken 12/23/2024 1000 by Sariah Brewer, RN  Medication Review/Management: medications reviewed  Taken 12/23/2024 0835 by Sariah Brewer RN  Medication Review/Management: medications reviewed     Problem: Fall Injury Risk  Goal: Absence of Fall and Fall-Related Injury  12/23/2024 1149 by Sariah Brewer, RN  Outcome: Adequate for Care Transition  12/23/2024 1148 by Sariah Brewer, RN  Outcome: Progressing  Intervention: Identify and Manage Contributors  Recent Flowsheet Documentation  Taken 12/23/2024 1000 by Sariah Brewer, RN  Medication Review/Management: medications reviewed  Taken 12/23/2024 0835 by Sariah Brewer, RN  Medication Review/Management: medications reviewed  Self-Care Promotion: independence encouraged  Intervention: Promote Injury-Free Environment  Recent Flowsheet Documentation  Taken 12/23/2024 1000 by Sariah Brewer, RN  Safety Promotion/Fall Prevention:   activity supervised   clutter free environment maintained   fall prevention program maintained   lighting adjusted   nonskid shoes/slippers when out of bed   room organization consistent   safety round/check completed  Taken 12/23/2024 0835 by Sariah Brewer, RN  Safety Promotion/Fall Prevention:   safety round/check completed   room organization consistent   nonskid shoes/slippers when out of  bed   lighting adjusted   fall prevention program maintained   clutter free environment maintained   activity supervised   Goal Outcome Evaluation:Adequate for d/c

## 2024-12-23 NOTE — CONSULTS
Nutrition Services    Patient Name: Arianna Blanco  YOB: 1955  MRN: [id number]  Admission date: 12/20/2024    Continue current po diet and encourage dietary compliance.     NUTRITION SCREENING      Trending Narrative: 12/23:  Nutrition screening r/t Malnutrition screening tool score of 3 and Nursing admission screen consult received. Noted reported > 10# weight loss in the last 2 months - not reflected in weight history. Pt presented to ED via EMS on 12/20 with complaints of multiple episodes of vomiting and diarrhea as well as fever and chills. Pt also endorses abdominal pain. Pt recently admitted 12/16-12/18 for hyperosmolar hyperglycemic state. CT 12/21 revealed possible enteritis. RD visited pt at bedside. Pt reports that her appetite is good and reports no nutrition concerns at this time. NFPE completed and not consistent with nutrition diagnosis of malnutrition at this time using AND/ASPEN criteria          PO Diet: Diet: Diabetic; Consistent Carbohydrate; Fluid Consistency: Thin (IDDSI 0)   PO Supplements: None    Trending PO Intake:  12/23: 100% intake x last 4 documented meals        Nutritionally-Pertinent Medications RDN Reviewed, C/W clinical course         Labs (reviewed below): Hyperglycemia - Consistent CHO diet in place      Results from last 7 days   Lab Units 12/22/24  0334 12/21/24  0202 12/20/24  1919 12/17/24  0500 12/16/24  1513   SODIUM mmol/L 138 137 138   < > 130*   POTASSIUM mmol/L 3.6 3.7 4.2   < > 4.2   CHLORIDE mmol/L 103 104 104   < > 95*   CO2 mmol/L 26.7 25.5 24.1   < > 20.6*   BUN mg/dL 12 14 17   < > 12   CREATININE mg/dL 0.75 0.71 0.64   < > 0.82   CALCIUM mg/dL 9.3 8.6 8.9   < > 9.4   BILIRUBIN mg/dL 0.2  --  <0.2  --  0.3   ALK PHOS U/L 93  --  112  --  152*   ALT (SGPT) U/L 29  --  22  --  25   AST (SGOT) U/L 34*  --  28  --  30   GLUCOSE mg/dL 239* 267* 221*   < > 585*    < > = values in this interval not displayed.     Results from last 7 days   Lab Units  "12/22/24  0102 12/20/24  1919 12/17/24  2223   MAGNESIUM mg/dL  --   --  1.9   PHOSPHORUS mg/dL  --   --  3.1   HEMOGLOBIN g/dL 11.5*   < > 12.1   HEMATOCRIT % 35.6   < > 34.9    < > = values in this interval not displayed.     Lab Results   Component Value Date    HGBA1C 12.1 (A) 12/16/2024          GI Function:  Last documented BM 12/20       Skin: No PI documented        Weight Review: Estimated body mass index is 24.27 kg/m² as calculated from the following:    Height as of this encounter: 172.7 cm (68\").    Weight as of this encounter: 72.4 kg (159 lb 9.8 oz).    Comment:   12/23: (last weight 12/21) 159#  3% weight loss in the last 5 months     Wt Readings from Last 30 Encounters:   12/21/24 0034 72.4 kg (159 lb 9.8 oz)   12/20/24 1837 69.9 kg (154 lb 1.6 oz)   12/16/24 2300 69.9 kg (154 lb)   12/16/24 1445 69.9 kg (154 lb 1.6 oz)   12/16/24 1053 69.9 kg (154 lb)   08/27/24 1759 76.6 kg (168 lb 14 oz)   08/27/24 1343 74.6 kg (164 lb 7.4 oz)   07/23/24 0820 74.5 kg (164 lb 3.2 oz)   07/11/24 1837 76.7 kg (169 lb)   07/11/24 1541 78.9 kg (174 lb)   07/08/24 0514 79.3 kg (174 lb 13.2 oz)   07/06/24 1717 77.1 kg (169 lb 15.6 oz)   07/05/24 2043 77.8 kg (171 lb 8.3 oz)   04/22/24 0810 76.7 kg (169 lb 3.2 oz)   03/29/24 0830 75.6 kg (166 lb 9.6 oz)   01/09/24 0800 75.3 kg (166 lb)   12/21/23 1422 74.8 kg (165 lb)   11/06/23 1254 77.1 kg (170 lb)   09/12/23 1547 80.3 kg (177 lb)   08/29/23 1045 81.9 kg (180 lb 9.6 oz)   08/14/23 1454 82.6 kg (182 lb)   11/08/22 1307 72.1 kg (159 lb)   10/18/22 0520 72.5 kg (159 lb 13.3 oz)   10/15/22 1416 79.8 kg (176 lb)   10/14/22 1522 79.8 kg (176 lb)   06/29/21 1904 79.8 kg (176 lb)   10/30/20 1100 82.5 kg (181 lb 14.1 oz)   10/28/20 1600 83.5 kg (184 lb)   07/02/19 1106 74.4 kg (164 lb)          --       Nutrition Problem Statement: Excessive carbohydrate intake related to modified carbohydrate intake needed in the setting of DM as evidenced by recurrent/ongoing hyperglycemia.  "        Nutrition Intervention: Continue current po diet and encourage dietary compliance.           Monitoring/Evaluation Per protocol, I&O, PO intake, Pertinent labs, Weight, GI status, Symptoms, Swallow function, Hemodynamic stability            RD to follow up per protocol.    Electronically signed by:  Barbara Delarosa RD  12/23/24 08:18 EST

## 2024-12-23 NOTE — CASE MANAGEMENT/SOCIAL WORK
Case Management Discharge Note      Final Note: Routine home.         Selected Continued Care - Discharged on 12/23/2024 Admission date: 12/20/2024 - Discharge disposition: Home or Self Care     Transportation Services  Private: Car    Final Discharge Disposition Code: 01 - home or self-care

## 2024-12-23 NOTE — CASE MANAGEMENT/SOCIAL WORK
Social Work Assessment  [facility]     Patient Name: Arianna Blanco  MRN: [id number]  Today's Date: 12/23/2024    Admit Date: 12/20/2024       Discharge Plan       Row Name 12/23/24 1214       Plan    Plan Comments LSW met with patient at bedside, introduced self/role, and reason for visit. LSW asked patient about current needs at home. Patient stated that she needs help administering her medications, cooking, checking sugar levels and blood pressure, and household chores. Patient stated that she is too weak and doesn’t want to have to go to the table and do those things for herself. LSW asked patient about transportation and ability to  medications. Patient stated that she doesn’t drive herself, that her son provides her transportation, and that not being able to drive makes it hard to keep up with her needs. LSW inquired about patient’s ability to pay for basic needs, housing, and feelings of stress or being down. Patient stated that her utilities were threatened to be turned off last month, that she receives food stamps, but sometimes that’s not enough, and that she feels stressed and down every day. LSW asked patient about mental health counseling and patient stated that she talked to a counselor years ago, but isn’t interested now because talking about it doesn’t solve the problem. LSW offered patient county resource information for food, housing, utility assistance, etc. and patient agreeable. LSW presented information at bedside. Patient has no further needs at this time.             BABITA Meek, YVONNE    Phone: [phone]  Fax: [phone]  [email or web address]

## 2024-12-23 NOTE — CONSULTS
"Diabetes Education  Assessment/Teaching    Patient Name:  Arianna Blanco  YOB: 1955  MRN: [id number]  Admit Date:  12/20/2024      Assessment Date:  12/23/2024  Flowsheet Row Most Recent Value   General Information     Referral From: Blood glucose, MD order  [MD consult for blood glucose >200 and admission blood sugar 240.]   Height 172.7 cm (68\")   Height Method Stated   Weight 72.4 kg (159 lb 9.8 oz)   Weight Method Bed scale   Pregnancy Assessment    Diabetes History    What type of diabetes do you have? Type 2   Length of Diabetes Diagnosis 10 + years  [Patient stated she was diagnosed at least 15 years ago.]   Current DM knowledge fair   Have you had diabetes education/teaching in the past? yes   When and where was your diabetes education? Inpatient hospitalizations at [facility] with last one being 12/17/2024   Do you test your blood sugar at home? yes   Frequency of checks daily   Meter type Accu-chek about 2 years old   Who performs the test? patient   Typical readings 200-600   Have you had high blood sugar? (>140mg/dl) yes   How often do you have high blood sugar? frequently   When was your last high blood sugar? Admission blood sugar 240   Feeling down, depressed, or hopeless Nearly every day   Little interest or pleasure in doing things Nearly every day   Education Preferences    What areas of diabetes would you like to learn about? avoiding high blood sugar, diabetes complications, medications for diabetes, testing my blood sugar at home   Nutrition Information    Assessment Topics    Taking Medication - Assessment Needs education   Problem Solving - Assessment Needs education   Reducing Risk - Assessment Needs education   Monitoring - Assessment Needs education   DM Goals    Taking Medication - Goal Today   Problem Solving - Goal Today   Reducing Risk - Goal Today   Monitoring - Goal Today            Flowsheet Row Most Recent Value   DM Education Needs    Meter Has own   Meter Type Accuchek "   Frequency of Testing AC/HS  [Discussed with patient that it is recommended to check blood sugar before meals and at bedtime when on insulin.]   Medication Insulin, Pen  [At home patient stated that she takes Lantus 22 units at bedtime and Apidra 5 units BID before meals.]   Problem Solving Hyperglycemia, Signs, Symptoms, Treatment   Reducing Risks A1C testing  [On 12/16/2024 A1c was 12.1%.]   Discharge Plan Home   Motivation Moderate   Teaching Method Discussion, Handouts   Patient Response Verbalized understanding              Other Comments:  A1c info sheet given with discussion on A1c target and healthy blood sugar range. Patient stated she had a heart ablation on 8/27/2024 and hasn't felt well since the procedure. Patient stated she doesn't eat well and doesn't fell like cooking. Discussed with patient the importance of eating throughout the day for better blood sugar control. Patient stated she keeps unopened insulin in the refrigerator and opened insulin on the table. Asked patient if she was priming the insulin pen and she said no. Handouts given with discussion on how to use an insulin pen. Instructed patient to prime pen needle prior to each injection and to hold insulin pen for 10 seconds after injection. Instructed patient to notify PCP if blood sugars are >200 for 3 consecutive days.  Patient stated she drinks 2-16 ounce bottles of regular Coke a day. Explained to patient how sugared beverages affect the blood sugar and asked her if she could give up the regular soda and she said yes. Home med list has patient on Lantus 50 units daily. Patient has discharge instructions at bedside with orders to take Lantus 50 units daily. Explained to patient how much insulin she should be taking and patient stated she would start taking the recommended dose. Patient has no further questions or concerns related to diabetes at this time.      Electronically signed by:  Annabelle Davies RN  12/23/24 15:02 EST

## 2024-12-24 ENCOUNTER — TRANSITIONAL CARE MANAGEMENT TELEPHONE ENCOUNTER (OUTPATIENT)
Dept: CALL CENTER | Facility: HOSPITAL | Age: 69
End: 2024-12-24
Payer: MEDICAID

## 2024-12-24 ENCOUNTER — NURSE TRIAGE (OUTPATIENT)
Dept: CALL CENTER | Facility: HOSPITAL | Age: 69
End: 2024-12-24
Payer: MEDICAID

## 2024-12-24 RX ORDER — DILTIAZEM HYDROCHLORIDE 240 MG/1
240 CAPSULE, COATED, EXTENDED RELEASE ORAL DAILY
Qty: 30 CAPSULE | Refills: 0 | Status: SHIPPED | OUTPATIENT
Start: 2024-12-24

## 2024-12-24 RX ORDER — DILTIAZEM HYDROCHLORIDE 240 MG/1
240 CAPSULE, COATED, EXTENDED RELEASE ORAL DAILY
Qty: 30 CAPSULE | Refills: 0 | Status: SHIPPED | OUTPATIENT
Start: 2024-12-24 | End: 2024-12-24

## 2024-12-24 NOTE — TELEPHONE ENCOUNTER
Vm left requesting call back about her medications. Returned call to patient, she states she does not have Diltiazem CD in her D/C medications. States she had it with her when she was admitted and it was not returned to her.   Called 3C at [facility] and they transferred me to Pharmacy to ask about this. Left  with my contact number to have them call back when able.   10:34 returned call to [facility] to speak to pharmacy.  Spoke with Fabio who is checking to see if they have the Cardizem CD still in pharmacy. Patient had 2 bags of meds and these were returned to [facility] on 3C.  Spoke with Anny on 3C who states they do not have the Cardizem there.   10:41 Called Ms. Blanco to let her know [facility] does not have her Cardizem CD.   10:48 SC message to Dr. Cassidy to see if he will send a prescription for her Cardizem CD to [facility] in [place] for her.  11:07 Spoke with Breana CAMILO and apprised of situation. Patient information given to Breana. She states she will check with the Charge Nurse and see what they can do.

## 2024-12-24 NOTE — OUTREACH NOTE
Call Center TCM Note      Flowsheet Row Responses   [facility] patient discharged from? [facility]   Does the patient have one of the following disease processes/diagnoses(primary or secondary)? Other   TCM attempt successful? Yes   Call start time 1340   Call end time 1342   Discharge diagnosis Intractable nausea and vomiting   Person spoke with today (if not patient) and relationship patient   Meds reviewed with patient/caregiver? Yes   Is the patient having any side effects they believe may be caused by any medication additions or changes? No   Does the patient have all medications ordered at discharge? Yes   Is the patient taking all medications as directed (includes completed medication regime)? Yes   Does the patient have an appointment with their PCP within 7-14 days of discharge? No   Nursing Interventions Patient declined scheduling/rescheduling appointment at this time   Psychosocial issues? No   Did the patient receive a copy of their discharge instructions? Yes   Nursing interventions Reviewed instructions with patient   What is the patient's perception of their health status since discharge? Improving   Is the patient/caregiver able to teach back signs and symptoms related to disease process for when to call PCP? Yes   Is the patient/caregiver able to teach back signs and symptoms related to disease process for when to call 911? Yes   Is the patient/caregiver able to teach back the hierarchy of who to call/visit for symptoms/problems? PCP, Specialist, Home health nurse, Urgent Care, ED, 911 Yes   If the patient is a current smoker, are they able to teach back resources for cessation? Not a smoker   TCM call completed? Yes   Wrap up additional comments Patient denies any n/v today. Patient's last . Pt is out of town and will fu with PCP on 1/17/24.   Call end time 1342   Would this patient benefit from a Referral to [facility] Social Work? No   Is the patient interested in additional calls from an  ambulatory ? No            Laura Magaña RN    12/24/2024, 13:43 EST

## 2024-12-26 RX ORDER — PANTOPRAZOLE SODIUM 40 MG/1
40 TABLET, DELAYED RELEASE ORAL DAILY
Qty: 30 TABLET | Refills: 0 | Status: CANCELLED | OUTPATIENT
Start: 2024-12-23 | End: 2025-01-22

## 2024-12-26 RX ORDER — ONDANSETRON 8 MG/1
8 TABLET, ORALLY DISINTEGRATING ORAL EVERY 8 HOURS PRN
Qty: 90 TABLET | Refills: 0 | Status: CANCELLED | OUTPATIENT
Start: 2024-12-23 | End: 2025-01-22

## 2024-12-26 RX ORDER — METOCLOPRAMIDE 10 MG/1
10 TABLET ORAL 4 TIMES DAILY
Qty: 120 TABLET | Refills: 0 | Status: CANCELLED | OUTPATIENT
Start: 2024-12-23 | End: 2025-01-22

## 2024-12-26 RX ORDER — GABAPENTIN 300 MG/1
300 CAPSULE ORAL 3 TIMES DAILY
Qty: 9 CAPSULE | Refills: 0 | Status: CANCELLED | OUTPATIENT
Start: 2024-12-23 | End: 2024-12-26

## 2024-12-27 ENCOUNTER — NURSE TRIAGE (OUTPATIENT)
Dept: CALL CENTER | Facility: HOSPITAL | Age: 69
End: 2024-12-27
Payer: MEDICAID

## 2024-12-27 NOTE — TELEPHONE ENCOUNTER
"Reason for Disposition   Information only question and nurse able to answer    Additional Information   Negative: Nursing judgment   Negative: Nursing judgment   Negative: Nursing judgment   Negative: Nursing judgment    Answer Assessment - Initial Assessment Questions  1. REASON FOR CALL or QUESTION: \"What is your reason for calling today?\" or \"How can I best help you?\" or \"What question do you have that I can help answer?\"      Discharged from [facility] on 12/23/2024 and was supposed to receive HH. States has not heard from anyone re: HH and has not heard from GI referral that was to be sent.    Protocols used: No Protocol Available - Information Only-ADULT-[place]    "

## 2024-12-30 ENCOUNTER — PATIENT OUTREACH (OUTPATIENT)
Dept: CASE MANAGEMENT | Facility: OTHER | Age: 69
End: 2024-12-30
Payer: MEDICAID

## 2024-12-30 DIAGNOSIS — G47.00 INSOMNIA, UNSPECIFIED TYPE: ICD-10-CM

## 2024-12-30 RX ORDER — ZOLPIDEM TARTRATE 10 MG/1
10 TABLET ORAL NIGHTLY PRN
Qty: 28 TABLET | Refills: 0 | Status: SHIPPED | OUTPATIENT
Start: 2024-12-30

## 2024-12-30 RX ORDER — GABAPENTIN 100 MG/1
CAPSULE ORAL
Qty: 15 CAPSULE | Refills: 0 | Status: SHIPPED | OUTPATIENT
Start: 2024-12-30

## 2024-12-30 NOTE — OUTREACH NOTE
AMBULATORY CASE MANAGEMENT NOTE    Names and Relationships of Patient/Support Persons: Contact: [facility]; Relationship:  -     Care Coordination    Received referral from  Nurse Call Center, pt requesting follow up on GI referral and HH orders. RNCORIE outreach call made to [facility], spoke with staff Annabelle. Annabelle states they are running 2 weeks behind on referrals. Annabelle reports they have access to Epic for referral orders. RN-ACM informed Annabelle that referral was in Epic. Annabelle states pt needs to call them to schedule the appt. RN-ACM will notify pt.     Patient Outreach    RN-ACM outreach call to pt. Explained role of RNALPAM and reason for call. Discussed above outreach with [facility], Annabelle states she will call I later, their contact information provided. Annabelle states she is interested in HH SN services, CR notes indicate HH referral to [facility] was declined due to staffing. RN-ACM advised pt to request HH referral from her PCP, pt states she will. Pt has PCP appt scheduled for this week. Pt states she has been dealing with nausea since hospital discharge. Reviewed AVS with pt. Education provided regarding nausea tx. Unable to further outreach, pt states she needs to go. Advised her to call RN-ACM or [facility] Nurse Line with any needs. Follow up outreach prn.       Shelby DRAKE  Ambulatory Case Management    12/30/2024, 10:43 EST

## 2025-01-01 ENCOUNTER — NURSE TRIAGE (OUTPATIENT)
Dept: CALL CENTER | Facility: HOSPITAL | Age: 70
End: 2025-01-01
Payer: MEDICAID

## 2025-01-01 NOTE — TELEPHONE ENCOUNTER
Advised caller to use Albuterol for acute period of SOA and to ask PCP about continued use when she see's them on 1/3/25. Verbalizes understanding.       Reason for Disposition   Caller has medicine question, adult has minor symptoms, caller declines triage, AND triager answers question    Additional Information   Negative: [1] Intentional drug overdose AND [2] suicidal thoughts or ideas   Negative: Drug overdose and triager unable to answer question   Negative: Caller requesting a renewal or refill of a medicine patient is currently taking   Negative: Caller requesting information unrelated to medicine   Negative: Caller requesting information about COVID-19 Vaccine   Negative: Caller requesting information about Emergency Contraception   Negative: Caller requesting information about Combined Birth Control Pills   Negative: Caller requesting information about Progestin Birth Control Pills   Negative: Caller requesting information about Post-Op pain or medicines   Negative: Caller requesting a prescription antibiotic (such as Penicillin) for Strep throat and has a positive culture result   Negative: Caller requesting a prescription anti-viral med (such as Tamiflu) and has influenza (flu) symptoms   Negative: Immunization reaction suspected   Negative: Rash while taking a medicine or within 3 days of stopping it   Negative: [1] Asthma and [2] having symptoms of asthma (cough, wheezing, etc.)   Negative: [1] Symptom of illness (e.g., headache, abdominal pain, earache, vomiting) AND [2] more than mild   Negative: Breastfeeding questions about mother's medicines and diet   Negative: MORE THAN A DOUBLE DOSE of a prescription or over-the-counter (OTC) drug   Negative: [1] DOUBLE DOSE (an extra dose or lesser amount) of prescription drug AND [2] any symptoms (e.g., dizziness, nausea, pain, sleepiness)   Negative: [1] DOUBLE DOSE (an extra dose or lesser amount) of over-the-counter (OTC) drug AND [2] any symptoms (e.g.,  "dizziness, nausea, pain, sleepiness)   Negative: Took another person's prescription drug   Negative: [1] DOUBLE DOSE (an extra dose or lesser amount) of prescription drug AND [2] NO symptoms  (Exception: A double dose of antibiotics.)   Negative: Diabetes drug error or overdose (e.g., took wrong type of insulin or took extra dose)   Negative: [1] Prescription not at pharmacy AND [2] was prescribed by PCP recently (Exception: Triager has access to EMR and prescription is recorded there. Go to Home Care and confirm for pharmacy.)   Negative: [1] Pharmacy calling with prescription question AND [2] triager unable to answer question   Negative: [1] Caller has URGENT medicine question about med that PCP or specialist prescribed AND [2] triager unable to answer question   Negative: Medicine patch causing local rash or itching   Negative: [1] Caller has medicine question about med NOT prescribed by PCP AND [2] triager unable to answer question (e.g., compatibility with other med, storage)   Negative: Prescription request for new medicine (not a refill)   Negative: [1] Caller has NON-URGENT medicine question about med that PCP prescribed AND [2] triager unable to answer question   Negative: Caller wants to use a complementary or alternative medicine   Negative: [1] Prescription prescribed recently is not at pharmacy AND [2] triager has access to patient's EMR AND [3] prescription is recorded in the EMR   Negative: [1] DOUBLE DOSE (an extra dose or lesser amount) of over-the-counter (OTC) drug AND [2] NO symptoms   Negative: [1] DOUBLE DOSE (an extra dose or lesser amount) of antibiotic drug AND [2] NO symptoms   Negative: Caller has medicine question only, adult not sick, AND triager answers question    Answer Assessment - Initial Assessment Questions  1. NAME of MEDICINE: \"What medicine(s) are you calling about?\"      Albuterol  2. QUESTION: \"What is your question?\" (e.g., double dose of medicine, side effect)      Was " "instructed to stop taking after last hospital admission but is currently SOA and is wanting to know if she can use due to SOA  3. PRESCRIBER: \"Who prescribed the medicine?\" Reason: if prescribed by specialist, call should be referred to that group.      Not asked  4. SYMPTOMS: \"Do you have any symptoms?\" If Yes, ask: \"What symptoms are you having?\"  \"How bad are the symptoms (e.g., mild, moderate, severe)      SOA  5. PREGNANCY:  \"Is there any chance that you are pregnant?\" \"When was your last menstrual period?\"      Na    Protocols used: Medication Question Call-ADULT-    "

## 2025-01-02 ENCOUNTER — TELEPHONE (OUTPATIENT)
Dept: PAIN MEDICINE | Facility: CLINIC | Age: 70
End: 2025-01-02
Payer: MEDICAID

## 2025-01-02 RX ORDER — BACLOFEN 20 MG/1
20 TABLET ORAL 3 TIMES DAILY
Qty: 90 TABLET | Refills: 0 | Status: SHIPPED | OUTPATIENT
Start: 2025-01-02

## 2025-01-02 NOTE — TELEPHONE ENCOUNTER
Caller: Arianna Blanco    Relationship: Self    Best call back number: [phone]    Requested Prescriptions: BACLOFEN 20 MG     Pharmacy where request should be sent: [facility] [place]      Last office visit with prescribing clinician: 7/26/2024     Additional details provided by patient: PATIENT IS OUT OF MEDICATION. SHE STATES SHE IS GOING OUT OF TOWN TOMORROW AFTERNOON, AND NEEDS TO PICK THIS UP BEFORE SHE LEAVES    Does the patient have less than a 3 day supply:  [x] Yes  [] No    Would you like a call back once the refill request has been completed: [x] Yes [] No

## 2025-01-03 ENCOUNTER — OFFICE VISIT (OUTPATIENT)
Dept: FAMILY MEDICINE CLINIC | Facility: CLINIC | Age: 70
End: 2025-01-03
Payer: MEDICAID

## 2025-01-03 VITALS
RESPIRATION RATE: 18 BRPM | OXYGEN SATURATION: 99 % | SYSTOLIC BLOOD PRESSURE: 146 MMHG | TEMPERATURE: 96.7 F | BODY MASS INDEX: 25.07 KG/M2 | HEIGHT: 68 IN | DIASTOLIC BLOOD PRESSURE: 68 MMHG | HEART RATE: 72 BPM | WEIGHT: 165.4 LBS

## 2025-01-03 DIAGNOSIS — R11.0 NAUSEA: ICD-10-CM

## 2025-01-03 DIAGNOSIS — F17.200 TOBACCO DEPENDENCE: ICD-10-CM

## 2025-01-03 DIAGNOSIS — R53.81 PHYSICAL DECONDITIONING: ICD-10-CM

## 2025-01-03 DIAGNOSIS — F41.9 ANXIETY: ICD-10-CM

## 2025-01-03 DIAGNOSIS — Z09 HOSPITAL DISCHARGE FOLLOW-UP: Primary | ICD-10-CM

## 2025-01-03 DIAGNOSIS — E11.65 TYPE 2 DIABETES MELLITUS WITH HYPERGLYCEMIA, WITHOUT LONG-TERM CURRENT USE OF INSULIN: ICD-10-CM

## 2025-01-03 DIAGNOSIS — I10 ESSENTIAL HYPERTENSION: ICD-10-CM

## 2025-01-03 DIAGNOSIS — Z12.11 SCREENING FOR COLON CANCER: ICD-10-CM

## 2025-01-03 DIAGNOSIS — J44.9 CHRONIC OBSTRUCTIVE PULMONARY DISEASE, UNSPECIFIED COPD TYPE: ICD-10-CM

## 2025-01-03 DIAGNOSIS — E78.5 DYSLIPIDEMIA: ICD-10-CM

## 2025-01-03 DIAGNOSIS — H53.9 VISION CHANGES: ICD-10-CM

## 2025-01-03 DIAGNOSIS — K59.00 CONSTIPATION, UNSPECIFIED CONSTIPATION TYPE: ICD-10-CM

## 2025-01-03 DIAGNOSIS — I48.91 ATRIAL FIBRILLATION, UNSPECIFIED TYPE: ICD-10-CM

## 2025-01-03 DIAGNOSIS — M54.16 LUMBAR RADICULOPATHY: ICD-10-CM

## 2025-01-03 PROBLEM — Z79.4 UNCONTROLLED TYPE 2 DIABETES MELLITUS WITH HYPERGLYCEMIA, WITH LONG-TERM CURRENT USE OF INSULIN: Status: ACTIVE | Noted: 2022-10-14

## 2025-01-03 PROBLEM — E78.2 MIXED HYPERLIPIDEMIA: Status: ACTIVE | Noted: 2023-08-14

## 2025-01-03 PROBLEM — I48.20 CHRONIC ATRIAL FIBRILLATION: Status: ACTIVE | Noted: 2024-07-08

## 2025-01-03 PROBLEM — G47.33 OBSTRUCTIVE SLEEP APNEA: Status: ACTIVE | Noted: 2023-08-14

## 2025-01-03 PROCEDURE — 99495 TRANSJ CARE MGMT MOD F2F 14D: CPT

## 2025-01-03 PROCEDURE — 3077F SYST BP >= 140 MM HG: CPT

## 2025-01-03 PROCEDURE — 1125F AMNT PAIN NOTED PAIN PRSNT: CPT

## 2025-01-03 PROCEDURE — 3078F DIAST BP <80 MM HG: CPT

## 2025-01-03 RX ORDER — LISINOPRIL 10 MG/1
10 TABLET ORAL DAILY
Qty: 90 TABLET | Refills: 3 | Status: SHIPPED | OUTPATIENT
Start: 2025-01-03

## 2025-01-03 RX ORDER — CALCIUM CARBONATE/VITAMIN D3 500MG-5MCG
2 TABLET ORAL DAILY
COMMUNITY
Start: 2024-12-16

## 2025-01-03 RX ORDER — INSULIN GLULISINE 100 [IU]/ML
10 INJECTION, SOLUTION SUBCUTANEOUS 3 TIMES DAILY
Qty: 27 ML | Refills: 3 | Status: SHIPPED | OUTPATIENT
Start: 2025-01-03

## 2025-01-03 RX ORDER — DILTIAZEM HYDROCHLORIDE 240 MG/1
240 CAPSULE, COATED, EXTENDED RELEASE ORAL DAILY
Qty: 90 CAPSULE | Refills: 3 | Status: SHIPPED | OUTPATIENT
Start: 2025-01-03

## 2025-01-03 RX ORDER — DOCUSATE SODIUM 100 MG/1
100 CAPSULE, LIQUID FILLED ORAL 2 TIMES DAILY
Qty: 180 CAPSULE | Refills: 3 | Status: SHIPPED | OUTPATIENT
Start: 2025-01-03

## 2025-01-03 RX ORDER — TRAMADOL HYDROCHLORIDE 50 MG/1
TABLET ORAL
COMMUNITY
Start: 2024-12-30

## 2025-01-03 RX ORDER — METOCLOPRAMIDE 10 MG/1
10 TABLET ORAL 4 TIMES DAILY
Qty: 120 TABLET | Refills: 0 | Status: SHIPPED | OUTPATIENT
Start: 2025-01-03 | End: 2025-02-02

## 2025-01-03 RX ORDER — ALBUTEROL SULFATE 90 UG/1
2 INHALANT RESPIRATORY (INHALATION) EVERY 4 HOURS PRN
Qty: 6.7 G | Refills: 2 | Status: SHIPPED | OUTPATIENT
Start: 2025-01-03

## 2025-01-03 RX ORDER — NICOTINE 21 MG/24HR
1 PATCH, TRANSDERMAL 24 HOURS TRANSDERMAL EVERY 24 HOURS
Status: DISCONTINUED | OUTPATIENT
Start: 2025-01-03 | End: 2025-01-03

## 2025-01-03 RX ORDER — APIXABAN 5 MG/1
5 TABLET, FILM COATED ORAL EVERY 12 HOURS SCHEDULED
Qty: 180 TABLET | Refills: 0 | Status: SHIPPED | OUTPATIENT
Start: 2025-01-03

## 2025-01-03 RX ORDER — LANCETS
1 EACH MISCELLANEOUS
Qty: 100 EACH | Refills: 11 | Status: SHIPPED | OUTPATIENT
Start: 2025-01-03

## 2025-01-03 RX ORDER — BLOOD-GLUCOSE METER
1 EACH MISCELLANEOUS
Qty: 1 KIT | Refills: 0 | Status: SHIPPED | OUTPATIENT
Start: 2025-01-03

## 2025-01-03 RX ORDER — PANTOPRAZOLE SODIUM 40 MG/1
40 TABLET, DELAYED RELEASE ORAL DAILY
Qty: 90 TABLET | Refills: 3 | Status: SHIPPED | OUTPATIENT
Start: 2025-01-03

## 2025-01-03 RX ORDER — INSULIN GLULISINE 100 [IU]/ML
10 INJECTION, SOLUTION SUBCUTANEOUS 3 TIMES DAILY
Qty: 9 ML | Refills: 3
Start: 2025-01-03 | End: 2025-01-03

## 2025-01-03 RX ORDER — NICOTINE 21 MG/24HR
1 PATCH, TRANSDERMAL 24 HOURS TRANSDERMAL EVERY 24 HOURS
Qty: 30 EACH | Refills: 2 | Status: SHIPPED | OUTPATIENT
Start: 2025-01-03

## 2025-01-03 RX ORDER — METOPROLOL SUCCINATE 50 MG/1
50 TABLET, EXTENDED RELEASE ORAL DAILY
Qty: 90 TABLET | Refills: 3 | Status: SHIPPED | OUTPATIENT
Start: 2025-01-03

## 2025-01-03 NOTE — ASSESSMENT & PLAN NOTE
She will continue Breztri twice daily and albuterol as needed. The prescription for Breztri and albuterol will be reordered.  Orders:    Budeson-Glycopyrrol-Formoterol (BREZTRI) 160-9-4.8 MCG/ACT aerosol inhaler; Inhale 2 puffs 2 (Two) Times a Day.    albuterol sulfate  (90 Base) MCG/ACT inhaler; Inhale 2 puffs Every 4 (Four) Hours As Needed for Wheezing or Shortness of Air.

## 2025-01-03 NOTE — PROGRESS NOTES
Subjective   Arianna Blanco is a 69 y.o. female. Presents to [facility]    Arianna was seen at [facility] . She was admitted on 12/20/2024  for dehydration, abdominal pain, nausea vomiting, type 2 diabetes.. She was discharged on 12/23/2024.  She was also admitted from December 16 to December 18 for type 2 diabetes with hyperosmolar hyperglycemia and weakness discharge diagnosis was intractable nausea and vomiting. Labs that were performed during the encounter included: CBC, BMP, Lipase, glucose, urinalysis, urine drug screen, TSH, CMP. . Diagnostic studies that were performed included: CT Scan-abdomen and pelvis with multiple loops of small bowel in the left hemiabdomen concerning for enteritis otherwise no acute abnormality . Currently Arianna receives care at home. Complications from the hospital stay include dizziness, vomiting . The patient stated that they do need help with their daily life and activities. The patient stated that they do not have emotional support at home.  Current outpatient and discharge medications have been reconciled for the patient.  Reviewed by: THOMAS Scanlon    Transitional Care Management Certification  I certify that the following are true:  Communication was made within 2 business days of discharge.  Complexity of Medical Decision Making is moderate.  Face to face visit occurred within 11 days.    *Note: 40145 is for high complexity patients with a face to face visit within 7 days of discharge.  65123 is for high complexity patients with a face to face on days 8-14 post discharge or medium complexity with face to face visit within 14 days post discharge.      Chief Complaint   Patient presents with    Hospital Follow Up Visit       History of Present Illness   History of Present Illness  The patient is a 69-year-old female who presents for a hospital discharge follow-up.    She was previously admitted to [facility] on 12/20/2024 for  dehydration, abdominal pain, nausea, vomiting, and type 2 diabetes. She was discharged on 12/23/2024. She was also admitted from 12/16/2024 to 12/18/2024 for type 2 diabetes with hyperosmolar hyperglycemia and weakness. Despite being discharged with intractable nausea and vomiting, she reports no recent episodes of vomiting since [place] day. She experiences occasional dizziness, which she attributes to visual disturbances following cataract surgery in her right eye. She has an upcoming appointment with Dr. Logan regarding this issue. Her mobility is compromised, requiring the use of a walker or cane, and she is unable to stand for extended periods. She does not drive and relies on others for transportation. She has been prescribed hydrocodone, despite reporting an allergy to it. She reports no recent atrial fibrillation issues but experienced some heart-related symptoms prior to her hospital admission. She reports no thoughts of self-harm or harm to others. She reports poor sleep quality, particularly when caring for her grandchildren.    She is currently on Apidra 9 units, Lantus 50 units at night, and has been advised against taking metformin. Her fasting blood sugar levels have been as low as 135. She is requesting Accu-Chek guide pins and a new glucometer.    She is seeking a home health referral for medication management. She has recently acquired a lift chair and uses a shower chair due to her inability to stand for long durations. She continues to experience weakness and is dependent on others for transportation.    She is on Prozac for mood management and reports feeling significantly better since starting the medication.    She is on lisinopril for blood pressure control.    She is on diltiazem for heart rate control.    She is on Eliquis every 12 hours.    She is on metoprolol 50 mg daily.    She is on Reglan four times a day for gastrointestinal issues and has been advised to consult a  gastroenterologist. She has Zofran at home for nausea management. She reports difficulty with bowel movements, having only had two in the past two weeks. She has tried Metamucil, Dulcolax, and MiraLAX without success. She has not had a recent colonoscopy and reports no diarrhea.    She is on Breztri and albuterol inhalers and reports coughing when using the latter. She is considering the need for supplemental oxygen. She has a history of sleep apnea but has not used her CPAP machine recently.    She is seeking nicotine patches to aid in smoking cessation. She smokes approximately 1 to 1.5 packs per day.    SOCIAL HISTORY  The patient admits to smoking a pack to a pack and a half a day.    FAMILY HISTORY  The patient mentions that her sister, mother, and cousin could not take metformin.    ALLERGIES  The patient is allergic to METFORMIN and HYDROCODONE.    MEDICATIONS  Current: Apidra, Lantus, diltiazem, Eliquis, Prozac, lisinopril, metoclopramide, metoprolol, Protonix, Ambien, ondansetron, Breztri, albuterol  Discontinued: Metformin, Os-Oleksandr, simvastatin      I personally reviewed and updated the patient's allergies, medications, problem list, and past medical, surgical, social, and family history. I have reviewed and confirmed the accuracy of the History of Present Illness and Review of Symptoms as documented by the MA/MANUELN/RN. THOMAS You    Allergies:  Allergies   Allergen Reactions    Codeine GI Intolerance    Metformin Other (See Comments)     Intolerant to medication       Social History:  Social History     Socioeconomic History    Marital status:    Tobacco Use    Smoking status: Every Day     Current packs/day: 1.00     Average packs/day: 1 pack/day for 42.0 years (42.0 ttl pk-yrs)     Types: Cigarettes     Start date: 1983     Passive exposure: Current    Smokeless tobacco: Never   Vaping Use    Vaping status: Some Days    Substances: Nicotine, Flavoring    Devices: [employer]    Substance and Sexual Activity    Alcohol use: No    Drug use: Never    Sexual activity: Defer       Family History:  Family History   Problem Relation Age of Onset    Heart disease Mother     Hypertension Mother     Diabetes Mother     Stroke Mother        Past Medical History :  Patient Active Problem List   Diagnosis    Benign essential hypertension    Dyslipidemia    Simple chronic bronchitis    Unstable angina    Chest pain    Atrial fibrillation    Uncontrolled type 2 diabetes mellitus with hyperglycemia, with long-term current use of insulin    Anxiety associated with depression    Anxiety    Cataracts, bilateral    Cervico-occipital neuralgia    Chronic obstructive pulmonary disease    Degenerative disc disease, lumbar    Headaches, cluster    Lesion of ulnar nerve, bilateral upper limbs    Degenerative disc disease, cervical    Median nerve neuropathy    Migraine    Nuclear senile cataract    Open angle with borderline findings and high glaucoma risk in left eye    Open-angle glaucoma of right eye    Presbyopia    Asthma    Tobacco dependence    Mixed hyperlipidemia    Hypercholesteremia    Restless legs syndrome (RLS)    Obstructive sleep apnea    Defect of endplate of vertebra    Sacroiliac joint dysfunction    Lumbar spondylosis    Lumbar facet joint pain    Osteopenia of neck of left femur    Right renal mass    Splenomegaly    Right adrenal mass    Hepatic steatosis    Hepatomegaly    Nephrolithiasis    Mammogram declined    Tremor of right hand    Depression    Cervical stenosis of spinal canal    Numbness and tingling in right hand    Lumbar radiculopathy    Incontinence of feces    Lymphadenopathy, submental    Insomnia    Atrial fibrillation with RVR    Atrial flutter with controlled response    Chronic atrial fibrillation    Fatigue    Paroxysmal atrial fibrillation    Generalized weakness    Nausea and vomiting    Intractable nausea and vomiting    Chronic pain       Medication List:    Current  Outpatient Medications:     baclofen (LIORESAL) 20 MG tablet, Take 1 tablet by mouth 3 (Three) Times a Day., Disp: 90 tablet, Rfl: 0    Brexpiprazole (Rexulti) 3 MG tablet, Take 1 tablet by mouth Daily., Disp: , Rfl:     busPIRone (BUSPAR) 7.5 MG tablet, Take 1 tablet by mouth At Night As Needed (anxiety)., Disp: 30 tablet, Rfl: 2    dilTIAZem CD (CARDIZEM CD) 240 MG 24 hr capsule, Take 1 capsule by mouth Daily., Disp: 90 capsule, Rfl: 3    Eliquis 5 MG tablet tablet, Take 1 tablet by mouth Every 12 (Twelve) Hours., Disp: 180 tablet, Rfl: 0    FLUoxetine (PROzac) 20 MG capsule, Take 1 capsule by mouth Daily., Disp: 90 capsule, Rfl: 3    gabapentin (NEURONTIN) 100 MG capsule, TAKE 1 CAPSULE BY MOUTH THREE TIMES A DAY FOR 5 DAYS, Disp: 15 capsule, Rfl: 0    Insulin Glargine (LANTUS SOLOSTAR) 100 UNIT/ML injection pen, Inject 60 Units under the skin into the appropriate area as directed Daily., Disp: 54 mL, Rfl: 3    Insulin Glulisine (Apidra SoloStar) 100 UNIT/ML solution pen-injector, Inject 0.1 mL under the skin into the appropriate area as directed 3 (Three) Times a Day. Take within 15 before or within 20 minutes of starting a meal., Disp: 27 mL, Rfl: 3    Insulin Pen Needle (BD Pen Needle Mikala U/F) 32G X 4 MM misc, Use as directed with lantus, Disp: 100 each, Rfl: 0    lisinopril (PRINIVIL,ZESTRIL) 10 MG tablet, Take 1 tablet by mouth Daily., Disp: 90 tablet, Rfl: 3    metoclopramide (REGLAN) 10 MG tablet, Take 1 tablet by mouth 4 (Four) Times a Day for 30 days., Disp: 120 tablet, Rfl: 0    metoprolol succinate XL (TOPROL-XL) 50 MG 24 hr tablet, Take 1 tablet by mouth Daily., Disp: 90 tablet, Rfl: 3    ondansetron ODT (ZOFRAN-ODT) 8 MG disintegrating tablet, Place 1 tablet on the tongue Every 8 (Eight) Hours As Needed for Nausea or Vomiting for up to 30 days., Disp: 90 tablet, Rfl: 0    Oyster Shell Calcium Plus D 500-5 MG-MCG tablet per tablet, Take 2 tablets by mouth Daily., Disp: , Rfl:     pantoprazole  (Protonix) 40 MG EC tablet, Take 1 tablet by mouth Daily., Disp: 90 tablet, Rfl: 3    traMADol (ULTRAM) 50 MG tablet, , Disp: , Rfl:     zolpidem (AMBIEN) 10 MG tablet, TAKE 1 TABLET BY MOUTH AT NIGHT AS NEEDED FOR SLEEP, Disp: 28 tablet, Rfl: 0    Accu-Chek FastClix Lancets misc, 1 each by Other route 4 (Four) Times a Day Before Meals & at Bedtime As Needed (diabetes type 2 with hyperglycemia)., Disp: 100 each, Rfl: 11    albuterol sulfate  (90 Base) MCG/ACT inhaler, Inhale 2 puffs Every 4 (Four) Hours As Needed for Wheezing or Shortness of Air., Disp: 6.7 g, Rfl: 2    Blood Glucose Monitoring Suppl (Accu-Chek Guide) w/Device kit, Use 1 each 4 (Four) Times a Day Before Meals & at Bedtime As Needed (diabetes type 2 with hyperglycemia)., Disp: 1 kit, Rfl: 0    Budeson-Glycopyrrol-Formoterol (BREZTRI) 160-9-4.8 MCG/ACT aerosol inhaler, Inhale 2 puffs 2 (Two) Times a Day., Disp: 10.7 g, Rfl: 11    docusate sodium (Colace) 100 MG capsule, Take 1 capsule by mouth 2 (Two) Times a Day., Disp: 180 capsule, Rfl: 3    gabapentin (NEURONTIN) 300 MG capsule, Take 1 capsule by mouth 3 (Three) Times a Day for 3 days., Disp: 9 capsule, Rfl: 0    glucose blood (Accu-Chek Guide) test strip, 1 each by Other route 4 (Four) Times a Day Before Meals & at Bedtime As Needed (diabetes type 2 with hyperglycemia). Use as instructed, Disp: 100 each, Rfl: 11    nicotine (Nicoderm CQ) 21 MG/24HR patch, Place 1 patch on the skin as directed by provider Daily., Disp: 30 each, Rfl: 2  No current facility-administered medications for this visit.    Past Surgical History:  Past Surgical History:   Procedure Laterality Date    CARDIAC CATHETERIZATION Left 10/30/2020    Procedure: Left Heart Cath with Coronary Angiography;  Surgeon: Donaldo Archer MD;  Location: [facility] INVASIVE LOCATION;  Service: Cardiovascular;  Laterality: Left;    CARDIAC ELECTROPHYSIOLOGY PROCEDURE Right 8/27/2024    Procedure: Ablation atrial fibrillation;   "Surgeon: Shay Santos MD;  Location: [facility] INVASIVE LOCATION;  Service: Cardiovascular;  Laterality: Right;    CARPAL TUNNEL RELEASE Bilateral 2017    CHOLECYSTECTOMY  1980    HYSTERECTOMY  1980       Review of Systems:  Review of Systems   Constitutional:  Negative for activity change, appetite change, chills, fatigue and fever.   Eyes:  Positive for visual disturbance.   Respiratory:  Positive for cough and wheezing. Negative for shortness of breath.    Cardiovascular:  Negative for chest pain, palpitations and leg swelling.   Gastrointestinal:  Positive for constipation, nausea and GERD. Negative for diarrhea and vomiting.   Psychiatric/Behavioral:  Positive for sleep disturbance and depressed mood. Negative for self-injury, suicidal ideas and stress.    All other systems reviewed and are negative.      Physical Exam:  Vital Signs:  Vital Signs:   /68 (BP Location: Right arm, Patient Position: Sitting, Cuff Size: Large Adult)   Pulse 72   Temp 96.7 °F (35.9 °C) (Temporal)   Resp 18   Ht 172.7 cm (67.99\")   Wt 75 kg (165 lb 6.4 oz)   SpO2 99%   BMI 25.15 kg/m²     Result Review :   The following data was reviewed by: THOMAS Scanlon on 01/03/2025:  Common labs          12/20/2024    19:19 12/21/2024    02:02 12/22/2024    01:02 12/22/2024    03:34   Common Labs   Glucose 221  267   239    BUN 17  14   12    Creatinine 0.64  0.71   0.75    Sodium 138  137   138    Potassium 4.2  3.7   3.6    Chloride 104  104   103    Calcium 8.9  8.6   9.3    Albumin 3.7    3.6    Total Bilirubin <0.2    0.2    Alkaline Phosphatase 112    93    AST (SGOT) 28    34    ALT (SGPT) 22    29    WBC 8.16  5.89  6.40     Hemoglobin 11.6  11.8  11.5     Hematocrit 34.7  35.4  35.6     Platelets 140  126  115       Data reviewed : Recent hospitalization notes [facility]        Results  Laboratory Studies  Blood sugar is 223.      Physical Exam  Vitals and nursing note reviewed.   Constitutional:  "      General: She is not in acute distress.     Appearance: Normal appearance. She is well-groomed. She is not ill-appearing.   HENT:      Head: Normocephalic and atraumatic.   Eyes:      General: Lids are normal. Vision grossly intact.   Neck:      Vascular: No carotid bruit.   Cardiovascular:      Rate and Rhythm: Normal rate and regular rhythm.      Heart sounds: Normal heart sounds.   Pulmonary:      Effort: Pulmonary effort is normal.      Breath sounds: Normal breath sounds and air entry.   Abdominal:      General: Bowel sounds are normal. There is no distension or abdominal bruit. There are no signs of injury.      Palpations: Abdomen is soft. There is no shifting dullness, fluid wave, hepatomegaly, splenomegaly, mass or pulsatile mass.      Tenderness: There is no abdominal tenderness.   Musculoskeletal:      Right lower leg: No edema.      Left lower leg: No edema.   Skin:     General: Skin is warm and dry.   Neurological:      Mental Status: She is alert and oriented to person, place, and time. Mental status is at baseline.   Psychiatric:         Attention and Perception: Attention normal.         Mood and Affect: Mood normal.         Behavior: Behavior normal. Behavior is cooperative.       Physical Exam      Assessment and Plan:  Assessment & Plan  Hospital discharge follow-up         Type 2 diabetes mellitus with hyperglycemia, without long-term current use of insulin  Her blood sugar is 223, indicating suboptimal control. She will increase her Lantus dosage from 50 units to 60 units nightly. She will continue Apidra 10 units with meals. A prescription for Accu-Chek guide pins and a glucometer will be provided.    Orders:    Ambulatory Referral to Home Health    Blood Glucose Monitoring Suppl (Accu-Chek Guide) w/Device kit; Use 1 each 4 (Four) Times a Day Before Meals & at Bedtime As Needed (diabetes type 2 with hyperglycemia).    glucose blood (Accu-Chek Guide) test strip; 1 each by Other route 4  (Four) Times a Day Before Meals & at Bedtime As Needed (diabetes type 2 with hyperglycemia). Use as instructed    Accu-Chek FastClix Lancets misc; 1 each by Other route 4 (Four) Times a Day Before Meals & at Bedtime As Needed (diabetes type 2 with hyperglycemia).    Insulin Glargine (LANTUS SOLOSTAR) 100 UNIT/ML injection pen; Inject 60 Units under the skin into the appropriate area as directed Daily.    Insulin Glulisine (Apidra SoloStar) 100 UNIT/ML solution pen-injector; Inject 0.1 mL under the skin into the appropriate area as directed 3 (Three) Times a Day. Take within 15 before or within 20 minutes of starting a meal.    Essential hypertension      Orders:    Eliquis 5 MG tablet tablet; Take 1 tablet by mouth Every 12 (Twelve) Hours.    lisinopril (PRINIVIL,ZESTRIL) 10 MG tablet; Take 1 tablet by mouth Daily.    metoprolol succinate XL (TOPROL-XL) 50 MG 24 hr tablet; Take 1 tablet by mouth Daily.    Physical deconditioning    Orders:    Ambulatory Referral to Home Health    Chronic obstructive pulmonary disease, unspecified COPD type  She will continue Breztri twice daily and albuterol as needed. The prescription for Breztri and albuterol will be reordered.  Orders:    Budeson-Glycopyrrol-Formoterol (BREZTRI) 160-9-4.8 MCG/ACT aerosol inhaler; Inhale 2 puffs 2 (Two) Times a Day.    albuterol sulfate  (90 Base) MCG/ACT inhaler; Inhale 2 puffs Every 4 (Four) Hours As Needed for Wheezing or Shortness of Air.    Atrial fibrillation, unspecified type    Orders:    dilTIAZem CD (CARDIZEM CD) 240 MG 24 hr capsule; Take 1 capsule by mouth Daily.    Eliquis 5 MG tablet tablet; Take 1 tablet by mouth Every 12 (Twelve) Hours.    Anxiety  She reports feeling better on Prozac. She will continue Prozac 20 mg daily. The prescription will be reordered.  Orders:    FLUoxetine (PROzac) 20 MG capsule; Take 1 capsule by mouth Daily.    Constipation, unspecified constipation type  She will be referred to a  gastroenterologist for further evaluation. A prescription for Colace, a stool softener, will be provided, to be taken twice daily.  Orders:    docusate sodium (Colace) 100 MG capsule; Take 1 capsule by mouth 2 (Two) Times a Day.    Dyslipidemia         Lumbar radiculopathy    Orders:    Ambulatory Referral to Home Health    Tobacco dependence  A prescription for NicoDerm CQ 21 mg patches will be provided. She is advised to rotate application sites and avoid smoking while using the patch to prevent nicotine overdose. The prescription will be sent to [facility] mail order.  Orders:    nicotine (Nicoderm CQ) 21 MG/24HR patch; Place 1 patch on the skin as directed by provider Daily.    Vision changes  She reports occasional dizziness and blurry vision in her right eye, likely due to cataract surgery. She has an appointment with Dr. Logan for further evaluation.       Screening for colon cancer    Orders:    Ambulatory Referral For Screening Colonoscopy    Nausea    Orders:    pantoprazole (Protonix) 40 MG EC tablet; Take 1 tablet by mouth Daily.    metoclopramide (REGLAN) 10 MG tablet; Take 1 tablet by mouth 4 (Four) Times a Day for 30 days.      Assessment & Plan  1. Post-hospitalization follow-up.  She was admitted to [facility] on 12/20/2024 for dehydration, abdominal pain, nausea, vomiting, and type 2 diabetes, and was discharged on 12/23/2024. She was also admitted from 12/16/2024 to 12/18/2024 for type 2 diabetes with hyperosmolar hyperglycemia and weakness. She reports occasional dizziness and blurry vision in her right eye, likely due to cataract surgery. She has an appointment with Dr. Logan for further evaluation. She reports no recent atrial fibrillation issues.    2. Type 2 Diabetes Mellitus.  Her blood sugar is 223, indicating suboptimal control. She will increase her Lantus dosage from 50 units to 60 units nightly. She will continue Apidra 10 units with meals. A prescription for Accu-Chek  guide pins and a glucometer will be provided.    3. Nicotine Dependence.  A prescription for NicoDerm CQ 21 mg patches will be provided. She is advised to rotate application sites and avoid smoking while using the patch to prevent nicotine overdose. The prescription will be sent to [facility] mail order.    4. Hypertension.  She will continue lisinopril and diltiazem. Both medications will be reordered for 90 days.    5. Gastroesophageal Reflux Disease (GERD).  She will continue Protonix and Reglan (metoclopramide) 4 times a day. Both medications will be reordered.    6. Constipation.  She will be referred to a gastroenterologist for further evaluation. A prescription for Colace, a stool softener, will be provided, to be taken twice daily.    7. Depression.  She reports feeling better on Prozac. She will continue Prozac 20 mg daily. The prescription will be reordered.    8. Atrial Fibrillation.  She will continue Eliquis every 12 hours. The prescription will be reordered.    9. Heart Failure.  She will continue metoprolol 50 mg daily. The prescription will be reordered.    10. Chronic Obstructive Pulmonary Disease (COPD).  She will continue Breztri twice daily and albuterol as needed. The prescription for Breztri and albuterol will be reordered.    Risk for Readmission (LACE) Score: 10 (12/23/2024  6:00 AM)      An After Visit Summary and PPPS were given to the patient.           Follow Up   Return in about 3 months (around 4/3/2025) for Recheck a1c.  Patient was given instructions and counseling regarding her condition or for health maintenance advice. Please see specific information pulled into the AVS if appropriate.    There are no Patient Instructions on file for this visit.     This document is intended for medical expert use only. Reading of this document by patients and/or patient's family without participating medical staff guidance may result in misinterpretation and unintended morbidity. Any  interpretation of such data is the responsibility of the patient and/or family member responsible for the patient in concert with their primary or specialist providers, not to be left for sources of online searches such as [employer], [employer] or similar queries. Relying on these approaches to knowledge may result in misinterpretation, misguided goals of care and even death should patients or family members try recommendations outside of the realm of professional medical care.      Patient or patient representative verbalized consent for the use of Ambient Listening during the visit with  THOMAS Scanlon for chart documentation. 1/3/2025  09:02 EST

## 2025-01-03 NOTE — ASSESSMENT & PLAN NOTE
Orders:    dilTIAZem CD (CARDIZEM CD) 240 MG 24 hr capsule; Take 1 capsule by mouth Daily.    Eliquis 5 MG tablet tablet; Take 1 tablet by mouth Every 12 (Twelve) Hours.

## 2025-01-03 NOTE — ASSESSMENT & PLAN NOTE
Orders:    Eliquis 5 MG tablet tablet; Take 1 tablet by mouth Every 12 (Twelve) Hours.    lisinopril (PRINIVIL,ZESTRIL) 10 MG tablet; Take 1 tablet by mouth Daily.    metoprolol succinate XL (TOPROL-XL) 50 MG 24 hr tablet; Take 1 tablet by mouth Daily.

## 2025-01-03 NOTE — ASSESSMENT & PLAN NOTE
She reports feeling better on Prozac. She will continue Prozac 20 mg daily. The prescription will be reordered.  Orders:    FLUoxetine (PROzac) 20 MG capsule; Take 1 capsule by mouth Daily.

## 2025-01-03 NOTE — ASSESSMENT & PLAN NOTE
A prescription for NicoDerm CQ 21 mg patches will be provided. She is advised to rotate application sites and avoid smoking while using the patch to prevent nicotine overdose. The prescription will be sent to [facility] mail order.  Orders:    nicotine (Nicoderm CQ) 21 MG/24HR patch; Place 1 patch on the skin as directed by provider Daily.

## 2025-01-08 DIAGNOSIS — E11.65 TYPE 2 DIABETES MELLITUS WITH HYPERGLYCEMIA, WITHOUT LONG-TERM CURRENT USE OF INSULIN: ICD-10-CM

## 2025-01-09 RX ORDER — BLOOD SUGAR DIAGNOSTIC
STRIP MISCELLANEOUS
Qty: 50 EACH | Refills: 11 | Status: SHIPPED | OUTPATIENT
Start: 2025-01-09

## 2025-01-10 ENCOUNTER — TELEPHONE (OUTPATIENT)
Dept: FAMILY MEDICINE CLINIC | Facility: CLINIC | Age: 70
End: 2025-01-10

## 2025-01-10 ENCOUNTER — READMISSION MANAGEMENT (OUTPATIENT)
Dept: CALL CENTER | Facility: HOSPITAL | Age: 70
End: 2025-01-10
Payer: MEDICAID

## 2025-01-10 NOTE — OUTREACH NOTE
Medical Week 3 Survey      Flowsheet Row Responses   [facility] patient discharged from? [facility]   Does the patient have one of the following disease processes/diagnoses(primary or secondary)? Other   Week 3 attempt successful? No   Unsuccessful attempts Attempt 1   Revoke Other transitional program            AIMEE GILL - Registered Nurse

## 2025-01-10 NOTE — TELEPHONE ENCOUNTER
Caller: Arianna Blanco    Relationship: Self    Best call back number: [phone]     What was the call regarding: PATIENT WAS IN ON 01/03/25 AND SAW THOMAS MEANS. PATIENT STATED THAT SHE WAS SUPPOSED TO GET MEDICATIONS MAILED TO HER, SHE IS NOT SURE WHAT ALL THE MEDICATIONS WERE SHE WAS SUPPOSED TO RECEIVE. PATIENT STATED SHE WENT OVER THEM WITH [employer] WHEN SHE WAS IN. PATIENT STATED SHE WAS ALSO SUPPOSED TO GET NICOTINE PATCHES, AN  ACCUCHEK GUIDE , ALONG WITH STRIPS AND NEEDLES, BUT SHE HAS NOT RECEIVED THEM YET. SHE WAS ALSO SUPPOSED TO BE SCHEDULED WITH A DIETICIAN BUT SHE HAS NOT HEARD ANYTHING ABOUT IT YET. PLEASE ADVISE.

## 2025-01-10 NOTE — TELEPHONE ENCOUNTER
I called [employer], patient has to call insurance company to add [employer] and then when she does that needs to call [employer] back to get the account active. I left patient a voicemail to complete these steps.

## 2025-01-12 ENCOUNTER — APPOINTMENT (OUTPATIENT)
Dept: GENERAL RADIOLOGY | Facility: HOSPITAL | Age: 70
End: 2025-01-12
Payer: MEDICAID

## 2025-01-12 ENCOUNTER — HOSPITAL ENCOUNTER (EMERGENCY)
Facility: HOSPITAL | Age: 70
Discharge: HOME | End: 2025-01-12
Admitting: EMERGENCY MEDICINE
Payer: MEDICAID

## 2025-01-12 VITALS
HEART RATE: 83 BPM | TEMPERATURE: 97.7 F | OXYGEN SATURATION: 91 % | HEIGHT: 68 IN | BODY MASS INDEX: 25.06 KG/M2 | WEIGHT: 165.34 LBS | DIASTOLIC BLOOD PRESSURE: 65 MMHG | SYSTOLIC BLOOD PRESSURE: 166 MMHG | RESPIRATION RATE: 17 BRPM

## 2025-01-12 DIAGNOSIS — D64.9 ANEMIA, UNSPECIFIED TYPE: ICD-10-CM
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
## 2025-01-12 DIAGNOSIS — R07.89 CHEST WALL PAIN: Primary | ICD-10-CM

## 2025-01-12 DIAGNOSIS — R73.9 HYPERGLYCEMIA: ICD-10-CM

## 2025-01-12 LAB
ALBUMIN SERPL-MCNC: 3.7 G/DL (ref 3.5–5.2)
ALBUMIN/GLOB SERPL: 1.4 G/DL
ALP SERPL-CCNC: 137 U/L (ref 39–117)
ALT SERPL W P-5'-P-CCNC: 21 U/L (ref 1–33)
ANION GAP SERPL CALCULATED.3IONS-SCNC: 10.1 MMOL/L (ref 5–15)
AST SERPL-CCNC: 20 U/L (ref 1–32)
BASOPHILS # BLD AUTO: 0.05 10*3/MM3 (ref 0–0.2)
BASOPHILS NFR BLD AUTO: 0.7 % (ref 0–1.5)
BILIRUB SERPL-MCNC: 0.2 MG/DL (ref 0–1.2)
BILIRUB UR QL STRIP: NEGATIVE
BUN SERPL-MCNC: 20 MG/DL (ref 8–23)
BUN/CREAT SERPL: 22 (ref 7–25)
CALCIUM SPEC-SCNC: 9.3 MG/DL (ref 8.6–10.5)
CHLORIDE SERPL-SCNC: 101 MMOL/L (ref 98–107)
CLARITY UR: CLEAR
CO2 SERPL-SCNC: 26.9 MMOL/L (ref 22–29)
COLOR UR: YELLOW
CREAT SERPL-MCNC: 0.91 MG/DL (ref 0.57–1)
DEPRECATED RDW RBC AUTO: 42.1 FL (ref 37–54)
EGFRCR SERPLBLD CKD-EPI 2021: 68.9 ML/MIN/1.73
EOSINOPHIL # BLD AUTO: 0.13 10*3/MM3 (ref 0–0.4)
EOSINOPHIL NFR BLD AUTO: 1.9 % (ref 0.3–6.2)
ERYTHROCYTE [DISTWIDTH] IN BLOOD BY AUTOMATED COUNT: 13.4 % (ref 12.3–15.4)
GEN 5 1HR TROPONIN T REFLEX: 40 NG/L
GLOBULIN UR ELPH-MCNC: 2.7 GM/DL
GLUCOSE BLDC GLUCOMTR-MCNC: 349 MG/DL (ref 70–105)
GLUCOSE SERPL-MCNC: 406 MG/DL (ref 65–99)
GLUCOSE UR STRIP-MCNC: ABNORMAL MG/DL
HCT VFR BLD AUTO: 33.8 % (ref 34–46.6)
HGB BLD-MCNC: 10.7 G/DL (ref 12–15.9)
HGB UR QL STRIP.AUTO: NEGATIVE
IMM GRANULOCYTES # BLD AUTO: 0.02 10*3/MM3 (ref 0–0.05)
IMM GRANULOCYTES NFR BLD AUTO: 0.3 % (ref 0–0.5)
KETONES UR QL STRIP: NEGATIVE
LEUKOCYTE ESTERASE UR QL STRIP.AUTO: NEGATIVE
LYMPHOCYTES # BLD AUTO: 1.92 10*3/MM3 (ref 0.7–3.1)
LYMPHOCYTES NFR BLD AUTO: 27.4 % (ref 19.6–45.3)
MCH RBC QN AUTO: 27.4 PG (ref 26.6–33)
MCHC RBC AUTO-ENTMCNC: 31.7 G/DL (ref 31.5–35.7)
MCV RBC AUTO: 86.4 FL (ref 79–97)
MONOCYTES # BLD AUTO: 0.54 10*3/MM3 (ref 0.1–0.9)
MONOCYTES NFR BLD AUTO: 7.7 % (ref 5–12)
NEUTROPHILS NFR BLD AUTO: 4.34 10*3/MM3 (ref 1.7–7)
NEUTROPHILS NFR BLD AUTO: 62 % (ref 42.7–76)
NITRITE UR QL STRIP: NEGATIVE
NRBC BLD AUTO-RTO: 0 /100 WBC (ref 0–0.2)
NT-PROBNP SERPL-MCNC: 481 PG/ML (ref 0–900)
PH UR STRIP.AUTO: 7 [PH] (ref 5–8)
PLATELET # BLD AUTO: 135 10*3/MM3 (ref 140–450)
PMV BLD AUTO: 13 FL (ref 6–12)
POTASSIUM SERPL-SCNC: 4.2 MMOL/L (ref 3.5–5.2)
PROT SERPL-MCNC: 6.4 G/DL (ref 6–8.5)
PROT UR QL STRIP: NEGATIVE
RBC # BLD AUTO: 3.91 10*6/MM3 (ref 3.77–5.28)
SODIUM SERPL-SCNC: 138 MMOL/L (ref 136–145)
SP GR UR STRIP: 1.03 (ref 1–1.03)
TROPONIN T % DELTA: 8 %
TROPONIN T NUMERIC DELTA: 3 NG/L
TROPONIN T SERPL HS-MCNC: 37 NG/L
UROBILINOGEN UR QL STRIP: ABNORMAL
WBC NRBC COR # BLD AUTO: 7 10*3/MM3 (ref 3.4–10.8)

## 2025-01-12 PROCEDURE — 80053 COMPREHEN METABOLIC PANEL: CPT | Performed by: OCCUPATIONAL THERAPIST

## 2025-01-12 PROCEDURE — 99284 EMERGENCY DEPT VISIT MOD MDM: CPT

## 2025-01-12 PROCEDURE — 83880 ASSAY OF NATRIURETIC PEPTIDE: CPT | Performed by: OCCUPATIONAL THERAPIST

## 2025-01-12 PROCEDURE — 81003 URINALYSIS AUTO W/O SCOPE: CPT | Performed by: OCCUPATIONAL THERAPIST

## 2025-01-12 PROCEDURE — 85025 COMPLETE CBC W/AUTO DIFF WBC: CPT | Performed by: OCCUPATIONAL THERAPIST

## 2025-01-12 PROCEDURE — 36415 COLL VENOUS BLD VENIPUNCTURE: CPT

## 2025-01-12 PROCEDURE — 63710000001 INSULIN REGULAR HUMAN PER 5 UNITS: Performed by: OCCUPATIONAL THERAPIST

## 2025-01-12 PROCEDURE — 71045 X-RAY EXAM CHEST 1 VIEW: CPT

## 2025-01-12 PROCEDURE — 82948 REAGENT STRIP/BLOOD GLUCOSE: CPT | Performed by: OCCUPATIONAL THERAPIST

## 2025-01-12 PROCEDURE — 84484 ASSAY OF TROPONIN QUANT: CPT | Performed by: OCCUPATIONAL THERAPIST

## 2025-01-12 PROCEDURE — 93005 ELECTROCARDIOGRAM TRACING: CPT

## 2025-01-12 RX ADMIN — INSULIN HUMAN 5 UNITS: 100 INJECTION, SOLUTION PARENTERAL at 20:40

## 2025-01-12 NOTE — ED NOTES
"Chief Complaint   Patient presents with    Chest Pain     EMS reports chest pain after a fall at 2:30am. EMS states that pt reports left anterior chest pain that is intermittent. EMS states that pt reports intermittent tremors that is not baseline. Pt has a hx of a-fib. . Pt reports that she's here r/t chest pain and weakness. Pt states chest pain stated at 2:30am after her fall. Pt states she is SOB and has been SOB for \"days\". Pt reports she's a smoker. Pt denies LOC.   "

## 2025-01-13 LAB
QT INTERVAL: 407 MS
QTC INTERVAL: 466 MS

## 2025-01-13 NOTE — ED PROVIDER NOTES
Subjective   History of Present Illness  Patient is a 69-year-old female who was transported to the ER via EMS for chest pain that started after a fall this morning at 2 AM.  She reports that the pain has been intermittent.  She has also had increased tremors.  Patient states that when she fell, she landed on her right hip.  She denies any pain in her hip.  She denies any head trauma.  She has not had any confusion or other neurologic symptoms.  She does have a pre-existing tremor to the right upper extremity that she says she is not sure why it does that but that is normal for her.  She reports that these are more like spasms recently were not in just 1 place.  She said there would be 1 that maybe would affect her arms and then her legs and then her stomach and she said that there was no rhyme or reason to it.  She reports that the chest pain is sharp when she feels it.  She has not noticed anything that seems to make it better or worse.        Review of Systems   Constitutional:  Negative for fever.       Past Medical History:   Diagnosis Date    CHF (congestive heart failure)     COPD (chronic obstructive pulmonary disease)     Depression     Diabetes mellitus     Hyperlipidemia     Hypertension        Allergies   Allergen Reactions    Codeine GI Intolerance    Metformin Other (See Comments)     Intolerant to medication       Past Surgical History:   Procedure Laterality Date    CARDIAC CATHETERIZATION Left 10/30/2020    Procedure: Left Heart Cath with Coronary Angiography;  Surgeon: Donaldo Archer MD;  Location: [facility] CATH INVASIVE LOCATION;  Service: Cardiovascular;  Laterality: Left;    CARDIAC ELECTROPHYSIOLOGY PROCEDURE Right 8/27/2024    Procedure: Ablation atrial fibrillation;  Surgeon: Shay Santos MD;  Location: [facility] CATH INVASIVE LOCATION;  Service: Cardiovascular;  Laterality: Right;    CARPAL TUNNEL RELEASE Bilateral 2017    CHOLECYSTECTOMY  1980    HYSTERECTOMY  1980       Family History    Problem Relation Age of Onset    Heart disease Mother     Hypertension Mother     Diabetes Mother     Stroke Mother        Social History     Socioeconomic History    Marital status:    Tobacco Use    Smoking status: Every Day     Current packs/day: 1.00     Average packs/day: 1 pack/day for 42.0 years (42.0 ttl pk-yrs)     Types: Cigarettes     Start date: 1983     Passive exposure: Current    Smokeless tobacco: Never   Vaping Use    Vaping status: Some Days    Substances: Nicotine, Flavoring    Devices: Refillable tank   Substance and Sexual Activity    Alcohol use: No    Drug use: Never    Sexual activity: Defer           Objective   Physical Exam  Vitals and nursing note reviewed.   Constitutional:       General: She is not in acute distress.     Appearance: She is well-developed. She is not toxic-appearing or diaphoretic.   HENT:      Head: Normocephalic and atraumatic.   Neck:      Trachea: No tracheal deviation.   Cardiovascular:      Rate and Rhythm: Normal rate and regular rhythm.      Heart sounds: Normal heart sounds. Heart sounds not distant. No murmur heard.     No systolic murmur is present.      No gallop.   Pulmonary:      Effort: Pulmonary effort is normal. No tachypnea, accessory muscle usage or respiratory distress.      Breath sounds: Normal breath sounds. No stridor.   Chest:      Chest wall: No mass or deformity.   Abdominal:      General: Bowel sounds are normal. There is no abdominal bruit.      Palpations: Abdomen is soft.   Musculoskeletal:      Cervical back: Normal range of motion.      Right lower leg: No tenderness. No edema.      Left lower leg: No tenderness. No edema.   Skin:     General: Skin is warm and dry.      Capillary Refill: Capillary refill takes less than 2 seconds.   Neurological:      General: No focal deficit present.      Mental Status: She is alert.   Psychiatric:         Mood and Affect: Mood normal.         Behavior: Behavior normal.         Procedures            ED Course      Labs Reviewed   COMPREHENSIVE METABOLIC PANEL - Abnormal; Notable for the following components:       Result Value    Glucose 406 (*)     Alkaline Phosphatase 137 (*)     All other components within normal limits    Narrative:     GFR Categories in Chronic Kidney Disease (CKD)      GFR Category          GFR (mL/min/1.73)    Interpretation  G1                     90 or greater         Normal or high (1)  G2                      60-89                Mild decrease (1)  G3a                   45-59                Mild to moderate decrease  G3b                   30-44                Moderate to severe decrease  G4                    15-29                Severe decrease  G5                    14 or less           Kidney failure          (1)In the absence of evidence of kidney disease, neither GFR category G1 or G2 fulfill the criteria for CKD.    eGFR calculation 2021 CKD-EPI creatinine equation, which does not include race as a factor   URINALYSIS W/ CULTURE IF INDICATED - Abnormal; Notable for the following components:    Glucose, UA >=1000 mg/dL (3+) (*)     All other components within normal limits    Narrative:     In absence of clinical symptoms, the presence of pyuria, bacteria, and/or nitrites on the urinalysis result does not correlate with infection.  Urine microscopic not indicated.   TROPONIN - Abnormal; Notable for the following components:    HS Troponin T 37 (*)     All other components within normal limits    Narrative:     High Sensitive Troponin T Reference Range:  <14.0 ng/L- Negative Female for AMI  <22.0 ng/L- Negative Male for AMI  >=14 - Abnormal Female indicating possible myocardial injury.  >=22 - Abnormal Male indicating possible myocardial injury.   Clinicians would have to utilize clinical acumen, EKG, Troponin, and serial changes to determine if it is an Acute Myocardial Infarction or myocardial injury due to an underlying chronic condition.        CBC WITH AUTO  DIFFERENTIAL - Abnormal; Notable for the following components:    Hemoglobin 10.7 (*)     Hematocrit 33.8 (*)     MPV 13.0 (*)     Platelets 135 (*)     All other components within normal limits   HIGH SENSITIVITIY TROPONIN T 1HR - Abnormal; Notable for the following components:    HS Troponin T 40 (*)     All other components within normal limits    Narrative:     High Sensitive Troponin T Reference Range:  <14.0 ng/L- Negative Female for AMI  <22.0 ng/L- Negative Male for AMI  >=14 - Abnormal Female indicating possible myocardial injury.  >=22 - Abnormal Male indicating possible myocardial injury.   Clinicians would have to utilize clinical acumen, EKG, Troponin, and serial changes to determine if it is an Acute Myocardial Infarction or myocardial injury due to an underlying chronic condition.        POCT GLUCOSE FINGERSTICK - Abnormal; Notable for the following components:    Glucose 349 (*)     All other components within normal limits   BNP (IN-HOUSE) - Normal    Narrative:     This assay is used as an aid in the diagnosis of individuals suspected of having heart failure. It can be used as an aid in the diagnosis of acute decompensated heart failure (ADHF) in patients presenting with signs and symptoms of ADHF to the emergency department (ED). In addition, NT-proBNP of <300 pg/mL indicates ADHF is not likely.    Age Range Result Interpretation  NT-proBNP Concentration (pg/mL:      <50             Positive            >450                   Gray                 300-450                    Negative             <300    50-75           Positive            >900                  Gray                300-900                  Negative            <300      >75             Positive            >1800                  Gray                300-1800                  Negative            <300   CBC AND DIFFERENTIAL    Narrative:     The following orders were created for panel order CBC & Differential.  Procedure                                Abnormality         Status                     ---------                               -----------         ------                     CBC Auto Differential[[id number]]        Abnormal            Final result               Scan Slide[[id number]]                                                                    Please view results for these tests on the individual orders.     XR Chest 1 View    Result Date: 1/12/2025  Impression: No acute cardiopulmonary disease. Electronically Signed: Malcolm Lara MD  1/12/2025 7:34 PM EST  Workstation ID: [id number]   Medications   insulin regular (humuLIN R,novoLIN R) injection 5 Units (5 Units Subcutaneous Given 1/12/25 2040)                   HEART Score: 4   Shared Decision Making  I discussed the findings with the patient/patient representative who is in agreement with the treatment plan and the final disposition.  Risks and benefits of discharge and/or observation/admission were discussed: Yes                                      Medical Decision Making  Patient is a 69-year-old female who presented to the ER with chest pain after a fall earlier this morning.  She had the above evaluation and exam.    EKG independently interpreted by Dr. Lawrence and reviewed by myself.  Sinus rhythm with PVCs.  Rate 78, QT interval 407.  No significant change from prior.    Imaging independently interpreted by radiology and reviewed by myself.  X-ray of the chest was negative.  Labs were significant for anemia, 406 glucose, and mild lymphocytopenia.  These values were not significantly deviated from her baseline's.  Patient was given 5 units of insulin for the glucose.      At reassessment, patient's glucose was 349.  Discussed need for her to manage her blood sugar when she gets home.  She is hemodynamically stable and afebrile.  She is having reproducible pain with pressure and movement of the chest wall.  She is not experiencing shortness of breath.  Patient's delta troponin was  within normal limits, and her pain is reproducible, suggesting musculoskeletal pain over cardiac involvement, particularly in light of her fall this morning.  Discussed findings with patient and instructed her to return to the ER with new or worsening symptoms.  She verbalized agreement and understanding.        Problems Addressed:  Hyperglycemia: complicated acute illness or injury    Amount and/or Complexity of Data Reviewed  Labs: ordered.  Radiology: ordered.    Risk  OTC drugs.        Final diagnoses:   Fall, initial encounter   Chest wall pain   Anemia, unspecified type   Hyperglycemia       ED Disposition  ED Disposition       ED Disposition   Discharge    Condition   Stable    Comment   --               Maddie Cornejo, APRN  [address] [place] [address] [address]  [place] [address]  [phone]    Schedule an appointment as soon as possible for a visit            Medication List      No changes were made to your prescriptions during this visit.            Annabelle Taylor PA-C  01/12/25 [id number]

## 2025-01-13 NOTE — DISCHARGE INSTRUCTIONS
Ice as needed for pain and swelling.  Follow-up with your PCP.  Return to the ER with new or worsening symptoms.

## 2025-01-17 ENCOUNTER — OFFICE VISIT (OUTPATIENT)
Dept: FAMILY MEDICINE CLINIC | Facility: CLINIC | Age: 70
End: 2025-01-17
Payer: MEDICAID

## 2025-01-17 VITALS
WEIGHT: 165.4 LBS | TEMPERATURE: 97.2 F | BODY MASS INDEX: 25.07 KG/M2 | HEIGHT: 68 IN | HEART RATE: 101 BPM | DIASTOLIC BLOOD PRESSURE: 82 MMHG | RESPIRATION RATE: 18 BRPM | SYSTOLIC BLOOD PRESSURE: 164 MMHG | OXYGEN SATURATION: 97 %

## 2025-01-17 DIAGNOSIS — E11.65 TYPE 2 DIABETES MELLITUS WITH HYPERGLYCEMIA, WITHOUT LONG-TERM CURRENT USE OF INSULIN: ICD-10-CM

## 2025-01-17 DIAGNOSIS — G25.3 MYOCLONUS: ICD-10-CM

## 2025-01-17 DIAGNOSIS — D64.9 ANEMIA, UNSPECIFIED TYPE: ICD-10-CM

## 2025-01-17 DIAGNOSIS — R25.1 TREMOR OF RIGHT HAND: Primary | ICD-10-CM

## 2025-01-17 DIAGNOSIS — Z09 FOLLOW-UP EXAM: ICD-10-CM

## 2025-01-17 NOTE — PATIENT INSTRUCTIONS
Increase Lantus to 65 units if fasting blood glucose is uncontrolled.  Increase Apidra mealtime units by 2 units if uncontrolled post prandial blood glucose levels.

## 2025-01-17 NOTE — PROGRESS NOTES
Subjective   Arianna Blanco is a 69 y.o. female. Presents to [facility]    Arianna was seen at [facility] . She was seen in the er on 01/12/2025 for chest pain sustained after a fall. She was discharged home from the emergency department.. Discharge diagnosis was fall, chest wall pain, anemia, hyperglycemia. Labs that were performed during the encounter included: CBC, glucose, troponin, urinalysis, cmp. Diagnostic studies that were performed included:  chest x ray, EKG.  . Currently Arianna receives care at home. Complications from the hospital stay include none. The patient stated that they do need help with their daily life and activities. The patient stated that they do not have emotional support at home.  Current outpatient and discharge medications have been reconciled for the patient.  Reviewed by: THOMAS Scanlon      Chief Complaint   Patient presents with    Hospital Follow Up Visit     Spent couple hours in the er for elevated blood sugar, body jerking, chest pain       History of Present Illness   History of Present Illness  The patient is a 69-year-old female who presents for a hospital discharge follow-up.    She reports experiencing persistent tremors in her right hand, which have been present for approximately one year. She also describes involuntary jerking movements that have resulted in two falls on the same day. She expresses concern about these symptoms and reports feeling fatigued. She has not received any updates regarding her home health nurse. She is unsure if there were any changes in her medications when she started having tremors. She has been on Rexulti for a long time.    She has a history of type 2 diabetes, with her blood glucose levels remaining uncontrolled. She does not monitor her fasting blood glucose levels regularly. She was previously on glipizide but was advised to discontinue it during a hospital stay. She does not currently see an  endocrinologist. Her current medication regimen includes Apidra 10 units three times daily and Lantus 60 units nightly.    She reports no rectal bleeding or black stools. She has a scheduled appointment  for a colonoscopy. She has undergone a complete hysterectomy.    She has not had her pain management injections for some time, but notes that her symptoms persist even after receiving lower back injections. She is on gabapentin 300 mg 3 times a day. She is requesting a refill of gabapentin.    Supplemental Information  She has an eye appointment scheduled.    MEDICATIONS  Current: Gabapentin, Apidra, Lantus, Rexulti.  Discontinued: Glipizide.      I personally reviewed and updated the patient's allergies, medications, problem list, and past medical, surgical, social, and family history. I have reviewed and confirmed the accuracy of the History of Present Illness and Review of Symptoms as documented by the MA/LPN/RN. Maddie Cornejo, THOMAS    Allergies:  Allergies   Allergen Reactions    Codeine GI Intolerance    Metformin Other (See Comments)     Intolerant to medication       Social History:  Social History     Socioeconomic History    Marital status:    Tobacco Use    Smoking status: Every Day     Current packs/day: 1.00     Average packs/day: 1 pack/day for 42.0 years (42.0 ttl pk-yrs)     Types: Cigarettes     Start date: 1983     Passive exposure: Current    Smokeless tobacco: Never   Vaping Use    Vaping status: Some Days    Substances: Nicotine, Flavoring    Devices: Refillable tank   Substance and Sexual Activity    Alcohol use: No    Drug use: Never    Sexual activity: Defer       Family History:  Family History   Problem Relation Age of Onset    Heart disease Mother     Hypertension Mother     Diabetes Mother     Stroke Mother        Past Medical History :  Patient Active Problem List   Diagnosis    Benign essential hypertension    Dyslipidemia    Simple chronic bronchitis    Unstable angina     Chest pain    Atrial fibrillation    Uncontrolled type 2 diabetes mellitus with hyperglycemia, with long-term current use of insulin    Anxiety associated with depression    Anxiety    Cataracts, bilateral    Cervico-occipital neuralgia    Chronic obstructive pulmonary disease    Degenerative disc disease, lumbar    Headaches, cluster    Lesion of ulnar nerve, bilateral upper limbs    Degenerative disc disease, cervical    Median nerve neuropathy    Migraine    Nuclear senile cataract    Open angle with borderline findings and high glaucoma risk in left eye    Open-angle glaucoma of right eye    Presbyopia    Asthma    Tobacco dependence    Mixed hyperlipidemia    Hypercholesteremia    Restless legs syndrome (RLS)    Obstructive sleep apnea    Defect of endplate of vertebra    Sacroiliac joint dysfunction    Lumbar spondylosis    Lumbar facet joint pain    Osteopenia of neck of left femur    Right renal mass    Splenomegaly    Right adrenal mass    Hepatic steatosis    Hepatomegaly    Nephrolithiasis    Mammogram declined    Tremor of right hand    Depression    Cervical stenosis of spinal canal    Numbness and tingling in right hand    Lumbar radiculopathy    Incontinence of feces    Lymphadenopathy, submental    Insomnia    Atrial fibrillation with RVR    Atrial flutter with controlled response    Chronic atrial fibrillation    Fatigue    Paroxysmal atrial fibrillation    Generalized weakness    Nausea and vomiting    Intractable nausea and vomiting    Chronic pain       Medication List:    Current Outpatient Medications:     Accu-Chek FastClix Lancets misc, 1 each by Other route 4 (Four) Times a Day Before Meals & at Bedtime As Needed (diabetes type 2 with hyperglycemia)., Disp: 100 each, Rfl: 11    albuterol sulfate  (90 Base) MCG/ACT inhaler, Inhale 2 puffs Every 4 (Four) Hours As Needed for Wheezing or Shortness of Air., Disp: 6.7 g, Rfl: 2    baclofen (LIORESAL) 20 MG tablet, Take 1 tablet by mouth 3  (Three) Times a Day., Disp: 90 tablet, Rfl: 0    Blood Glucose Monitoring Suppl (Accu-Chek Guide) w/Device kit, Use 1 each 4 (Four) Times a Day Before Meals & at Bedtime As Needed (diabetes type 2 with hyperglycemia)., Disp: 1 kit, Rfl: 0    Brexpiprazole (Rexulti) 3 MG tablet, Take 1 tablet by mouth Daily., Disp: , Rfl:     Budeson-Glycopyrrol-Formoterol (BREZTRI) 160-9-4.8 MCG/ACT aerosol inhaler, Inhale 2 puffs 2 (Two) Times a Day., Disp: 10.7 g, Rfl: 11    busPIRone (BUSPAR) 7.5 MG tablet, Take 1 tablet by mouth At Night As Needed (anxiety)., Disp: 30 tablet, Rfl: 2    dilTIAZem CD (CARDIZEM CD) 240 MG 24 hr capsule, Take 1 capsule by mouth Daily., Disp: 90 capsule, Rfl: 3    docusate sodium (Colace) 100 MG capsule, Take 1 capsule by mouth 2 (Two) Times a Day., Disp: 180 capsule, Rfl: 3    Eliquis 5 MG tablet tablet, Take 1 tablet by mouth Every 12 (Twelve) Hours., Disp: 180 tablet, Rfl: 0    FLUoxetine (PROzac) 20 MG capsule, Take 1 capsule by mouth Daily., Disp: 90 capsule, Rfl: 3    gabapentin (NEURONTIN) 100 MG capsule, TAKE 1 CAPSULE BY MOUTH THREE TIMES A DAY FOR 5 DAYS, Disp: 15 capsule, Rfl: 0    glucose blood (Accu-Chek Guide Test) test strip, 1 EACH BY OTHER ROUTE 4 (FOUR) TIMES A DAY. USE TO TEST BLOOD SUGAR 4 TIMES DAILY. INSURANCE PAYS 50 TESTSCRIPTS FOR 30 DAYS. INFORM YOUR PRESCRIBER ABOUT THAT, Disp: 50 each, Rfl: 11    Insulin Glargine (LANTUS SOLOSTAR) 100 UNIT/ML injection pen, Inject 60 Units under the skin into the appropriate area as directed Daily., Disp: 54 mL, Rfl: 3    Insulin Glulisine (Apidra SoloStar) 100 UNIT/ML solution pen-injector, Inject 0.1 mL under the skin into the appropriate area as directed 3 (Three) Times a Day. Take within 15 before or within 20 minutes of starting a meal., Disp: 27 mL, Rfl: 3    Insulin Pen Needle (BD Pen Needle Mikala U/F) 32G X 4 MM misc, Use as directed with lantus, Disp: 100 each, Rfl: 0    lisinopril (PRINIVIL,ZESTRIL) 10 MG tablet, Take 1 tablet by  mouth Daily., Disp: 90 tablet, Rfl: 3    metoclopramide (REGLAN) 10 MG tablet, Take 1 tablet by mouth 4 (Four) Times a Day for 30 days., Disp: 120 tablet, Rfl: 0    metoprolol succinate XL (TOPROL-XL) 50 MG 24 hr tablet, Take 1 tablet by mouth Daily., Disp: 90 tablet, Rfl: 3    nicotine (Nicoderm CQ) 21 MG/24HR patch, Place 1 patch on the skin as directed by provider Daily., Disp: 30 each, Rfl: 2    ondansetron ODT (ZOFRAN-ODT) 8 MG disintegrating tablet, Place 1 tablet on the tongue Every 8 (Eight) Hours As Needed for Nausea or Vomiting for up to 30 days., Disp: 90 tablet, Rfl: 0    Oyster Shell Calcium Plus D 500-5 MG-MCG tablet per tablet, Take 2 tablets by mouth Daily., Disp: , Rfl:     pantoprazole (Protonix) 40 MG EC tablet, Take 1 tablet by mouth Daily., Disp: 90 tablet, Rfl: 3    traMADol (ULTRAM) 50 MG tablet, , Disp: , Rfl:     zolpidem (AMBIEN) 10 MG tablet, TAKE 1 TABLET BY MOUTH AT NIGHT AS NEEDED FOR SLEEP, Disp: 28 tablet, Rfl: 0    gabapentin (NEURONTIN) 300 MG capsule, Take 1 capsule by mouth 3 (Three) Times a Day for 3 days., Disp: 9 capsule, Rfl: 0    Past Surgical History:  Past Surgical History:   Procedure Laterality Date    CARDIAC CATHETERIZATION Left 10/30/2020    Procedure: Left Heart Cath with Coronary Angiography;  Surgeon: Donaldo Archer MD;  Location: [facility] CATH INVASIVE LOCATION;  Service: Cardiovascular;  Laterality: Left;    CARDIAC ELECTROPHYSIOLOGY PROCEDURE Right 8/27/2024    Procedure: Ablation atrial fibrillation;  Surgeon: Shay Santos MD;  Location: [facility] CATH INVASIVE LOCATION;  Service: Cardiovascular;  Laterality: Right;    CARPAL TUNNEL RELEASE Bilateral 2017    CHOLECYSTECTOMY  1980    HYSTERECTOMY  1980       Review of Systems:  Review of Systems   Constitutional:  Negative for activity change, appetite change, chills, fatigue and fever.   Eyes:  Positive for visual disturbance.   Respiratory:  Positive for cough and wheezing. Negative for shortness of  "breath.    Cardiovascular:  Negative for chest pain, palpitations and leg swelling.   Gastrointestinal:  Positive for constipation, nausea and GERD. Negative for diarrhea and vomiting.   Neurological:  Positive for tremors and weakness.   Psychiatric/Behavioral:  Negative for self-injury, sleep disturbance, suicidal ideas, depressed mood and stress.    All other systems reviewed and are negative.      Physical Exam:  Vital Signs:  Vital Signs:   /82 (BP Location: Right arm, Patient Position: Sitting, Cuff Size: Large Adult)   Pulse 101   Temp 97.2 °F (36.2 °C) (Temporal)   Resp 18   Ht 172.7 cm (67.99\")   Wt 75 kg (165 lb 6.4 oz)   SpO2 97%   BMI 25.15 kg/m²     Result Review :   The following data was reviewed by: THOMAS Scanlon on 01/17/2025:  Common labs          12/21/2024    02:02 12/22/2024    01:02 12/22/2024    03:34 1/12/2025    19:36   Common Labs   Glucose 267   239  406    BUN 14   12  20    Creatinine 0.71   0.75  0.91    Sodium 137   138  138    Potassium 3.7   3.6  4.2    Chloride 104   103  101    Calcium 8.6   9.3  9.3    Albumin   3.6  3.7    Total Bilirubin   0.2  0.2    Alkaline Phosphatase   93  137    AST (SGOT)   34  20    ALT (SGPT)   29  21    WBC 5.89  6.40   7.00    Hemoglobin 11.8  11.5   10.7    Hematocrit 35.4  35.6   33.8    Platelets 126  115   135      Data reviewed : Recent hospitalization notes [facility] ER        Results  Laboratory Studies  Blood glucose was elevated. ALK phos slightly elevated. Anemia detected.      Physical Exam  Vitals and nursing note reviewed.   Constitutional:       General: She is not in acute distress.     Appearance: Normal appearance. She is well-groomed. She is not ill-appearing.   HENT:      Head: Normocephalic and atraumatic.   Eyes:      General: Lids are normal. Vision grossly intact.   Neck:      Vascular: No carotid bruit.   Cardiovascular:      Rate and Rhythm: Normal rate and regular rhythm.      Heart sounds: " Normal heart sounds.   Pulmonary:      Effort: Pulmonary effort is normal.      Breath sounds: Normal breath sounds and air entry.   Abdominal:      General: Bowel sounds are normal. There is no distension or abdominal bruit. There are no signs of injury.      Palpations: Abdomen is soft. There is no shifting dullness, fluid wave, hepatomegaly, splenomegaly, mass or pulsatile mass.      Tenderness: There is no abdominal tenderness.   Musculoskeletal:      Right lower leg: No edema.      Left lower leg: No edema.      Comments: Bilateral tremors noted to upper arms     Skin:     General: Skin is warm and dry.   Neurological:      Mental Status: She is alert and oriented to person, place, and time. Mental status is at baseline.   Psychiatric:         Attention and Perception: Attention normal.         Mood and Affect: Mood normal.         Behavior: Behavior normal. Behavior is cooperative.       Physical Exam      Assessment and Plan:  Assessment & Plan  Tremor of right hand  She has a history of a tremor in the right hand and is now experiencing tremors in both hands, leading to dropping objects and falls. There is no known history of MS or Parkinson's disease. A referral to neurology has been initiated for further evaluation.  Orders:    Ambulatory Referral to Neurology    Magnesium    Myoclonus  Reports involuntary muscle  jerking occurring sporadically to all four limbs.  Orders:    Ambulatory Referral to Neurology    Magnesium    Follow-up exam         Type 2 diabetes mellitus with hyperglycemia, without long-term current use of insulin  Her blood glucose levels remain uncontrolled. She is currently taking Apidra 10 units three times a day and Lantus 60 units every night. She is advised to increase her Lantus dosage to 65 units nightly if her morning blood glucose levels remain uncontrolled. Additionally, she can increase her Apidra dosage by 2 units if her blood glucose levels are not within the desired range.  She is instructed to monitor her blood glucose levels and bring the readings to her next appointment in 2 weeks.         Anemia, unspecified type  She reports no black stools or rectal bleeding. A referral to gastroenterology has been made to investigate the cause of her anemia, including potential gastrointestinal sources. An iron profile, B12, folate, magnesium, potassium, and sodium levels will be checked to determine the cause of her anemia. A colonoscopy has also been recommended.  Orders:    Vitamin B12; Future    Folate    Iron and TIBC    Comprehensive metabolic panel    CBC w AUTO Differential      Assessment & Plan  1. Bilateral hand tremors.  She has a history of a tremor in the right hand and is now experiencing tremors in both hands, leading to dropping objects and falls. There is no known history of MS or Parkinson's disease. A referral to neurology has been initiated for further evaluation.    2. Uncontrolled type 2 diabetes mellitus.  Her blood glucose levels remain uncontrolled. She is currently taking Apidra 10 units three times a day and Lantus 60 units every night. She is advised to increase her Lantus dosage to 65 units nightly if her morning blood glucose levels remain uncontrolled. Additionally, she can increase her Apidra dosage by 2 units if her blood glucose levels are not within the desired range. She is instructed to monitor her blood glucose levels and bring the readings to her next appointment in 2 weeks.    3. Anemia.  She reports no black stools or rectal bleeding. A referral to gastroenterology has been made to investigate the cause of her anemia, including potential gastrointestinal sources. An iron profile, B12, folate, magnesium, potassium, and sodium levels will be checked to determine the cause of her anemia. A colonoscopy has also been recommended.      Follow-up  The patient will follow up in 2 weeks.    PROCEDURE  The patient has undergone a complete hysterectomy in the  past.    Risk for Readmission (LACE) No data recorded    An After Visit Summary and PPPS were given to the patient.           Follow Up   No follow-ups on file.  Patient was given instructions and counseling regarding her condition or for health maintenance advice. Please see specific information pulled into the AVS if appropriate.    There are no Patient Instructions on file for this visit.     This document is intended for medical expert use only. Reading of this document by patients and/or patient's family without participating medical staff guidance may result in misinterpretation and unintended morbidity. Any interpretation of such data is the responsibility of the patient and/or family member responsible for the patient in concert with their primary or specialist providers, not to be left for sources of online searches such as [employer], [employer] or similar queries. Relying on these approaches to knowledge may result in misinterpretation, misguided goals of care and even death should patients or family members try recommendations outside of the realm of professional medical care.      Patient or patient representative verbalized consent for the use of Ambient Listening during the visit with  THOMAS Scanlon for chart documentation. 1/17/2025  09:53 EST

## 2025-01-17 NOTE — ASSESSMENT & PLAN NOTE
She has a history of a tremor in the right hand and is now experiencing tremors in both hands, leading to dropping objects and falls. There is no known history of MS or Parkinson's disease. A referral to neurology has been initiated for further evaluation.  Orders:    Ambulatory Referral to Neurology    Magnesium

## 2025-01-19 LAB
ALBUMIN SERPL-MCNC: 4.3 G/DL (ref 3.9–4.9)
ALP SERPL-CCNC: 163 IU/L (ref 44–121)
ALT SERPL-CCNC: 19 IU/L (ref 0–32)
AST SERPL-CCNC: 17 IU/L (ref 0–40)
BASOPHILS # BLD AUTO: 0 X10E3/UL (ref 0–0.2)
BASOPHILS NFR BLD AUTO: 1 %
BILIRUB SERPL-MCNC: <0.2 MG/DL (ref 0–1.2)
BUN SERPL-MCNC: 11 MG/DL (ref 8–27)
BUN/CREAT SERPL: 16 (ref 12–28)
CALCIUM SERPL-MCNC: 9.8 MG/DL (ref 8.7–10.3)
CHLORIDE SERPL-SCNC: 103 MMOL/L (ref 96–106)
CO2 SERPL-SCNC: 22 MMOL/L (ref 20–29)
CREAT SERPL-MCNC: 0.67 MG/DL (ref 0.57–1)
EGFRCR SERPLBLD CKD-EPI 2021: 95 ML/MIN/1.73
EOSINOPHIL # BLD AUTO: 0.1 X10E3/UL (ref 0–0.4)
EOSINOPHIL NFR BLD AUTO: 2 %
ERYTHROCYTE [DISTWIDTH] IN BLOOD BY AUTOMATED COUNT: 13 % (ref 11.7–15.4)
FOLATE SERPL-MCNC: 15 NG/ML
GLOBULIN SER CALC-MCNC: 2.6 G/DL (ref 1.5–4.5)
GLUCOSE SERPL-MCNC: 309 MG/DL (ref 70–99)
HCT VFR BLD AUTO: 37.6 % (ref 34–46.6)
HGB BLD-MCNC: 12.2 G/DL (ref 11.1–15.9)
IMM GRANULOCYTES # BLD AUTO: 0 X10E3/UL (ref 0–0.1)
IMM GRANULOCYTES NFR BLD AUTO: 0 %
IRON SATN MFR SERPL: 5 % (ref 15–55)
IRON SERPL-MCNC: 24 UG/DL (ref 27–139)
LYMPHOCYTES # BLD AUTO: 1.5 X10E3/UL (ref 0.7–3.1)
LYMPHOCYTES NFR BLD AUTO: 24 %
MAGNESIUM SERPL-MCNC: 2 MG/DL (ref 1.6–2.3)
MCH RBC QN AUTO: 27.7 PG (ref 26.6–33)
MCHC RBC AUTO-ENTMCNC: 32.4 G/DL (ref 31.5–35.7)
MCV RBC AUTO: 86 FL (ref 79–97)
MONOCYTES # BLD AUTO: 0.5 X10E3/UL (ref 0.1–0.9)
MONOCYTES NFR BLD AUTO: 8 %
NEUTROPHILS # BLD AUTO: 4.1 X10E3/UL (ref 1.4–7)
NEUTROPHILS NFR BLD AUTO: 65 %
PLATELET # BLD AUTO: 177 X10E3/UL (ref 150–450)
POTASSIUM SERPL-SCNC: 4.4 MMOL/L (ref 3.5–5.2)
PROT SERPL-MCNC: 6.9 G/DL (ref 6–8.5)
RBC # BLD AUTO: 4.4 X10E6/UL (ref 3.77–5.28)
SODIUM SERPL-SCNC: 139 MMOL/L (ref 134–144)
TIBC SERPL-MCNC: 507 UG/DL (ref 250–450)
UIBC SERPL-MCNC: 483 UG/DL (ref 118–369)
WBC # BLD AUTO: 6.3 X10E3/UL (ref 3.4–10.8)

## 2025-01-22 DIAGNOSIS — I48.91 ATRIAL FIBRILLATION, UNSPECIFIED TYPE: ICD-10-CM

## 2025-01-22 DIAGNOSIS — I10 ESSENTIAL HYPERTENSION: ICD-10-CM

## 2025-01-22 DIAGNOSIS — E11.65 TYPE 2 DIABETES MELLITUS WITH HYPERGLYCEMIA, WITHOUT LONG-TERM CURRENT USE OF INSULIN: ICD-10-CM

## 2025-01-23 RX ORDER — APIXABAN 5 MG/1
5 TABLET, FILM COATED ORAL
Qty: 180 TABLET | Refills: 0 | OUTPATIENT
Start: 2025-01-23

## 2025-01-23 RX ORDER — INSULIN GLULISINE 100 [IU]/ML
INJECTION, SOLUTION SUBCUTANEOUS
Qty: 10 ML | OUTPATIENT
Start: 2025-01-23

## 2025-01-23 RX ORDER — METOPROLOL SUCCINATE 25 MG/1
25 TABLET, EXTENDED RELEASE ORAL DAILY
Qty: 90 TABLET | Refills: 2 | OUTPATIENT
Start: 2025-01-23

## 2025-01-28 ENCOUNTER — TELEPHONE (OUTPATIENT)
Dept: FAMILY MEDICINE CLINIC | Facility: CLINIC | Age: 70
End: 2025-01-28
Payer: MEDICAID

## 2025-01-28 DIAGNOSIS — D64.9 ANEMIA, UNSPECIFIED TYPE: ICD-10-CM

## 2025-01-28 DIAGNOSIS — K59.09 CHRONIC CONSTIPATION: Primary | ICD-10-CM

## 2025-01-28 RX ORDER — FERROUS SULFATE 325(65) MG
325 TABLET ORAL
Qty: 30 TABLET | Refills: 12 | Status: SHIPPED | OUTPATIENT
Start: 2025-01-28

## 2025-01-28 RX ORDER — ASCORBIC ACID 500 MG
500 TABLET ORAL DAILY
Qty: 30 TABLET | Refills: 12 | Status: SHIPPED | OUTPATIENT
Start: 2025-01-28

## 2025-01-28 NOTE — TELEPHONE ENCOUNTER
HUB TO RELAY  Folate, magnesium and CBC are normal.  Iron profile is indicative of anemia.   Alk phos level again elevated.  Blood glucose elevated but less than previous.     I have sent in a prescription for iron and vitamin C.  Please take it as directed.  Please avoid tea during consumption of iron.     I placed a referral for her to see gastroenterology for the chronic constipation and anemia.  Please let me know when she has an appointment.

## 2025-01-31 ENCOUNTER — NURSE TRIAGE (OUTPATIENT)
Dept: CALL CENTER | Facility: HOSPITAL | Age: 70
End: 2025-01-31
Payer: MEDICAID

## 2025-02-01 NOTE — TELEPHONE ENCOUNTER
"Caller reported her insulin was increased, both lantus and apidra, and since then has been having severe itching. Stating she thinks she may be allergic to it. Reported itching is all over. Denied rash. Denied any difficulty breathing  Reason for Disposition   Patient sounds very sick or weak to the triager    Additional Information   Negative: [1] Life-threatening reaction (anaphylaxis) in the past to similar substance (e.g., food, insect bite/sting, chemical, etc.) AND [2] < 2 hours since exposure   Negative: Difficulty breathing or wheezing   Negative: [1] Difficulty swallowing or slurred speech AND [2] sudden onset   Negative: Sounds like a life-threatening emergency to the triager   Negative: Could be severe allergic reaction   Negative: Insect bites suspected   Negative: Looks like hives (pink bumps with pale centers)   Negative: Yellowish color of the skin AND sclera (white of the eye)   Negative: Itching in just one area or spot   Negative: Widespread rash also present    Answer Assessment - Initial Assessment Questions  1. DESCRIPTION: \"Describe the itching you are having.\"      Itching to arms, legs, back  2. SEVERITY: \"How bad is it?\"     - MILD: Doesn't interfere with normal activities.    - MODERATE-SEVERE: Interferes with work, school, sleep, or other activities.       Mod to severe  3. SCRATCHING: \"Are there any scratch marks? Bleeding?\"      -  4. ONSET: \"When did this begin?\"       When insulin was increased  5. CAUSE: \"What do you think is causing the itching?\" (ask about swimming pools, pollen, animals, soaps, etc.)      Thinks insulin is causing  6. OTHER SYMPTOMS: \"Do you have any other symptoms?\"       Jittery, irritable  7. PREGNANCY: \"Is there any chance you are pregnant?\" \"When was your last menstrual period?\"      N/a    Protocols used: Itching - Widespread-ADULT-AH    "

## 2025-02-04 DIAGNOSIS — G47.00 INSOMNIA, UNSPECIFIED TYPE: ICD-10-CM

## 2025-02-04 RX ORDER — ZOLPIDEM TARTRATE 10 MG/1
10 TABLET ORAL NIGHTLY PRN
Qty: 28 TABLET | Refills: 0 | Status: SHIPPED | OUTPATIENT
Start: 2025-02-04

## 2025-02-04 NOTE — TELEPHONE ENCOUNTER
"Caller: Stevan Blancotim BUTLER    Relationship: Self    Best call back number: [phone]    Requested Prescriptions:   Requested Prescriptions     Pending Prescriptions Disp Refills    zolpidem (AMBIEN) 10 MG tablet 28 tablet 0     Sig: Take 1 tablet by mouth At Night As Needed for Sleep.        Pharmacy where request should be sent: [id number] - [place], [place] - [address] [facility] [phone] [facility] [phone]      Last office visit with prescribing clinician: 1/17/2025   Last telemedicine visit with prescribing clinician: Visit date not found   Next office visit with prescribing clinician: 4/4/2025    Does the patient have less than a 3 day supply:  [x] Yes  [] No    Would you like a call back once the refill request has been completed: [] Yes [x] No    If the office needs to give you a call back, can they leave a voicemail: [] Yes [x] No    Agnes Arroyo Rep   02/04/25 10:03 EST         "

## 2025-02-17 DIAGNOSIS — I10 ESSENTIAL HYPERTENSION: ICD-10-CM

## 2025-02-17 DIAGNOSIS — G47.00 INSOMNIA, UNSPECIFIED TYPE: ICD-10-CM

## 2025-02-20 RX ORDER — METOPROLOL SUCCINATE 50 MG/1
50 TABLET, EXTENDED RELEASE ORAL DAILY
Qty: 90 TABLET | Refills: 1 | OUTPATIENT
Start: 2025-02-20

## 2025-02-20 RX ORDER — ZOLPIDEM TARTRATE 10 MG/1
10 TABLET ORAL NIGHTLY PRN
Qty: 28 TABLET | Refills: 0 | OUTPATIENT
Start: 2025-02-20

## 2025-02-21 DIAGNOSIS — G43.909 MIGRAINE WITHOUT STATUS MIGRAINOSUS, NOT INTRACTABLE, UNSPECIFIED MIGRAINE TYPE: ICD-10-CM

## 2025-02-21 RX ORDER — BUTALBITAL AND ACETAMINOPHEN 325; 50 MG/1; MG/1
1 TABLET ORAL EVERY 6 HOURS PRN
Qty: 30 TABLET | OUTPATIENT
Start: 2025-02-21

## 2025-03-03 DIAGNOSIS — E11.65 TYPE 2 DIABETES MELLITUS WITH HYPERGLYCEMIA, WITHOUT LONG-TERM CURRENT USE OF INSULIN: ICD-10-CM

## 2025-03-04 RX ORDER — INSULIN GLARGINE 100 [IU]/ML
INJECTION, SOLUTION SUBCUTANEOUS
Qty: 20 ML | Refills: 2 | Status: SHIPPED | OUTPATIENT
Start: 2025-03-04

## 2025-03-06 DIAGNOSIS — G47.00 INSOMNIA, UNSPECIFIED TYPE: ICD-10-CM

## 2025-03-06 RX ORDER — ZOLPIDEM TARTRATE 10 MG/1
10 TABLET ORAL NIGHTLY PRN
Qty: 28 TABLET | Refills: 0 | Status: SHIPPED | OUTPATIENT
Start: 2025-03-06

## 2025-03-12 DIAGNOSIS — I10 ESSENTIAL HYPERTENSION: ICD-10-CM

## 2025-03-12 DIAGNOSIS — I48.91 ATRIAL FIBRILLATION, UNSPECIFIED TYPE: ICD-10-CM

## 2025-03-12 RX ORDER — APIXABAN 5 MG/1
5 TABLET, FILM COATED ORAL
Qty: 180 TABLET | Refills: 0 | Status: SHIPPED | OUTPATIENT
Start: 2025-03-12

## 2025-03-14 DIAGNOSIS — G89.29 OTHER CHRONIC PAIN: ICD-10-CM

## 2025-03-14 DIAGNOSIS — F34.1 PERSISTENT DEPRESSIVE DISORDER: ICD-10-CM

## 2025-03-14 DIAGNOSIS — R53.82 CHRONIC FATIGUE: ICD-10-CM

## 2025-03-14 DIAGNOSIS — J44.9 CHRONIC OBSTRUCTIVE PULMONARY DISEASE, UNSPECIFIED COPD TYPE: ICD-10-CM

## 2025-03-14 DIAGNOSIS — H40.022 OPEN ANGLE WITH BORDERLINE FINDINGS AND HIGH GLAUCOMA RISK IN LEFT EYE: ICD-10-CM

## 2025-03-14 DIAGNOSIS — I48.20 CHRONIC ATRIAL FIBRILLATION: ICD-10-CM

## 2025-03-14 DIAGNOSIS — H40.10X0 OPEN-ANGLE GLAUCOMA OF RIGHT EYE, UNSPECIFIED GLAUCOMA STAGE, UNSPECIFIED OPEN-ANGLE GLAUCOMA TYPE: ICD-10-CM

## 2025-03-14 DIAGNOSIS — Z79.4 UNCONTROLLED TYPE 2 DIABETES MELLITUS WITH HYPERGLYCEMIA, WITH LONG-TERM CURRENT USE OF INSULIN: ICD-10-CM

## 2025-03-14 DIAGNOSIS — H25.9 AGE-RELATED CATARACT OF BOTH EYES, UNSPECIFIED AGE-RELATED CATARACT TYPE: ICD-10-CM

## 2025-03-14 DIAGNOSIS — K76.0 HEPATIC STEATOSIS: ICD-10-CM

## 2025-03-14 DIAGNOSIS — R25.1 TREMOR OF RIGHT HAND: ICD-10-CM

## 2025-03-14 DIAGNOSIS — F41.8 ANXIETY ASSOCIATED WITH DEPRESSION: ICD-10-CM

## 2025-03-14 DIAGNOSIS — R53.1 GENERALIZED WEAKNESS: Primary | ICD-10-CM

## 2025-03-14 DIAGNOSIS — E78.5 DYSLIPIDEMIA: ICD-10-CM

## 2025-03-14 DIAGNOSIS — I10 BENIGN ESSENTIAL HYPERTENSION: ICD-10-CM

## 2025-03-14 DIAGNOSIS — F41.9 ANXIETY: ICD-10-CM

## 2025-03-14 DIAGNOSIS — E11.65 UNCONTROLLED TYPE 2 DIABETES MELLITUS WITH HYPERGLYCEMIA, WITH LONG-TERM CURRENT USE OF INSULIN: ICD-10-CM

## 2025-03-21 DIAGNOSIS — I10 ESSENTIAL HYPERTENSION: ICD-10-CM

## 2025-03-21 RX ORDER — LISINOPRIL 10 MG/1
10 TABLET ORAL DAILY
Qty: 90 TABLET | Refills: 1 | Status: SHIPPED | OUTPATIENT
Start: 2025-03-21

## 2025-04-04 ENCOUNTER — OFFICE VISIT (OUTPATIENT)
Dept: FAMILY MEDICINE CLINIC | Facility: CLINIC | Age: 70
End: 2025-04-04
Payer: MEDICAID

## 2025-04-04 VITALS
DIASTOLIC BLOOD PRESSURE: 60 MMHG | OXYGEN SATURATION: 99 % | HEIGHT: 68 IN | SYSTOLIC BLOOD PRESSURE: 140 MMHG | WEIGHT: 166.8 LBS | TEMPERATURE: 96.3 F | RESPIRATION RATE: 18 BRPM | BODY MASS INDEX: 25.28 KG/M2 | HEART RATE: 65 BPM

## 2025-04-04 DIAGNOSIS — E11.65 UNCONTROLLED TYPE 2 DIABETES MELLITUS WITH HYPERGLYCEMIA, WITH LONG-TERM CURRENT USE OF INSULIN: Primary | ICD-10-CM

## 2025-04-04 DIAGNOSIS — K59.00 CONSTIPATION, UNSPECIFIED CONSTIPATION TYPE: ICD-10-CM

## 2025-04-04 DIAGNOSIS — M54.16 LUMBAR RADICULOPATHY: ICD-10-CM

## 2025-04-04 DIAGNOSIS — Z79.4 UNCONTROLLED TYPE 2 DIABETES MELLITUS WITH HYPERGLYCEMIA, WITH LONG-TERM CURRENT USE OF INSULIN: Primary | ICD-10-CM

## 2025-04-04 DIAGNOSIS — F41.9 ANXIETY: ICD-10-CM

## 2025-04-04 DIAGNOSIS — G47.00 INSOMNIA, UNSPECIFIED TYPE: ICD-10-CM

## 2025-04-04 LAB
EXPIRATION DATE: ABNORMAL
HBA1C MFR BLD: 8.8 % (ref 4.5–5.7)
Lab: ABNORMAL

## 2025-04-04 RX ORDER — BACLOFEN 20 MG/1
20 TABLET ORAL 3 TIMES DAILY
Qty: 90 TABLET | Refills: 0 | Status: SHIPPED | OUTPATIENT
Start: 2025-04-04

## 2025-04-04 RX ORDER — GABAPENTIN 100 MG/1
100 CAPSULE ORAL 3 TIMES DAILY
Qty: 90 CAPSULE | Refills: 0 | Status: SHIPPED | OUTPATIENT
Start: 2025-04-04

## 2025-04-04 RX ORDER — IBUPROFEN 600 MG/1
600 TABLET, FILM COATED ORAL EVERY 6 HOURS PRN
Qty: 270 TABLET | Refills: 0 | Status: SHIPPED | OUTPATIENT
Start: 2025-04-04

## 2025-04-04 RX ORDER — ZOLPIDEM TARTRATE 10 MG/1
10 TABLET ORAL NIGHTLY PRN
Qty: 28 TABLET | Refills: 0 | Status: SHIPPED | OUTPATIENT
Start: 2025-04-04

## 2025-04-04 RX ORDER — BREXPIPRAZOLE 3 MG/1
1 TABLET ORAL DAILY
Qty: 90 TABLET | Refills: 3 | Status: SHIPPED | OUTPATIENT
Start: 2025-04-04

## 2025-04-04 RX ORDER — CETIRIZINE HYDROCHLORIDE 10 MG/1
10 TABLET ORAL DAILY
Qty: 90 TABLET | Refills: 3 | Status: SHIPPED | OUTPATIENT
Start: 2025-04-04

## 2025-04-04 RX ORDER — BUSPIRONE HYDROCHLORIDE 7.5 MG/1
7.5 TABLET ORAL NIGHTLY PRN
Qty: 30 TABLET | Refills: 2 | Status: SHIPPED | OUTPATIENT
Start: 2025-04-04

## 2025-04-04 RX ORDER — DOCUSATE SODIUM 100 MG/1
100 CAPSULE, LIQUID FILLED ORAL 2 TIMES DAILY
Qty: 180 CAPSULE | Refills: 3 | Status: SHIPPED | OUTPATIENT
Start: 2025-04-04

## 2025-04-04 RX ORDER — SERTRALINE HYDROCHLORIDE 100 MG/1
100 TABLET, FILM COATED ORAL DAILY
Qty: 90 TABLET | Refills: 3 | Status: SHIPPED | OUTPATIENT
Start: 2025-04-04

## 2025-04-04 NOTE — ASSESSMENT & PLAN NOTE
Orders:    busPIRone (BUSPAR) 7.5 MG tablet; Take 1 tablet by mouth At Night As Needed (anxiety).

## 2025-04-04 NOTE — ASSESSMENT & PLAN NOTE
Orders:    zolpidem (AMBIEN) 10 MG tablet; Take 1 tablet by mouth At Night As Needed for Sleep.

## 2025-04-04 NOTE — PROGRESS NOTES
Office Note     Name: Arianna Blanco    : 1955     MRN: [id number]     Chief Complaint  Diabetes (3 mos follow up and medication refills from Dr. Lane Ibuprofren, Baclofen, Lyrica, gabapentin)    Subjective     History of Present Illness:  Arianna Blanco is a 69 y.o. female who presents today for 3 mos A1c follow up and medication refills from dr. Domingo lepe.   Diabetes  She presents for her follow-up diabetic visit. She has type 2 diabetes mellitus. Her disease course has been stable. Hypoglycemia symptoms include tremors. Pertinent negatives for hypoglycemia include no nervousness/anxiousness. Pertinent negatives for diabetes include no polydipsia, no polyphagia and no polyuria. Symptoms are stable. There are no known risk factors for coronary artery disease. When asked about current treatments, none were reported. Her weight is stable. When asked about meal planning, she reported none. There is no change in her home blood glucose trend. Her blood sugar drops below 70 never.An ACE inhibitor/angiotensin II receptor blocker is being taken. She does not see a podiatrist. Eye exam is current.       History of Present Illness  The patient is a 69-year-old female who presents for evaluation of diabetes, depression, anxiety, COPD, pain management, and medication management.    Her A1c level was 12.1 on 2024. She has been unable to sleep for almost 3 weeks as she has been staying with her son and grandchildren. She is currently on a regimen of Lantus 65 units at night and Apidra 12 units. She is mindful of her sugar intake and consumes more potatoes.    She discontinued Prozac over a month ago due to adverse effects and resumed Zoloft, for which she requires a refill. She additionally takes Rexulti.  She occasionally experiences feelings of depression but does not endorse any thoughts of self-harm or harm to others. She reports no anxiety. She is currently on Zoloft 100 mg and Rexulti 3  mg.    She is currently on BuSpar for anxiety and requests a refill.    She uses the Breztri inhaler daily and her rescue inhaler at least twice daily, particularly at night. She is not taking any allergy medications. She suspects that her allergies may be exacerbated by the unclean conditions of her apartment, leading to coughing.    She is on baclofen 20 mg 3 times a day and gabapentin 100 mg 3 times a day. She also has a history of taking Lyrica and ibuprofen.  She had a medial branch block in December 2024. She was last seen by Dr[facility] on 07/26/2024, who refilled her Lyrica prescription. At that time, she was not on baclofen. During her visit on 12/16/2024, she was prescribed Lyrica 150 mg twice daily and baclofen. Gabapentin was not prescribed at that time. In January 2025, while in the hospital, Lyrica was discontinued and gabapentin was started. She is uncertain which medication was more effective. She was in the ER in January 2025 after a fall and was not taking ibuprofen at that time. She was previously on Reglan 4 times daily, Zofran, and metoclopramide for acid reflux. Ibuprofen was discontinued during her ER visit on 12/23/2024 and hydrocodone Tylenol was initiated along with Reglan, Zofran, and Protonix. She reports that Tylenol made her feel unwell.            Allergies   Allergen Reactions    Codeine GI Intolerance    Metformin Other (See Comments)     Intolerant to medication         Current Outpatient Medications:     Accu-Chek FastClix Lancets misc, 1 each by Other route 4 (Four) Times a Day Before Meals & at Bedtime As Needed (diabetes type 2 with hyperglycemia)., Disp: 100 each, Rfl: 11    albuterol sulfate  (90 Base) MCG/ACT inhaler, Inhale 2 puffs Every 4 (Four) Hours As Needed for Wheezing or Shortness of Air., Disp: 6.7 g, Rfl: 2    baclofen (LIORESAL) 20 MG tablet, Take 1 tablet by mouth 3 (Three) Times a Day., Disp: 90 tablet, Rfl: 0    Blood Glucose Monitoring  Suppl (Accu-Chek Guide) w/Device kit, Use 1 each 4 (Four) Times a Day Before Meals & at Bedtime As Needed (diabetes type 2 with hyperglycemia)., Disp: 1 kit, Rfl: 0    Brexpiprazole (Rexulti) 3 MG tablet, Take 1 tablet by mouth Daily., Disp: 90 tablet, Rfl: 3    Budeson-Glycopyrrol-Formoterol (BREZTRI) 160-9-4.8 MCG/ACT aerosol inhaler, Inhale 2 puffs 2 (Two) Times a Day., Disp: 10.7 g, Rfl: 11    busPIRone (BUSPAR) 7.5 MG tablet, Take 1 tablet by mouth At Night As Needed (anxiety)., Disp: 30 tablet, Rfl: 2    dilTIAZem CD (CARDIZEM CD) 240 MG 24 hr capsule, Take 1 capsule by mouth Daily., Disp: 90 capsule, Rfl: 3    docusate sodium (Colace) 100 MG capsule, Take 1 capsule by mouth 2 (Two) Times a Day., Disp: 180 capsule, Rfl: 3    Eliquis 5 MG tablet tablet, TAKE 1 TABLET BY MOUTH EVERY 12 HOURS, Disp: 180 tablet, Rfl: 0    ferrous sulfate 325 (65 FE) MG tablet, Take 1 tablet by mouth Daily With Breakfast., Disp: 30 tablet, Rfl: 12    gabapentin (NEURONTIN) 100 MG capsule, Take 1 capsule by mouth 3 (Three) Times a Day., Disp: 90 capsule, Rfl: 0    glucose blood (Accu-Chek Guide Test) test strip, 1 EACH BY OTHER ROUTE 4 (FOUR) TIMES A DAY. USE TO TEST BLOOD SUGAR 4 TIMES DAILY. INSURANCE PAYS 50 TESTSCRIPTS FOR 30 DAYS. INFORM YOUR PRESCRIBER ABOUT THAT, Disp: 50 each, Rfl: 11    Insulin Glargine (Lantus SoloStar) 100 UNIT/ML injection pen, INJECT 60 UNITS UNDER THE SKIN INTO THE APPROPRIATE AREA AS DIRECTED EVERY NIGHT., Disp: 20 mL, Rfl: 2    Insulin Glulisine (Apidra SoloStar) 100 UNIT/ML solution pen-injector, Inject 0.1 mL under the skin into the appropriate area as directed 3 (Three) Times a Day. Take within 15 before or within 20 minutes of starting a meal., Disp: 27 mL, Rfl: 3    Insulin Pen Needle (BD Pen Needle Mikala U/F) 32G X 4 MM misc, Use as directed with lantus, Disp: 100 each, Rfl: 0    lisinopril (PRINIVIL,ZESTRIL) 10 MG tablet, TAKE 1 TABLET BY MOUTH EVERY DAY, Disp: 90 tablet, Rfl: 1    metoprolol  succinate XL (TOPROL-XL) 50 MG 24 hr tablet, Take 1 tablet by mouth Daily., Disp: 90 tablet, Rfl: 3    pantoprazole (Protonix) 40 MG EC tablet, Take 1 tablet by mouth Daily., Disp: 90 tablet, Rfl: 3    zolpidem (AMBIEN) 10 MG tablet, Take 1 tablet by mouth At Night As Needed for Sleep., Disp: 28 tablet, Rfl: 0    cetirizine (zyrTEC) 10 MG tablet, Take 1 tablet by mouth Daily., Disp: 90 tablet, Rfl: 3    ibuprofen (ADVIL,MOTRIN) 600 MG tablet, Take 1 tablet by mouth Every 6 (Six) Hours As Needed for Moderate Pain., Disp: 270 tablet, Rfl: 0    Oyster Shell Calcium Plus D 500-5 MG-MCG tablet per tablet, Take 2 tablets by mouth Daily. (Patient not taking: Reported on 4/4/2025), Disp: , Rfl:     sertraline (Zoloft) 100 MG tablet, Take 1 tablet by mouth Daily., Disp: 90 tablet, Rfl: 3    Review of Systems   Gastrointestinal:  Positive for GERD.   Endocrine: Negative for polydipsia, polyphagia and polyuria.   Musculoskeletal:  Positive for arthralgias and myalgias.   Neurological:  Positive for tremors.   Psychiatric/Behavioral:  Positive for depressed mood. Negative for self-injury, sleep disturbance, suicidal ideas, negative for hyperactivity and stress. The patient is not nervous/anxious.        Social History     Socioeconomic History    Marital status:    Tobacco Use    Smoking status: Every Day     Current packs/day: 1.00     Average packs/day: 1 pack/day for 42.3 years (42.3 ttl pk-yrs)     Types: Cigarettes     Start date: 1983     Passive exposure: Current    Smokeless tobacco: Never   Vaping Use    Vaping status: Some Days    Substances: Nicotine, Flavoring    Devices: Refillable tank   Substance and Sexual Activity    Alcohol use: No    Drug use: Never    Sexual activity: Defer       Family History   Problem Relation Age of Onset    Heart disease Mother     Hypertension Mother     Diabetes Mother     Stroke Mother            4/4/2025     8:05 AM   PHQ-2/PHQ-9 Depression Screening   Little interest or  "pleasure in doing things Almost all   Feeling down, depressed, or hopeless Almost all   Trouble falling or staying asleep, or sleeping too much Almost all   Feeling tired or having little energy Almost all   Poor appetite or overeating Not at all   Feeling bad about yourself - or that you are a failure or have let yourself or your family down Not at all   Trouble concentrating on things, such as reading the newspaper or watching television Almost all   Moving or speaking so slowly that other people could have noticed? Or the opposite - being so fidgety or restless that you have been moving around a lot more than usual. Not at all   Thoughts that you would be better off dead or hurting yourself in some way Not at all   Patient Health Questionnaire-9 Score 15   How difficult have these problems made it for you to do your work, take care of things at home, or get along with other people? Very difficult       Fall Risk Screen:  KIANA Fall Risk Assessment was completed, and patient is at LOW risk for falls.Assessment completed on:1/3/2025      Objective     /60 (BP Location: Right arm, Patient Position: Sitting, Cuff Size: Large Adult)   Pulse 65   Temp 96.3 °F (35.7 °C) (Temporal)   Resp 18   Ht 172.7 cm (67.99\")   Wt 75.7 kg (166 lb 12.8 oz)   SpO2 99%   BMI 25.37 kg/m²     BP Readings from Last 2 Encounters:   04/04/25 140/60   01/17/25 164/82       Wt Readings from Last 2 Encounters:   04/04/25 75.7 kg (166 lb 12.8 oz)   01/17/25 75 kg (165 lb 6.4 oz)                Physical Exam  Vitals and nursing note reviewed.   Constitutional:       General: She is not in acute distress.     Appearance: Normal appearance. She is well-groomed. She is not ill-appearing.   HENT:      Head: Normocephalic and atraumatic.   Eyes:      General: Lids are normal. Vision grossly intact.   Neck:      Vascular: No carotid bruit.   Cardiovascular:      Rate and Rhythm: Normal rate and regular rhythm.      Heart sounds: Normal " heart sounds.   Pulmonary:      Effort: Pulmonary effort is normal.      Breath sounds: Normal breath sounds and air entry.   Musculoskeletal:      Right lower leg: No edema.      Left lower leg: No edema.   Skin:     General: Skin is warm and dry.   Neurological:      Mental Status: She is alert and oriented to person, place, and time. Mental status is at baseline.   Psychiatric:         Mood and Affect: Mood normal.         Behavior: Behavior normal. Behavior is cooperative.         Thought Content: Thought content normal.       Derm Physical Exam  Physical Exam  Vital Signs  Blood pressure is slightly elevated at 140/60.    Result Review :       Results  Laboratory Studies  A1c is 8.8.    Labs:       Imaging:   No valid procedures specified.        Data reviewed:        Assessment and Plan     Plan      Assessment & Plan  Uncontrolled type 2 diabetes mellitus with hyperglycemia, with long-term current use of insulin      Orders:    POC Glycosylated Hemoglobin (Hb A1C)    Microalbumin / Creatinine Urine Ratio - Urine, Clean Catch    Insomnia, unspecified type    Orders:    zolpidem (AMBIEN) 10 MG tablet; Take 1 tablet by mouth At Night As Needed for Sleep.    Anxiety    Orders:    busPIRone (BUSPAR) 7.5 MG tablet; Take 1 tablet by mouth At Night As Needed (anxiety).    Constipation, unspecified constipation type    Orders:    docusate sodium (Colace) 100 MG capsule; Take 1 capsule by mouth 2 (Two) Times a Day.    Lumbar radiculopathy    Orders:    Ambulatory Referral to Pain Management      Assessment & Plan  1. Diabetes Mellitus.  Her A1c has improved significantly from 12.1 on 12/16/2024 to 8.8 today. She is currently taking Lantus 65 units at night and Apidra 12 units. She is advised to continue her current medication regimen and to avoid high-carb foods like potatoes.    2. Depression.  She has switched back to Zoloft from Prozac due to side effects. A prescription for Zoloft 100 mg will be provided with a  90-day refill sent to [facility] in [place].    3. Anxiety.  She is currently taking BuSpar and Rexulti. A prescription for BuSpar will be refilled.    4. Pain Management.  She is currently taking baclofen 20 mg 3 times a day and gabapentin 3 times a day. A prescription for ibuprofen 600 mg will be provided, with caution advised due to potential stomach irritation. She is advised to consult with a pain management specialist for further management.    6. Medication Management.  She is currently taking iron tablets and Colace to prevent constipation. A prescription for Colace will be refilled.    7. Hypertension.  Her blood pressure is slightly elevated at 140/60. She is currently on lisinopril 10 mg, metoprolol XL 50 mg, and Cardizem 240 mg daily. She is advised to continue her current medication regimen.    8. Anticoagulation Therapy.  She is currently on Eliquis and is pending cardiac ablation. The use of ibuprofen will be monitored due to her anticoagulation status.    9. Insomnia.  She is on Ambien 10 mg for sleep. A prescription for Ambien 10 mg will be refilled.       Follow Up   Wrapup Tab  No follow-ups on file.     Patient was given instructions and counseling regarding her condition or for health maintenance advice. Please see specific information pulled into the AVS if appropriate.  Hand hygiene was performed during entrance to exam room and following assessment of patient. This document is intended for medical expert use only.       THOMAS Scanlon, FNP-C  [address]  [facility]  [address] [place] [address] DR HALEY AMBRIZ [address] [place] [address] [place] [address] [phone]    Patient or patient representative verbalized consent for the use of Ambient Listening during the visit with  THOMAS Scanlon for chart documentation. 4/4/2025  08:52 EDT

## 2025-04-04 NOTE — ASSESSMENT & PLAN NOTE
Orders:    POC Glycosylated Hemoglobin (Hb A1C)    Microalbumin / Creatinine Urine Ratio - Urine, Clean Catch

## 2025-04-05 LAB
ALBUMIN/CREAT UR: 61 MG/G CREAT (ref 0–29)
CREAT UR-MCNC: 57.8 MG/DL
MICROALBUMIN UR-MCNC: 35.3 UG/ML

## 2025-04-11 ENCOUNTER — TELEPHONE (OUTPATIENT)
Dept: FAMILY MEDICINE CLINIC | Facility: CLINIC | Age: 70
End: 2025-04-11

## 2025-04-11 NOTE — TELEPHONE ENCOUNTER
Caller: Arianna Blanco    Relationship: Self    Best call back number: [phone]    What medication are you requesting: MEDICATION FOR KIDNEYS     Have you had these symptoms before:    [x] Yes  [] No    Have you been treated for these symptoms before:   [x] Yes  [] No    If a prescription is needed, what is your preferred pharmacy and phone number: [facility]/PHARMACY #[id number] - [place], [place] - [address] [place] [address] - [phone] [facility] [phone]      Additional notes:  PATIENT STATES SHE RECEIVED A CALL SAYING THAT SOME MEDICINE WOULD BE CALLED IN FOR HER KIDNEYS TO THE [facility] IN [place].

## 2025-04-13 DIAGNOSIS — I10 ESSENTIAL HYPERTENSION: ICD-10-CM

## 2025-04-14 RX ORDER — METOPROLOL SUCCINATE 50 MG/1
50 TABLET, EXTENDED RELEASE ORAL DAILY
Qty: 90 TABLET | Refills: 1 | Status: SHIPPED | OUTPATIENT
Start: 2025-04-14

## 2025-04-14 NOTE — TELEPHONE ENCOUNTER
Left a detailed message for her to either call me back and discuss or discuss at the next follow up appointment.

## 2025-04-15 DIAGNOSIS — G47.00 INSOMNIA, UNSPECIFIED TYPE: ICD-10-CM

## 2025-04-15 NOTE — TELEPHONE ENCOUNTER
Caller: Arianna Blanco    Relationship: Self    Best call back number:[phone]     Requested Prescriptions:   Requested Prescriptions     Pending Prescriptions Disp Refills    zolpidem (AMBIEN) 10 MG tablet 28 tablet 0     Sig: Take 1 tablet by mouth At Night As Needed for Sleep.        Pharmacy where request should be sent:      Last office visit with prescribing clinician: 4/4/2025   Last telemedicine visit with prescribing clinician: Visit date not found   Next office visit with prescribing clinician: 7/18/2025     Additional details provided by patient:      Does the patient have less than a 3 day supply:  [x] Yes  [] No    Would you like a call back once the refill request has been completed: [x] Yes [] No    If the office needs to give you a call back, can they leave a voicemail: [x] Yes [] No    Agnes Vann Rep   04/15/25 11:12 EDT

## 2025-04-16 ENCOUNTER — TELEPHONE (OUTPATIENT)
Dept: FAMILY MEDICINE CLINIC | Facility: CLINIC | Age: 70
End: 2025-04-16
Payer: MEDICAID

## 2025-04-16 NOTE — TELEPHONE ENCOUNTER
Patient is needing Ambien and the Butalbital acetaminophen refilled she has been out of Ambien for a week now. And the other medication she has not needed it until now.

## 2025-04-17 ENCOUNTER — TELEPHONE (OUTPATIENT)
Dept: FAMILY MEDICINE CLINIC | Facility: CLINIC | Age: 70
End: 2025-04-17
Payer: MEDICAID

## 2025-04-17 DIAGNOSIS — G43.909 MIGRAINE WITHOUT STATUS MIGRAINOSUS, NOT INTRACTABLE, UNSPECIFIED MIGRAINE TYPE: ICD-10-CM

## 2025-04-17 RX ORDER — NICOTINE 21 MG/24HR
1 PATCH, TRANSDERMAL 24 HOURS TRANSDERMAL EVERY 24 HOURS
Qty: 28 EACH | Refills: 2 | Status: SHIPPED | OUTPATIENT
Start: 2025-04-17

## 2025-04-17 RX ORDER — BUTALBITAL AND ACETAMINOPHEN 325; 50 MG/1; MG/1
1 TABLET ORAL EVERY 6 HOURS PRN
Qty: 30 TABLET | Refills: 0 | Status: SHIPPED | OUTPATIENT
Start: 2025-04-17

## 2025-04-17 RX ORDER — ZOLPIDEM TARTRATE 10 MG/1
10 TABLET ORAL NIGHTLY PRN
Qty: 28 TABLET | Refills: 0 | OUTPATIENT
Start: 2025-04-17

## 2025-04-17 NOTE — TELEPHONE ENCOUNTER
"    Caller: Arianna Blanco    Relationship: Self    Best call back number:     [phone] (Home)       What medication are you requesting:     nicotine (Nicoderm CQ) 21 MG/24HR patch     ASKING FOR LARGER QUANTITY WITH REFILLS     Have you had these symptoms before:    [x] Yes  [] No    Have you been treated for these symptoms before:   [x] Yes  [] No    If a prescription is needed, what is your preferred pharmacy and phone number: [facility]/PHARMACY #[id number] - [place], [place] [address] AT [place] [facility] - [phone] [facility] [phone]      Additional notes:        "

## 2025-04-17 NOTE — TELEPHONE ENCOUNTER
Called and spoke to pt.  Pt states that her insurance will not pay for the nicotine patches.  Spoke to pharmacy and pharmacy verified that insurance will not cover nicotine patches.

## 2025-04-22 ENCOUNTER — TELEPHONE (OUTPATIENT)
Dept: PAIN MEDICINE | Facility: CLINIC | Age: 70
End: 2025-04-22
Payer: MEDICAID

## 2025-04-22 NOTE — TELEPHONE ENCOUNTER
Caller: Arianna Blanco    Relationship to patient: Self    Best call back number:     Chief complaint: BACK PAIN    Type of visit: FUP BACK PROCEDURE       Additional notes:FORMER PATIENT OF DR BASURTO PLEASE ADVISE

## 2025-04-22 NOTE — TELEPHONE ENCOUNTER
Spoke with pt- informed her we are currently full on taking new patients. Pt has an appt with another pain clinic but wanted to see if we have any openings in [place], but we do not. Pt verbalized understanding.

## 2025-04-23 ENCOUNTER — TELEPHONE (OUTPATIENT)
Dept: FAMILY MEDICINE CLINIC | Facility: CLINIC | Age: 70
End: 2025-04-23
Payer: MEDICAID

## 2025-04-23 DIAGNOSIS — I48.91 ATRIAL FIBRILLATION, UNSPECIFIED TYPE: ICD-10-CM

## 2025-04-23 RX ORDER — DILTIAZEM HYDROCHLORIDE 240 MG/1
240 CAPSULE, COATED, EXTENDED RELEASE ORAL DAILY
Qty: 90 CAPSULE | Refills: 3 | Status: SHIPPED | OUTPATIENT
Start: 2025-04-23

## 2025-04-23 NOTE — TELEPHONE ENCOUNTER
Caller: DAMIEN/ [facility]    Relationship:     D[facility]IEN/ [facility] [phone] Remove     FAX NUMBER [phone] ATTN: DAMIEN       What form or medical record are you requesting:  HNP - MOST RECENT OFFICE NOTES - HISTORY AND PHYSICAL NOTES     Who is requesting this form or medical record from you: INSURANCE /     How would you like to receive the form or medical records (pick-up, mail, fax): FAX NUMBER [phone] ATTN: DAMIEN    Timeframe paperwork needed:   ANY TIME

## 2025-04-28 RX ORDER — BACLOFEN 20 MG/1
20 TABLET ORAL 3 TIMES DAILY
Qty: 90 TABLET | Refills: 0 | Status: SHIPPED | OUTPATIENT
Start: 2025-04-28

## 2025-05-04 PROBLEM — I70.90 ATHEROMATOUS PLAQUE: Status: ACTIVE | Noted: 2025-05-04

## 2025-05-04 RX ORDER — GABAPENTIN 100 MG/1
300 CAPSULE ORAL 2 TIMES DAILY
Start: 2025-05-04

## 2025-05-14 DIAGNOSIS — G47.00 INSOMNIA, UNSPECIFIED TYPE: ICD-10-CM

## 2025-05-15 RX ORDER — ZOLPIDEM TARTRATE 10 MG/1
10 TABLET ORAL NIGHTLY PRN
Qty: 28 TABLET | Refills: 0 | Status: SHIPPED | OUTPATIENT
Start: 2025-05-15

## 2025-05-23 ENCOUNTER — TELEPHONE (OUTPATIENT)
Dept: FAMILY MEDICINE CLINIC | Facility: CLINIC | Age: 70
End: 2025-05-23
Payer: MEDICAID

## 2025-05-23 NOTE — TELEPHONE ENCOUNTER
Caller: AIDA WITH [facility]    Relationship to patient: OTHER     Best call back number:  WHICH IS ALSO HER FAX NUMBER TO SEND REQUESTED DOCUMENTS TO.      Patient is needing: PLEASE CALL AIDA TO CONFIRM RECEIPT OF THE FAXES SHE HAS SENT ON 5/20 AND 5/21 REGARDING PA HOURS (CARE GIVING HOURS THAT ARE PAID FOR BY MEDICAID THAT NEEDS TO BE SIGNED OFF BY A PCP), AND REQUEST FOR OFFICE VISIT NOTES, HISTORY AND PHYSICAL, CURRENT MED LIST AND ALLERGIES.   PLEASE CONFIRM WITH PORFIRIOIN.

## 2025-05-29 NOTE — TELEPHONE ENCOUNTER
Caller: AIDA WITH [facility]    Best call back number: [phone]    What form or medical record are you requesting:     OFFICE VISIT NOTES FOR THE PAST 90 DAYS     DEMOGRAPHICS INFO FOR PATIENT / WITH HER INSURANCE INFO       Who is requesting this form or medical record from you: JOSE ARMANDO UPLANDS     How would you like to receive the form or medical records (pick-up, mail, fax):     FAX NUMBER : [phone]     Timeframe paperwork needed: ASAP

## 2025-06-02 DIAGNOSIS — G47.00 INSOMNIA, UNSPECIFIED TYPE: ICD-10-CM

## 2025-06-02 RX ORDER — ZOLPIDEM TARTRATE 10 MG/1
10 TABLET ORAL NIGHTLY PRN
Qty: 28 TABLET | Refills: 0 | OUTPATIENT
Start: 2025-06-02

## 2025-06-02 RX ORDER — IBUPROFEN 600 MG/1
600 TABLET, FILM COATED ORAL
Qty: 270 TABLET | Refills: 0 | Status: SHIPPED | OUTPATIENT
Start: 2025-06-02

## 2025-06-03 RX ORDER — GABAPENTIN 100 MG/1
300 CAPSULE ORAL 2 TIMES DAILY
Start: 2025-06-03 | End: 2025-06-09 | Stop reason: SDUPTHER

## 2025-06-03 NOTE — TELEPHONE ENCOUNTER
"    Caller: Arianna Blanco LUKE    Relationship: Self    Best call back number:   [phone]      Requested Prescriptions:   Requested Prescriptions     Pending Prescriptions Disp Refills    gabapentin (NEURONTIN) 100 MG capsule       Sig: Take 3 capsules by mouth 2 (Two) Times a Day.        Pharmacy where request should be sent: [facility]/PHARMACY #[id number] - [place], [place] - [address] [facility] [address] AT [place] [facility] [phone] [facility] [phone]      Last office visit with prescribing clinician: 4/4/2025   Last telemedicine visit with prescribing clinician: Visit date not found   Next office visit with prescribing clinician: 7/18/2025     Additional details provided by patient:     Does the patient have less than a 3 day supply:  [x] Yes  [] No    Would you like a call back once the refill request has been completed: [] Yes [] No    If the office needs to give you a call back, can they leave a voicemail: [] Yes [] No    Agnes [place] Rep   06/03/25 08:13 EDT         "

## 2025-06-05 RX ORDER — BACLOFEN 20 MG/1
20 TABLET ORAL 3 TIMES DAILY
Qty: 90 TABLET | Refills: 0 | Status: SHIPPED | OUTPATIENT
Start: 2025-06-05

## 2025-06-06 DIAGNOSIS — G47.00 INSOMNIA, UNSPECIFIED TYPE: ICD-10-CM

## 2025-06-06 RX ORDER — ZOLPIDEM TARTRATE 10 MG/1
10 TABLET ORAL NIGHTLY PRN
Qty: 28 TABLET | Refills: 0 | Status: SHIPPED | OUTPATIENT
Start: 2025-06-13

## 2025-06-06 RX ORDER — GABAPENTIN 100 MG/1
300 CAPSULE ORAL 2 TIMES DAILY
Start: 2025-06-06

## 2025-06-06 NOTE — TELEPHONE ENCOUNTER
"  Caller: Arianna Blanco LUKE    Relationship: Self    Best call back number: [phone]     Requested Prescriptions:   Requested Prescriptions     Pending Prescriptions Disp Refills    gabapentin (NEURONTIN) 100 MG capsule       Sig: Take 3 capsules by mouth 2 (Two) Times a Day.    zolpidem (AMBIEN) 10 MG tablet 28 tablet 0     Sig: Take 1 tablet by mouth At Night As Needed for Sleep.        Pharmacy where request should be sent: [facility]/PHARMACY #[id number] - [place], [place] [address] AT [facility] - [phone] [facility] [phone]      Last office visit with prescribing clinician: 4/4/2025   Last telemedicine visit with prescribing clinician: Visit date not found   Next office visit with prescribing clinician: 7/18/2025     Additional details provided by patient: THE GABAPENTIN DID NOT GO THROUGH TO PHARMACY AND PLEASE RESEND.  WILL NEED NEW PRESCRIPTION.  PATIENT IS LEAVING TODAY TO GO OUT OF TOWN AND NEED CALLED INTO TO PHARMACY AS SOON AS POSSIBLE.      Does the patient have less than a 3 day supply:  [x] Yes  [] No    Would you like a call back once the refill request has been completed: [] Yes [] No    If the office needs to give you a call back, can they leave a voicemail: [] Yes [] No    Agnes Ortiz   06/06/25 08:11 EDT            "

## 2025-06-09 ENCOUNTER — TELEPHONE (OUTPATIENT)
Dept: FAMILY MEDICINE CLINIC | Facility: CLINIC | Age: 70
End: 2025-06-09
Payer: MEDICAID

## 2025-06-09 RX ORDER — GABAPENTIN 100 MG/1
300 CAPSULE ORAL 2 TIMES DAILY
Qty: 180 CAPSULE | Refills: 0 | Status: SHIPPED | OUTPATIENT
Start: 2025-06-09

## 2025-06-09 RX ORDER — GABAPENTIN 100 MG/1
300 CAPSULE ORAL 2 TIMES DAILY
Start: 2025-06-09 | End: 2025-06-09 | Stop reason: SDUPTHER

## 2025-06-09 NOTE — TELEPHONE ENCOUNTER
Caller: Arianna Blanco    Relationship to patient: Self    Best call back number: [phone]    Patient is needing: PATIENT IS WANTING THE OFFICE TO KNOW HER MEDICATION NEEDS TO BE SENT TO [facility] BECAUSE SHE'S GOING TO BE THERE FOR A FEW HOURS BEFORE SHE LEAVES FOR HER TRIP

## 2025-06-30 RX ORDER — BACLOFEN 20 MG/1
20 TABLET ORAL 3 TIMES DAILY
Qty: 90 TABLET | Refills: 0 | Status: SHIPPED | OUTPATIENT
Start: 2025-06-30

## 2025-07-02 RX ORDER — BACLOFEN 20 MG/1
20 TABLET ORAL 3 TIMES DAILY
Qty: 90 TABLET | Refills: 0 | OUTPATIENT
Start: 2025-07-02

## 2025-07-07 RX ORDER — GABAPENTIN 100 MG/1
300 CAPSULE ORAL 2 TIMES DAILY
Qty: 180 CAPSULE | Refills: 0 | Status: SHIPPED | OUTPATIENT
Start: 2025-07-07

## 2025-07-12 DIAGNOSIS — F41.9 ANXIETY: ICD-10-CM

## 2025-07-13 DIAGNOSIS — I10 ESSENTIAL HYPERTENSION: ICD-10-CM

## 2025-07-13 DIAGNOSIS — I48.91 ATRIAL FIBRILLATION, UNSPECIFIED TYPE: ICD-10-CM

## 2025-07-14 ENCOUNTER — TELEPHONE (OUTPATIENT)
Dept: FAMILY MEDICINE CLINIC | Facility: CLINIC | Age: 70
End: 2025-07-14
Payer: OTHER GOVERNMENT

## 2025-07-14 RX ORDER — BUSPIRONE HYDROCHLORIDE 7.5 MG/1
7.5 TABLET ORAL NIGHTLY PRN
Qty: 30 TABLET | Refills: 2 | Status: SHIPPED | OUTPATIENT
Start: 2025-07-14

## 2025-07-14 RX ORDER — APIXABAN 5 MG/1
5 TABLET, FILM COATED ORAL EVERY 12 HOURS SCHEDULED
Qty: 180 TABLET | Refills: 0 | Status: SHIPPED | OUTPATIENT
Start: 2025-07-14

## 2025-07-14 NOTE — TELEPHONE ENCOUNTER
Caller: DAMIEN- [facility]    Relationship:     Best call back number: [phone]     What was the call regarding: RILEY IS REQUESTING A CALL BACK REGARDING A FAX SHE SENT OVER TO THE OFFICE ON 7/1.     PLEASE CALL AND ADVISE

## 2025-07-24 DIAGNOSIS — G47.00 INSOMNIA, UNSPECIFIED TYPE: ICD-10-CM

## 2025-07-29 RX ORDER — ZOLPIDEM TARTRATE 10 MG/1
10 TABLET ORAL NIGHTLY PRN
Qty: 28 TABLET | Refills: 0 | Status: SHIPPED | OUTPATIENT
Start: 2025-07-29

## 2025-07-31 ENCOUNTER — TELEPHONE (OUTPATIENT)
Dept: FAMILY MEDICINE CLINIC | Facility: CLINIC | Age: 70
End: 2025-07-31
Payer: OTHER GOVERNMENT

## 2025-07-31 NOTE — TELEPHONE ENCOUNTER
Patient was notified of information and given [facility] number. They called her to schedule and she was driving and said that she would call them back. She did not call them and I told her to call and make appointment and let them know referral has been in there since December

## 2025-07-31 NOTE — TELEPHONE ENCOUNTER
Patient is schedule to see you 9/16 for a follow up. She is having a hard time with her BS and states that it is running in the 300's. Her weight has went from 152 to 187. She is wanting to know if you would place her back on the Ozempic to help with her BS.

## 2025-08-05 RX ORDER — GABAPENTIN 100 MG/1
300 CAPSULE ORAL 2 TIMES DAILY
Qty: 180 CAPSULE | Refills: 0 | Status: SHIPPED | OUTPATIENT
Start: 2025-08-05

## 2025-08-11 RX ORDER — IBUPROFEN 600 MG/1
TABLET, FILM COATED ORAL
Qty: 270 TABLET | Refills: 0 | Status: SHIPPED | OUTPATIENT
Start: 2025-08-11

## 2025-08-25 ENCOUNTER — TELEPHONE (OUTPATIENT)
Dept: FAMILY MEDICINE CLINIC | Facility: CLINIC | Age: 70
End: 2025-08-25
Payer: OTHER GOVERNMENT

## 2025-08-29 DIAGNOSIS — G47.00 INSOMNIA, UNSPECIFIED TYPE: ICD-10-CM

## 2025-08-29 RX ORDER — ZOLPIDEM TARTRATE 10 MG/1
10 TABLET ORAL NIGHTLY PRN
Qty: 28 TABLET | Refills: 0 | Status: SHIPPED | OUTPATIENT
Start: 2025-08-29

## (undated) DEVICE — OCTA,PERSEID,2-2-2-2-2,D-CURVE: Brand: OCTARAY MAPPING CATHETER

## (undated) DEVICE — CATHETER CONNECTION CABLE: Brand: FARASTAR™

## (undated) DEVICE — CATH DIAG IMPULSE FR4 5F 100CM

## (undated) DEVICE — STEERABLE SHEATH CLEAR: Brand: FARADRIVE™

## (undated) DEVICE — INTERFACE CABLE: Brand: CARTO 3 SYSTEM ECO INTERFACE CABLE

## (undated) DEVICE — SKIN PREP TRAY W/CHG: Brand: MEDLINE INDUSTRIES, INC.

## (undated) DEVICE — CATH DIAG IMPULSE FL4 5F 100CM

## (undated) DEVICE — GW DIAG EMERALD HEPCOAT MOVE JTIP STD .035 3MM 150CM

## (undated) DEVICE — Device: Brand: NRG TRANSSEPTAL NEEDLE

## (undated) DEVICE — Device: Brand: REFERENCE PATCH CARTO 3

## (undated) DEVICE — CATH DIAG IMPULSE PIG 5F 100CM

## (undated) DEVICE — INTRO PERFORMER CHECKFLO/LG RAD/BND NO/GW 18F .038IN 30CM

## (undated) DEVICE — PK TRY HEART CATH 50

## (undated) DEVICE — TBG IV DRIP CHAMBER MACRO SGL 72IN

## (undated) DEVICE — Device: Brand: RFP-100A CONNECTOR CABLE

## (undated) DEVICE — LN INJ CONTRST FLXCIL HP F/M LL 1200PSI72

## (undated) DEVICE — PREF.GUIDING SHEATH W/MULT.CRV: Brand: PREFACE

## (undated) DEVICE — Device: Brand: WEBSTER CS

## (undated) DEVICE — ST ACC MICROPUNCTURE STFF/CANN PLAT/TP 4F 21G 40CM

## (undated) DEVICE — PINNACLE INTRODUCER SHEATH: Brand: PINNACLE

## (undated) DEVICE — PULSED FIELD ABLATION CATHETER: Brand: FARAWAVE™

## (undated) DEVICE — ELECTRD DEFIB M/FUNC PROPADZ RADIOL 2PK

## (undated) DEVICE — Device

## (undated) DEVICE — PROVE COVER: Brand: UNBRANDED

## (undated) DEVICE — PAD E/S GRND SGL/FOIL 9FT/CORD DISP

## (undated) DEVICE — BLANKT WARM UNDER/BDY FUL/ACC A/ 90X206CM

## (undated) DEVICE — SOUNDSTAR ECO 8F G CATHETER: Brand: SOUNDSTAR

## (undated) DEVICE — GW XCHG AMPLTZ XSTIF PTFE CRV .035IN 3X180CM

## (undated) DEVICE — RADIFOCUS OBTURATOR: Brand: RADIFOCUS